# Patient Record
Sex: FEMALE | Race: WHITE | NOT HISPANIC OR LATINO | Employment: OTHER | URBAN - METROPOLITAN AREA
[De-identification: names, ages, dates, MRNs, and addresses within clinical notes are randomized per-mention and may not be internally consistent; named-entity substitution may affect disease eponyms.]

---

## 2018-03-13 ENCOUNTER — CONVERSION ENCOUNTER (OUTPATIENT)
Dept: FAMILY MEDICINE CLINIC | Facility: CLINIC | Age: 73
End: 2018-03-13

## 2018-03-13 ENCOUNTER — OFFICE VISIT CONVERTED (OUTPATIENT)
Dept: FAMILY MEDICINE CLINIC | Facility: CLINIC | Age: 73
End: 2018-03-13
Attending: NURSE PRACTITIONER

## 2018-09-20 ENCOUNTER — OFFICE VISIT CONVERTED (OUTPATIENT)
Dept: FAMILY MEDICINE CLINIC | Facility: CLINIC | Age: 73
End: 2018-09-20
Attending: NURSE PRACTITIONER

## 2018-10-02 ENCOUNTER — CONVERSION ENCOUNTER (OUTPATIENT)
Dept: FAMILY MEDICINE CLINIC | Facility: CLINIC | Age: 73
End: 2018-10-02

## 2018-10-02 ENCOUNTER — OFFICE VISIT CONVERTED (OUTPATIENT)
Dept: FAMILY MEDICINE CLINIC | Facility: CLINIC | Age: 73
End: 2018-10-02
Attending: FAMILY MEDICINE

## 2018-10-18 ENCOUNTER — CONVERSION ENCOUNTER (OUTPATIENT)
Dept: FAMILY MEDICINE CLINIC | Facility: CLINIC | Age: 73
End: 2018-10-18

## 2018-10-18 ENCOUNTER — OFFICE VISIT CONVERTED (OUTPATIENT)
Dept: FAMILY MEDICINE CLINIC | Facility: CLINIC | Age: 73
End: 2018-10-18
Attending: FAMILY MEDICINE

## 2018-10-25 ENCOUNTER — OFFICE VISIT CONVERTED (OUTPATIENT)
Dept: FAMILY MEDICINE CLINIC | Facility: CLINIC | Age: 73
End: 2018-10-25
Attending: FAMILY MEDICINE

## 2018-11-28 ENCOUNTER — OFFICE VISIT CONVERTED (OUTPATIENT)
Dept: FAMILY MEDICINE CLINIC | Facility: CLINIC | Age: 73
End: 2018-11-28
Attending: NURSE PRACTITIONER

## 2018-12-28 ENCOUNTER — CONVERSION ENCOUNTER (OUTPATIENT)
Dept: SURGERY | Facility: CLINIC | Age: 73
End: 2018-12-28

## 2018-12-28 ENCOUNTER — OFFICE VISIT CONVERTED (OUTPATIENT)
Dept: SURGERY | Facility: CLINIC | Age: 73
End: 2018-12-28
Attending: UROLOGY

## 2019-01-02 ENCOUNTER — HOSPITAL ENCOUNTER (OUTPATIENT)
Dept: PERIOP | Facility: HOSPITAL | Age: 74
Setting detail: HOSPITAL OUTPATIENT SURGERY
Discharge: HOME | End: 2019-01-02

## 2019-01-02 LAB
ANION GAP SERPL CALC-SCNC: 15 MMOL/L (ref 8–19)
BUN SERPL-MCNC: 16 MG/DL (ref 5–25)
BUN/CREAT SERPL: 15 {RATIO} (ref 6–20)
CALCIUM SERPL-MCNC: 9.5 MG/DL (ref 8.7–10.4)
CHLORIDE SERPL-SCNC: 101 MMOL/L (ref 99–111)
CONV CO2: 28 MMOL/L (ref 22–32)
CREAT UR-MCNC: 1.05 MG/DL (ref 0.5–0.9)
GFR SERPLBLD BASED ON 1.73 SQ M-ARVRAT: 53 ML/MIN/{1.73_M2}
GLUCOSE BLD-MCNC: 174 MG/DL (ref 65–99)
GLUCOSE SERPL-MCNC: 131 MG/DL (ref 65–99)
OSMOLALITY SERPL CALC.SUM OF ELEC: 291 MOSM/KG (ref 273–304)
POTASSIUM SERPL-SCNC: 5 MMOL/L (ref 3.5–5.3)
SODIUM SERPL-SCNC: 139 MMOL/L (ref 135–147)

## 2019-01-07 ENCOUNTER — HOSPITAL ENCOUNTER (OUTPATIENT)
Dept: SURGERY | Facility: CLINIC | Age: 74
Discharge: HOME | End: 2019-01-07

## 2019-01-09 ENCOUNTER — HOSPITAL ENCOUNTER (OUTPATIENT)
Dept: OTHER | Facility: HOSPITAL | Age: 74
Discharge: HOME | End: 2019-01-09
Attending: NURSE PRACTITIONER

## 2019-01-09 LAB
BACTERIA UR CULT: ABNORMAL
CEFEPIME SUSC ISLT: 2
CEFTAZIDIME SUSC ISLT: 4
CIPROFLOXACIN SUSC ISLT: <=0.25
GENTAMICIN SUSC ISLT: <=1
INR PPP: 2.05 (ref 2–3)
LEVOFLOXACIN SUSC ISLT: 1
PROTHROMBIN TIME: 19.4 S (ref 9.4–12)
TOBRAMYCIN SUSC ISLT: <=1

## 2019-01-14 ENCOUNTER — HOSPITAL ENCOUNTER (OUTPATIENT)
Dept: SURGERY | Facility: CLINIC | Age: 74
Discharge: HOME | End: 2019-01-14

## 2019-01-16 ENCOUNTER — OFFICE VISIT CONVERTED (OUTPATIENT)
Dept: FAMILY MEDICINE CLINIC | Facility: CLINIC | Age: 74
End: 2019-01-16
Attending: NURSE PRACTITIONER

## 2019-01-17 LAB
AMPICILLIN SUSC ISLT: <=2
BACTERIA UR CULT: ABNORMAL
CIPROFLOXACIN SUSC ISLT: >=8
CONV GENTAMICIN HIGH LEVEL SYNERGY: ABNORMAL
CONV STREPTOMYCIN HIGH LEVEL SYNERGY: ABNORMAL
DAPTOMYCIN SUSC ISLT: 4
ERYTHROMYCIN SUSC ISLT: >=8
LEVOFLOXACIN SUSC ISLT: >=8
LINEZOLID SUSC ISLT: 2
NITROFURANTOIN SUSC ISLT: <=16
TETRACYCLINE SUSC ISLT: >=16
TIGECYCLINE SUSC ISLT: <=0.12
VANCOMYCIN SUSC ISLT: 1

## 2019-01-28 ENCOUNTER — HOSPITAL ENCOUNTER (OUTPATIENT)
Dept: SURGERY | Facility: CLINIC | Age: 74
Discharge: HOME | End: 2019-01-28

## 2019-01-30 LAB — BACTERIA UR CULT: NORMAL

## 2019-01-31 ENCOUNTER — HOSPITAL ENCOUNTER (OUTPATIENT)
Dept: PERIOP | Facility: HOSPITAL | Age: 74
Setting detail: HOSPITAL OUTPATIENT SURGERY
Discharge: HOME | End: 2019-01-31

## 2019-01-31 LAB
GLUCOSE BLD-MCNC: 116 MG/DL (ref 65–99)
GLUCOSE BLD-MCNC: 130 MG/DL (ref 65–99)

## 2019-02-08 LAB
COLOR STONE: NORMAL
COMPN STONE: NORMAL
CONV CA OXALATE DIHYDRATE: 15 %
CONV CA OXALATE MONOHYDRATE: 65 %
CONV CALCIUM PHOSPHATE: 20 %
CONV CALCULI COMMENT: NORMAL
CONV CALCULI DISCLAIMER: NORMAL
CONV CALCULI NOTE: NORMAL
NIDUS STONE QL: NORMAL
SIZE STONE: NORMAL MM
WT STONE: 27.4 MG

## 2019-02-22 ENCOUNTER — OFFICE VISIT CONVERTED (OUTPATIENT)
Dept: SURGERY | Facility: CLINIC | Age: 74
End: 2019-02-22
Attending: UROLOGY

## 2019-02-27 ENCOUNTER — HOSPITAL ENCOUNTER (OUTPATIENT)
Dept: GENERAL RADIOLOGY | Facility: HOSPITAL | Age: 74
Discharge: HOME | End: 2019-02-27

## 2019-03-06 ENCOUNTER — OFFICE VISIT CONVERTED (OUTPATIENT)
Dept: GASTROENTEROLOGY | Facility: CLINIC | Age: 74
End: 2019-03-06
Attending: INTERNAL MEDICINE

## 2019-03-06 ENCOUNTER — CONVERSION ENCOUNTER (OUTPATIENT)
Dept: GASTROENTEROLOGY | Facility: CLINIC | Age: 74
End: 2019-03-06

## 2019-03-08 ENCOUNTER — OFFICE VISIT CONVERTED (OUTPATIENT)
Dept: SURGERY | Facility: CLINIC | Age: 74
End: 2019-03-08
Attending: UROLOGY

## 2019-04-03 ENCOUNTER — HOSPITAL ENCOUNTER (OUTPATIENT)
Dept: GENERAL RADIOLOGY | Facility: HOSPITAL | Age: 74
Discharge: HOME | End: 2019-04-03
Attending: INTERNAL MEDICINE

## 2019-04-03 LAB
CREAT BLD-MCNC: 1.1 MG/DL (ref 0.6–1.4)
GFR SERPLBLD BASED ON 1.73 SQ M-ARVRAT: 50 ML/MIN/{1.73_M2}

## 2019-04-09 ENCOUNTER — HOSPITAL ENCOUNTER (OUTPATIENT)
Dept: FAMILY MEDICINE CLINIC | Facility: CLINIC | Age: 74
Discharge: HOME | End: 2019-04-09
Attending: NURSE PRACTITIONER

## 2019-04-09 LAB
ALBUMIN SERPL-MCNC: 3.7 G/DL (ref 3.5–5)
ALBUMIN/GLOB SERPL: 1 {RATIO} (ref 1.4–2.6)
ALP SERPL-CCNC: 68 U/L (ref 43–160)
ALT SERPL-CCNC: 13 U/L (ref 10–40)
ANION GAP SERPL CALC-SCNC: 17 MMOL/L (ref 8–19)
APPEARANCE UR: CLEAR
AST SERPL-CCNC: 23 U/L (ref 15–50)
BASOPHILS # BLD AUTO: 0.05 10*3/UL (ref 0–0.2)
BASOPHILS NFR BLD AUTO: 0.8 % (ref 0–3)
BILIRUB SERPL-MCNC: 0.54 MG/DL (ref 0.2–1.3)
BILIRUB UR QL: NEGATIVE
BUN SERPL-MCNC: 13 MG/DL (ref 5–25)
BUN/CREAT SERPL: 12 {RATIO} (ref 6–20)
CALCIUM SERPL-MCNC: 9.6 MG/DL (ref 8.7–10.4)
CHLORIDE SERPL-SCNC: 99 MMOL/L (ref 99–111)
CHOLEST SERPL-MCNC: 192 MG/DL (ref 107–200)
CHOLEST/HDLC SERPL: 3.3 {RATIO} (ref 3–6)
COLOR UR: ABNORMAL
CONV ABS IMM GRAN: 0.01 10*3/UL (ref 0–0.2)
CONV BACTERIA: ABNORMAL
CONV CO2: 28 MMOL/L (ref 22–32)
CONV COLLECTION SOURCE (UA): ABNORMAL
CONV CREATININE URINE, RANDOM: 175.2 MG/DL (ref 10–300)
CONV IMMATURE GRAN: 0.2 % (ref 0–1.8)
CONV MICROALBUM.,U,RANDOM: 14.7 MG/L (ref 0–20)
CONV TOTAL PROTEIN: 7.4 G/DL (ref 6.3–8.2)
CONV UROBILINOGEN IN URINE BY AUTOMATED TEST STRIP: 0.2 {EHRLICHU}/DL (ref 0.1–1)
CREAT UR-MCNC: 1.12 MG/DL (ref 0.5–0.9)
DEPRECATED RDW RBC AUTO: 51.2 FL (ref 36.4–46.3)
EOSINOPHIL # BLD AUTO: 0.2 10*3/UL (ref 0–0.7)
EOSINOPHIL # BLD AUTO: 3.4 % (ref 0–7)
ERYTHROCYTE [DISTWIDTH] IN BLOOD BY AUTOMATED COUNT: 15.4 % (ref 11.7–14.4)
EST. AVERAGE GLUCOSE BLD GHB EST-MCNC: 120 MG/DL
GFR SERPLBLD BASED ON 1.73 SQ M-ARVRAT: 49 ML/MIN/{1.73_M2}
GLOBULIN UR ELPH-MCNC: 3.7 G/DL (ref 2–3.5)
GLUCOSE SERPL-MCNC: 99 MG/DL (ref 65–99)
GLUCOSE UR QL: NEGATIVE MG/DL
HBA1C MFR BLD: 13.1 G/DL (ref 12–16)
HBA1C MFR BLD: 5.8 % (ref 3.5–5.7)
HCT VFR BLD AUTO: 43.6 % (ref 37–47)
HDLC SERPL-MCNC: 59 MG/DL (ref 40–60)
HGB UR QL STRIP: ABNORMAL
KETONES UR QL STRIP: NEGATIVE MG/DL
LDLC SERPL CALC-MCNC: 109 MG/DL (ref 70–100)
LEUKOCYTE ESTERASE UR QL STRIP: ABNORMAL
LYMPHOCYTES # BLD AUTO: 1.7 10*3/UL (ref 1–5)
MCH RBC QN AUTO: 27.2 PG (ref 27–31)
MCHC RBC AUTO-ENTMCNC: 30 G/DL (ref 33–37)
MCV RBC AUTO: 90.6 FL (ref 81–99)
MICROALBUMIN/CREAT UR: 8.4 MG/G{CRE} (ref 0–35)
MONOCYTES # BLD AUTO: 0.58 10*3/UL (ref 0.2–1.2)
MONOCYTES NFR BLD AUTO: 9.8 % (ref 3–10)
NEUTROPHILS # BLD AUTO: 3.4 10*3/UL (ref 2–8)
NEUTROPHILS NFR BLD AUTO: 57.2 % (ref 30–85)
NITRITE UR QL STRIP: NEGATIVE
NRBC CBCN: 0 % (ref 0–0.7)
OSMOLALITY SERPL CALC.SUM OF ELEC: 290 MOSM/KG (ref 273–304)
PH UR STRIP.AUTO: 6.5 [PH] (ref 5–8)
PLATELET # BLD AUTO: 211 10*3/UL (ref 130–400)
PMV BLD AUTO: 10.8 FL (ref 9.4–12.3)
POTASSIUM SERPL-SCNC: 4.1 MMOL/L (ref 3.5–5.3)
PROT UR QL: NEGATIVE MG/DL
RBC # BLD AUTO: 4.81 10*6/UL (ref 4.2–5.4)
RBC #/AREA URNS HPF: ABNORMAL /[HPF]
SODIUM SERPL-SCNC: 140 MMOL/L (ref 135–147)
SP GR UR: 1.02 (ref 1–1.03)
SQUAMOUS SPT QL MICRO: ABNORMAL /[HPF]
TRIGL SERPL-MCNC: 122 MG/DL (ref 40–150)
VARIANT LYMPHS NFR BLD MANUAL: 28.6 % (ref 20–45)
VLDLC SERPL-MCNC: 24 MG/DL (ref 5–37)
WBC # BLD AUTO: 5.94 10*3/UL (ref 4.8–10.8)
WBC #/AREA URNS HPF: ABNORMAL /[HPF]

## 2019-04-12 LAB — BACTERIA UR CULT: NORMAL

## 2019-06-21 ENCOUNTER — HOSPITAL ENCOUNTER (OUTPATIENT)
Dept: OTHER | Facility: HOSPITAL | Age: 74
Discharge: HOME | End: 2019-06-21
Attending: NURSE PRACTITIONER

## 2019-06-21 LAB
INR PPP: 2.55 (ref 2–3)
PROTHROMBIN TIME: 23.8 S (ref 9.4–12)

## 2019-07-05 ENCOUNTER — OFFICE VISIT CONVERTED (OUTPATIENT)
Dept: FAMILY MEDICINE CLINIC | Facility: CLINIC | Age: 74
End: 2019-07-05
Attending: NURSE PRACTITIONER

## 2019-07-05 ENCOUNTER — HOSPITAL ENCOUNTER (OUTPATIENT)
Dept: FAMILY MEDICINE CLINIC | Facility: CLINIC | Age: 74
Discharge: HOME | End: 2019-07-05
Attending: NURSE PRACTITIONER

## 2019-07-05 ENCOUNTER — CONVERSION ENCOUNTER (OUTPATIENT)
Dept: FAMILY MEDICINE CLINIC | Facility: CLINIC | Age: 74
End: 2019-07-05

## 2019-07-25 ENCOUNTER — HOSPITAL ENCOUNTER (OUTPATIENT)
Dept: OTHER | Facility: HOSPITAL | Age: 74
Discharge: HOME | End: 2019-07-25
Attending: NURSE PRACTITIONER

## 2019-07-25 LAB
INR PPP: 2.34 (ref 2–3)
PROTHROMBIN TIME: 22 S (ref 9.4–12)

## 2019-07-30 ENCOUNTER — OFFICE VISIT CONVERTED (OUTPATIENT)
Dept: ORTHOPEDIC SURGERY | Facility: CLINIC | Age: 74
End: 2019-07-30
Attending: ORTHOPAEDIC SURGERY

## 2019-07-30 ENCOUNTER — CONVERSION ENCOUNTER (OUTPATIENT)
Dept: ORTHOPEDIC SURGERY | Facility: CLINIC | Age: 74
End: 2019-07-30

## 2019-09-03 ENCOUNTER — HOSPITAL ENCOUNTER (OUTPATIENT)
Dept: OTHER | Facility: HOSPITAL | Age: 74
Discharge: HOME | End: 2019-09-03
Attending: NURSE PRACTITIONER

## 2019-09-03 LAB
INR PPP: 2.1 (ref 2–3)
PROTHROMBIN TIME: 19.9 S (ref 9.4–12)

## 2019-10-14 ENCOUNTER — HOSPITAL ENCOUNTER (OUTPATIENT)
Dept: OTHER | Facility: HOSPITAL | Age: 74
Discharge: HOME | End: 2019-10-14
Attending: NURSE PRACTITIONER

## 2019-10-14 LAB
INR PPP: 2.62 (ref 2–3)
PROTHROMBIN TIME: 25.5 S (ref 9.4–12)

## 2019-11-01 ENCOUNTER — HOSPITAL ENCOUNTER (OUTPATIENT)
Dept: FAMILY MEDICINE CLINIC | Facility: CLINIC | Age: 74
Discharge: HOME | End: 2019-11-01
Attending: INTERNAL MEDICINE

## 2019-11-01 ENCOUNTER — OFFICE VISIT CONVERTED (OUTPATIENT)
Dept: FAMILY MEDICINE CLINIC | Facility: CLINIC | Age: 74
End: 2019-11-01
Attending: NURSE PRACTITIONER

## 2019-11-01 LAB
ALBUMIN SERPL-MCNC: 4 G/DL (ref 3.5–5)
ALBUMIN/GLOB SERPL: 1.1 {RATIO} (ref 1.4–2.6)
ALP SERPL-CCNC: 63 U/L (ref 43–160)
ALT SERPL-CCNC: 14 U/L (ref 10–40)
ANION GAP SERPL CALC-SCNC: 21 MMOL/L (ref 8–19)
AST SERPL-CCNC: 31 U/L (ref 15–50)
BASOPHILS # BLD AUTO: 0.06 10*3/UL (ref 0–0.2)
BASOPHILS NFR BLD AUTO: 1 % (ref 0–3)
BILIRUB SERPL-MCNC: 0.53 MG/DL (ref 0.2–1.3)
BUN SERPL-MCNC: 12 MG/DL (ref 5–25)
BUN/CREAT SERPL: 11 {RATIO} (ref 6–20)
CALCIUM SERPL-MCNC: 9.9 MG/DL (ref 8.7–10.4)
CHLORIDE SERPL-SCNC: 102 MMOL/L (ref 99–111)
CHOLEST SERPL-MCNC: 190 MG/DL (ref 107–200)
CHOLEST/HDLC SERPL: 3.3 {RATIO} (ref 3–6)
CONV ABS IMM GRAN: 0.01 10*3/UL (ref 0–0.2)
CONV CO2: 24 MMOL/L (ref 22–32)
CONV CREATININE URINE, RANDOM: 101.9 MG/DL (ref 10–300)
CONV CREATININE URINE, RANDOM: 107.1 MG/DL (ref 10–300)
CONV IMMATURE GRAN: 0.2 % (ref 0–1.8)
CONV MICROALBUM.,U,RANDOM: 94.6 MG/L (ref 0–20)
CONV PROTEIN TO CREATININE RATIO (RANDOM URINE): 0.33 {RATIO} (ref 0–0.1)
CONV TOTAL PROTEIN: 7.6 G/DL (ref 6.3–8.2)
CREAT UR-MCNC: 1.06 MG/DL (ref 0.5–0.9)
DEPRECATED RDW RBC AUTO: 51.7 FL (ref 36.4–46.3)
EOSINOPHIL # BLD AUTO: 0.19 10*3/UL (ref 0–0.7)
EOSINOPHIL # BLD AUTO: 3.2 % (ref 0–7)
ERYTHROCYTE [DISTWIDTH] IN BLOOD BY AUTOMATED COUNT: 15.3 % (ref 11.7–14.4)
EST. AVERAGE GLUCOSE BLD GHB EST-MCNC: 123 MG/DL
GFR SERPLBLD BASED ON 1.73 SQ M-ARVRAT: 52 ML/MIN/{1.73_M2}
GLOBULIN UR ELPH-MCNC: 3.6 G/DL (ref 2–3.5)
GLUCOSE SERPL-MCNC: 129 MG/DL (ref 65–99)
HBA1C MFR BLD: 5.9 % (ref 3.5–5.7)
HCT VFR BLD AUTO: 46.6 % (ref 37–47)
HDLC SERPL-MCNC: 58 MG/DL (ref 40–60)
HGB BLD-MCNC: 14.4 G/DL (ref 12–16)
LDLC SERPL CALC-MCNC: 109 MG/DL (ref 70–100)
LYMPHOCYTES # BLD AUTO: 1.36 10*3/UL (ref 1–5)
LYMPHOCYTES NFR BLD AUTO: 22.6 % (ref 20–45)
MCH RBC QN AUTO: 28.6 PG (ref 27–31)
MCHC RBC AUTO-ENTMCNC: 30.9 G/DL (ref 33–37)
MCV RBC AUTO: 92.5 FL (ref 81–99)
MICROALBUMIN/CREAT UR: 92.8 MG/G{CRE} (ref 0–35)
MONOCYTES # BLD AUTO: 0.65 10*3/UL (ref 0.2–1.2)
MONOCYTES NFR BLD AUTO: 10.8 % (ref 3–10)
NEUTROPHILS # BLD AUTO: 3.75 10*3/UL (ref 2–8)
NEUTROPHILS NFR BLD AUTO: 62.2 % (ref 30–85)
NRBC CBCN: 0 % (ref 0–0.7)
OSMOLALITY SERPL CALC.SUM OF ELEC: 295 MOSM/KG (ref 273–304)
PLATELET # BLD AUTO: 214 10*3/UL (ref 130–400)
PMV BLD AUTO: 10.5 FL (ref 9.4–12.3)
POTASSIUM SERPL-SCNC: 5.1 MMOL/L (ref 3.5–5.3)
PROT UR-MCNC: 35.8 MG/DL
RBC # BLD AUTO: 5.04 10*6/UL (ref 4.2–5.4)
SODIUM SERPL-SCNC: 142 MMOL/L (ref 135–147)
T4 FREE SERPL-MCNC: 1.1 NG/DL (ref 0.9–1.8)
TRIGL SERPL-MCNC: 114 MG/DL (ref 40–150)
TSH SERPL-ACNC: 2.82 M[IU]/L (ref 0.27–4.2)
VLDLC SERPL-MCNC: 23 MG/DL (ref 5–37)
WBC # BLD AUTO: 6.02 10*3/UL (ref 4.8–10.8)

## 2019-11-03 LAB
AMPICILLIN SUSC ISLT: 16
AMPICILLIN+SULBAC SUSC ISLT: 16
BACTERIA UR CULT: ABNORMAL
CEFAZOLIN SUSC ISLT: >=64
CEFEPIME SUSC ISLT: <=1
CEFTAZIDIME SUSC ISLT: <=1
CEFTRIAXONE SUSC ISLT: <=1
CEFUROXIME ORAL SUSC ISLT: <=1
CEFUROXIME PARENTER SUSC ISLT: <=1
CIPROFLOXACIN SUSC ISLT: <=0.25
ERTAPENEM SUSC ISLT: <=0.5
GENTAMICIN SUSC ISLT: 2
LEVOFLOXACIN SUSC ISLT: <=0.12
NITROFURANTOIN SUSC ISLT: 256
TETRACYCLINE SUSC ISLT: >=16
TMP SMX SUSC ISLT: <=20
TOBRAMYCIN SUSC ISLT: 2

## 2019-12-03 ENCOUNTER — HOSPITAL ENCOUNTER (OUTPATIENT)
Dept: OTHER | Facility: HOSPITAL | Age: 74
Discharge: HOME | End: 2019-12-03
Attending: NURSE PRACTITIONER

## 2019-12-03 LAB
INR PPP: 3.59 (ref 2–3)
PROTHROMBIN TIME: 34.8 S (ref 9.4–12)

## 2019-12-10 ENCOUNTER — HOSPITAL ENCOUNTER (OUTPATIENT)
Dept: OTHER | Facility: HOSPITAL | Age: 74
Discharge: HOME | End: 2019-12-10
Attending: NURSE PRACTITIONER

## 2019-12-10 LAB
APPEARANCE UR: ABNORMAL
BILIRUB UR QL: NEGATIVE
COLOR UR: YELLOW
CONV BACTERIA: ABNORMAL
CONV COLLECTION SOURCE (UA): ABNORMAL
CONV CRYSTALS: ABNORMAL /[HPF]
CONV HYALINE CASTS IN URINE MICRO: ABNORMAL /[LPF]
CONV UROBILINOGEN IN URINE BY AUTOMATED TEST STRIP: 1 {EHRLICHU}/DL (ref 0.1–1)
GLUCOSE UR QL: NEGATIVE MG/DL
HGB UR QL STRIP: ABNORMAL
INR PPP: 2.28 (ref 2–3)
KETONES UR QL STRIP: NEGATIVE MG/DL
LEUKOCYTE ESTERASE UR QL STRIP: ABNORMAL
NITRITE UR QL STRIP: NEGATIVE
PH UR STRIP.AUTO: 6.5 [PH] (ref 5–8)
PROT UR QL: 30 MG/DL
PROTHROMBIN TIME: 22.4 S (ref 9.4–12)
RBC #/AREA URNS HPF: ABNORMAL /[HPF]
SP GR UR: 1.02 (ref 1–1.03)
SQUAMOUS SPT QL MICRO: ABNORMAL /[HPF]
WBC #/AREA URNS HPF: ABNORMAL /[HPF]

## 2019-12-12 LAB
AMOXICILLIN+CLAV SUSC ISLT: <=2
AMPICILLIN SUSC ISLT: >=32
AMPICILLIN+SULBAC SUSC ISLT: <=2
BACTERIA UR CULT: ABNORMAL
CEFAZOLIN SUSC ISLT: <=4
CEFEPIME SUSC ISLT: <=1
CEFTAZIDIME SUSC ISLT: <=1
CEFTRIAXONE SUSC ISLT: <=1
CEFUROXIME ORAL SUSC ISLT: <=1
CEFUROXIME PARENTER SUSC ISLT: <=1
CIPROFLOXACIN SUSC ISLT: <=0.25
ERTAPENEM SUSC ISLT: <=0.5
GENTAMICIN SUSC ISLT: <=1
LEVOFLOXACIN SUSC ISLT: 0.25
NITROFURANTOIN SUSC ISLT: 128
TETRACYCLINE SUSC ISLT: >=16
TMP SMX SUSC ISLT: >=320
TOBRAMYCIN SUSC ISLT: <=1

## 2020-01-21 ENCOUNTER — HOSPITAL ENCOUNTER (OUTPATIENT)
Dept: OTHER | Facility: HOSPITAL | Age: 75
Discharge: HOME | End: 2020-01-21
Attending: NURSE PRACTITIONER

## 2020-01-21 LAB
INR PPP: 2.2 (ref 2–3)
PROTHROMBIN TIME: 21.6 S (ref 9.4–12)

## 2020-01-28 ENCOUNTER — OFFICE VISIT CONVERTED (OUTPATIENT)
Dept: FAMILY MEDICINE CLINIC | Facility: CLINIC | Age: 75
End: 2020-01-28
Attending: NURSE PRACTITIONER

## 2020-03-11 ENCOUNTER — HOSPITAL ENCOUNTER (OUTPATIENT)
Dept: OTHER | Facility: HOSPITAL | Age: 75
Discharge: HOME | End: 2020-03-11
Attending: NURSE PRACTITIONER

## 2020-03-11 LAB
INR PPP: 2.12 (ref 2–3)
PROTHROMBIN TIME: 20.9 S (ref 9.4–12)

## 2020-04-17 ENCOUNTER — HOSPITAL ENCOUNTER (OUTPATIENT)
Dept: FAMILY MEDICINE CLINIC | Facility: CLINIC | Age: 75
Discharge: HOME | End: 2020-04-17
Attending: NURSE PRACTITIONER

## 2020-04-17 LAB
INR PPP: 2.44 (ref 2–3)
PROTHROMBIN TIME: 23.9 S (ref 9.4–12)

## 2020-04-24 ENCOUNTER — TELEPHONE CONVERTED (OUTPATIENT)
Dept: FAMILY MEDICINE CLINIC | Facility: CLINIC | Age: 75
End: 2020-04-24
Attending: NURSE PRACTITIONER

## 2020-06-01 ENCOUNTER — HOSPITAL ENCOUNTER (OUTPATIENT)
Dept: FAMILY MEDICINE CLINIC | Facility: CLINIC | Age: 75
Discharge: HOME | End: 2020-06-01
Attending: NURSE PRACTITIONER

## 2020-06-01 LAB
INR PPP: 3.52 (ref 2–3)
PROTHROMBIN TIME: 34.1 S (ref 9.4–12)

## 2020-06-30 ENCOUNTER — HOSPITAL ENCOUNTER (OUTPATIENT)
Dept: FAMILY MEDICINE CLINIC | Facility: CLINIC | Age: 75
Discharge: HOME | End: 2020-06-30
Attending: NURSE PRACTITIONER

## 2020-06-30 ENCOUNTER — OFFICE VISIT CONVERTED (OUTPATIENT)
Dept: FAMILY MEDICINE CLINIC | Facility: CLINIC | Age: 75
End: 2020-06-30
Attending: NURSE PRACTITIONER

## 2020-06-30 LAB
ALBUMIN SERPL-MCNC: 4 G/DL (ref 3.5–5)
ALBUMIN/GLOB SERPL: 1.1 {RATIO} (ref 1.4–2.6)
ALP SERPL-CCNC: 63 U/L (ref 43–160)
ALT SERPL-CCNC: 10 U/L (ref 10–40)
ANION GAP SERPL CALC-SCNC: 22 MMOL/L (ref 8–19)
APPEARANCE UR: ABNORMAL
AST SERPL-CCNC: 24 U/L (ref 15–50)
BASOPHILS # BLD AUTO: 0.08 10*3/UL (ref 0–0.2)
BASOPHILS NFR BLD AUTO: 1.3 % (ref 0–3)
BILIRUB SERPL-MCNC: 0.76 MG/DL (ref 0.2–1.3)
BILIRUB UR QL: NEGATIVE
BUN SERPL-MCNC: 13 MG/DL (ref 5–25)
BUN/CREAT SERPL: 10 {RATIO} (ref 6–20)
CALCIUM SERPL-MCNC: 10.4 MG/DL (ref 8.7–10.4)
CHLORIDE SERPL-SCNC: 98 MMOL/L (ref 99–111)
CHOLEST SERPL-MCNC: 152 MG/DL (ref 107–200)
CHOLEST/HDLC SERPL: 2.8 {RATIO} (ref 3–6)
COLOR UR: YELLOW
CONV ABS IMM GRAN: 0.01 10*3/UL (ref 0–0.2)
CONV BACTERIA: ABNORMAL
CONV CO2: 26 MMOL/L (ref 22–32)
CONV COLLECTION SOURCE (UA): ABNORMAL
CONV HYALINE CASTS IN URINE MICRO: ABNORMAL /[LPF]
CONV IMMATURE GRAN: 0.2 % (ref 0–1.8)
CONV TOTAL PROTEIN: 7.7 G/DL (ref 6.3–8.2)
CONV UROBILINOGEN IN URINE BY AUTOMATED TEST STRIP: 0.2 {EHRLICHU}/DL (ref 0.1–1)
CREAT UR-MCNC: 1.26 MG/DL (ref 0.5–0.9)
DEPRECATED RDW RBC AUTO: 51.8 FL (ref 36.4–46.3)
EOSINOPHIL # BLD AUTO: 0.28 10*3/UL (ref 0–0.7)
EOSINOPHIL # BLD AUTO: 4.7 % (ref 0–7)
ERYTHROCYTE [DISTWIDTH] IN BLOOD BY AUTOMATED COUNT: 15.4 % (ref 11.7–14.4)
GFR SERPLBLD BASED ON 1.73 SQ M-ARVRAT: 42 ML/MIN/{1.73_M2}
GLOBULIN UR ELPH-MCNC: 3.7 G/DL (ref 2–3.5)
GLUCOSE SERPL-MCNC: 101 MG/DL (ref 65–99)
GLUCOSE UR QL: NEGATIVE MG/DL
HCT VFR BLD AUTO: 45.1 % (ref 37–47)
HDLC SERPL-MCNC: 54 MG/DL (ref 40–60)
HGB BLD-MCNC: 13.9 G/DL (ref 12–16)
HGB UR QL STRIP: ABNORMAL
KETONES UR QL STRIP: NEGATIVE MG/DL
LDLC SERPL CALC-MCNC: 76 MG/DL (ref 70–100)
LEUKOCYTE ESTERASE UR QL STRIP: ABNORMAL
LYMPHOCYTES # BLD AUTO: 1.57 10*3/UL (ref 1–5)
LYMPHOCYTES NFR BLD AUTO: 26.2 % (ref 20–45)
MCH RBC QN AUTO: 28.6 PG (ref 27–31)
MCHC RBC AUTO-ENTMCNC: 30.8 G/DL (ref 33–37)
MCV RBC AUTO: 92.8 FL (ref 81–99)
MONOCYTES # BLD AUTO: 0.65 10*3/UL (ref 0.2–1.2)
MONOCYTES NFR BLD AUTO: 10.8 % (ref 3–10)
NEUTROPHILS # BLD AUTO: 3.41 10*3/UL (ref 2–8)
NEUTROPHILS NFR BLD AUTO: 56.8 % (ref 30–85)
NITRITE UR QL STRIP: NEGATIVE
NRBC CBCN: 0 % (ref 0–0.7)
OSMOLALITY SERPL CALC.SUM OF ELEC: 292 MOSM/KG (ref 273–304)
PH UR STRIP.AUTO: 7.5 [PH] (ref 5–8)
PLATELET # BLD AUTO: 188 10*3/UL (ref 130–400)
PMV BLD AUTO: 10.8 FL (ref 9.4–12.3)
POTASSIUM SERPL-SCNC: 4.7 MMOL/L (ref 3.5–5.3)
PROT UR QL: ABNORMAL MG/DL
RBC # BLD AUTO: 4.86 10*6/UL (ref 4.2–5.4)
RBC #/AREA URNS HPF: ABNORMAL /[HPF]
SODIUM SERPL-SCNC: 141 MMOL/L (ref 135–147)
SP GR UR: 1.01 (ref 1–1.03)
TRIGL SERPL-MCNC: 108 MG/DL (ref 40–150)
VLDLC SERPL-MCNC: 22 MG/DL (ref 5–37)
WBC # BLD AUTO: 6 10*3/UL (ref 4.8–10.8)
WBC #/AREA URNS HPF: ABNORMAL /[HPF]

## 2020-07-01 LAB
EST. AVERAGE GLUCOSE BLD GHB EST-MCNC: 117 MG/DL
HBA1C MFR BLD: 5.7 % (ref 3.5–5.7)

## 2020-07-02 LAB
AMPHETAMINES UR QL SCN: NEGATIVE
BARBITURATES UR QL SCN: NEGATIVE
BENZODIAZ UR QL SCN: NEGATIVE
CONV COCAINE, UR: NEGATIVE
CONV CREATININE URINE, RANDOM: 87.5 MG/DL (ref 10–300)
CONV MICROALBUM.,U,RANDOM: 56.1 MG/L (ref 0–20)
METHADONE UR QL SCN: NEGATIVE
MICROALBUMIN/CREAT UR: 64.1 MG/G{CRE} (ref 0–35)
OPIATES TESTED UR SCN: NEGATIVE
OXYCODONE UR QL SCN: NEGATIVE
PCP UR QL: NEGATIVE
THC SERPLBLD CFM-MCNC: NEGATIVE NG/ML

## 2020-07-24 ENCOUNTER — HOSPITAL ENCOUNTER (OUTPATIENT)
Dept: FAMILY MEDICINE CLINIC | Facility: CLINIC | Age: 75
Discharge: HOME | End: 2020-07-24
Attending: NURSE PRACTITIONER

## 2020-07-26 LAB
AMOXICILLIN+CLAV SUSC ISLT: <=2
AMPICILLIN SUSC ISLT: <=2
AMPICILLIN+SULBAC SUSC ISLT: <=2
BACTERIA UR CULT: ABNORMAL
CEFAZOLIN SUSC ISLT: <=4
CEFEPIME SUSC ISLT: <=1
CEFTAZIDIME SUSC ISLT: <=1
CEFTRIAXONE SUSC ISLT: <=1
CEFUROXIME ORAL SUSC ISLT: 4
CEFUROXIME PARENTER SUSC ISLT: 4
CIPROFLOXACIN SUSC ISLT: <=0.25
ERTAPENEM SUSC ISLT: <=0.5
GENTAMICIN SUSC ISLT: <=1
LEVOFLOXACIN SUSC ISLT: <=0.12
NITROFURANTOIN SUSC ISLT: <=16
TETRACYCLINE SUSC ISLT: <=1
TMP SMX SUSC ISLT: <=20
TOBRAMYCIN SUSC ISLT: <=1

## 2020-08-06 ENCOUNTER — HOSPITAL ENCOUNTER (OUTPATIENT)
Dept: FAMILY MEDICINE CLINIC | Facility: CLINIC | Age: 75
Discharge: HOME | End: 2020-08-06
Attending: NURSE PRACTITIONER

## 2020-08-08 LAB
AMOXICILLIN+CLAV SUSC ISLT: <=2
AMPICILLIN SUSC ISLT: <=2
AMPICILLIN+SULBAC SUSC ISLT: <=2
BACTERIA UR CULT: ABNORMAL
CEFAZOLIN SUSC ISLT: <=4
CEFEPIME SUSC ISLT: <=1
CEFTAZIDIME SUSC ISLT: <=1
CEFTRIAXONE SUSC ISLT: <=1
CEFUROXIME ORAL SUSC ISLT: 4
CEFUROXIME PARENTER SUSC ISLT: 4
CIPROFLOXACIN SUSC ISLT: <=0.25
ERTAPENEM SUSC ISLT: <=0.5
GENTAMICIN SUSC ISLT: <=1
LEVOFLOXACIN SUSC ISLT: <=0.12
NITROFURANTOIN SUSC ISLT: 64
TETRACYCLINE SUSC ISLT: <=1
TMP SMX SUSC ISLT: <=20
TOBRAMYCIN SUSC ISLT: <=1

## 2020-09-22 ENCOUNTER — OFFICE VISIT CONVERTED (OUTPATIENT)
Dept: FAMILY MEDICINE CLINIC | Facility: CLINIC | Age: 75
End: 2020-09-22
Attending: NURSE PRACTITIONER

## 2020-09-22 ENCOUNTER — HOSPITAL ENCOUNTER (OUTPATIENT)
Dept: FAMILY MEDICINE CLINIC | Facility: CLINIC | Age: 75
Discharge: HOME | End: 2020-09-22
Attending: NURSE PRACTITIONER

## 2020-09-22 LAB
ALBUMIN SERPL-MCNC: 3 G/DL (ref 3.5–5)
ALBUMIN/GLOB SERPL: 0.8 {RATIO} (ref 1.4–2.6)
ALP SERPL-CCNC: 78 U/L (ref 43–160)
ALT SERPL-CCNC: 13 U/L (ref 10–40)
ANION GAP SERPL CALC-SCNC: 17 MMOL/L (ref 8–19)
AST SERPL-CCNC: 36 U/L (ref 15–50)
BASOPHILS # BLD AUTO: 0.05 10*3/UL (ref 0–0.2)
BASOPHILS NFR BLD AUTO: 0.7 % (ref 0–3)
BILIRUB SERPL-MCNC: 0.72 MG/DL (ref 0.2–1.3)
BUN SERPL-MCNC: 21 MG/DL (ref 5–25)
BUN/CREAT SERPL: 13 {RATIO} (ref 6–20)
CALCIUM SERPL-MCNC: 10.6 MG/DL (ref 8.7–10.4)
CHLORIDE SERPL-SCNC: 91 MMOL/L (ref 99–111)
CONV ABS IMM GRAN: 0.03 10*3/UL (ref 0–0.2)
CONV CO2: 29 MMOL/L (ref 22–32)
CONV IMMATURE GRAN: 0.4 % (ref 0–1.8)
CONV TOTAL PROTEIN: 7 G/DL (ref 6.3–8.2)
CREAT UR-MCNC: 1.58 MG/DL (ref 0.5–0.9)
DEPRECATED RDW RBC AUTO: 53.7 FL (ref 36.4–46.3)
EOSINOPHIL # BLD AUTO: 0.05 10*3/UL (ref 0–0.7)
EOSINOPHIL # BLD AUTO: 0.7 % (ref 0–7)
ERYTHROCYTE [DISTWIDTH] IN BLOOD BY AUTOMATED COUNT: 15.6 % (ref 11.7–14.4)
GFR SERPLBLD BASED ON 1.73 SQ M-ARVRAT: 32 ML/MIN/{1.73_M2}
GLOBULIN UR ELPH-MCNC: 4 G/DL (ref 2–3.5)
GLUCOSE SERPL-MCNC: 157 MG/DL (ref 65–99)
HCT VFR BLD AUTO: 49.5 % (ref 37–47)
HGB BLD-MCNC: 15.6 G/DL (ref 12–16)
LYMPHOCYTES # BLD AUTO: 1.15 10*3/UL (ref 1–5)
LYMPHOCYTES NFR BLD AUTO: 16.8 % (ref 20–45)
MCH RBC QN AUTO: 29.5 PG (ref 27–31)
MCHC RBC AUTO-ENTMCNC: 31.5 G/DL (ref 33–37)
MCV RBC AUTO: 93.6 FL (ref 81–99)
MONOCYTES # BLD AUTO: 0.65 10*3/UL (ref 0.2–1.2)
MONOCYTES NFR BLD AUTO: 9.5 % (ref 3–10)
NEUTROPHILS # BLD AUTO: 4.93 10*3/UL (ref 2–8)
NEUTROPHILS NFR BLD AUTO: 71.9 % (ref 30–85)
NRBC CBCN: 0 % (ref 0–0.7)
OSMOLALITY SERPL CALC.SUM OF ELEC: 284 MOSM/KG (ref 273–304)
PLATELET # BLD AUTO: 218 10*3/UL (ref 130–400)
PMV BLD AUTO: 11 FL (ref 9.4–12.3)
POTASSIUM SERPL-SCNC: 3.4 MMOL/L (ref 3.5–5.3)
RBC # BLD AUTO: 5.29 10*6/UL (ref 4.2–5.4)
SODIUM SERPL-SCNC: 134 MMOL/L (ref 135–147)
WBC # BLD AUTO: 6.86 10*3/UL (ref 4.8–10.8)

## 2020-09-26 LAB
AMPICILLIN SUSC ISLT: >=32
AMPICILLIN+SULBAC SUSC ISLT: >=32
BACTERIA UR CULT: ABNORMAL
CEFAZOLIN SUSC ISLT: >=64
CEFEPIME SUSC ISLT: >=32
CEFTAZIDIME SUSC ISLT: 4
CEFTRIAXONE SUSC ISLT: >=64
CIPROFLOXACIN SUSC ISLT: 2
CONV ERTAPENEM SUSCEPTIBILITY BY DISK DIFFUSION (KB): 31
CONV MEROPENEM (BP): 0.05
GENTAMICIN SUSC ISLT: <=1
LEVOFLOXACIN SUSC ISLT: 4
NITROFURANTOIN SUSC ISLT: <=16
PIP+TAZO SUSC ISLT: >=128
TMP SMX SUSC ISLT: >=320
TOBRAMYCIN SUSC ISLT: <=1

## 2020-09-30 ENCOUNTER — PATIENT OUTREACH - CONVERTED (OUTPATIENT)
Dept: FAMILY MEDICINE CLINIC | Facility: CLINIC | Age: 75
End: 2020-09-30
Attending: NURSE PRACTITIONER

## 2020-10-02 ENCOUNTER — CONVERSION ENCOUNTER (OUTPATIENT)
Dept: SURGERY | Facility: CLINIC | Age: 75
End: 2020-10-02

## 2020-10-02 ENCOUNTER — OFFICE VISIT CONVERTED (OUTPATIENT)
Dept: UROLOGY | Facility: CLINIC | Age: 75
End: 2020-10-02
Attending: NURSE PRACTITIONER

## 2020-12-30 ENCOUNTER — PATIENT OUTREACH - CONVERTED (OUTPATIENT)
Dept: FAMILY MEDICINE CLINIC | Facility: CLINIC | Age: 75
End: 2020-12-30
Attending: NURSE PRACTITIONER

## 2021-01-07 ENCOUNTER — CONVERSION ENCOUNTER (OUTPATIENT)
Dept: FAMILY MEDICINE CLINIC | Facility: CLINIC | Age: 76
End: 2021-01-07

## 2021-02-08 ENCOUNTER — HOSPITAL ENCOUNTER (OUTPATIENT)
Dept: OTHER | Facility: HOSPITAL | Age: 76
Discharge: HOME | End: 2021-02-08
Attending: NURSE PRACTITIONER

## 2021-02-08 LAB
INR PPP: 2.6 (ref 2–3)
PROTHROMBIN TIME: 26.4 S (ref 9.4–12)

## 2021-02-23 ENCOUNTER — TELEMEDICINE CONVERTED (OUTPATIENT)
Dept: FAMILY MEDICINE CLINIC | Facility: CLINIC | Age: 76
End: 2021-02-23
Attending: NURSE PRACTITIONER

## 2021-02-26 ENCOUNTER — PATIENT OUTREACH - CONVERTED (OUTPATIENT)
Dept: FAMILY MEDICINE CLINIC | Facility: CLINIC | Age: 76
End: 2021-02-26
Attending: NURSE PRACTITIONER

## 2021-03-09 ENCOUNTER — HOSPITAL ENCOUNTER (OUTPATIENT)
Dept: FAMILY MEDICINE CLINIC | Facility: CLINIC | Age: 76
Discharge: HOME | End: 2021-03-09
Attending: NURSE PRACTITIONER

## 2021-03-09 ENCOUNTER — CONVERSION ENCOUNTER (OUTPATIENT)
Dept: FAMILY MEDICINE CLINIC | Facility: CLINIC | Age: 76
End: 2021-03-09

## 2021-03-09 ENCOUNTER — OFFICE VISIT CONVERTED (OUTPATIENT)
Dept: FAMILY MEDICINE CLINIC | Facility: CLINIC | Age: 76
End: 2021-03-09
Attending: NURSE PRACTITIONER

## 2021-03-09 LAB
ANION GAP SERPL CALC-SCNC: 15 MMOL/L (ref 8–19)
BUN SERPL-MCNC: 5 MG/DL (ref 5–25)
BUN/CREAT SERPL: 5 {RATIO} (ref 6–20)
CALCIUM SERPL-MCNC: 10.3 MG/DL (ref 8.7–10.4)
CHLORIDE SERPL-SCNC: 105 MMOL/L (ref 99–111)
CONV CO2: 26 MMOL/L (ref 22–32)
CREAT UR-MCNC: 1.07 MG/DL (ref 0.5–0.9)
GFR SERPLBLD BASED ON 1.73 SQ M-ARVRAT: 51 ML/MIN/{1.73_M2}
GLUCOSE SERPL-MCNC: 119 MG/DL (ref 65–99)
MAGNESIUM SERPL-MCNC: 1.53 MG/DL (ref 1.6–2.3)
OSMOLALITY SERPL CALC.SUM OF ELEC: 294 MOSM/KG (ref 273–304)
POTASSIUM SERPL-SCNC: 3.4 MMOL/L (ref 3.5–5.3)
SODIUM SERPL-SCNC: 143 MMOL/L (ref 135–147)

## 2021-03-10 ENCOUNTER — HOSPITAL ENCOUNTER (OUTPATIENT)
Dept: OTHER | Facility: HOSPITAL | Age: 76
Discharge: HOME | End: 2021-03-10
Attending: NURSE PRACTITIONER

## 2021-03-10 LAB
APPEARANCE UR: ABNORMAL
BILIRUB UR QL: NEGATIVE
COLOR UR: YELLOW
CONV BACTERIA: ABNORMAL
CONV COLLECTION SOURCE (UA): ABNORMAL
CONV HYALINE CASTS IN URINE MICRO: ABNORMAL /[LPF]
CONV UROBILINOGEN IN URINE BY AUTOMATED TEST STRIP: 0.2 {EHRLICHU}/DL (ref 0.1–1)
GLUCOSE UR QL: NEGATIVE MG/DL
HGB UR QL STRIP: ABNORMAL
KETONES UR QL STRIP: NEGATIVE MG/DL
LEUKOCYTE ESTERASE UR QL STRIP: ABNORMAL
NITRITE UR QL STRIP: POSITIVE
PH UR STRIP.AUTO: 6.5 [PH] (ref 5–8)
PROT UR QL: 30 MG/DL
RBC #/AREA URNS HPF: ABNORMAL /[HPF]
SP GR UR: 1.01 (ref 1–1.03)
SQUAMOUS SPT QL MICRO: ABNORMAL /[HPF]
WBC #/AREA URNS HPF: ABNORMAL /[HPF]

## 2021-03-13 LAB
AMPICILLIN SUSC ISLT: >=32
AMPICILLIN+SULBAC SUSC ISLT: >=32
BACTERIA UR CULT: ABNORMAL
CEFAZOLIN SUSC ISLT: >=64
CEFEPIME SUSC ISLT: >=32
CEFTAZIDIME SUSC ISLT: 4
CEFTRIAXONE SUSC ISLT: >=64
CIPROFLOXACIN SUSC ISLT: 2
CONV ERTAPENEM SUSCEPTIBILITY BY DISK DIFFUSION (KB): 20
CONV MEROPENEM (BP): 2
ERTAPENEM SUSC ISLT: 1
GENTAMICIN SUSC ISLT: <=1
IMIPENEM SUSC ISLT: <=0.25
LEVOFLOXACIN SUSC ISLT: 4
Lab: 25
NITROFURANTOIN SUSC ISLT: 64
PIP+TAZO SUSC ISLT: >=128
TMP SMX SUSC ISLT: >=320
TOBRAMYCIN SUSC ISLT: <=1

## 2021-03-29 ENCOUNTER — PATIENT OUTREACH - CONVERTED (OUTPATIENT)
Dept: FAMILY MEDICINE CLINIC | Facility: CLINIC | Age: 76
End: 2021-03-29
Attending: NURSE PRACTITIONER

## 2021-04-07 ENCOUNTER — HOSPITAL ENCOUNTER (OUTPATIENT)
Dept: OTHER | Facility: HOSPITAL | Age: 76
Discharge: HOME | End: 2021-04-07
Attending: UROLOGY

## 2021-04-07 LAB
APPEARANCE UR: ABNORMAL
BILIRUB UR QL: NEGATIVE
COLOR UR: YELLOW
CONV BACTERIA: NEGATIVE
CONV COLLECTION SOURCE (UA): ABNORMAL
CONV UROBILINOGEN IN URINE BY AUTOMATED TEST STRIP: 0.2 {EHRLICHU}/DL (ref 0.1–1)
CONV YEAST, UA: PRESENT
GLUCOSE UR QL: NEGATIVE MG/DL
HGB UR QL STRIP: ABNORMAL
KETONES UR QL STRIP: NEGATIVE MG/DL
LEUKOCYTE ESTERASE UR QL STRIP: ABNORMAL
NITRITE UR QL STRIP: NEGATIVE
PH UR STRIP.AUTO: 6.5 [PH] (ref 5–8)
PROT UR QL: 100 MG/DL
RBC #/AREA URNS HPF: ABNORMAL /[HPF]
SP GR UR: 1.01 (ref 1–1.03)
WBC #/AREA URNS HPF: ABNORMAL /[HPF]

## 2021-04-09 ENCOUNTER — HOSPITAL ENCOUNTER (OUTPATIENT)
Dept: PREADMISSION TESTING | Facility: HOSPITAL | Age: 76
Discharge: HOME | End: 2021-04-09
Attending: UROLOGY

## 2021-04-10 LAB
BACTERIA UR CULT: ABNORMAL
CEFEPIME SUSC ISLT: 2
CEFTAZIDIME SUSC ISLT: 4
CIPROFLOXACIN SUSC ISLT: <=0.25
GENTAMICIN SUSC ISLT: <=1
LEVOFLOXACIN SUSC ISLT: 0.5
PIP+TAZO SUSC ISLT: 8
SARS-COV-2 RNA SPEC QL NAA+PROBE: NOT DETECTED
TOBRAMYCIN SUSC ISLT: <=1

## 2021-04-15 ENCOUNTER — HOSPITAL ENCOUNTER (OUTPATIENT)
Dept: PERIOP | Facility: HOSPITAL | Age: 76
Setting detail: HOSPITAL OUTPATIENT SURGERY
Discharge: HOME | End: 2021-04-15
Attending: UROLOGY

## 2021-04-15 LAB
ALBUMIN SERPL-MCNC: 2.8 G/DL (ref 3.5–5)
ALBUMIN/GLOB SERPL: 0.7 {RATIO} (ref 1.4–2.6)
ALP SERPL-CCNC: 105 U/L (ref 43–160)
ALT SERPL-CCNC: 10 U/L (ref 10–40)
ANION GAP SERPL CALC-SCNC: 14 MMOL/L (ref 8–19)
AST SERPL-CCNC: 23 U/L (ref 15–50)
BASOPHILS # BLD AUTO: 0.11 10*3/UL (ref 0–0.2)
BASOPHILS NFR BLD AUTO: 1.9 % (ref 0–3)
BILIRUB SERPL-MCNC: 0.57 MG/DL (ref 0.2–1.3)
BUN SERPL-MCNC: 12 MG/DL (ref 5–25)
BUN/CREAT SERPL: 10 {RATIO} (ref 6–20)
CALCIUM SERPL-MCNC: 10.1 MG/DL (ref 8.7–10.4)
CHLORIDE SERPL-SCNC: 102 MMOL/L (ref 99–111)
CONV ABS IMM GRAN: 0.06 10*3/UL (ref 0–0.2)
CONV CO2: 27 MMOL/L (ref 22–32)
CONV IMMATURE GRAN: 1 % (ref 0–1.8)
CONV TOTAL PROTEIN: 7 G/DL (ref 6.3–8.2)
CREAT UR-MCNC: 1.22 MG/DL (ref 0.5–0.9)
DEPRECATED RDW RBC AUTO: 50.5 FL (ref 36.4–46.3)
EOSINOPHIL # BLD AUTO: 0.35 10*3/UL (ref 0–0.7)
EOSINOPHIL # BLD AUTO: 6.1 % (ref 0–7)
ERYTHROCYTE [DISTWIDTH] IN BLOOD BY AUTOMATED COUNT: 15.2 % (ref 11.7–14.4)
GFR SERPLBLD BASED ON 1.73 SQ M-ARVRAT: 43 ML/MIN/{1.73_M2}
GLOBULIN UR ELPH-MCNC: 4.2 G/DL (ref 2–3.5)
GLUCOSE SERPL-MCNC: 112 MG/DL (ref 65–99)
HCT VFR BLD AUTO: 41.9 % (ref 37–47)
HGB BLD-MCNC: 13.7 G/DL (ref 12–16)
INR PPP: 1.35 (ref 2–3)
LYMPHOCYTES # BLD AUTO: 1.66 10*3/UL (ref 1–5)
LYMPHOCYTES NFR BLD AUTO: 29 % (ref 20–45)
MCH RBC QN AUTO: 29.8 PG (ref 27–31)
MCHC RBC AUTO-ENTMCNC: 32.7 G/DL (ref 33–37)
MCV RBC AUTO: 91.1 FL (ref 81–99)
MONOCYTES # BLD AUTO: 0.61 10*3/UL (ref 0.2–1.2)
MONOCYTES NFR BLD AUTO: 10.6 % (ref 3–10)
NEUTROPHILS # BLD AUTO: 2.94 10*3/UL (ref 2–8)
NEUTROPHILS NFR BLD AUTO: 51.4 % (ref 30–85)
NRBC CBCN: 0 % (ref 0–0.7)
OSMOLALITY SERPL CALC.SUM OF ELEC: 287 MOSM/KG (ref 273–304)
PLATELET # BLD AUTO: 216 10*3/UL (ref 130–400)
PMV BLD AUTO: 9.7 FL (ref 9.4–12.3)
POTASSIUM SERPL-SCNC: 4.7 MMOL/L (ref 3.5–5.3)
PROTHROMBIN TIME: 14.2 S (ref 9.4–12)
PTH-INTACT SERPL-MCNC: 47.8 PG/ML (ref 11.1–79.5)
RBC # BLD AUTO: 4.6 10*6/UL (ref 4.2–5.4)
SODIUM SERPL-SCNC: 138 MMOL/L (ref 135–147)
URATE SERPL-MCNC: 7.9 MG/DL (ref 2.5–7.5)
WBC # BLD AUTO: 5.73 10*3/UL (ref 4.8–10.8)

## 2021-04-26 LAB
COLOR STONE: NORMAL
COMPN STONE: NORMAL
CONV CA OXALATE DIHYDRATE: 40 %
CONV CA OXALATE MONOHYDRATE: 55 %
CONV CALCULI COMMENT: NORMAL
CONV CALCULI DISCLAIMER: NORMAL
CONV CALCULI NOTE: NORMAL
CONV PHOTO (CALCULI): NORMAL
HYDROXYAPATITE: 5 %
SIZE STONE: NORMAL MM
WT STONE: 32 MG

## 2021-04-28 ENCOUNTER — PATIENT OUTREACH - CONVERTED (OUTPATIENT)
Dept: FAMILY MEDICINE CLINIC | Facility: CLINIC | Age: 76
End: 2021-04-28
Attending: NURSE PRACTITIONER

## 2021-05-07 NOTE — PROGRESS NOTES
Progress Note      Patient Name: Isabell Ribera   Patient ID: [id number]   Sex: Female   YOB: 1945        Visit Date: September 20, 2018    Provider: CAROL Mejia   Location: [facility]   Location Address: [address] [place] [address]  [id number]   Location Phone: [phone]          Chief Complaint     refills       History Of Present Illness  Isabell Ribera is a 72 year old /White female who presents for evaluation and treatment of:      chronic health conditions and medication refills.     She takes Coreg for HTN and atrial fibrillation--denies any persistent chest pain, palpitations, dizziness, headaches, syncope--she does have chronic LE edema--this is stable with PRN furosemide. She does not need that today, but she does need her potassium refilled.     She takes ranitidine for GERD--BID--works well--no breakthrough reflux, nausea, vomiting, or abdominal pain. No dysphagia.     She is also requesting her refill of gabapentin for neuropathy--she takes 400mg, 2 po at bedtime. This works well for her and she denies any adverse side effects--she exhibits no s/s of dependency/addiction.       Past Medical History  Disease Name Date Onset Notes   Anxiety --  --    Arthritis --  --    Atrial Fibrillation --  --    Depression --  --    Diabetes --  --    Hypertension --  --    Long term (current) use of anticoagulants 12/08/2017 --    Warfarin anticoagulation 12/08/2017 --          Past Surgical History  Procedure Name Date Notes   *Denies any surgical procedures --  --          Medication List  Name Date Started Instructions   aspirin 81 mg oral tablet,chewable  chew 1 tablet (81 mg) by oral route once daily   carvedilol 6.25 mg oral tablet 08/31/2018 TAKE ONE TABLET BY MOUTH TWICE A DAY   FreeStyle Lite Strips miscellaneous strip  use as directed   furosemide 40 mg oral tablet  Take 1 tablet every morning and take 1/2 tablet in evening    gabapentin 400 mg oral capsule  take 2 capsules at bedtime   Klor-Con M10 10 mEq oral tablet,ER particles/crystals 2018 TAKE ONE TABLET BY MOUTH DAILY   ranitidine HCl 150 mg oral tablet 2018 TAKE ONE TABLET BY MOUTH TWICE A DAY AS DIRECTED   warfarin 3 mg oral tablet 2018 TAKE ONE TABLET BY MOUTH DAILY ON , , AND  AS DIRECTED. TAKE 4 MG ON OTHER DAYS   warfarin 4 mg oral tablet 01/10/2018 Take 1 tablet on all days except Wednesday and Thursday (take 3mg on Wednesday and Thursday)         Allergy List  Allergen Name Date Reaction Notes   NO KNOWN DRUG ALLERGIES --  --  --          Family Medical History  Disease Name Relative/Age Notes   *No Known Family History / --          Reproductive History  Menstrual   Pregnancy Summary   Total Pregnancies: 5 Full Term: 0 Premature: 0   Ab Induced: 0 Ab Spontaneous: 0 Ectopics: 0   Multiples: 0 Livin         Social History  Finding Status Start/Stop Quantity Notes   Alcohol Never --/-- --  --    Disabled --  --/-- --  --    Homemaker --  --/-- --  --    lives with spouse --  --/-- --  --     --  --/-- --  --    Tobacco Former --/-- --  quit in          Review of Systems  · Constitutional  o Admits  o : good general health lately  o Denies  o : fatigue, fever, chills  · Eyes  o Denies  o : changes in vision  · HENT  o Denies  o : headaches, vertigo, lightheadedness  · Cardiovascular  o Admits  o : lower extremity edema  o Denies  o : chest pain, irregular heart beats, syncope, dyspnea on exertion, palpitations  · Respiratory  o Denies  o : shortness of breath, cough  · Gastrointestinal  o Denies  o : nausea, vomiting, diarrhea, heartburn, reflux, abdominal pain, blood in stools, hematochezia  · Genitourinary  o Denies  o : dysuria, urinary retention  · Neurologic  o Denies  o : tingling or numbness--stable with gabapentin  · Musculoskeletal  o Denies  o : joint pain, joint swelling  · Endocrine  o Denies  o :  polyuria, polydipsia, cold intolerance, heat intolerance  · Heme-Lymph  o Admits  o : easy bruising  o Denies  o : easy bleeding      Vitals  Date Time BP Position Site L\R Cuff Size HR RR TEMP(F) WT  HT  BMI kg/m2 BSA m2 O2 Sat HC       09/20/2018 02:52 /80 Sitting    78 - R  97     96 %           Physical Examination  · Constitutional  o Appearance  o : well developed, well-nourished, in no acute distress  · Eyes  o Conjunctivae  o : conjunctivae normal, no exudates present  · Neck  o Inspection/Palpation  o : normal appearance, no masses or tenderness, trachea midline  o Thyroid  o : no thyromegaly  · Respiratory  o Respiratory Effort  o : breathing unlabored  o Auscultation of Lungs  o : clear to ascultation  · Cardiovascular  o Heart  o :   § Auscultation of Heart  § : regular rate, normal rhythm, no murmurs present  o Peripheral Vascular System  o :   § Carotid Arteries  § : normal pulses bilaterally, no bruits present  § Pedal Pulses  § : bilateral pulse strength 1+   § Extremities  § : mild lower extremity edema present, no cyanosis, generalized distal extremity hair loss present, normal capillary refill  · Gastrointestinal  o Abdominal Examination  o :   § Abdomen  § : soft, non-tender, non-distended, bowel sounds +  · Lymphatic  o Neck  o : no lymphadenopathy present  · Musculoskeletal  o General  o :   § General Musculoskeletal  § : Muscle tone, strength, and development grossly normal.  · Skin and Subcutaneous Tissue  o General Inspection  o : no lesions present, no areas of discoloration, skin turgor normal, texture normal  · Neurologic  o Gait and Station  o :   § Gait Screening  § : resting in a wheelchair, but station steady with walker  · Psychiatric  o Mood and Affect  o : mood normal, affect appropriate              Assessment  · Need for influenza vaccination     V04.81/Z23  · Long term (current) use of anticoagulants     V58.61/Z79.01  · Warfarin  anticoagulation     V58.61/Z79.01  · Hypertension     401.9/I10  · Arthritis     V13.4/V13.4  · Atrial Fibrillation     427.31/I48.91  · Lower extremity edema     782.3/R60.0  · Peripheral neuropathy     356.9/G62.9  · History of diabetes mellitus     V12.29/Z86.39      Plan  · Orders  o Collection Fee for Venipuncture (57514) - - 09/20/2018  o CMP [facility] (33616) - 401.9/I10, V12.29/Z86.39 - 09/20/2018  o Lipid Panel [facility] (39527) - 401.9/I10, V12.29/Z86.39 - 09/20/2018  o Urinalysis with Reflex Microscopy if abnormal ([facility]) (23115) - 401.9/I10, V12.29/Z86.39 - 09/20/2018  o Urine microalbumin (65974) - 401.9/I10, V12.29/Z86.39 - 09/20/2018  o Immunization Admin Fee (Single) ([facility]) (93098) - V04.81/Z23 - 09/20/2018  o ACO-14: Influenza immunization administered or previously received () - V04.81/Z23 - 09/20/2018  o ACO-27: Most recent 2017 HgbA1c less than 7 [facility] (3044F) - - 09/20/2018  o ACO-39: Current medications updated and reviewed () - - 09/20/2018  o Fluzone High Dose Flu Vaccine (85504) - V04.81/Z23 - 09/20/2018   Vaccine - Influenza; Dose: 0.5; Site: Left Arm; Route: Intramuscular; Date: 09/20/2018 15:07:00; Exp: 03/30/2019; Lot: [id number]; Mfg: sanofi pasteur; TradeName: Fluzone High-Dose; Administered By: Eunice Chinchilla MA; Comment: [id number]  · Medications  o carvedilol 6.25 mg oral tablet   SIG: take 1 tablet (6.25 mg) by oral route 2 times per day with food   DISP: (60) Tablet with 5 refills  Adjusted on 09/20/2018     o gabapentin 400 mg oral capsule   SIG: take 2 capsules by oral route once a day (at bedtime) for 30 days   DISP: (60) capsules with 2 refills  Adjusted on 09/20/2018     o Klor-Con M10 10 mEq oral tablet,ER particles/crystals   SIG: take 1 tablet (10 meq) by oral route once daily   DISP: (30) Tablet with 5 refills  Adjusted on 09/20/2018     o ranitidine HCl 150 mg oral tablet   SIG: take 1 tablet (150 mg) by oral route 2 times per day   DISP: (60) Tablet with 5 refills  Adjusted  on 09/20/2018     o warfarin 4 mg oral tablet   SIG: Take 1 tablet on all days except Wednesday and Thursday (take 3mg on Wednesday and Thursday)   DISP: (30) tablet with 0 refills  Discontinued on 09/20/2018     · Instructions  o Obtained a written consent for STEPHANIE query. Discussed the risk and benefits of the use of controlled substances with the patient, including the risk of tolerance and drug dependence. The patient has been counseled on the need to have an exit strategy, including potentially discontinuing the use of controlled substances. STEPHANIE has or will be reviewed as soon as it becomes avaliable.  o Take all medications as prescribed/directed.  o Patient was educated/instructed on their diagnosis, treatment and medications prior to discharge from the clinic today.  o Chronic conditions reviewed and taken into consideration for today's treatment plan.  · Disposition  o Call or Return if symptoms worsen or persist.            Electronically Signed by: CAROL Mejia -Author on September 20, 2018 03:40:30 PM

## 2021-05-07 NOTE — PROGRESS NOTES
Quick Note      Patient Name: Isabell Ribera   Patient ID: [id number]   Sex: Female   YOB: 1945    Primary Care Provider: Ivanna MEDINA   Referring Provider: Ivanna MEDINA    Visit Date: April 24, 2020    Provider: CAROL Mejia   Location: [facility]   Location Address: [address] [place] [address] [place]  [id number]   Location Phone: [phone]          History Of Present Illness  TELEHEALTH TELEPHONE VISIT  Chief Complaint: refill gabapentin   Isabell Ribera is a 74 year old /White female who is presenting for evaluation via telehealth telephone visit, completed remotely from my residence. Verbal consent obtained before beginning visit.   Provider spent 5 minutes with the patient during telehealth visit.   The following staff were present during this visit: CAROL Mejia   Past Medical History/Overview of Patient Symptoms     She is currently taking gabapentin, 400mg - 2 PO QHS for peripheral neuropathy secondary to DM - which is now diet-controlled - in the remote past she did take metformin for her DM. Last A1C was 5.9%. She has tried weaning off of the medication in the past as well -when she did, it did not go well, thus she ended up right back on it. She describes the sensation of her feet going numb - sometimes she can't feel them at all - and the sensation of electricity shooting through her legs.     Her last fill of gabapentin was on 3/30 - 30 day supply. She has transportation issues and limited income/resources - she is asking that I call in her gabapentin today so that it will be ready for  early next week when she is able to get a ride to [place]. Her  will have some medication that will be ready for  at the same time. This will limit their exposure in getting out of the house during the pandemic.           Assessment  · Diabetes mellitus, controlled     250.00/E11.9  · Peripheral  "neuropathy     356.9/G62.9  · High risk medication use     V58.69/Z79.899      Plan  · Orders  o STEPHANIE Report (KAS[facility]) - - 04/24/2020   on chart  · Medications  o gabapentin 400 mg oral capsule   SIG: take 2 capsules by oral route once a day (at bedtime) for 30 days   DISP: (60) capsules with 2 refills  Refilled on 04/24/2020     o Medications have been Reconciled  o Transition of Care or Provider Policy  · Instructions  o Plan Of Care: Gabapentin 400mg, 2 PO QHS, #60 with 2 RF called to Davi, pharmacist, at [facility]. Discussed discrepancy on STEPHANIE - shows \"300\" days of gabapentin dispensed - advised that he would have to call their tech support to have it corrected.   o Patient is taking medications as prescribed and doing well.   o Call the office with any concerns or questions.            Electronically Signed by: CAROL Mejia -Author on April 24, 2020 12:57:34 PM  "

## 2021-05-07 NOTE — PROGRESS NOTES
Progress Note      Patient Name: Isabell Ribera   Patient ID: [id number]   Sex: Female   YOB: 1945        Visit Date: November 28, 2018    Provider: CAROL Mejia   Location: [facility]   Location Address: [address] [place] [address]  [id number]   Location Phone: [phone]          Chief Complaint     follow up from [facility],  she was inpatient for 8 days for a UTI and cellulitis of lower leg   she doesn't need a refill of her gabapentin just yet, she has a refill left but while she is here she was wanting to sign the paperwork, etc       History Of Present Illness  Isabell Ribera is a 72 year old /White female who presents for evaluation and treatment of:      follow up from the hospital.     She was admitted to [facility] on 11/16 and discharged on 11/21--was admitted for AMS r/t UTI and cat bite, with the brent bite resulting in cellulitis of the LLE. She was treated with Zosyn in the hospital and discharged home with 3 days of Doxycycline 100mg BID.     She currently denies any c/o. She has edema of the BLE, but notes that this is not any different than baseline. She has mild erythema of the LLE, but again, she states this is not any different than baseline. She denies any pain in the LLE. There is no areas of skin breakdown, but skin dryness is present.       Past Medical History  Disease Name Date Onset Notes   Anxiety --  --    Arthritis --  --    Atrial Fibrillation --  --    Depression --  --    Diabetes --  --    Hypertension --  --    Long term (current) use of anticoagulants 12/08/2017 --    Warfarin anticoagulation 12/08/2017 --          Past Surgical History  Procedure Name Date Notes   *Denies any surgical procedures --  --          Medication List  Name Date Started Instructions   aspirin 81 mg oral tablet,chewable  chew 1 tablet (81 mg) by oral route once daily   carvedilol 6.25 mg oral tablet 09/20/2018 take 1 tablet (6.25 mg) by oral route  2 times per day with food   FreeStyle Lite Strips miscellaneous strip  use as directed   furosemide 40 mg oral tablet  Take 1 tablet every morning and take 1/2 tablet in evening   gabapentin 400 mg oral capsule 2018 take 2 capsules by oral route once a day (at bedtime) for 30 days   Klor-Con M10 10 mEq oral tablet,ER particles/crystals 2018 take 1 tablet (10 meq) by oral route once daily   ranitidine HCl 150 mg oral tablet 2018 take 1 tablet (150 mg) by oral route 2 times per day   warfarin 1 mg oral tablet 10/02/2018 take 1 tablet (1 mg) by oral route once daily for 30 days   warfarin 3 mg oral tablet 10/25/2018 take 1 tablet (3 mg) by oral route once daily for 30 days         Allergy List  Allergen Name Date Reaction Notes   NO KNOWN DRUG ALLERGIES --  --  --          Family Medical History  Disease Name Relative/Age Notes   *No Known Family History / --          Reproductive History  Menstrual   Pregnancy Summary   Total Pregnancies: 5 Full Term: 0 Premature: 0   Ab Induced: 0 Ab Spontaneous: 0 Ectopics: 0   Multiples: 0 Livin         Social History  Finding Status Start/Stop Quantity Notes   Alcohol Never --/-- --  --    Disabled --  --/-- --  --    Homemaker --  --/-- --  --    lives with spouse --  --/-- --  --     --  --/-- --  --    Tobacco Former --/-- --  quit in          Immunizations  NameDate Admin Mfg Trade Name Lot Number Route Inj VIS Given VIS Publication   Mfcunrgil25/20/2018 [facility] Fluzone High-Dose [id number] IM LA 2018   Comments: [id number]         Review of Systems  · Constitutional  o Admits  o : good general health lately  o Denies  o : fatigue, fever, chills  · HENT  o Denies  o : headaches, vertigo, lightheadedness  · Cardiovascular  o Admits  o : lower extremity edema  o Denies  o : chest pain, irregular heart beats, rapid heart rate, dyspnea on exertion, palpitations  · Respiratory  o Denies  o : shortness of breath, wheezing,  cough  · Gastrointestinal  o Denies  o : nausea, vomiting, diarrhea, abdominal pain  · Genitourinary  o Denies  o : urgency, frequency, dysuria, urinary retention  · Integument  o Admits  o : skin dryness  o Denies  o : new skin lesions, changes to existing skin lesions or moles  · Neurologic  o Denies  o : altered mental status  · Musculoskeletal  o Denies  o : joint pain, joint swelling  · Endocrine  o Denies  o : cold intolerance, heat intolerance      Vitals  Date Time BP Position Site L\R Cuff Size HR RR TEMP(F) WT  HT  BMI kg/m2 BSA m2 O2 Sat HC       11/28/2018 09:32 /80 Sitting    85 - R  96.4     98 %           Physical Examination  · Constitutional  o Appearance  o : well developed, well-nourished, in no acute distress  · Eyes  o Conjunctivae  o : conjunctivae normal, no exudates present  · Respiratory  o Respiratory Effort  o : breathing unlabored  o Auscultation of Lungs  o : clear to ascultation  · Cardiovascular  o Heart  o :   § Auscultation of Heart  § : regular rate and rhythm  o Peripheral Vascular System  o :   § Pedal Pulses  § : bilateral pulse strength 1+   § Extremities  § : 1+ edema present, no cyanosis  · Gastrointestinal  o Abdominal Examination  o :   § Abdomen  § : soft, non-tender, non-distended, bowel sounds +  · Lymphatic  o Neck  o : no lymphadenopathy present  · Musculoskeletal  o General  o :   § General Musculoskeletal  § : Muscle tone, strength, and development grossly normal.  · Skin and Subcutaneous Tissue  o General Inspection  o : the LLE has mild erythema anteriorly with dryness, but there is no skin breakdown present--there are tiny punctate scabs present where the cat bit her--no drainage or exudates  · Neurologic  o Gait and Station  o :   § Gait Screening  § : normal gait  · Psychiatric  o Mood and Affect  o : mood normal, affect appropriate              Assessment  · Hospital discharge follow-up     V67.59/Z09  · Resolved Cat bite of lower leg, left, subsequent  encounter       Open bite, left lower leg, subsequent encounter     V58.89/S81.852D  Bitten by cat, subsequent encounter     V58.89/W55.01XD  · Resolved Cellulitis of left lower extremity     682.6/L03.116      Plan  · Orders  o ACO-39: Current medications updated and reviewed () - - 11/28/2018  · Instructions  o Patient was educated/instructed on their diagnosis, treatment and medications prior to discharge from the clinic today. She is doing well s/p hospitalization. She has home health coming. Advised to follow up in 1 month for repeat INR and gabapentin refill. Call with any concerns.   · Disposition  o Call or Return if symptoms worsen or persist.            Electronically Signed by: CAROL Mejia -Author on November 28, 2018 10:14:16 AM

## 2021-05-07 NOTE — PROGRESS NOTES
Progress Note      Patient Name: Isabell Ribera   Patient ID: [id number]   Sex: Female   YOB: 1945        Visit Date: October 25, 2018    Provider: Tacho Cleveland MD   Location: [facility]   Location Address: [address] [place] [address]  [id number]   Location Phone: [phone]          Chief Complaint     protime       History Of Present Illness  Isabell Ribera is a 72 year old /White female who presents for evaluation and treatment of:      DM II- controlled  discussed labs  renal insufficiency  hyperlipidemia- stable  HTN- controlled       Past Medical History  Disease Name Date Onset Notes   Anxiety --  --    Arthritis --  --    Atrial Fibrillation --  --    Depression --  --    Diabetes --  --    Hypertension --  --    Long term (current) use of anticoagulants 12/08/2017 --    Warfarin anticoagulation 12/08/2017 --          Past Surgical History  Procedure Name Date Notes   *Denies any surgical procedures --  --          Medication List  Name Date Started Instructions   aspirin 81 mg oral tablet,chewable  chew 1 tablet (81 mg) by oral route once daily   carvedilol 6.25 mg oral tablet 09/20/2018 take 1 tablet (6.25 mg) by oral route 2 times per day with food   FreeStyle Lite Strips miscellaneous strip  use as directed   furosemide 40 mg oral tablet  Take 1 tablet every morning and take 1/2 tablet in evening   gabapentin 400 mg oral capsule 09/20/2018 take 2 capsules by oral route once a day (at bedtime) for 30 days   Klor-Con M10 10 mEq oral tablet,ER particles/crystals 09/20/2018 take 1 tablet (10 meq) by oral route once daily   ranitidine HCl 150 mg oral tablet 09/20/2018 take 1 tablet (150 mg) by oral route 2 times per day   warfarin 1 mg oral tablet 10/02/2018 take 1 tablet (1 mg) by oral route once daily for 30 days   warfarin 3 mg oral tablet 08/28/2018 TAKE ONE TABLET BY MOUTH DAILY ON TUESDAYS, THURSDAYS, AND SATURDAYS AS DIRECTED. TAKE 4 MG  ON OTHER DAYS         Allergy List  Allergen Name Date Reaction Notes   NO KNOWN DRUG ALLERGIES --  --  --          Family Medical History  Disease Name Relative/Age Notes   *No Known Family History / --          Reproductive History  Menstrual   Pregnancy Summary   Total Pregnancies: 5 Full Term: 0 Premature: 0   Ab Induced: 0 Ab Spontaneous: 0 Ectopics: 0   Multiples: 0 Livin         Social History  Finding Status Start/Stop Quantity Notes   Alcohol Never --/-- --  --    Disabled --  --/-- --  --    Homemaker --  --/-- --  --    lives with spouse --  --/-- --  --     --  --/-- --  --    Tobacco Former --/-- --  quit in          Immunizations  NameDate Admin Mfg Trade Name Lot Number Route Inj VIS Given VIS Publication   Kdhvvygpb08/20/2018 [facility] Fluzone High-Dose [id number] IM LA 2018   Comments: [id number]         Review of Systems  · Constitutional  o Denies  o : fatigue, fever  · Cardiovascular  o Denies  o : chest pain, palpitations  · Respiratory  o Denies  o : shortness of breath, cough  · Gastrointestinal  o Denies  o : nausea, vomiting, diarrhea      Vitals  Date Time BP Position Site L\R Cuff Size HR RR TEMP(F) WT  HT  BMI kg/m2 BSA m2 O2 Sat HC       10/25/2018 11:43 /84 Sitting    70 - R  96.6     97 %           Physical Examination  · Constitutional  o Appearance  o : well developed, well-nourished, in no acute distress  · Respiratory  o Respiratory Effort  o : breathing unlabored  o Auscultation of Lungs  o : clear to ascultation  · Cardiovascular  o Heart  o :   § Auscultation of Heart  § : regular rate and rhythm  o Peripheral Vascular System  o :   § Extremities  § : no edema  · Gastrointestinal  o Abdomen  o : soft, non-tender, non-distended, + bowel sounds, no hepatosplenomegaly, no masses palpated  · Musculoskeletal  o General  o :   § General Musculoskeletal  § : No joint swelling or deformity., Muscle tone, strength, and development grossly  normal.  · Neurologic  o Gait and Station  o :   § Gait Screening  § : normal gait  · Psychiatric  o Mood and Affect  o : mood normal, affect appropriate          Assessment  · Diabetes mellitus, type 2     250.00/E11.9  · Essential hypertension     401.9/I10  · Hyperlipidemia     272.4/E78.5  · Long term (current) use of anticoagulants     V58.61/Z79.01  · Renal insufficiency     593.9/N28.9      Plan  · Orders  o IOP - INR (29171) - - 10/25/2018   2.6/31.2  o ACO-14: Influenza immunization administered or previously received () - - 10/25/2018  o ACO-15: Pneumococcal Vaccine Administered or Previously Received (4040F) - - 10/25/2018  o ACO-39: Current medications updated and reviewed () - - 10/25/2018  o NEPHROLOGY CONSULTATION (NEPHR) - 593.9/N28.9 - 10/25/2018  · Medications  o warfarin 3 mg oral tablet   SIG: take 1 tablet (3 mg) by oral route once daily for 30 days   DISP: (30) Tablet with 5 refills  Adjusted on 10/25/2018     · Instructions  o Advised that cheeses and other sources of dairy fats, animal fats, fast food, and the extras (candy, pasteries, pies, doughnuts and cookies) all contain LDL raising nutrients. Advised to increase fruits, vegetables, whole grains, and to monitor portion sizes.   o Patient was educated/instructed on their diagnosis, treatment and medications prior to discharge from the clinic today.            Electronically Signed by: Tacho Cleveland MD -Author on October 25, 2018 12:22:45 PM

## 2021-05-07 NOTE — PROGRESS NOTES
"   Progress Note      Patient Name: Isabell Ribera   Patient ID: [id number]   Sex: Female   YOB: 1945        Visit Date: March 13, 2018    Provider: CAROL Mejia   Location: [facility]   Location Address: [address] [place] [address]  [id number]   Location Phone: [phone]          Chief Complaint     refills and protime  INR 3.7       History Of Present Illness  Isabell Ribera is a 72 year old /White female who presents for evaluation and treatment of:      INR check and refill gabapentin.     Her INR was 3.9 last week--this week it is 3.7--she takes this for atrial fibrillation. She has not had any recent changes in medication or diet--she denies taking any antibiotics. She can tell she is thin--she can hit her hands so easily and instantly bruise. She denies any blood in her urine or stool. No chest pain, palpitations, abdominal pain, nausea, dizziness, or shortness of breath.     She takes the gabapentin for neuropathy and pain in her legs. She has taken it for years. She is currently taking 400mg, 2 pills once daily at bedtime. She does not split the dose in 2. As far as she can tell it is working well. She is no longer having the sharp pains or \"electric shock\" going through her toes.     She has been well. She isn't sure that she needs refills on her other medications at this time, but she would like to go ahead and get her routine lab work next week when she comes in for her INR.     She has a remote hx of diabetes--she hasn't been on medication in a year--she used to take Metformin. She was hospitalized at [facility] about a year ago--maybe 2, and she hasn't taken the meds since then d/t blood sugar dropping.       Past Medical History  Disease Name Date Onset Notes   Arthritis --  --    Atrial Fibrillation --  --    Diabetes --  --    Hypertension --  --    Long term (current) use of anticoagulants 12/08/2017 --    Warfarin anticoagulation " 2017 --          Past Surgical History  Procedure Name Date Notes   *Denies any surgical procedures --  --          Medication List  Name Date Started Instructions   aspirin 81 mg oral tablet,chewable  chew 1 tablet (81 mg) by oral route once daily   carvedilol 6.25 mg oral tablet 2017 TAKE ONE TABLET BY MOUTH TWICE A DAY   FreeStyle Lite Strips miscellaneous strip  use as directed   furosemide 40 mg oral tablet  Take 1 tablet every morning and take 1/2 tablet in evening   gabapentin 400 mg oral capsule  take 2 capsules at bedtime   Klor-Con M10 10 mEq oral tablet,ER particles/crystals 2018 TAKE ONE TABLET BY MOUTH DAILY   ranitidine HCl 150 mg oral tablet 2018 TAKE ONE TABLET BY MOUTH TWICE A DAY AS DIRECTED   Tessalon Perles 100 mg oral capsule 2017 take 1 capsule (100 mg) by oral route 3 times per day for 21 days   warfarin 3 mg oral tablet  take 1 tablet as directed Tues, Thurs, Sat. all other days take 4mg   warfarin 4 mg oral tablet 01/10/2018 Take 1 tablet on all days except Wednesday and Thursday (take 3mg on Wednesday and Thursday)         Allergy List  Allergen Name Date Reaction Notes   NO KNOWN DRUG ALLERGIES --  --  --          Family Medical History  Disease Name Relative/Age Notes   *No Known Family History / --          Reproductive History  Menstrual   Pregnancy Summary   Total Pregnancies: 5 Full Term: 0 Premature: 0   Ab Induced: 0 Ab Spontaneous: 0 Ectopics: 0   Multiples: 0 Livin         Social History  Finding Status Start/Stop Quantity Notes   Alcohol Never --/-- --  --    Disabled --  --/-- --  --    Homemaker --  --/-- --  --    lives with spouse --  --/-- --  --     --  --/-- --  --    Tobacco Former --/-- --  quit in          Review of Systems  · Constitutional  o Admits  o : good general health lately  o Denies  o : fatigue, fever, chills  · Eyes  o Denies  o : double vision, blurred vision  · HENT  o Denies  o : headaches, vertigo,  lightheadedness, nose bleeding, gingival bleeding  · Cardiovascular  o Admits  o : lower extremity edema  o Denies  o : chest pain, irregular heart beats, syncope, dyspnea on exertion, shortness of breath, palpitations  · Respiratory  o Denies  o : shortness of breath, wheezing, cough  · Gastrointestinal  o Denies  o : nausea, diarrhea, constipation, abdominal pain, blood in stools, hematochezia, melena  · Genitourinary  o Admits  o : urinary retention  o Denies  o : urgency, dysuria, hematuria  · Integument  o Denies  o : rash, new skin lesions  · Neurologic  o Admits  o : tingling or numbness  · Musculoskeletal  o Admits  o : knee pain--stable, from knee surgery  · Psychiatric  o Admits  o : anxiety, depression--she gets aggravated because she can't hardly do anything. She is a worrier. Always has been. She has been treated for it in the past by Dr. Brody and she didn't tolerate the medication  o Denies  o : difficulty sleeping, suicidal ideation, homicidal ideation      Vitals  Date Time BP Position Site L\R Cuff Size HR RR TEMP(F) WT  HT  BMI kg/m2 BSA m2 O2 Sat        03/13/2018 11:04 /80 Sitting    78 - R  97.3     98 %           Physical Examination  · Constitutional  o Appearance  o : well developed, well-nourished, in no acute distress  · Eyes  o Conjunctivae  o : conjunctivae normal, no exudates present  o Pupils and Irises  o : pupils equal and round, pupils reactive to light bilaterally  · Neck  o Inspection/Palpation  o : normal appearance, no masses or tenderness, trachea midline  o Thyroid  o : no thyromegaly  · Respiratory  o Respiratory Effort  o : breathing unlabored  o Auscultation of Lungs  o : clear to ascultation  · Cardiovascular  o Heart  o :   § Auscultation of Heart  § : regular rate and rhythm  o Peripheral Vascular System  o :   § Carotid Arteries  § : normal pulses bilaterally, no bruits present  § Extremities  § : marked lower extremity edema present, no cyanosis, generalized  distal extremity hair loss present, normal capillary refill  · Gastrointestinal  o Abdominal Examination  o :   § Abdomen  § : pendulous abdomen, soft, non-tender, non-distended, bowel sounds +  · Lymphatic  o Neck  o : no lymphadenopathy present  · Musculoskeletal  o General  o :   § General Musculoskeletal  § : No joint swelling or deformity. Muscle tone, strength, and development grossly normal.  · Skin and Subcutaneous Tissue  o General Inspection  o : no lesions present, no areas of discoloration, skin turgor normal, texture normal  · Neurologic  o Gait and Station  o :   § Gait Screening  § : normal gait  · Psychiatric  o Mood and Affect  o : mood normal, affect appropriate              Assessment  · Anticoagulant long-term use     V58.61/Z79.01  · Long term (current) use of anticoagulants     V58.61/Z79.01  · Warfarin anticoagulation     V58.61/Z79.01  · Hypertension     401.9/I10  · Arthritis     V13.4/V13.4  · Atrial Fibrillation     427.31/I48.91  · High risk medication use     V58.69/Z79.899  · Neuropathy     355.9/G62.9  · History of diabetes mellitus     V12.29/Z86.39  · Lower extremity edema     782.3/R60.0      Plan  · Orders  o Collection Fee for Venipuncture (87722) - - 03/13/2018  o CBC with Auto Diff [facility] (85730) - 427.31/I48.91, 401.9/I10, V12.29/Z86.39, 782.3/R60.0 - 03/20/2018  o CMP [facility] (54056) - 355.9/G62.9, 401.9/I10, V12.29/Z86.39, 782.3/R60.0 - 03/20/2018  o Hgb A1c [facility] (92305) - 355.9/G62.9, 401.9/I10, 782.3/R60.0 - 03/20/2018  o IOP - INR (02766) - V58.61/Z79.01, 427.31/I48.91 - 03/13/2018   inr 3.7  o Lipid Panel [facility] (83034) - 401.9/I10, V12.29/Z86.39 - 03/20/2018  o Urinalysis with Reflex Microscopy if abnormal ([facility]) (61469) - 427.31/I48.91, 401.9/I10, V12.29/Z86.39, 782.3/R60.0 - 03/20/2018  o Urine Drug Screen ([facility]) (18365) - V58.69/Z79.899, 355.9/G62.9 - 03/13/2018  o Urine microalbumin (25763) - 427.31/I48.91, 401.9/I10, V12.29/Z86.39, 782.3/R60.0 - 03/20/2018  o ACO-39: Current  medications updated and reviewed () - - 03/13/2018  o ACO-18: Screening for clinical depression not completed due to current diagnosis of depression or bipolar disorder () - - 03/13/2018  o ACO-15: Pneumococcal Vaccine Administered or Previously Received (4040F) - - 03/13/2018   Already received in the past  o Influenza immunization was not ordered or administered for reasons documented by clinician () - - 03/13/2018   already received this year  o IOP - INR (85546) - V58.61/Z79.01, 427.31/I48.91 - 03/20/2018  · Medications  o warfarin 3 mg oral tablet   SIG: take 1 tablet by oral route daily   DISP: (30) tablet with 1 refills  Adjusted on 03/13/2018     · Instructions  o Obtained a written consent for STEPHANIE query. Discussed the risk and benefits of the use of controlled substances with the patient, including the risk of tolerance and drug dependence. The patient has been counseled on the need to have an exit strategy, including potentially discontinuing the use of controlled substances. STEPHANIE has or will be reviewed as soon as it becomes avaliable.  o Patient was educated/instructed on their diagnosis, treatment and medications prior to discharge from the clinic today. With her warfarin--she is going to hold today, and then take 3mg daily thereafter, and recheck in 1 week, at which time she will get fasting labs.   o Chronic conditions reviewed and taken into consideration for today's treatment plan.            Electronically Signed by: CAROL Mejia -Author on March 13, 2018 11:39:45 AM

## 2021-05-07 NOTE — PROGRESS NOTES
Progress Note      Patient Name: Isabell Ribera   Patient ID: [id number]   Sex: Female   YOB: 1945    Primary Care Provider: Ivanna MEDINA   Referring Provider: Ivanna MEDINA    Visit Date: March 9, 2021    Provider: CAROL Mejia   [place] [facility]   [place] Address: [address] [place] [address] [place]  [address]   Location Phone: [phone]          Chief Complaint  · Follow up office visit within 14 calender days of discharge from inpatient status (moderate complexity).     f/u UTI       History Of Present Illness  FOLLOW UP OFFICE VISIT WITHIN 14 CALENDER DAYS OF INPATIENT STATUS (MODERATE COMPLEXITY)  Isabell Ribera presents to office for follow up post discharge from inpatient status within 14 calender days. Patient was contacted within 2 business days via phone conversation. Documentation of that phone contact is present in the patient's electronic chart. Patient was admitted to inpatient facility on 02/22/21 and discharged 03/01/2021 due to: ESBL Klebsiella UTI, acute metabolic encephalopathy, hypomagnesemia, Hypokalemia, CHF, Stage III CKD, Afib, GERD, chronic venous stasis.   Admitting MD: Maira Zavala MD   Her discharge summary has been reviewed and placed in the patient's electronic chart.   Patient's problem list is: Arthritis, Atrial Fibrillation, Hypertension, Long term (current) use of anticoagulants, and Warfarin anticoagulation   Patient's medication list has been updated/reconcilled from discharge summary. Medication list is: aspirin 81 mg oral tablet,chewable, carvedilol 6.25 mg oral tablet, FreeStyle Lite Strips miscellaneous strip, furosemide 20 mg oral tablet, gabapentin 400 mg oral capsule, Miralax 17 gram/dose oral powder, nitrofurantoin macrocrystal 50 mg oral capsule, potassium chloride 10 mEq oral tablet extended release, warfarin 1 mg oral tablet, warfarin 2 mg oral tablet, and warfarin 3 mg oral tablet      She  was sent home on Macrobid and finished yesterday or the day before. Her INR is 4.7 today - she was normal at the time of discharge and home health hasn't been out since prior to her hospitalization d/t she told them not to come for one month.     The family is concerned that she isn't taking her Lasix. She won't take the Lasix d/t it makes her have to pee more and then it is just too much trouble to have to get up and go to the bathroom. She has a walker and a cane, but neither seem to help with the LLE weakness/sensation that her knee will give way. Her family is wondering if a brace would help. She was having PT services through home health but she fired them d/t one of the nurses that came out was hateful about her needing to wear a mask in her own home.       Past Medical History  Disease Name Date Onset Notes   Anxiety --  --    Arthritis --  --    Atrial Fibrillation --  --    Depression --  --    Diabetes --  --    Hypertension --  --    Long term (current) use of anticoagulants 12/08/2017 --    Warfarin anticoagulation 12/08/2017 --          Past Surgical History  Procedure Name Date Notes   *Denies any surgical procedures --  --          Medication List  Name Date Started Instructions   aspirin 81 mg oral tablet,chewable  chew 1 tablet (81 mg) by oral route once daily   carvedilol 6.25 mg oral tablet 02/23/2021 take 1 tablet (6.25 mg) by oral route 2 times per day with food   FreeStyle Lite Strips miscellaneous strip  use as directed   furosemide 20 mg oral tablet  take 1 tablet (20 mg) by oral route once daily for 30 days   gabapentin 400 mg oral capsule 02/23/2021 take 2 capsules by oral route once a day (at bedtime)   Miralax 17 gram/dose oral powder 02/23/2021 take 17 gram mixed with 8 oz. water, juice, soda, coffee or tea by oral route once daily   nitrofurantoin macrocrystal 50 mg oral capsule  take 2 capsules (100 mg) by oral route once daily at bedtime with food   potassium chloride 10 mEq oral  tablet extended release 2021 take 1 tablet by oral route three times per week on Monday, Wednesday, and Friday   warfarin 1 mg oral tablet 2020 TAKE ONE TABLET BY MOUTH ONCE DAILY   warfarin 2 mg oral tablet 2021 take 1 tablet (2 mg) by oral route once daily   warfarin 3 mg oral tablet 2021 TAKE ONE TABLET BY MOUTH DAILY         Allergy List  Allergen Name Date Reaction Notes   NO KNOWN DRUG ALLERGIES --  --  --          Family Medical History  Disease Name Relative/Age Notes   *No Known Family History  --          Reproductive History  Menstrual   Pregnancy Summary   Total Pregnancies: 5 Full Term: 0 Premature: 0   Ab Induced: 0 Ab Spontaneous: 0 Ectopics: 0   Multiples: 0 Livin         Social History  Finding Status Start/Stop Quantity Notes   Alcohol Never --/-- --  --    Disabled --  --/-- --  --    Homemaker --  --/-- --  --    lives with spouse --  --/-- --  --     --  --/-- --  --    Tobacco Former --/-- --  quit in          Immunizations  NameDate Admin Mfg Trade Name Lot Number Route Inj VIS Given VIS Publication   Luchpfwmn06/22/2020 PMC FLUZONE-HIGH DOSE [id number] IM LA 2020    Comments: [id number]         Review of Systems  · Constitutional  o Denies  o : fever, weight gain, weight loss, fatigue  · HENT  o Denies  o : headaches, vertigo, recent head injury  · Cardiovascular  o Admits  o : lower extremity edema, chronic- currently doing well  o Denies  o : palpitation, chest pain, claudication  · Respiratory  o Denies  o : shortness of breath, wheezing, cough, productive cough  · Gastrointestinal  o Denies  o : nausea, vomiting, diarrhea, constipation, abdominal pain  · Genitourinary  o Denies  o : urgency, frequency, dysuria, urinary retention  · Integument  o Denies  o : rash, pigmentation changes  · Neurologic  o Admits  o : unsteady gait, weakness  o Denies  o : dizziness, H/A  · Musculoskeletal  o Admits  o : joint pain, knee pain      Vitals  Date Time BP  Position Site L\R Cuff Size HR RR TEMP (F) WT  HT  BMI kg/m2 BSA m2 O2 Sat FR L/min FiO2 HC       03/09/2021 03:04 /88 Sitting    61 - R  97.6     94 %            Physical Examination  · Constitutional  o Appearance  o : morbidly obese, well developed, alert, in no acute distress, well-tended appearance, no obvious deformities present  · Head and Face  o HEENT  o : Unremarkable  · Respiratory  o Respiratory Effort  o : breathing unlabored  o Auscultation of Lungs  o : clear to auscultation  · Cardiovascular  o Heart  o :   § Auscultation of Heart  § : regular rate, normal rhythm, no murmurs; only trace LE edema on exam today - her legs actually look very good for her  · Gastrointestinal  o Abdomen  o : soft, non-tender, non-distended, + bowel sounds  · Musculoskeletal  o General  o :   § General Musculoskeletal  § : Muscle tone, strength, and development grossly normal. Sitting in the passenger seat of her vehicle  · Skin and Subcutaneous Tissue  o General Inspection  o : normal tone  · Neurologic  o Mental Status Examination  o :   § Orientation  § : grossly oriented to person, place and time  · Psychiatric  o General  o : normal affect and calm          Assessment  · Urinary tract infection     599.0/N39.0  · Hospital discharge follow-up     V67.59/Z09  · Long term (current) use of anticoagulants     V58.61/Z79.01  · Warfarin anticoagulation     V58.61/Z79.01  · Atrial Fibrillation     427.31/I48.91  · Hypomagnesemia     275.2/E83.42  · Hypokalemia     276.8/E87.6  · Stage III chronic kidney disease     585.3/N18.30  · Chronic venous stasis     459.81/I87.8  · Weakness     780.79/R53.1  · Knee pain, left     719.46/M25.562      Plan  · Orders  o Discharge medications reconciled with the current medication list (1111F) - - 03/09/2021  o IOP - INR (92640) - V58.61/Z79.01 - 03/09/2021   INR 4.7, PRORTIME 56.2  o BMP Non-fasting [facility] (91442) - 276.8/E87.6, 585.3/N18.30 - 03/09/2021  o Magnesium ser (18500) -  275.2/E83.42 - 03/09/2021  o Urinalysis with Reflex Microscopy if abnormal ([facility]) (18360) - 599.0/N39.0 - 03/09/2021   to be obtained by home health  o PHYSICAL THERAPY CONSULTATION ([facility]) - 780.79/R53.1, 719.46/M25.562 - 03/09/2021   needs in-home PT - [facility]??  · Medications  o nitrofurantoin macrocrystal 50 mg oral capsule   SIG: take 2 capsules (100 mg) by oral route once daily at bedtime with food   DISP: (30) Capsule with 0 refills  Discontinued on 03/09/2021     o Medications have been Reconciled  o Transition of Care or Provider Policy  · Instructions  o Patient discharge summary has been reviewed and placed in patient's electronic medical record.  o Patient received a phone call from my office within 2 business days of discharge from inpatient status, and documented within the patient's chart.  o Also patient was seen (face to face) for follow up evaluation within 14 calender days of discharge from inpatient status for a severe complexity issue.  o Patient was educated on their diagnosis, treatment and any medication changes while being evaluated today.  o Hold warfarin - will have home health check INR daily until less than 2, then plan to restart warfarin at that time. Likely out of range d/t recent abx use.   o Will re-consult home health PT for weakness and decreased mobility with left knee pain - explained to patient that she would need to take her diuretic as prescribed to help with LE edema and to prevent acute exacerbation of CHF - if she was not willing to participate in PT and could not ambulate to the bathroom then she would need a permanent catheter or go to the nursing home or assisted living d/t her family could not physically take care of her that way. She voiced understanding.             Electronically Signed by: CAROL Mejia -Author on March 9, 2021 03:34:45 PM

## 2021-05-07 NOTE — OUTREACH NOTE
Quick Note      Patient Name: Isabell Ribera   Patient ID: [id number]   Sex: Female   YOB: 1945    Primary Care Provider: Ivanna MEDINA   Referring Provider: Ivanna MEDINA    Visit Date: December 30, 2020    Provider: CAROL Mejia   Location: [facility]   Location Address: [address] [place] [address] [place]  [address]   Location Phone: [phone]          History Of Present Illness  CCM Comprehensive Care Plan    This Chronic Medical Management Care Plan for Isabell Ribera, 75 year old /White female, has been monitored and managed, reviewed, and revised for the month of December. A cumulative time of 40 minutes was spent on this patient record, including phone call with patient, phone call with other providers, and chart review.   Regarding the patient's diagnoses Arthritis, Atrial Fibrillation, Hypertension, Long term (current) use of anticoagulants, and Warfarin anticoagulation, the following items were adressed: medical records and medications and any changes can be found within the plan section of the note. A detailed listing of time spent for chronic care management has been scanned into the patient's electronic record. Current medications include: amoxicillin-pot clavulanate 500-125 mg oral tablet, aspirin 81 mg oral tablet,chewable, carvedilol 6.25 mg oral tablet, cefdinir 300 mg oral capsule, famotidine 10 mg oral tablet, FreeStyle Lite Strips miscellaneous strip, furosemide 40 mg oral tablet, gabapentin 400 mg oral capsule, Miralax 17 gram/dose oral powder, potassium chloride 10 mEq oral tablet extended release, POTASSIUM CHLORIDE ER 10MEQ TABLET, Tylenol-Codeine #3 300-30 mg oral tablet, warfarin 1 mg oral tablet, and warfarin 3 mg oral tablet and the patient is reported to be compliant with medication protocol. Medications are reported to be effective. Regarding these diagnoses, referrals were made to the following provider/s N/A.    The patient was monitored remotely for blood pressure and INR level for a period of 30 minutes.   This patient's physical needs include: health care coverage and DME supplies. Patient is in the care of Home Health also requires a walker and assistance to ambulate..   Patient's mental support needs include: continued support. Patient has no diagnosis of depression or anxiety but still requires support to help her through her continued care..   The patient's cognitive support needs include: continued support, needs assistance with ADLs, personal care, and household care. Per chart the patient requires assistance with all ADLs and continued support..   The patient's psychosocial support needs are N/A,   This patient's functional needs include: DME supplies, needs assistance for ADLs, and physical healthcare. patient requires walker , ADL assistance and constant supervision.   This patient's environmental needs are N/A.           Assessment  · Chronic Care Management (CCM)     V68.89/Z02.89  · Long term (current) use of anticoagulants     V58.61/Z79.01  · Warfarin anticoagulation     V58.61/Z79.01  · Hypertension     401.9/I10  · Arthritis     V13.4/V13.4  · Atrial Fibrillation     427.31/I48.91      Plan  · Instructions  o Patient's Health Care Goals: obtain and maintain therapeutic level on her INR  o Provider's Health Care Goals: Continued health and wellness / control of INR levels  o Patient was provided an electronic copy of care plan  o CCM services were explained and offered and patient has accepted these services.  o Patient has given their written consent to recieve CCM services and understands that this includes the authorization of electronic communication of medical information with other treating providers.  o Patient understands that they may stop CCM services at any time and these changes will be effective at the end of the calendar month and will effectively revocate the agreement of CCM  services.  o Patient understands that only one practioner can furnish and be paid for CCM services during one calendar month. Patient also understands that there may be co-payment or deductible fees in association with CCM services.  o Patient will continue with at least monthly follow-up calls with the Nurse Navigator.  · Associate Tasks  o Task ID [id number] CCM: CCM Note December 2020            Electronically Signed by: Micaela Ramirez RN -Author on December 30, 2020 10:09:30 AM

## 2021-05-07 NOTE — OUTREACH NOTE
Quick Note      Patient Name: Isabell Ribera   Patient ID: [id number]   Sex: Female   YOB: 1945    Primary Care Provider: Ivanna MEDINA   Referring Provider: Iavnna MEDINA    Visit Date: March 29, 2021    Provider: CAROL Mejia   Location: [facility]   Location Address: [address] [place] [address] [place]  [address]   Location Phone: [phone]          History Of Present Illness  CCM Comprehensive Care Plan    This Chronic Medical Management Care Plan for Isabell Ribera, 75 year old /White female, has been monitored and managed, reviewed, revised, and and a new plan of care implemented for the month of March. A cumulative time of 60 minutes was spent on this patient record, including phone call with patient, electronic communication with primary care provider, and chart review.   Regarding the patient's diagnoses Arthritis, Atrial Fibrillation, Hypertension, Long term (current) use of anticoagulants, and Warfarin anticoagulation, the following items were adressed: medical records, medications, changes to medical care, transitions to medical care, and referrals to community service providers and any changes can be found within the plan section of the note. A detailed listing of time spent for chronic care management has been scanned into the patient's electronic record. Current medications include: aspirin 81 mg oral tablet,chewable, Augmentin 875-125 mg oral tablet, carvedilol 6.25 mg oral tablet, FreeStyle Lite Strips miscellaneous strip, furosemide 20 mg oral tablet, gabapentin 400 mg oral capsule, magnesium oxide 400 mg (241.3 mg magnesium) oral tablet, Miralax 17 gram/dose oral powder, potassium chloride 10 mEq oral tablet extended release, warfarin 1 mg oral tablet, warfarin 2 mg oral tablet, and warfarin 3 mg oral tablet and the patient is reported to be compliant with medication protocol. Medications are reported to be effective.   The  patient was monitored remotely for activity level for a period of 15 minutes.   This patient's physical needs include: help taking medications as prescribed, medication education, needs assistance with ADLs, physical healthcare, and resources for disability needs. Patient has struggled with strength and with getting around without assistance since her last hospitalization. Patient has had a recent decline again, with trouble clearing UTI. Patient has been less active, has been wheelchair bound, and has required a lot of help from her , daughters, and homehealth services. Patient reports not being able to walk much, as she has been very weak. She has been utilizing the wheelchair as much as possible, but feels herself getting weaker instead of stronger.   Patient's mental support needs include: increased support, contact information, and coordination of community providers. Patient recently down mentally, because of her decline in health, and her inability to take care of herself or drive. I have encouraged patient to call with any needs.   The patient's cognitive support needs include: medication, increased support, coordination of community providers, requires supervision, needs assistance with ADLs, and supervision. Patient has required a lot of help from family and from homehealth staff in order to complete ADL's. Family has been more involved lately, as they feel like their mother is not as active as before, and she has had some mental status changes and confusion.   The patient's psychosocial support needs include:, continued support, coordination of community providers, and medication monitoring and or assistance. patient has been declining over the past few month, and has required more helped that usual. Her daughters have had to provide care, outside of what home health is able to provide. With patient's increasing weakness, patient reports that she spends most of her time in her wheelchair, as she is  not able to get up and care for herself.   This patient's functional needs include: DME supplies, needs assistance for ADLs, physical healthcare, and resources for disability needs. Patient is needing help with mobility, and even with therapy, patient is having to utilize a wheelchair at this time. Her continued weakness has made it impossible for her care for herself.   This patient's environmental needs include: resources for disability needs, an unsafe living environment, and no involvement in outside activities or no access to other activities. With patients extreme weakness, she is going to be prone to readmission to the hospital. I have advised  or patient to call me with any needs, but to also take patient to the er with any increased weakness, high fever, or mental status changes.           Assessment  · Chronic Care Management (CCM)     V68.89/Z02.89  · Long term (current) use of anticoagulants     V58.61/Z79.01  · Warfarin anticoagulation     V58.61/Z79.01  · Hypertension     401.9/I10  · Arthritis     V13.4/V13.4  · Atrial Fibrillation     427.31/I48.91      Plan  · Orders  o Chronic care management services with the following required chin (85846) - V58.61/Z79.01, 401.9/I10, 427.31/I48.91 - 03/26/2021  o Chronic care management services with the following required chin (77276) - V58.61/Z79.01, 401.9/I10, 427.31/I48.91 - 03/26/2021  · Instructions  o Patient's Health Care Goals: Get stronger  o Provider's Health Care Goals: Compliance with home health care; no falls  o Patient was provided an electronic copy of care plan  o CCM services were explained and offered and patient has accepted these services.  o Patient has given their written consent to recieve CCM services and understands that this includes the authorization of electronic communication of medical information with other treating providers.  o Patient understands that they may stop CCM services at any time and these changes will be  effective at the end of the calendar month and will effectively revocate the agreement of CCM services.  o Patient understands that only one practioner can furnish and be paid for CCM services during one calendar month. Patient also understands that there may be co-payment or deductible fees in association with CCM services.  o Patient will continue with at least monthly follow-up calls with the Nurse Navigator.  · Associate Tasks  o Task ID [id number] CCM: CCM - March 2021            Electronically Signed by: Micaela Ramirez RN -Author on March 29, 2021 03:33:50 PM

## 2021-05-07 NOTE — PROGRESS NOTES
Progress Note      Patient Name: Isabell Ribera   Patient ID: [id number]   Sex: Female   YOB: 1945    Primary Care Provider: Ivanna MEDINA   Referring Provider: Ivanna MEDINA    Visit Date: January 28, 2020    Provider: CAROL Mejia   Location: [facility]   Location Address: [address] [place] [address]   [place]  [address]   Location Phone: [phone]          Chief Complaint     Refill Gabapentin.       History Of Present Illness  Isabell Ribera is a 74 year old /White female who presents for evaluation and treatment of:      she needs a refill on her gabapentin - she has 9 pills left and brought her RX with her today - last RX was written on 11/2 - she has only taken 1 a few nights d/t she was afraid she would run out before her check comes in - she gets her check on the 3rd of the month. She has been taking gabapentin for some time - she takes 800mg at bedtime for peripheral neuropathy secondary to DM, which is currently diet-controlled. No adverse s/e. No s/s of abuse or addiction.     She declines any screening tests today - mammogram, colonoscopy, and bone density - she does not feel that they are necessary as she does not have any symptoms to suggest that there is anything wrong. She denies any falls in the past 12 months.       Past Medical History  Disease Name Date Onset Notes   Anxiety --  --    Arthritis --  --    Atrial Fibrillation --  --    Depression --  --    Diabetes --  --    Hypertension --  --    Long term (current) use of anticoagulants 12/08/2017 --    Warfarin anticoagulation 12/08/2017 --          Past Surgical History  Procedure Name Date Notes   *Denies any surgical procedures --  --          Medication List  Name Date Started Instructions   aspirin 81 mg oral tablet,chewable  chew 1 tablet (81 mg) by oral route once daily   carvedilol 6.25 mg oral tablet 11/01/2019 TAKE ONE TABLET BY MOUTH TWICE A DAY   famotidine  10 mg oral tablet  take 1 tablet (10 mg) by oral route 2 times per day as needed   FreeStyle Lite Strips miscellaneous strip  use as directed   furosemide 40 mg oral tablet 2019 take 1/2 to 1 tablet daily as needed for swelling   gabapentin 400 mg oral capsule 2019 take 2 capsules by oral route once a day (at bedtime) for 30 days   Klor-Con M10 10 mEq oral tablet,ER particles/crystals 2019 TAKE ONE TABLET BY MOUTH DAILY   Tylenol-Codeine #3 300-30 mg oral tablet 2019 take 1/2 to 1 tablets by oral route every 6 hours as needed for severe pain   warfarin 1 mg oral tablet 2019 take 1 tablet (1 mg) by oral route once daily for 30 days   warfarin 3 mg oral tablet 2019 take 1 tablet (3 mg) by oral route once daily for 30 days         Allergy List  Allergen Name Date Reaction Notes   NO KNOWN DRUG ALLERGIES --  --  --          Family Medical History  Disease Name Relative/Age Notes   *No Known Family History  --          Reproductive History  Menstrual   Pregnancy Summary   Total Pregnancies: 5 Full Term: 0 Premature: 0   Ab Induced: 0 Ab Spontaneous: 0 Ectopics: 0   Multiples: 0 Livin         Social History  Finding Status Start/Stop Quantity Notes   Alcohol Never --/-- --  --    Disabled --  --/-- --  --    Homemaker --  --/-- --  --    lives with spouse --  --/-- --  --     --  --/-- --  --    Tobacco Former --/-- --  quit in          Immunizations  NameDate Admin Mfg Trade Name Lot Number Route Inj VIS Given VIS Publication   Qycshndhz35/01/2019 [facility] FLUZONE-HIGH DOSE [id number]  LA 2019    Comments: 79684-804-82   Ikbjlsxzu28/20/2018 [facility] FLUZONE-HIGH DOSE [id number] IM LA 2018   Comments: 67243-686-28         Review of Systems  · Constitutional  o Admits  o : good general health lately  o Denies  o : fatigue, fever, chills  · Breasts  o Denies  o : lumps, tenderness, swelling, nipple discharge  · Cardiovascular  o Admits  o : lower extremity  edema  o Denies  o : chest pain, syncope, dyspnea on exertion, palpitations  · Respiratory  o Denies  o : shortness of breath, cough  · Gastrointestinal  o Denies  o : nausea, vomiting, diarrhea, constipation, abdominal pain, blood in stools, hematochezia  · Neurologic  o Admits  o : tingling or numbness  o Denies  o : dizziness  · Musculoskeletal  o Admits  o : knee pain, BLE pain  · Endocrine  o Denies  o : polyuria, polydipsia, cold intolerance, heat intolerance, weight loss  · Psychiatric  o Denies  o : anxiety, depression, difficulty sleeping, suicidal ideation, homicidal ideation      Vitals  Date Time BP Position Site L\R Cuff Size HR RR TEMP (F) WT  HT  BMI kg/m2 BSA m2 O2 Sat HC       01/28/2020 11:09 /95 Sitting    55 - R  97.2     98 %    01/28/2020 11:42 /94 Sitting                     Physical Examination  · Constitutional  o Appearance  o : well developed, well-nourished, in no acute distress  · Eyes  o Conjunctivae  o : conjunctivae normal, no exudates present  · Neck  o Inspection/Palpation  o : normal appearance, no masses or tenderness, trachea midline  o Thyroid  o : no thyromegaly  · Respiratory  o Respiratory Effort  o : breathing unlabored  o Auscultation of Lungs  o : clear to ascultation  · Cardiovascular  o Heart  o :   § Auscultation of Heart  § : regular rate and rhythm  o Peripheral Vascular System  o :   § Pedal Pulses  § : bilateral pulse strength 2+  § Extremities  § : trace BLE   · Lymphatic  o Neck  o : no lymphadenopathy present  · Musculoskeletal  o General  o :   § General Musculoskeletal  § : Muscle tone, strength, and development grossly normal.  · Skin and Subcutaneous Tissue  o General Inspection  o : no lesions present, no areas of discoloration, skin turgor normal, texture normal  · Neurologic  o Gait and Station  o :   § Gait Screening  § : normal gait  · Psychiatric  o Mood and Affect  o : mood normal, affect appropriate          Assessment  · Diabetes  mellitus, controlled     250.00/E11.9  · Peripheral neuropathy     356.9/G62.9  · High risk medication use     V58.69/Z79.899  · Colon cancer screening declined     V64.2/Z53.20  · Mammogram declined     V64.2/Z53.20  · Osteoporosis screening declined     V64.2/Z53.20    Problems Reconciled  Plan  · Orders  o ACO-13: Fall Risk Screening with no falls in past year or only one fall without injury in the past year (1101F) - - 01/28/2020  o ACO-18: Positive screen for clinical depression using a standardized tool and a follow-up plan documented () - - 01/28/2020  o ACO-19: Colorectal cancer screening results documented and reviewed (3017F) - V64.2/Z53.20 - 01/28/2020  o ACO-20: Screening Mammography documented and reviewed (, 3014F) - V64.2/Z53.20 - 01/28/2020  o ACO-39: Current medications updated and reviewed () - - 01/28/2020  · Medications  o gabapentin 400 mg oral capsule   SIG: take 2 capsules by oral route once a day (at bedtime) for 30 days   DISP: (60) capsules with 2 refills  Refilled on 01/28/2020     o Medications have been Reconciled  o Transition of Care or Provider Policy  · Instructions  o Obtained a written consent for STEPHANIE query. Discussed the risk and benefits of the use of controlled substances with the patient, including the risk of tolerance and drug dependence. The patient has been counseled on the need to have an exit strategy, including potentially discontinuing the use of controlled substances. STEPHANIE has or will be reviewed as soon as it becomes avaliable.  o Take all medications as prescribed/directed.  o Patient was educated/instructed on their diagnosis, treatment and medications prior to discharge from the clinic today.  o Chronic conditions reviewed and taken into consideration for today's treatment plan.  o Electronically Identified Patient Education Materials Provided Electronically  · Disposition  o Call or Return if symptoms worsen or  persist.            Electronically Signed by: CAROL Mejia -Author on January 28, 2020 11:47:37 AM

## 2021-05-07 NOTE — PROGRESS NOTES
Progress Note      Patient Name: Isabell Ribera   Patient ID: [id number]   Sex: Female   YOB: 1945    Referring Provider: Tacho [place] MD    Visit Date: October 2, 2018    Provider: Tacho [place] MD   Location: [facility]   Location Address: [address] [place] [address]  [id number]   Location Phone: [phone]          Chief Complaint  · Follow up office visit within 7 calender days of discharge from inpatient status (high complexity).      History Of Present Illness  FOLLOW UP OFFICE VISIT WITHIN 7 CALENDER DAYS OF INPATIENT STATUS (SEVERE COMPLEXITY)  Isabell Ribera presents to office for follow up post discharge from inpatient status within 7 calender days. Patient was contacted within 2 business days via phone conversation. Documentation of that phone contact is present in the patient's electronic chart. Patient was admitted to an inpatient faciliity on 09/27/2018 and discharged on 09/29/2018 due to: uti. Patient did not tolerate Levaquin. Urine culture and wound cultures were sensitive to Bactrim also. Pt said that Levaquin caused weakness and dizziness. Patient feels better since stopping Levaquin 2 days ago. Will need to start Bactrim. Right big toe wound looking better- home health caring for wound. No redness, swelling, or warmth. Pt feeling much better. Rechecked INR and changed Coumadin dose- please see Coumadin flow sheet.   Admitting MD: Dr. Morris   Her discharge summary has been reviewed and placed in the patient's electronic chart.   Patient's problem list is: Arthritis, Atrial Fibrillation, Hypertension, and Long term (current) use of anticoagulants   Patient's medication list has been updated/reconcilled from discharge summary. Medication list is: aspirin 81 mg oral tablet,chewable, carvedilol 6.25 mg oral tablet, FreeStyle Lite Strips miscellaneous strip, furosemide 40 mg oral tablet, gabapentin 400 mg oral capsule, Klor-Con M10 10 mEq  oral tablet,ER particles/crystals, ranitidine HCl 150 mg oral tablet, and warfarin 3 mg oral tablet       Past Medical History  Disease Name Date Onset Notes   Anxiety --  --    Arthritis --  --    Atrial Fibrillation --  --    Depression --  --    Diabetes --  --    Hypertension --  --    Long term (current) use of anticoagulants 2017 --    Warfarin anticoagulation 2017 --          Past Surgical History  Procedure Name Date Notes   *Denies any surgical procedures --  --          Medication List  Name Date Started Instructions   aspirin 81 mg oral tablet,chewable  chew 1 tablet (81 mg) by oral route once daily   carvedilol 6.25 mg oral tablet 2018 take 1 tablet (6.25 mg) by oral route 2 times per day with food   FreeStyle Lite Strips miscellaneous strip  use as directed   furosemide 40 mg oral tablet  Take 1 tablet every morning and take 1/2 tablet in evening   gabapentin 400 mg oral capsule 2018 take 2 capsules by oral route once a day (at bedtime) for 30 days   Klor-Con M10 10 mEq oral tablet,ER particles/crystals 2018 take 1 tablet (10 meq) by oral route once daily   ranitidine HCl 150 mg oral tablet 2018 take 1 tablet (150 mg) by oral route 2 times per day   warfarin 3 mg oral tablet 2018 TAKE ONE TABLET BY MOUTH DAILY ON , , AND  AS DIRECTED. TAKE 4 MG ON OTHER DAYS         Allergy List  Allergen Name Date Reaction Notes   NO KNOWN DRUG ALLERGIES --  --  --          Family Medical History  Disease Name Relative/Age Notes   *No Known Family History / --          Reproductive History  Menstrual   Pregnancy Summary   Total Pregnancies: 5 Full Term: 0 Premature: 0   Ab Induced: 0 Ab Spontaneous: 0 Ectopics: 0   Multiples: 0 Livin         Social History  Finding Status Start/Stop Quantity Notes   Alcohol Never --/-- --  --    Disabled --  --/-- --  --    Homemaker --  --/-- --  --    lives with spouse --  --/-- --  --     --  --/-- --  --     Tobacco Former --/-- --  quit in 1973         Immunizations  NameDate Admin Mfg Trade Name Lot Number Route Inj VIS Given VIS Publication   Ksbicsfqm78/20/2018 [facility] Fluzone High-Dose [id number] IM LA 09/20/2018 08/07/2015   Comments: 00333-209-30         Review of Systems  · Constitutional  o Denies  o : fever, weight gain, weight loss, malaise/fatigue  · Cardiovascular  o Denies  o : palpitation, chest pain, claudication, lower extremity edema  · Respiratory  o Denies  o : shortness of breath, hemoptysis, wheezing, productive cough  · Gastrointestinal  o Denies  o : nausea, vomiting, diarrhea, constipation, abdominal pain  · Neurologic  o Denies  o : unsteady gait, weakness, dizziness, H/A      Vitals  Date Time BP Position Site L\R Cuff Size HR RR TEMP(F) WT  HT  BMI kg/m2 BSA m2 O2 Sat HC       10/02/2018 02:25 /76 Sitting    82 - R  97.2     98 %           Physical Examination  · Constitutional  o Appearance  o : well developed, well-nourished, in no acute distress  · Respiratory  o Respiratory Effort  o : breathing unlabored  o Auscultation of Lungs  o : clear to ascultation  · Cardiovascular  o Heart  o :   § Auscultation of Heart  § : regular rate and rhythm  o Peripheral Vascular System  o :   § Extremities  § : no edema  · Gastrointestinal  o Abdomen  o : soft, non-tender, non-distended, + bowel sounds, no hepatosplenomegaly, no masses palpated, no CVA tenderness  · Musculoskeletal  o General  o :   § General Musculoskeletal  § : No joint swelling or deformity., Muscle tone, strength, and development grossly normal.  · Skin and Subcutaneous Tissue  o General Inspection  o : right big toe wound wrapped- no redness, warmth, or swelling.  · Neurologic  o Gait and Station  o :   § Gait Screening  § : normal gait  · Psychiatric  o Mood and Affect  o : mood normal, affect appropriate          Assessment  · Long term (current) use of anticoagulants     V58.61/Z79.01  · Hypertension     401.9/I10  · Atrial  Fibrillation     427.31/I48.91  · UTI (urinary tract infection)     599.0/N39.0  · Foot ulcer     707.15/L97.509      Plan  · Orders  o Discharge medications reconciled with the current medication list (1111F) - - 10/02/2018  o CBC with Auto Diff [facility] (76378) - 401.9/I10 - 10/02/2018  o CMP [facility] (21150) - 401.9/I10 - 10/02/2018  o IOP - INR (35084) - V58.61/Z79.01 - 10/02/2018   1.9  o Urinalysis with Reflex Microscopy if abnormal ([facility]) (26847) - 599.0/N39.0 - 10/09/2018  o INR (3555F, 22294) - V58.61/Z79.01, 427.31/I48.91 - 10/09/2018  · Medications  o Bactrim -160 mg oral tablet   SIG: take 1 tablet by oral route every 12 hours for 7 days   DISP: (14) tablets with 0 refills  Prescribed on 10/02/2018     · Instructions  o Patient discharge summary has been reviewed and placed in patient's electronic medical record.  o Patient received a phone call from my office within 2 business days of discharge from inpatient status, and documented within the patient's chart.  o Also patient was seen (face to face) for follow up evaluation within 7 calender days of discharge from inpatient status for a high complexity issue.  o Patient was educated on their diagnosis, treatment and any medication changes while being evaluated today.            Electronically Signed by: Tacho [place] MD -Author on October 8, 2018 03:23:24 PM

## 2021-05-07 NOTE — OUTREACH NOTE
Quick Note      Patient Name: Isabell Ribera   Patient ID: [id number]   Sex: Female   YOB: 1945    Primary Care Provider: Ivanna MEDINA   Referring Provider: Ivanna MEDINA    Visit Date: September 30, 2020    Provider: CAROL Mejia   Location: [facility]   Location Address: [address] [place] [address]   [place]  [address]   Location Phone: [phone]          History Of Present Illness  CCM Comprehensive Care Plan    This Chronic Medical Management Care Plan for Isabell Ribera, 74 year old /White female, has been monitored and managed, reviewed, revised, and a new plan of care implemented, and established for the month of September. A cumulative time of 45 minutes was spent on this patient record, including face to face visit with provider and patient, phone call with other providers, and chart review.   Regarding the patient's diagnoses Arthritis, Atrial Fibrillation, Hypertension, Long term (current) use of anticoagulants, and Warfarin anticoagulation, the following items were adressed: medical records, medications, and transitions to medical care and any changes can be found within the plan section of the note. A detailed listing of time spent for chronic care management has been scanned into the patient's electronic record. Current medications include: amoxicillin-pot clavulanate 500-125 mg oral tablet, aspirin 81 mg oral tablet,chewable, Augmentin 875-125 mg oral tablet, carvedilol 6.25 mg oral tablet, famotidine 10 mg oral tablet, FreeStyle Lite Strips miscellaneous strip, furosemide 40 mg oral tablet, gabapentin 400 mg oral capsule, Miralax 17 gram/dose oral powder, potassium chloride 10 mEq oral tablet extended release, Tylenol-Codeine #3 300-30 mg oral tablet, warfarin 1 mg oral tablet, and warfarin 3 mg oral tablet and the patient is reported to be compliant with medication protocol. Medications are reported to be effective.   The patient was  monitored remotely for activity level for a period of 15 minutes.   This patient's physical needs include: help taking medications as prescribed, health care coverage, needs assistance with ADLs, physical healthcare, and resources for disability needs. For the past few months, patient has had extreme weakness and fatigue associated with her UTI's. Patient is needing more care that family can provide, so homehealth has been ordered, and the  has been consulted to connect the patient with more resources. Currently trying to connect patient with services for during the day care, while daughters are at work.  has been able to help some, but with patient weight, and her weakness, he is unable to lift her and she is an extreme fall risk.   Patient's mental support needs include: increased support and coordination of community providers. Support provided to patient and family during this time of down health. All are needing more help than usual right now, with patient recent UTIs and weakness.   The patient's cognitive support needs include: continued support, coordination of community providers, needs assistance with ADLs, personal care, household care, emotional care, and supervision. Patient has severe fatigue and weakness secondary to recurrent UTIs recently. Patient has been unable to ambulate safe on her, and she is currently using a wheelchair to get around her home. Patient also has been having periods of confusion and forgetfulness.   The patient's psychosocial support needs include:, need for increased support and coordination of community providers. Helping patient and family to be connected to more healthcare with patient now being at home. Homehealth is working to see about getting a sitter/aid to help with ADL's and hygiene. Patient is having trouble with incontinence of bladder and bowels and requires a lot care right now. Family splitting the day with patient and , and it is  stressful for all involved.   This patient's functional needs include: DME supplies, needs assistance for ADLs, physical healthcare, and resources for disability needs. Patient is still too week to ambulate, so she has been having to use a wheelchair, but home is older in age, therefore the door frames are not as wide as the wheelchair, so that leaves patient from being able to enter certain rooms, mainly the bathroom.   This patient's environmental needs include: resources for disability needs. Patient is needing more help with getting around her home, and also care with hygiene related to the set up of their home.           Assessment  · Chronic Care Management (CCM)     V68.89/Z02.89  · Long term (current) use of anticoagulants     V58.61/Z79.01  · Recurrent UTI (urinary tract infection)     599.0/N39.0  · Weakness     780.79/R53.1      Plan  · Orders  o CCM:Each Additional 20 mins () - V68.89/Z02.89, V58.61/Z79.01, 599.0/N39.0, 780.79/R53.1 - 09/24/2020  · Instructions  o Patient's Health Care Goals: Help with care at home  o Provider's Health Care Goals: Resolution of UTI/ safety/ no falls  o CCM services were explained and offered and patient has accepted these services.  o Patient has given their written consent to recieve CCM services and understands that this includes the authorization of electronic communication of medical information with other treating providers.  o Patient understands that they may stop CCM services at any time and these changes will be effective at the end of the calendar month and will effectively revocate the agreement of CCM services.  o Patient understands that only one practioner can furnish and be paid for CCM services during one calendar month. Patient also understands that there may be co-payment or deductible fees in association with CCM services.  o Patient will continue with at least monthly follow-up calls with the Nurse Navigator.  · Associate Tasks  o Task ID [id number]  CCM: CCM - September 2020            Electronically Signed by: Micaela Ramirez RN -Author on September 30, 2020 09:11:38 AM

## 2021-05-07 NOTE — PROGRESS NOTES
Progress Note      Patient Name: Isabell Ribera   Patient ID: [id number]   Sex: Female   YOB: 1945    Primary Care Provider: Ivanna MEDINA   Referring Provider: Ivanna MEDINA    Visit Date: February 23, 2021    Provider: CAROL Mejia   Location: [facility]   Location Address: [address] [place] [address] [place]  [address]   Location Phone: [phone]          Chief Complaint     refills       History Of Present Illness  Video Conferencing Visit  Isabell Ribera is a 75 year old /White female who is presenting for evaluation via video conferencing via [facility] . Verbal consent obtained before beginning visit.   The following staff were present during this visit: CAROL Mejia   Chief Complaint: Medication refills/follow up   Past Medical History/Overview of Patient Symptoms     follow up on chronic health conditions and medication refills     She has recently been put back in the hospital for recurrent UTI - expected to be out of the hospital on Friday.     Diet -controlled DM with severe peripheral neuropathy - she takes gabapentin 400mg, 2 at bedtime daily. It helps with the numbness/tingling and sensation of 'electric shock'. She can hardly feel her feet anymore - some days are better than others. They check her feet routinely to make sure she doesn't have any breakdown. She can hardly feel the gas pedal if she drives, so she doesn't drive much at all. When she does, she has to be very careful. She has tried to come off of gabapentin in the past but has been unsuccessful due to recurrence of shocking sensation. She is mainly wheelchair dependent - if she walks with a walker then she can lose her balance easily. Her daughter can hardly get her out of the house any longer - her  was a big help previously, but he hasn't been able to help as much anymore d/t his back. She has become to much for him to lift.     HTN/HLD and a-fib -  she has been compliant with her warfarin - home health has been coming weekly to check her INR for her - she is currently taking 2mg MWF and 3mg all other days. She takes furosemide PRN for LE edema -she doesn't take it routinely d/t she doesn't want to run to the bathroom every 5 minutes. She is taking Coreg BID as well - no c/o chest pain, palpitations, headaches, dizziness, or shortness of breath. The kidney doctor cut her potassium back to three times per week.     When she starts getting confused and weak she knows she has another UTI coming on.       Past Medical History  Disease Name Date Onset Notes   Anxiety --  --    Arthritis --  --    Atrial Fibrillation --  --    Depression --  --    Diabetes --  --    Hypertension --  --    Long term (current) use of anticoagulants 12/08/2017 --    Warfarin anticoagulation 12/08/2017 --          Past Surgical History  Procedure Name Date Notes   *Denies any surgical procedures --  --          Medication List  Name Date Started Instructions   aspirin 81 mg oral tablet,chewable  chew 1 tablet (81 mg) by oral route once daily   carvedilol 6.25 mg oral tablet 02/08/2021 TAKE ONE TABLET BY MOUTH TWICE A DAY WITH FOOD   famotidine 10 mg oral tablet  take 1 tablet (10 mg) by oral route 2 times per day as needed   FreeStyle Lite Strips miscellaneous strip  use as directed   furosemide 40 mg oral tablet 12/28/2020 TAKE ONE-HALF TO ONE TABLET BY MOUTH DAILY AS NEEDED FOR SWELLING   gabapentin 400 mg oral capsule 02/12/2021 take 2 capsules by oral route once a day (at bedtime) for 7 days   Miralax 17 gram/dose oral powder 09/22/2020 take 17 gram mixed with 8 oz. water, juice, soda, coffee or tea by oral route once daily   POTASSIUM CHLORIDE ER 10MEQ TABLET 10/27/2020 TAKE ONE TABLET BY MOUTH DAILY   warfarin 1 mg oral tablet 11/23/2020 TAKE ONE TABLET BY MOUTH ONCE DAILY   warfarin 2 mg oral tablet 12/04/2020 take 1 tablet (2 mg) by oral route once daily   warfarin 3 mg oral  tablet 12/10/2020 TAKE ONE TABLET BY MOUTH DAILY         Allergy List  Allergen Name Date Reaction Notes   NO KNOWN DRUG ALLERGIES --  --  --          Family Medical History  Disease Name Relative/Age Notes   *No Known Family History  --          Reproductive History  Menstrual   Pregnancy Summary   Total Pregnancies: 5 Full Term: 0 Premature: 0   Ab Induced: 0 Ab Spontaneous: 0 Ectopics: 0   Multiples: 0 Livin         Social History  Finding Status Start/Stop Quantity Notes   Alcohol Never --/-- --  --    Disabled --  --/-- --  --    Homemaker --  --/-- --  --    lives with spouse --  --/-- --  --     --  --/-- --  --    Tobacco Former --/-- --  quit in          Immunizations  NameDate Admin Mfg Trade Name Lot Number Route Inj VIS Given VIS Publication   Sbrfjwelv71/22/2020 [facility] FLUZONE-HIGH DOSE [id number] IM LA 2020    Comments: [id number]         Review of Systems  · Constitutional  o Admits  o : fatigue  o Denies  o : fever, chills, body aches  · Eyes  o Denies  o : double vision, blurred vision  · HENT  o Denies  o : headaches, chronic sinus problem  · Cardiovascular  o Admits  o : lower extremity edema  o Denies  o : chest pain, syncope, dyspnea on exertion, lightheadedness, palpitations  · Respiratory  o Denies  o : shortness of breath, cough  · Gastrointestinal  o Denies  o : nausea, vomiting, diarrhea, abdominal pain  · Genitourinary  o Admits  o : recurrent UTI  o Denies  o : dysuria, urinary retention  · Integument  o Denies  o : rash, pigmentation changes  · Neurologic  o Admits  o : altered mental status - with UTI but improved  o Denies  o : dizziness  · Musculoskeletal  o Admits  o : muscular weakness  o Denies  o : joint pain, joint swelling  · Endocrine  o Denies  o : polyuria, polydipsia, cold intolerance, heat intolerance      Physical Examination  · Constitutional  o Appearance  o : morbidly obese, well developed, alert, in no acute distress, well-tended appearance, no obvious  deformities present - patient is in the hospital in the bed - daughter at her bedside   · Head and Face  o HEENT  o : Unremarkable  · Respiratory  o Respiratory Effort  o : breathing unlabored, no accessory muscle use  · Musculoskeletal  o General  o :   § General Musculoskeletal  § : Muscle tone, strength, and development grossly normal.  · Skin and Subcutaneous Tissue  o General Inspection  o : no lesions present, no areas of discoloration, skin turgor normal, texture normal  · Neurologic  o Gait and Station  o :   § Gait Screening  § : laying in bed  · Psychiatric  o Mood and Affect  o : mood normal, affect appropriate          Assessment  · Diabetes mellitus, type 2     250.00/E11.9  · Essential hypertension     401.9/I10  · Hyperlipidemia     272.4/E78.5  · Long term (current) use of anticoagulants     V58.61/Z79.01  · Warfarin anticoagulation     V58.61/Z79.01  · Atrial Fibrillation     427.31/I48.91  · High risk medication use     V58.69/Z79.899  · Peripheral neuropathy     356.9/G62.9      Plan  · Orders  o ACO-39: Current medications updated and reviewed (1159F, ) - - 02/23/2021  · Medications  o carvedilol 6.25 mg oral tablet   SIG: take 1 tablet (6.25 mg) by oral route 2 times per day with food   DISP: (60) Tablet with 2 refills  Adjusted on 02/23/2021     o furosemide 40 mg oral tablet   SIG: take 0.5 tablet by oral route QD PRN swelling   DISP: (15) Tablet with 2 refills  Adjusted on 02/23/2021     o gabapentin 400 mg oral capsule   SIG: take 2 capsules by oral route once a day (at bedtime)   DISP: (60) Capsule with 2 refills  Adjusted on 02/23/2021     o potassium chloride 10 mEq oral tablet extended release   SIG: take 1 tablet by oral route three times per week on Monday, Wednesday, and Friday   DISP: (30) Tablet with 0 refills  Adjusted on 02/23/2021     o Miralax 17 gram/dose oral powder   SIG: take 17 gram mixed with 8 oz. water, juice, soda, coffee or tea by oral route once daily   DISP:  (510) Gram with 0 refills  Refilled on 02/23/2021     o warfarin 2 mg oral tablet   SIG: take 1 tablet (2 mg) by oral route once daily   DISP: (90) Tablet with 0 refills  Refilled on 02/23/2021     o warfarin 3 mg oral tablet   SIG: TAKE ONE TABLET BY MOUTH DAILY   DISP: (90) Tablet with 0 refills  Refilled on 02/23/2021     o Medications have been Reconciled  o Transition of Care or Provider Policy  · Instructions  o Obtained a written consent for STEPHANIE query. Discussed the risk and benefits of the use of controlled substances with the patient, including the risk of tolerance and drug dependence. The patient has been counseled on the need to have an exit strategy, including potentially discontinuing the use of controlled substances. STEPHANIE has or will be reviewed as soon as it becomes avaliable.  o Take all medications as prescribed/directed.  o Patient was educated/instructed on their diagnosis, treatment and medications prior to discharge from the clinic today. Refills to the pharmacy today - will request in-patient records - patient to follow up in office after discharge from the hospital.   o Chronic conditions reviewed and taken into consideration for today's treatment plan.            Electronically Signed by: CAROL Mejia -Author on May 17, 2021 02:09:28 PM

## 2021-05-07 NOTE — PROGRESS NOTES
Progress Note      Patient Name: Isabell Ribera   Patient ID: [id number]   Sex: Female   YOB: 1945        Visit Date: January 16, 2019    Provider: CAROL Mejia   Location: [facility] [place]   Location Address: [address] [place] [address]  [id number]   Location Phone: [phone]          Chief Complaint     refills       History Of Present Illness  Isabell Ribera is a 73 year old /White female who presents for evaluation and treatment of:      she needs a refill on her gabapentin, and she is requesting a new prescription for Pepcid.     She is supposed to have surgery for a kidney stone--they are supposed to call her when they are going to do it (Dr. Boss). She was going to have it done earlier this month but she ended up having another UTI and they couldn't. She just came off another round of Cipro. She wants her INR checked to make sure her warfarin is still good. It was 2.05 on her last check on 1/9/18.     She needs her gabapentin refilled for her neuropathy. She does okay so long as she takes it. She takes 2-400mg tabs at bedtime. No s/s of dependence or addiction. She has been taking this dose for several years.     When she was in the hospital for her kidney stone/UTI they gave her Pepcid instead of ranitidine. She states it works just as well and it is cheaper for her. She would like to get a RX because she has been buying OTC. No nausea, vomiting, abdominal pain, or dysphagia.            Past Medical History  Disease Name Date Onset Notes   Anxiety --  --    Arthritis --  --    Atrial Fibrillation --  --    Depression --  --    Diabetes --  --    Hypertension --  --    Long term (current) use of anticoagulants 12/08/2017 --    Warfarin anticoagulation 12/08/2017 --          Past Surgical History  Procedure Name Date Notes   *Denies any surgical procedures --  --          Medication List  Name Date Started Instructions   aspirin 81 mg oral  tablet,chewable  chew 1 tablet (81 mg) by oral route once daily   carvedilol 6.25 mg oral tablet 2018 take 1 tablet (6.25 mg) by oral route 2 times per day with food   FreeStyle Lite Strips miscellaneous strip  use as directed   furosemide 40 mg oral tablet  Take 1 tablet every morning and take 1/2 tablet in evening   gabapentin 400 mg oral capsule 2018 take 2 capsules by oral route once a day (at bedtime) for 30 days   Klor-Con M10 10 mEq oral tablet,ER particles/crystals 2018 take 1 tablet (10 meq) by oral route once daily   warfarin 1 mg oral tablet 10/02/2018 take 1 tablet (1 mg) by oral route once daily for 30 days   warfarin 3 mg oral tablet 10/25/2018 take 1 tablet (3 mg) by oral route once daily for 30 days         Allergy List  Allergen Name Date Reaction Notes   NO KNOWN DRUG ALLERGIES --  --  --          Family Medical History  Disease Name Relative/Age Notes   *No Known Family History / --          Reproductive History  Menstrual   Pregnancy Summary   Total Pregnancies: 5 Full Term: 0 Premature: 0   Ab Induced: 0 Ab Spontaneous: 0 Ectopics: 0   Multiples: 0 Livin         Social History  Finding Status Start/Stop Quantity Notes   Alcohol Never --/-- --  --    Disabled --  --/-- --  --    Homemaker --  --/-- --  --    lives with spouse --  --/-- --  --     --  --/-- --  --    Tobacco Former --/-- --  quit in          Immunizations  NameDate Admin Mfg Trade Name Lot Number Route Inj VIS Given VIS Publication   Acuycadab84/20/2018 [facility] Fluzone High-Dose [id number] IM LA 2018   Comments: [id number]         Review of Systems  · Constitutional  o Admits  o : good general health lately  o Denies  o : fatigue, fever, chills  · HENT  o Denies  o : headaches, vertigo, nose bleeding, gingival bleeding  · Cardiovascular  o Admits  o : lower extremity edema  o Denies  o : chest pain, palpitations  · Respiratory  o Denies  o : shortness of breath,  cough  · Gastrointestinal  o Denies  o : nausea, vomiting, diarrhea, heartburn, reflux, excessive belching, abdominal pain  · Genitourinary  o Admits  o : kidney stones  o Denies  o : urgency, frequency, dysuria, hematuria  · Integument  o Denies  o : pigmentation changes  · Neurologic  o Denies  o : dizziness  · Musculoskeletal  o Denies  o : joint pain      Vitals  Date Time BP Position Site L\R Cuff Size HR RR TEMP(F) WT  HT  BMI kg/m2 BSA m2 O2 Sat        01/16/2019 12:48 /80 Sitting    61 - R  97     96 %           Physical Examination  · Constitutional  o Appearance  o : obese, well developed, alert, in no acute distress, no obvious deformities present  · Eyes  o Conjunctivae  o : conjunctivae normal, no exudates present  · Neck  o Inspection/Palpation  o : normal appearance, no masses or tenderness, trachea midline  o Thyroid  o : no thyromegaly  · Respiratory  o Respiratory Effort  o : breathing unlabored  o Auscultation of Lungs  o : clear to ascultation  · Cardiovascular  o Heart  o :   § Auscultation of Heart  § : regular rate, normal rhythm, no murmurs present  o Peripheral Vascular System  o :   § Carotid Arteries  § : normal pulses bilaterally, no bruits present  § Extremities  § : BLE edema--1+  · Gastrointestinal  o Abdominal Examination  o :   § Abdomen  § : soft, non-tender, non-distended, bowel sounds +  · Lymphatic  o Neck  o : no lymphadenopathy present  · Musculoskeletal  o General  o :   § General Musculoskeletal  § : Muscle tone, strength, and development grossly normal.  · Skin and Subcutaneous Tissue  o General Inspection  o : no lesions present, no areas of discoloration, skin turgor normal, texture normal  · Neurologic  o Gait and Station  o :   § Gait Screening  § : wheelchair-dependent   · Psychiatric  o Mood and Affect  o : mood normal, affect appropriate              Assessment  · GERD (gastroesophageal reflux disease)     530.81/K21.9  · Long term (current) use of  anticoagulants     V58.61/Z79.01  · Warfarin anticoagulation     V58.61/Z79.01  · Hypertension     401.9/I10  · Arthritis     V13.4/V13.4  · Atrial Fibrillation     427.31/I48.91  · Neuropathy     355.9/G62.9  · Lower extremity edema     782.3/R60.0      Plan  · Orders  o IOP - INR (82927) - V58.61/Z79.01, 427.31/I48.91 - 01/16/2019   2.3  o ACO-39: Current medications updated and reviewed () - - 01/16/2019  · Medications  o famotidine 10 mg oral tablet   SIG: take 1 tablet (10 mg) by oral route 2 times per day as needed   DISP: (180) tablets with 1 refills  Prescribed on 01/17/2019     o gabapentin 400 mg oral capsule   SIG: take 2 capsules by oral route once a day (at bedtime) for 30 days   DISP: (60) capsules with 2 refills  Refilled on 01/16/2019     · Instructions  o Maintain a healthy weight. Avoid tight fitting clothes. Avoid fried, fatty foods, tomato sauce, chocolate, mint, garlic, onion, alcohol. caffeine. Eat smaller meals, dont lie down after a meal, dont smoke. Elevate the head of your bed 6-9 inches.  o Take all medications as prescribed/directed.  o Patient was educated/instructed on their diagnosis, treatment and medications prior to discharge from the clinic today.  o Chronic conditions reviewed and taken into consideration for today's treatment plan.  · Disposition  o Call or Return if symptoms worsen or persist.            Electronically Signed by: CAROL Mejia -Author on January 17, 2019 09:34:27 AM

## 2021-05-07 NOTE — OUTREACH NOTE
Quick Note      Patient Name: Isabell Ribera   Patient ID: [id number]   Sex: Female   YOB: 1945    Primary Care Provider: Ivanna MEDINA   Referring Provider: Ivanna MEDINA    Visit Date: September 30, 2020    Provider: CAROL Mejia   [facility]   Location Address: [facility] [place]  [address]   Location Phone: [phone]          History Of Present Illness     [facility]     TaskID: [id number]    Task Subject: [facility] - September 2020    Task Comments:    Date: Sep 24 2020 10:21AM     Creator: [email or web address]  PT/INR results were called to me yesterday afternoon. INR was 4.1. Provider has reached out and advised that her coumadin be held at this time, and recheck levels on friday. Homehealth RN to call family and advised medication to be held. (5 minutes)    Date: Sep 24 2020 10:19AM     Creator: [email or web address]   Gavin, is at home with her during the day, but he is unable to lift her with her weight. He does try to help in other ways, but lifting and hygiene has not been a strong suit. Daughters to look into medicaid for both patient and  Gavin. I have encouraged them to reach out to the DCBS or social security office to apply, as their combined income is low. Daughters have asked that all communication come through them, as their mom is oftentimes confused and can refuse treatment at times. Family would like a caregiver who would be willing to cover the mid-day hours and to help with hygiene. Will see if any private sitters are available (40 minutes)    Date: Sep 24 2020 10:15AM     Creator: [email or web address]  I have been working with this patient for quite sometime, since her discharge from the nursing home recently. Spoke with provider, patient and family  on 09/22/2020 at patient appt. patient and family in need of more assistance at home. Both daughters are working around the clock to help care for their mom. Since she is unable to ambulate at this time,  they are having a hard time to get her into the shower safely and help with daily hygiene tasks. Family is requesting help at home, that is preferrably covered by her insurance. I have reached out to [facility] to see what resources are avilable. They do not have a nursing assistant at this time, but will hopefully in the future. [facility] is another option brought to our attention, but the care has to be paid out of pocket. [facility] to reach out to daughters to see if that would be doable, even once or twice per week. Patient is incontinent of bladder and bowel at times, therefore she has been battling recurrent UTI's with weakness, and at times confusion.                   Electronically Signed by: Micaela Ramirez RN -Author on September 30, 2020 09:12:27 AM

## 2021-05-07 NOTE — PROGRESS NOTES
Progress Note      Patient Name: Isabell Ribera   Patient ID: [id number]   Sex: Female   YOB: 1945    Referring Provider: Tacho Cleveland MD    Visit Date: October 18, 2018    Provider: Tacho Cleveland MD   Location: [facility]   Location Address: [address] [place] [address]  [id number]   Location Phone: [phone]          Chief Complaint     follow up on lab work       History Of Present Illness  Isabell Ribera is a 72 year old /White female who presents for evaluation and treatment of:      needs INR- on Coumadin- 3mg on T-ejas-X-F-Sat-Sun, 4mg Thurs  DM II- unknown control  renal insufficiency- needs lab rechecked  hyperlipidemia- unknown control- needs rechecked  HTN- controlled       Past Medical History  Disease Name Date Onset Notes   Anxiety --  --    Arthritis --  --    Atrial Fibrillation --  --    Depression --  --    Diabetes --  --    Hypertension --  --    Long term (current) use of anticoagulants 12/08/2017 --    Warfarin anticoagulation 12/08/2017 --          Past Surgical History  Procedure Name Date Notes   *Denies any surgical procedures --  --          Medication List  Name Date Started Instructions   aspirin 81 mg oral tablet,chewable  chew 1 tablet (81 mg) by oral route once daily   carvedilol 6.25 mg oral tablet 09/20/2018 take 1 tablet (6.25 mg) by oral route 2 times per day with food   FreeStyle Lite Strips miscellaneous strip  use as directed   furosemide 40 mg oral tablet  Take 1 tablet every morning and take 1/2 tablet in evening   gabapentin 400 mg oral capsule 09/20/2018 take 2 capsules by oral route once a day (at bedtime) for 30 days   Klor-Con M10 10 mEq oral tablet,ER particles/crystals 09/20/2018 take 1 tablet (10 meq) by oral route once daily   ranitidine HCl 150 mg oral tablet 09/20/2018 take 1 tablet (150 mg) by oral route 2 times per day   warfarin 1 mg oral tablet 10/02/2018 take 1 tablet (1 mg) by oral route once  daily for 30 days   warfarin 3 mg oral tablet 2018 TAKE ONE TABLET BY MOUTH DAILY ON , , AND  AS DIRECTED. TAKE 4 MG ON OTHER DAYS         Allergy List  Allergen Name Date Reaction Notes   NO KNOWN DRUG ALLERGIES --  --  --          Family Medical History  Disease Name Relative/Age Notes   *No Known Family History / --          Reproductive History  Menstrual   Pregnancy Summary   Total Pregnancies: 5 Full Term: 0 Premature: 0   Ab Induced: 0 Ab Spontaneous: 0 Ectopics: 0   Multiples: 0 Livin         Social History  Finding Status Start/Stop Quantity Notes   Alcohol Never --/-- --  --    Disabled --  --/-- --  --    Homemaker --  --/-- --  --    lives with spouse --  --/-- --  --     --  --/-- --  --    Tobacco Former --/-- --  quit in          Immunizations  NameDate Admin Mfg Trade Name Lot Number Route Inj VIS Given VIS Publication   Jwzligwbd07/20/2018 [facility] Fluzone High-Dose [id number] IM LA 2018   Comments: 96231-812-70         Review of Systems  · Constitutional  o Denies  o : fatigue, fever  · Cardiovascular  o Denies  o : chest pain, palpitations  · Respiratory  o Denies  o : shortness of breath, cough  · Gastrointestinal  o Denies  o : nausea, vomiting, diarrhea  · Psychiatric  o Denies  o : anxiety, depression      Physical Examination  · Constitutional  o Appearance  o : well developed, well-nourished, in no acute distress  · Respiratory  o Respiratory Effort  o : breathing unlabored  o Auscultation of Lungs  o : clear to ascultation  · Cardiovascular  o Heart  o :   § Auscultation of Heart  § : regular rate and rhythm  · Gastrointestinal  o Abdomen  o : soft, non-tender, non-distended, + bowel sounds, no hepatosplenomegaly, no masses palpated  · Musculoskeletal  o General  o :   § General Musculoskeletal  § : No joint swelling or deformity., Muscle tone, strength, and development grossly normal.  · Neurologic  o Mental Status Examination  o :    § Orientation  § : grossly oriented to person, place and time  o Gait and Station  o :   § Gait Screening  § : normal gait          Assessment  · Diabetes mellitus, type 2     250.00/E11.9  · Essential hypertension     401.9/I10  · Hyperlipidemia     272.4/E78.5  · Atrial fibrillation     427.31/I48.91  · Renal insufficiency     593.9/N28.9      Plan  · Orders  o CBC with Auto Diff [facility] (55110) - 250.00/E11.9 - 10/18/2018  o CMP [facility] (84951) - 250.00/E11.9 - 10/18/2018  o Hgb A1c [facility] (91800) - 250.00/E11.9 - 10/18/2018  o IOP - INR (78846) - - 10/18/2018   2.5  o Lipid Panel [facility] (96908) - 250.00/E11.9 - 10/18/2018  o Urine microalbumin (50439) - 250.00/E11.9 - 10/18/2018  o ACO-14: Influenza immunization administered or previously received () - - 10/18/2018  o ACO-39: Current medications updated and reviewed () - - 10/18/2018  · Instructions  o Advised that cheeses and other sources of dairy fats, animal fats, fast food, and the extras (candy, pasteries, pies, doughnuts and cookies) all contain LDL raising nutrients. Advised to increase fruits, vegetables, whole grains, and to monitor portion sizes.   o Patient was educated/instructed on their diagnosis, treatment and medications prior to discharge from the clinic today.            Electronically Signed by: Tacho Cleveland MD -Author on October 25, 2018 11:58:40 AM

## 2021-05-07 NOTE — OUTREACH NOTE
Quick Note      Patient Name: Isabell Ribera   Patient ID: [id number]   Sex: Female   YOB: 1945    Primary Care Provider: Ivanna MEDINA   Referring Provider: Ivanna MEDINA    Visit Date: February 26, 2021    Provider: CAROL Mejia   Location: [facility]   Location Address: [address] [place] [address] [place]  [address]   Location Phone: [phone]          History Of Present Illness  CCM Comprehensive Care Plan    This Chronic Medical Management Care Plan for Isabell Ribera, 75 year old /White female, has been monitored and managed, reviewed, and revised for the month of February. A cumulative time of 68 minutes was spent on this patient record, including phone call with patient and chart review.   Regarding the patient's diagnoses Hypertension, Long term (current) use of anticoagulants, and Warfarin anticoagulation, the following items were adressed: medical records, medications, and transitions to medical care and any changes can be found within the plan section of the note. A detailed listing of time spent for chronic care management has been scanned into the patient's electronic record. Current medications include: aspirin 81 mg oral tablet,chewable, carvedilol 6.25 mg oral tablet, famotidine 10 mg oral tablet, FreeStyle Lite Strips miscellaneous strip, furosemide 40 mg oral tablet, gabapentin 400 mg oral capsule, Miralax 17 gram/dose oral powder, potassium chloride 10 mEq oral tablet extended release, POTASSIUM CHLORIDE ER 10MEQ TABLET, warfarin 1 mg oral tablet, warfarin 2 mg oral tablet, and warfarin 3 mg oral tablet and the patient is reported to be compliant with medication protocol. Medications are reported to be effective. Regarding these diagnoses, referrals were made to the following provider/s N/A.   The patient was monitored remotely for blood pressure and INR level for a period of 20 minutes.   This patient's physical needs  include: health care coverage, needs assistance with ADLs, physical healthcare, physician referral, and DME supplies. May need further assistance with Medicaid. Due to immobility may need assistance with ADL's with continued mobility issues. Will require sleeves for her knees until surgery is able to scheduled, but patient has been advised to follow up with ortho to see about possible surgery dates. with frequency of UTI's and AMS changes, may require continued hospitalization and closer watch of s/s moving forward with homehealth. Hospital notes indicate that with patient condition, there is talk of her not being discharged home until her strength is improved.   Patient's mental support needs include: continued support and coordination of community providers. Continued support to patient as she has had some changes in mental status due to UTI's. Patient is currently hospitalized with mental status changes related to her UTI. Patient and family concerned for patients health moving forward.   The patient's cognitive support needs include: medication, increased support, needs assistance with ADLs, personal care, health care, and caregiver. May continue to need assistance in all areas post Discharge from Hospital. With current conversations about care, if patient is discharged home, she will require more services and needs in order to maintain health.   The patient's psychosocial support needs include:, need for increased support, coordination of community providers, and medication monitoring and or assistance. Patient needing to be followed closely, therefore homehealth has been consulted since the patient was last in the hospital. Patient has never regained full strength or level of functioning, especially with driving, therefore patient has had to rely on homehealth for services like home INR monitoring. Patient is afraid she will never return to previous level of functioning, and that is a stressor to patient and  family. Will continue to follow her case closely to ensure her and family needs are met.   This patient's functional needs include: needs assistance for ADLs and physical healthcare. As stated prior, but patient has had a significant decrease in her level of functioning and strength, requiring more help from her spouse and daughters. With spouse chronic back issues, he is unable to lift patient to help with all care needs due to his own health concerns. Daughters are concerned, as well as patient, that her level of care required may be too much to stay at home.   This patient's environmental needs include: no involvement in outside activities or no access to other activities. With patients decline, there is some issues with her having outside access to not only activities but also to health care. Patient now reliant on daughters help to get her to appts, and with them all working, it has been a struggle at times to see provider. With help, patient has utilized Telehealth visits to keep in contact with pcp.           Assessment  · Chronic Care Management (CCM)     V68.89/Z02.89  · Long term (current) use of anticoagulants     V58.61/Z79.01  · Warfarin anticoagulation     V58.61/Z79.01  · Hypertension     401.9/I10  · Atrial Fibrillation     427.31/I48.91      Plan  · Orders  o Chronic care management services with the following required chin (38527) - V68.89/Z02.89, V58.61/Z79.01, 401.9/I10, 427.31/I48.91 - 02/23/2021  o Chronic care management services with the following required chin (91426) - V68.89/Z02.89, V58.61/Z79.01, 401.9/I10, 427.31/I48.91 - 02/23/2021  · Instructions  o Patient's Health Care Goals: Continue recovery and discharge from hospital  o Provider's Health Care Goals: DC from [facility]/ Continued health and wellness  o Patient was provided an electronic copy of care plan  o CCM services were explained and offered and patient has accepted these services.  o Patient has given their written consent to recieve  CCM services and understands that this includes the authorization of electronic communication of medical information with other treating providers.  o Patient understands that they may stop CCM services at any time and these changes will be effective at the end of the calendar month and will effectively revocate the agreement of CCM services.  o Patient understands that only one practioner can furnish and be paid for CCM services during one calendar month. Patient also understands that there may be co-payment or deductible fees in association with CCM services.  o Patient will continue with at least monthly follow-up calls with the Nurse Navigator.  · Associate Tasks  o Task ID [id number] CCM: CCM - February 2021            Electronically Signed by: Micaela Ramirez RN -Author on February 26, 2021 10:22:59 AM

## 2021-05-07 NOTE — PROGRESS NOTES
Progress Note      Patient Name: Isabell Ribera   Patient ID: [id number]   Sex: Female   YOB: 1945    Primary Care Provider: Ivanna MEDINA   Referring Provider: Ivanna MEDINA    Visit Date: June 30, 2020    Provider: CAROL Mejia   [place] [facility] [place]   Location Address: [address] [place], [place] [address]   [place]  [address]   Location Phone: [phone]          Chief Complaint     refills       History Of Present Illness  Isabell Ribera is a 74 year old /White female who presents for evaluation and treatment of:      follow up on chronic health conditions and medication refills.     Diet-controlled DM with severe peripheral neuropathy - she takes gabapentin 400mg, 2 at bedtime daily. It helps with the numbness/tingling and sensation of electric shock. She can hardly feel her feet otherwise. She can cut her foot and not know. They check her feet routinely. She can hardly feel the gas pedal if she drives, so she doesn't drive much at all. When she does, she has to be very careful. She has tried to come off of gabapentin in the past but has been unsuccessful due to recurrence of shocking sensation. She is mainly wheelchair dependent - if she walks with a walker then she can lose her balance easily.     HTN/HLD and a-fib - she has been compliant with her warfarin - gets INR monthly and has been stable. She will go up or down with antibiotic use. She takes furosemide PRN for LE edema -she doesn't take it routinely d/t she doesn't want to run to the bathroom every 5 minutes. She is taking Coreg BID as well - no c/o chest pain, palpitations, headaches, dizziness, or shortness of breath. The kidney doctor cut her potassium back to three times per week.            Past Medical History  Disease Name Date Onset Notes   Anxiety --  --    Arthritis --  --    Atrial Fibrillation --  --    Depression --  --    Diabetes --  --    Hypertension --  --    Long term  (current) use of anticoagulants 2017 --    Warfarin anticoagulation 2017 --          Past Surgical History  Procedure Name Date Notes   *Denies any surgical procedures --  --          Medication List  Name Date Started Instructions   aspirin 81 mg oral tablet,chewable  chew 1 tablet (81 mg) by oral route once daily   carvedilol 6.25 mg oral tablet 2021 TAKE ONE TABLET BY MOUTH TWICE A DAY WITH FOOD   famotidine 10 mg oral tablet  take 1 tablet (10 mg) by oral route 2 times per day as needed   FreeStyle Lite Strips miscellaneous strip  use as directed   furosemide 40 mg oral tablet 2020 TAKE ONE-HALF TO ONE TABLET BY MOUTH DAILY AS NEEDED FOR SWELLING   gabapentin 400 mg oral capsule 2021 take 2 capsules by oral route once a day (at bedtime) for 7 days   Miralax 17 gram/dose oral powder 2020 take 17 gram mixed with 8 oz. water, juice, soda, coffee or tea by oral route once daily   POTASSIUM CHLORIDE ER 10MEQ TABLET 10/27/2020 TAKE ONE TABLET BY MOUTH DAILY   warfarin 1 mg oral tablet 2020 TAKE ONE TABLET BY MOUTH ONCE DAILY   warfarin 2 mg oral tablet 2020 take 1 tablet (2 mg) by oral route once daily   warfarin 3 mg oral tablet 12/10/2020 TAKE ONE TABLET BY MOUTH DAILY         Allergy List  Allergen Name Date Reaction Notes   NO KNOWN DRUG ALLERGIES --  --  --        Allergies Reconciled  Family Medical History  Disease Name Relative/Age Notes   *No Known Family History  --          Reproductive History  Menstrual   Pregnancy Summary   Total Pregnancies: 5 Full Term: 0 Premature: 0   Ab Induced: 0 Ab Spontaneous: 0 Ectopics: 0   Multiples: 0 Livin         Social History  Finding Status Start/Stop Quantity Notes   Alcohol Never --/-- --  --    Disabled --  --/-- --  --    Homemaker --  --/-- --  --    lives with spouse --  --/-- --  --     --  --/-- --  --    Tobacco Former --/-- --  quit in          Immunizations  NameDate Admin Mfg Trade Name Lot Number  Route Inj VIS Given VIS Publication   Uuwrqnnsh10/22/2020 [facility] FLUZONE-HIGH DOSE [id number] IM LA 09/22/2020    Comments: [id number]         Review of Systems  · Constitutional  o Admits  o : good general health lately  o Denies  o : fatigue, fever, chills  · Eyes  o Denies  o : blurred vision, changes in vision  · HENT  o Denies  o : headaches, vertigo, lightheadedness  · Cardiovascular  o Admits  o : lower extremity edema  o Denies  o : chest pain, dyspnea on exertion, palpitations  · Respiratory  o Denies  o : shortness of breath, cough  · Gastrointestinal  o Denies  o : nausea, vomiting, diarrhea, abdominal pain  · Genitourinary  o Denies  o : dysuria, urinary retention  · Integument  o Denies  o : pigmentation changes  · Neurologic  o Admits  o : tingling or numbness  · Endocrine  o Denies  o : polyuria, polydipsia, cold intolerance, heat intolerance      Vitals  Date Time BP Position Site L\R Cuff Size HR RR TEMP (F) WT  HT  BMI kg/m2 BSA m2 O2 Sat FR L/min FiO2        06/30/2020 10:59 /84 Sitting    65 - R  97.8     97 %            Physical Examination  · Constitutional  o Appearance  o : well developed, well-nourished, in no acute distress  · Neck  o Inspection/Palpation  o : normal appearance, no masses or tenderness, trachea midline  o Thyroid  o : no thyromegaly  · Respiratory  o Respiratory Effort  o : breathing unlabored  o Auscultation of Lungs  o : clear to auscultation  · Cardiovascular  o Heart  o :   § Auscultation of Heart  § : regular rate, normal rhythm, no murmurs present  o Peripheral Vascular System  o :   § Carotid Arteries  § : normal pulses bilaterally, no bruits present  § Pedal Pulses  § : bilateral pulse strength 1+   § Extremities  § : LE edema - pitting - bilateral   · Lymphatic  o Neck  o : no lymphadenopathy present  · Musculoskeletal  o General  o :   § General Musculoskeletal  § : Muscle tone, strength, and development grossly normal.  · Skin and Subcutaneous  Tissue  o General Inspection  o : no lesions present, no areas of discoloration, skin turgor normal, texture normal  · Neurologic  o Gait and Station  o :   § Gait Screening  § : wheelchair-dependent   · Psychiatric  o Mood and Affect  o : mood normal, affect appropriate  · Left DM Foot Exam  o Sensation  o : no perceived sensation on exam   o Visual Inspection  o : visual inspection is normal with no signs of breakdown, ulcerations or deformities unless otherwise noted.   o Vascular  o : palpable dorsalis pedis and posterir tibialis pulses present, normal capillary refill  · Right DM Foot Exam  o Sensation  o : no perceived sensation on exam   o Visual Inspection  o : visual inspection is normal with no signs of breakdown, ulcerations or deformities unless otherwise noted.   o Vascular  o : palpable dorsalis pedis and posterir tibialis pulses present, normal capillary refill          Assessment  · Diabetes mellitus, type 2     250.00/E11.9  · Essential hypertension     401.9/I10  · Hyperlipidemia     272.4/E78.5  · Long term (current) use of anticoagulants     V58.61/Z79.01  · Warfarin anticoagulation     V58.61/Z79.01  · Atrial Fibrillation     427.31/I48.91  · Lower extremity edema     782.3/R60.0  · Peripheral neuropathy     356.9/G62.9  · High risk medication use     V58.69/Z79.899    Problems Reconciled  Plan  · Orders  o Diabetic Foot (Motor and Sensory) Exam Completed [facility] (, , 2028F) - 250.00/E11.9 - 06/30/2020  o CBC with Auto Diff [facility] (72698) - 250.00/E11.9, 272.4/E78.5, 401.9/I10 - 06/30/2020  o CMP [facility] (28120) - 250.00/E11.9, 272.4/E78.5, 401.9/I10 - 06/30/2020  o Hgb A1c [facility] (29192) - 250.00/E11.9 - 06/30/2020  o Lipid Panel [facility] (43180) - 250.00/E11.9, 272.4/E78.5, 401.9/I10 - 06/30/2020  o Urinalysis with Reflex Microscopy if abnormal ([facility]) (21330) - 250.00/E11.9, 401.9/I10 - 06/30/2020  o Urine Drug Screen ([facility]) (73839) - V58.69/Z79.899 - 06/30/2020  o Urine microalbumin (36820) -  250.00/E11.9, 401.9/I10 - 06/30/2020  o ACO-39: Current medications updated and reviewed () - - 06/30/2020  · Medications  o carvedilol 6.25 mg oral tablet   SIG: take 1 tablet (6.25 mg) by oral route 2 times per day with food   DISP: (60) Tablet with 5 refills  Adjusted on 06/30/2020     o warfarin 1 mg oral tablet   SIG: take 1 tablet (1 mg) by oral route once daily   DISP: (30) Tablet with 2 refills  Adjusted on 06/30/2020     o warfarin 3 mg oral tablet   SIG: take 1 tablet (3 mg) by oral route once daily   DISP: (30) Tablet with 2 refills  Adjusted on 06/30/2020     o Medications have been Reconciled  o Transition of Care or Provider Policy  · Instructions  o Daily foot care. Avoid walking barefoot. Annual Dilated Eye Exam.  o Obtained a written consent for STEPHANIE query. Discussed the risk and benefits of the use of controlled substances with the patient, including the risk of tolerance and drug dependence. The patient has been counseled on the need to have an exit strategy, including potentially discontinuing the use of controlled substances. STEPHANIE has or will be reviewed as soon as it becomes avaliable. SHE HAS ONE RX REFILL REMAINING ON HER RX WRITTEN IN APRIL, WHICH SHE IS GETTING FILLED TODAY. CALL IN 1 MONTH AND I WILL SEND IN A NEW RX FOR HER.   o Take all medications as prescribed/directed.  o Patient was educated/instructed on their diagnosis, treatment and medications prior to discharge from the clinic today.  o Call the office with any concerns or questions.  o Chronic conditions reviewed and taken into consideration for today's treatment plan.  · Disposition  o Call or Return if symptoms worsen or persist.            Electronically Signed by: CAROL Mejia -Author on February 23, 2021 01:07:21 PM

## 2021-05-07 NOTE — PROGRESS NOTES
"   Progress Note      Patient Name: Isabell Ribera   Patient ID: [id number]   Sex: Female   YOB: 1945    Primary Care Provider: Ivanna MEDINA   Referring Provider: Ivanna MEDINA    Visit Date: September 22, 2020    Provider: CAROL Mejia   Location: [facility]   Location Address: [address] [place], [place] [address] [place]  [address]   Location Phone: [phone]          Chief Complaint     refills       History Of Present Illness  Isabell Ribera is a 74 year old /White female who presents for evaluation and treatment of:      follow up on chronic health conditions - was recently in the hospital several times for UTI - daughter is requesting repeat UA - recently had dark urine and discharge.     (Her daughter, Say, pulled me aside prior to the visit - states that the night before last she was talking out of her head, confused - EMS was called and she refused to go to the hospital - she has been being stubborn, and they think she has another infection)    She has been having recurrent UTIs - both as an outpatient and in-patient - she currently denies any burning or urgency - urine odor is strong - daughter states that she has been incontinent and she cannot control her urine. She has not been to see urology d/t unable to schedule at [facility] d/t outstanding balance - No c/o fever, chills or body aches - daughter states that she is cold all of the time and wearing a heating blanket - she does feel weak - her legs are really weak - PT with home health came yesterday and will be back Thursday or Friday - she has a walker at home, but she cannot support herself. If she goes to take a step she goes down. Her  is having to pick her up and put her in a chair.     She denies feeling confused or disoriented - but her  states that the other night he called EMS even though she told him not to - she states \"I don't want to go back to that hospital and I ain't " "going to\"     Her daughter wants home health to come out - Gavin, her , states that the  did come out and made sure that they had equipment that they needed - wheelchair, port-a-port, and walker.            Past Medical History  Disease Name Date Onset Notes   Anxiety --  --    Arthritis --  --    Atrial Fibrillation --  --    Depression --  --    Diabetes --  --    Hypertension --  --    Long term (current) use of anticoagulants 12/08/2017 --    Warfarin anticoagulation 12/08/2017 --          Past Surgical History  Procedure Name Date Notes   *Denies any surgical procedures --  --          Medication List  Name Date Started Instructions   amoxicillin-pot clavulanate 500-125 mg oral tablet 08/16/2020 take 1 tablet by oral route every 12 hours for 10 days   aspirin 81 mg oral tablet,chewable  chew 1 tablet (81 mg) by oral route once daily   carvedilol 6.25 mg oral tablet 06/30/2020 take 1 tablet (6.25 mg) by oral route 2 times per day with food   famotidine 10 mg oral tablet  take 1 tablet (10 mg) by oral route 2 times per day as needed   FreeStyle Lite Strips miscellaneous strip  use as directed   furosemide 40 mg oral tablet 11/01/2019 take 1/2 to 1 tablet daily as needed for swelling   gabapentin 400 mg oral capsule 07/29/2020 take 2 capsules by oral route once a day (at bedtime) for 30 days   potassium chloride 10 mEq oral tablet extended release  take 1 tablet (10 meq) by oral route on Mondays, Wednesdays, and Fridays   Tylenol-Codeine #3 300-30 mg oral tablet 07/05/2019 take 1/2 to 1 tablets by oral route every 6 hours as needed for severe pain   warfarin 1 mg oral tablet 06/30/2020 take 1 tablet (1 mg) by oral route once daily   warfarin 3 mg oral tablet 06/30/2020 take 1 tablet (3 mg) by oral route once daily         Allergy List  Allergen Name Date Reaction Notes   NO KNOWN DRUG ALLERGIES --  --  --          Family Medical History  Disease Name Relative/Age Notes   *No Known Family " History  --          Reproductive History  Menstrual   Pregnancy Summary   Total Pregnancies: 5 Full Term: 0 Premature: 0   Ab Induced: 0 Ab Spontaneous: 0 Ectopics: 0   Multiples: 0 Livin         Social History  Finding Status Start/Stop Quantity Notes   Alcohol Never --/-- --  --    Disabled --  --/-- --  --    Homemaker --  --/-- --  --    lives with spouse --  --/-- --  --     --  --/-- --  --    Tobacco Former --/-- --  quit in          Immunizations  NameDate Admin Mfg Trade Name Lot Number Route Inj VIS Given VIS Publication   Xrelzszxu54/01/2019 [facility] FLUZONE-HIGH DOSE [id number] IM LA 2019    Comments: [id number]   Mqnpkfvee40/20/2018 [facility] FLUZONE-HIGH DOSE [id number]  LA 2018   Comments: [id number]         Review of Systems  · Constitutional  o Admits  o : fatigue  o Denies  o : fever, chills, body aches  · Eyes  o Denies  o : double vision, blurred vision  · HENT  o Denies  o : headaches  · Cardiovascular  o Denies  o : chest pain, dyspnea on exertion, lower extremity edema, palpitations  · Respiratory  o Admits  o : cough  o Denies  o : shortness of breath  · Gastrointestinal  o Admits  o : constipation - has been having impactions and daughters are removing, loss of appetite  o Denies  o : vomiting, diarrhea, abdominal pain  · Genitourinary  o Admits  o : incontinence  o Denies  o : urgency, frequency, dysuria, urinary retention  · Neurologic  o Admits  o : tingling or numbness in the BLE (chronic/neuropathy), loss of balance/unsteady gait  · Musculoskeletal  o Admits  o : muscular weakness      Vitals  Date Time BP Position Site L\R Cuff Size HR RR TEMP (F) WT  HT  BMI kg/m2 BSA m2 O2 Sat HC       2020 10:52 /70 Sitting    66 - R  97.2     95 %          Physical Examination  · Constitutional  o Appearance  o : well developed, well-nourished, in no acute distress - she does appear to have lost weight but her gait is too unstable to put her on the  scale  · Neck  o Inspection/Palpation  o : normal appearance, no masses or tenderness, trachea midline  o Thyroid  o : no thyromegaly  · Respiratory  o Respiratory Effort  o : breathing unlabored  o Auscultation of Lungs  o : clear to auscultation  · Cardiovascular  o Heart  o :   § Auscultation of Heart  § : regular rate, normal rhythm, no murmurs present  o Peripheral Vascular System  o :   § Pedal Pulses  § : bilateral pulse strength 2+  § Extremities  § : minimal/trace edema bilaterally  · Lymphatic  o Neck  o : no lymphadenopathy present  · Musculoskeletal  o General  o :   § General Musculoskeletal  § : Muscle tone, strength, and development grossly normal.  · Skin and Subcutaneous Tissue  o General Inspection  o : no lesions present, no areas of discoloration, skin turgor normal, texture normal  · Neurologic  o Gait and Station  o :   § Gait Screening  § : wheelchair-dependent   · Psychiatric  o Mood and Affect  o : mood normal, affect appropriate          Assessment  · Urinary tract infection     599.0/N39.0  · Weakness     780.79/R53.1  · Unsteady gait     781.2/R26.81  · Recent urinary tract infection     V13.02/Z87.440  · Confusion     298.9/R41.0  · Urinary incontinence     788.30/R32  · Constipation     564.00/K59.00    Problems Reconciled  Plan  · Orders  o CBC with Auto Diff [facility] (63149) - 780.79/R53.1, 781.2/R26.81, V13.02/Z87.440, 298.9/R41.0 - 09/22/2020  o CMP [facility] (31966) - 780.79/R53.1, 781.2/R26.81, V13.02/Z87.440, 298.9/R41.0 - 09/22/2020  o IOP - Urinalysis without Microscopy (Clinitek) [facility] (33608) - 780.79/R53.1, V13.02/Z87.440, 298.9/R41.0 - 09/22/2020   GLU NEG, LORENZO NEG, KET NEG, SG 1.015, BLO LARGE, PH 7.0, PRO 100MG/DL, URO 0.2, NIT POSITIVE, GLORIA LARGE   o Urine culture (02958, 40016) - 599.0/N39.0 - 09/22/2020  o ACO-39: Current medications updated and reviewed () - - 09/22/2020  o Home Health Consultation (HOMHE) - 780.79/R53.1, 781.2/R26.81, V13.02/Z87.440, 788.30/R32 -  09/22/2020   Micaela is assisting with this. BV  o UROLOGY CONSULTATION (UROLO) - V13.02/Z87.440, 788.30/R32 - 09/22/2020   [facility] preferred, if not them, then [facility] [place]. BV  o IM - Injection Fee (14041) - 599.0/N39.0 - 09/22/2020  o 4.00 - Rocephin 1 gram mixed with Lidocaine (-2) - 599.0/N39.0 - 09/22/2020   Injection - Ceftriaxone 1 gram; Dose: 1 gram; Site: Right Gluteus; Route: intramuscular; Date: 09/22/2020 14:17:21; Exp: 02/01/2022; Lot: [id number]; Mfg: [employer]; TradeName: Rocephin; Location: [facility]; Administered By: Eunice Chinchilla MA; Comment: [id number]  · Medications  o Miralax 17 gram/dose oral powder   SIG: take 17 gram mixed with 8 oz. water, juice, soda, coffee or tea by oral route once daily   DISP: (1) 510 gm jar with 0 refills  Prescribed on 09/22/2020     o Augmentin 875-125 mg oral tablet   SIG: take 1 tablet by oral route every 12 hours for 7 days   DISP: (14) tablets with 0 refills  Prescribed on 09/22/2020     o Medications have been Reconciled  o Transition of Care or Provider Policy  · Instructions  o Increase fluid intake. If you develop fever, chills, N/V, flank pain, or worsening of your symptoms return to the office or go to the ER.  o Take all medications as prescribed/directed.  o Patient was educated/instructed on their diagnosis, treatment and medications prior to discharge from the clinic today.  o Chronic conditions reviewed and taken into consideration for today's treatment plan.  · Disposition  o Call or Return if symptoms worsen or persist.            Electronically Signed by: CAROL Mejia -Author on September 22, 2020 02:24:20 PM

## 2021-05-07 NOTE — PROGRESS NOTES
Progress Note      Patient Name: Isabell Ribera   Patient ID: [id number]   Sex: Female   YOB: 1945        Visit Date: July 5, 2019    Provider: CAROL Mejia   Location: [facility]   Location Address: [address] [place] [address]   [place]  [address]   Location Phone: [phone]          Chief Complaint     LT knee pain for about a month       History Of Present Illness  Isabell Ribera is a 73 year old /White female who presents for evaluation and treatment of:      left knee pain -- she has always had pain in it, but worse as of the last 1 month -- last week it was hurting so bad she was crying. She has a history of right total knee -- she went to [facility] for that in 2005. She uses a walker at home -- she couldn't walk without it -- she is in a wheelchair in the office today -- in the past she has been told it was bone on bone -- her  says that you can hear it grinding when she walks.     She is currently taking Tylenol with fair control of pain -- occasionally the pain is very bad. She isn't one to take a lot of medication. She cannot take NSAIDs due to warfarin anticoagulation.     She takes Gabapentin 400mg, 2 at bedtime for peripheral neuropathy -- has been taking this for years. She has tried to come off before and didn't tolerate symptoms well at all. Her last RX was written on 4/9. Patient exhibits no s/s of abuse or addiction. No suspicion for misuse.       Past Medical History  Disease Name Date Onset Notes   Anxiety --  --    Arthritis --  --    Atrial Fibrillation --  --    Depression --  --    Diabetes --  --    Hypertension --  --    Long term (current) use of anticoagulants 12/08/2017 --    Warfarin anticoagulation 12/08/2017 --          Past Surgical History  Procedure Name Date Notes   *Denies any surgical procedures --  --          Medication List  Name Date Started Instructions   aspirin 81 mg oral tablet,chewable  chew 1 tablet (81  mg) by oral route once daily   atorvastatin 10 mg oral tablet 2019 take 1 tablet (10 mg) by oral route once daily at bedtime   carvedilol 6.25 mg oral tablet 2019 TAKE ONE TABLET BY MOUTH TWICE A DAY   famotidine 10 mg oral tablet 2019 take 1 tablet (10 mg) by oral route 2 times per day as needed   FreeStyle Lite Strips miscellaneous strip  use as directed   furosemide 40 mg oral tablet  Take 1 tablet every morning and take 1/2 tablet in evening   gabapentin 400 mg oral capsule 2019 take 2 capsules by oral route once a day (at bedtime) for 30 days   Klor-Con M10 10 mEq oral tablet,ER particles/crystals 04/10/2019 TAKE ONE TABLET BY MOUTH DAILY   warfarin 1 mg oral tablet 2019 take 1 tablet (1 mg) by oral route once daily for 30 days   warfarin 3 mg oral tablet 2019 take 1 tablet (3 mg) by oral route once daily for 30 days         Allergy List  Allergen Name Date Reaction Notes   NO KNOWN DRUG ALLERGIES --  --  --          Family Medical History  Disease Name Relative/Age Notes   *No Known Family History  --          Reproductive History  Menstrual   Pregnancy Summary   Total Pregnancies: 5 Full Term: 0 Premature: 0   Ab Induced: 0 Ab Spontaneous: 0 Ectopics: 0   Multiples: 0 Livin         Social History  Finding Status Start/Stop Quantity Notes   Alcohol Never --/-- --  --    Disabled --  --/-- --  --    Homemaker --  --/-- --  --    lives with spouse --  --/-- --  --     --  --/-- --  --    Tobacco Former --/-- --  quit in          Immunizations  NameDate Admin Mfg Trade Name Lot Number Route Inj VIS Given VIS Publication   [id number]/20/2018 [facility] FLUZONE-HIGH DOSE [id number] IM LA 2018   Comments: 42751-861-92         Review of Systems  · Constitutional  o Admits  o : good general health lately  · Cardiovascular  o Admits  o : lower extremity edema -- chronic  o Denies  o : chest pain, palpitations  · Respiratory  o Denies  o : shortness of  breath  · Integument  o Denies  o : pigmentation changes  · Neurologic  o Admits  o : tingling or numbness  · Musculoskeletal  o Admits  o : joint pain, joint swelling, limitation of motion, knee pain  · Heme-Lymph  o Admits  o : easy bleeding      Vitals  Date Time BP Position Site L\R Cuff Size HR RR TEMP (F) WT  HT  BMI kg/m2 BSA m2 O2 Sat        07/05/2019 11:10 /80 Sitting    72 - R  96.3     97 %          Physical Examination  · Constitutional  o Appearance  o : well developed, well-nourished, in no acute distress  · Eyes  o Conjunctivae  o : conjunctivae normal, no exudates present  · Respiratory  o Respiratory Effort  o : breathing unlabored  o Auscultation of Lungs  o : clear to ascultation  · Cardiovascular  o Heart  o :   § Auscultation of Heart  § : regular rate and rhythm  o Peripheral Vascular System  o :   § Extremities  § : BLE trace edema present -- no purpura or ecchymosis  · Lymphatic  o Neck  o : no lymphadenopathy present  · Musculoskeletal  o General  o :   § General Musculoskeletal  § : Muscle tone, strength, and development grossly normal.  · Skin and Subcutaneous Tissue  o General Inspection  o : no lesions present, no areas of discoloration, skin turgor normal, texture normal  · Neurologic  o Gait and Station  o :   § Gait Screening  § : wheelchair-dependent   · Psychiatric  o Mood and Affect  o : mood normal, affect appropriate  · Left Knee  o Inspection  o : no redness, swelling present, no bruising  o Palpation  o : lateral joint line tenderness absent, medial joint line tenderness absent, crepitus present --anterior knee TTP   o Range of Motion  o : limited PROM -- patient unable to stand out of wheelchair without pain              Assessment  · Knee pain, left     719.46/M25.562  · Warfarin anticoagulation     V58.61/Z79.01  · Atrial Fibrillation     427.31/I48.91  · Diabetes mellitus, controlled     250.00/E11.9  · Peripheral  neuropathy     356.9/G62.9      Plan  · Orders  o ACO-39: Current medications updated and reviewed () - - 07/05/2019  o Knee (Left) 3 views X-Ray [facility] Preferred View ([id number]) - 719.46/M25.562 - 07/05/2019   1. No displaced acute fracture. 2. 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 3. Image quality degraded due to soft tissue attenuation and technical factors.  o ORTHOPEDIC CONSULTATION (ORTHO) - 719.46/M25.562 - 07/05/2019   [place]  · Medications  o Tylenol-Codeine #3 300-30 mg oral tablet   SIG: take 1/2 to 1 tablets by oral route every 6 hours as needed for severe pain   DISP: (12) tablets with 0 refills  Prescribed on 07/05/2019     o gabapentin 400 mg oral capsule   SIG: take 2 capsules by oral route once a day (at bedtime) for 30 days   DISP: (60) capsules with 2 refills  Refilled on 07/05/2019     · Instructions  o Obtained a written consent for STEPHANIE query. Discussed the risk and benefits of the use of controlled substances with the patient, including the risk of tolerance and drug dependence. The patient has been counseled on the need to have an exit strategy, including potentially discontinuing the use of controlled substances. STEPHANIE has or will be reviewed as soon as it becomes avaliable.  o Take all medications as prescribed/directed.  o Patient was educated/instructed on their diagnosis, treatment and medications prior to discharge from the clinic today. Refill her gabapentin today -- Stephanie reviewed and as expected. Will give her Tylenol #3, 12 pills, to use PRN for breakthrough pain. Do not exceed more than 2000mg of Tylenol per 24 hour period. Will refer to ortho d/t sever OA of knee.   o Chronic conditions reviewed and taken into consideration for today's treatment plan.  · Disposition  o Call or Return if symptoms worsen or persist.            Electronically Signed by: CAROL Mejia -Author on July 5, 2019 12:06:23 PM

## 2021-05-07 NOTE — OUTREACH NOTE
Quick Note      Patient Name: Isabell Ribera   Patient ID: [id number]   Sex: Female   YOB: 1945    Primary Care Provider: Ivanna MEDINA   Referring Provider: Ivanna MEDINA    Visit Date: April 28, 2021    Provider: CRAOL Mejia   Location: [facility]   Location Address: [address] [place] [address] [place]  [address]   Location Phone: [phone]          History Of Present Illness  CCM Comprehensive Care Plan    This Chronic Medical Management Care Plan for Isabell Ribera, 75 year old /White female, has been monitored and managed, reviewed, revised, and and a new plan of care implemented for the month of April. A cumulative time of 85 minutes was spent on this patient record, including face to face with provider, phone call with patient, phone call with pharmacist, electronic communication with primary care provider, and chart review.   Regarding the patient's diagnoses Arthritis, Atrial Fibrillation, Clostridium difficile diarrhea, Depression, Diabetes, Hypertension, Long term (current) use of anticoagulants, and Warfarin anticoagulation, the following items were adressed: medical records, medications, changes to medical care, transitions to medical care, and referrals to community service providers and any changes can be found within the plan section of the note. A detailed listing of time spent for chronic care management has been scanned into the patient's electronic record. Current medications include: aspirin 81 mg oral tablet,chewable, Augmentin 875-125 mg oral tablet, carvedilol 6.25 mg oral tablet, FreeStyle Lite Strips miscellaneous strip, furosemide 20 mg oral tablet, gabapentin 400 mg oral capsule, magnesium oxide 400 mg (241.3 mg magnesium) oral tablet, Miralax 17 gram/dose oral powder, potassium chloride 10 mEq oral tablet extended release, warfarin 1 mg oral tablet, warfarin 2 mg oral tablet, and warfarin 3 mg oral tablet and the  patient is reported to be compliant with medication protocol. Medications are reported to be effective.   The patient was monitored remotely for activity level and C-diff diarrhea for a period of 35 minutes.   This patient's physical needs include: help taking medications as prescribed, health care coverage, medication education, physical healthcare, and resources for disability needs. Patient has currently been discharged from a short stay in a LTC facility. Family was unhappy with facility and the care her mother received, so she has pushed for discharge home with homehealth services. Facility obliged, and patient is now home. Patient was advised to continue c-diff medications for 10 days beyond her IVAB treatments for her UTI to prevent recurrence. Providers at nursing home sent in Rx for liquid vancomycin, which was going to cost over $4000 dollars. DaughterGissell has called to see what their options were. Pharmacy did apply a coupon code and was able to get the price down to $900; still not acceptable. I have spoken personally with patients pharmacist and provider, and because infectious disease was consulted in the hospital, patient needs to continue the Vancomycin, but PO would be better from a cost perspective for this patient. Task sent to provider to send over new Rx for this medication as advised by Infectious Disease provider who rounded on patient while she was inpatient.   Patient's mental support needs include: increased support, contact information, and coordination of community providers. increased support provided to patient during this time of transition from the nursing home to home. Daughter is working closely with her mom to try and anticipate any needs to try and prevent a re-hospitalization.   The patient's cognitive support needs include: requires supervision, needs assistance with ADLs, personal care, health care, and supervision. Patient does require supervision at this time. With her  "overall weakness, patients daughter has been stopping by her home daily to check on her. Daughter has also been the one ensuring that patient has all needs met at home. Will communicate with daughter to ensure that home health services has been established.   The patient's psychosocial support needs include:, need for increased support, coordination of community providers, and medication monitoring and or assistance. Helping patient and family manage care at this time, as patient has just been discharge from the hospital and Long term care facility. Patient appears stronger to family, and has been up in her chair with little assist from daughter or spouse. Will continue to work closely with family to address any ongoing needs.   This patient's functional needs include: medication education, needs assistance for ADLs, and physical healthcare. Patient has been recently hospitalized and spend close to 2 weeks in a LTC facility. Family was not happy with the care received, as they did not want patient getting up and being physically active while there. Family did not feel like she received any \"rehab\" while there, so they pushed to have her discharged to home with homehealth services.   This patient's environmental needs are N/A.           Assessment  · Chronic Care Management (CCM)     V68.89/Z02.89  · Long term (current) use of anticoagulants     V58.61/Z79.01  · Atrial Fibrillation     427.31/I48.91  · Clostridium difficile diarrhea     008.45/A04.72      Plan  · Orders  o Chronic care management services with the following required chin (66407) - V68.89/Z02.89, V58.61/Z79.01, 427.31/I48.91, 008.45/A04.72 - 04/05/2021  o Chronic care management services with the following required chin (61582) - V68.89/Z02.89, V58.61/Z79.01, 427.31/I48.91, 008.45/A04.72 - 04/05/2021  · Instructions  o Patient's Health Care Goals: Getting stronger; getting over C-diff  o Provider's Health Care Goals: Treatment for C-diff and prevention " of rehospitalization  o Patient was provided an electronic copy of care plan  o CCM services were explained and offered and patient has accepted these services.  o Patient has given their written consent to recieve CCM services and understands that this includes the authorization of electronic communication of medical information with other treating providers.  o Patient understands that they may stop CCM services at any time and these changes will be effective at the end of the calendar month and will effectively revocate the agreement of CCM services.  o Patient understands that only one practioner can furnish and be paid for CCM services during one calendar month. Patient also understands that there may be co-payment or deductible fees in association with CCM services.  o Patient will continue with at least monthly follow-up calls with the Nurse Navigator.  · Associate Tasks  o Task ID [id number] CCM: CCM - April 2021  o Task ID 15457 CCM: Vancomycin Rx            Electronically Signed by: Micaela Ramirez RN -Author on April 28, 2021 03:13:19 PM

## 2021-05-07 NOTE — PROGRESS NOTES
Progress Note      Patient Name: Isabell Ribera   Patient ID: [id number]   Sex: Female   YOB: 1945    Primary Care Provider: vIanna MEDINA   Referring Provider: Ivanna MEDINA    Visit Date: November 1, 2019    Provider: CAROL Mejia   Location: [facility]   Location Address: [address] [place] [address] [place]  [id number]   Location Phone: [phone]          Chief Complaint     refills       History Of Present Illness  Isabell Ribera is a 73 year old /White female who presents for evaluation and treatment of:      follow up/med refills.     HTN/HLD/Chronic LE edema - she is taking carvedilol, atorvastatin, and furosemide with potassium supplement -- she takes either 1/2 to 1 tablet of furosemide daily, unless she has an errand or an appointment, then she won't take it d/t having to urinate so much. Her LE swelling is controlled when she takes it on a regular basis. No c/o chest pain or palpitations. No shortness of breath, headaches or dizziness. No history of JACK -- she does not snore or stop breathing in her sleep per her , Gavin.     Atrial fibrillation - takes warfarin - goal 2-3 - today she is 2.0. She is taking 3mg daily except on Thursday, she takes 4mg on Thursdays.    DM (diet controlled) with Peripheral neuropathy - she is taking 400mg, 2 pills, of gabapentin at bedtime for the neuropathy -- she has tried to come off before and was unsuccessful. Even on the medication she does occasionally have the sensation of electricity shooting through her feet. 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    GERD -- she is taking Pepcid ([employer] brand) OTC -- she has been taking TUMS as of late -- at least 1/2 of one 2-3 times per day. Salt or spearmint gum can make her reflux bad -- she doesn't eat spicy foods. She eats mainly starches -- potato chips and potatoes -- she  can't hardly eat any meat d/t she thinks about the poor little animal it came from.     She is having urinary frequency -- she feels like she has to pee after she has already peed -- she has urgency as well.    She doesn't get mammograms -- she states the last time she got one she had such a hard time standing up to the machine and it took forever. She states she doesn't have any problems with her boobs.     She does not want a colonoscopy.    She is wanting to see if she can get in home PT services again -- she has limited mobility -- unsteady gait. When she is out of her house she uses a transport wheelchair. Prior to going into the hospital for an UTI she was on a cane, but ever since she came out of the hospital she has been on a walker at home. She has trouble with her left knee -- she was recommended to have a knee replacement but she doesn't want it. She is wearing an ACE wrap and when she does that it helps her a lot.       Past Medical History  Disease Name Date Onset Notes   Anxiety --  --    Arthritis --  --    Atrial Fibrillation --  --    Depression --  --    Diabetes --  --    Hypertension --  --    Long term (current) use of anticoagulants 12/08/2017 --    Warfarin anticoagulation 12/08/2017 --          Past Surgical History  Procedure Name Date Notes   *Denies any surgical procedures --  --          Medication List  Name Date Started Instructions   aspirin 81 mg oral tablet,chewable  chew 1 tablet (81 mg) by oral route once daily   atorvastatin 10 mg oral tablet 11/01/2019 take 1 tablet (10 mg) by oral route once daily at bedtime   carvedilol 6.25 mg oral tablet 11/01/2019 TAKE ONE TABLET BY MOUTH TWICE A DAY   famotidine 10 mg oral tablet  take 1 tablet (10 mg) by oral route 2 times per day as needed   FreeStyle Lite Strips miscellaneous strip  use as directed   furosemide 40 mg oral tablet 11/01/2019 take 1/2 to 1 tablet daily as needed for swelling   gabapentin 400 mg oral capsule 11/01/2019 take  2 capsules by oral route once a day (at bedtime) for 30 days   Klor-Con M10 10 mEq oral tablet,ER particles/crystals 2019 TAKE ONE TABLET BY MOUTH DAILY   Tylenol-Codeine #3 300-30 mg oral tablet 2019 take 1/2 to 1 tablets by oral route every 6 hours as needed for severe pain   warfarin 1 mg oral tablet 2019 take 1 tablet (1 mg) by oral route once daily for 30 days   warfarin 3 mg oral tablet 2019 take 1 tablet (3 mg) by oral route once daily for 30 days         Allergy List  Allergen Name Date Reaction Notes   NO KNOWN DRUG ALLERGIES --  --  --          Family Medical History  Disease Name Relative/Age Notes   *No Known Family History  --          Reproductive History  Menstrual   Pregnancy Summary   Total Pregnancies: 5 Full Term: 0 Premature: 0   Ab Induced: 0 Ab Spontaneous: 0 Ectopics: 0   Multiples: 0 Livin         Social History  Finding Status Start/Stop Quantity Notes   Alcohol Never --/-- --  --    Disabled --  --/-- --  --    Homemaker --  --/-- --  --    lives with spouse --  --/-- --  --     --  --/-- --  --    Tobacco Former --/-- --  quit in          Immunizations  NameDate Admin Mfg Trade Name Lot Number Route Inj VIS Given VIS Publication   [id number]/01/2019 [facility] FLUZONE-HIGH DOSE [id number] Replaced by [facility] 2019    Comments: [id number]   Yitditydw66/20/2018 [facility] FLUZONE-HIGH DOSE [id number] Replaced by [facility] 2018   Comments: [id number]         Review of Systems  · Constitutional  o Admits  o : good general health lately  o Denies  o : fatigue, fever, chills  · Eyes  o Denies  o : changes in vision  · HENT  o Denies  o : headaches, vertigo, lightheadedness  · Cardiovascular  o Admits  o : lower extremity edema  o Denies  o : chest pain, dyspnea on exertion, palpitations  · Respiratory  o Denies  o : shortness of breath, cough  · Gastrointestinal  o Admits  o : heartburn, reflux  o Denies  o : nausea, vomiting, diarrhea, abdominal  pain  · Genitourinary  o Admits  o : urgency, frequency  o Denies  o : dysuria, nocturia, urinary retention  · Integument  o Denies  o : pigmentation changes  · Neurologic  o Admits  o : tingling or numbness  · Musculoskeletal  o Admits  o : knee pain  · Endocrine  o Denies  o : polyuria, polydipsia, cold intolerance, heat intolerance      Vitals  Date Time BP Position Site L\R Cuff Size HR RR TEMP (F) WT  HT  BMI kg/m2 BSA m2 O2 Sat HC       11/01/2019 11:20 /80 Sitting    70 - R  97.2     100 %          Physical Examination  · Constitutional  o Appearance  o : morbidly obese, well developed, no obvious deformities present  · Eyes  o Conjunctivae  o : conjunctivae normal, no exudates present  · Neck  o Inspection/Palpation  o : normal appearance, no masses or tenderness, trachea midline  o Thyroid  o : no thyromegaly  · Respiratory  o Respiratory Effort  o : breathing unlabored  o Auscultation of Lungs  o : clear to ascultation  · Cardiovascular  o Heart  o :   § Auscultation of Heart  § : regular rate, normal rhythm, no murmurs present  o Peripheral Vascular System  o :   § Carotid Arteries  § : normal pulses bilaterally, no bruits present  § Pedal Pulses  § : bilateral pulse strength 1+   § Extremities  § : mild lower extremity edema present, no cyanosis, generalized distal extremity hair loss present, normal capillary refill, no varicosities present  · Lymphatic  o Neck  o : no lymphadenopathy present  · Musculoskeletal  o General  o :   § General Musculoskeletal  § : Muscle tone, strength, and development grossly normal.  · Skin and Subcutaneous Tissue  o General Inspection  o : no lesions present, no areas of discoloration, skin turgor normal, texture normal  · Neurologic  o Gait and Station  o :   § Gait Screening  § : wheelchair-dependent   · Psychiatric  o Mood and Affect  o : mood normal, affect appropriate              Assessment  · Need for influenza vaccination     V04.81/Z23  · Diabetes mellitus,  type 2     250.00/E11.9  · Essential hypertension     401.9/I10  · Hyperlipidemia     272.4/E78.5  · Urinary tract infection     599.0/N39.0  · Long term (current) use of anticoagulants     V58.61/Z79.01  · Warfarin anticoagulation     V58.61/Z79.01  · Atrial Fibrillation     427.31/I48.91  · Peripheral neuropathy     356.9/G62.9  · Lower extremity edema     782.3/R60.0  · Left knee pain, unspecified chronicity     719.46/M25.562  · Unsteady gait     781.2/R26.81  · Mammogram declined     V64.2/Z53.20  · Colon cancer screening declined     V64.2/Z53.20    Problems Reconciled  Plan  · Orders  o CBC with Auto Diff [facility] (99326) - 250.00/E11.9, 401.9/I10 - 11/01/2019  o CMP [facility] (60214) - 250.00/E11.9, 401.9/I10 - 11/01/2019  o Hgb A1c [facility] (69439) - 250.00/E11.9 - 11/01/2019  o IOP - INR (39797) - V58.61/Z79.01, 427.31/I48.91 - 11/01/2019   2.0  o Lipid Panel [facility] (78213) - 250.00/E11.9, 401.9/I10, 272.4/E78.5 - 11/01/2019  o Thyroid Profile (THYII) - 250.00/E11.9, 401.9/I10 - 11/01/2019  o IOP - Urinalysis without Microscopy (Clinitek) [facility] (05861) - 250.00/E11.9, 401.9/I10 - 11/01/2019   Glu negative, Luis negative, Ket negative, SG 1.015, Blood trace -intact, pH 8.5, Pro 30, URO 0.2, Nit positive, Leuk Moderate  o Urine culture (15747, 25746) - 599.0/N39.0 - 11/01/2019  o Urine microalbumin (87972) - 250.00/E11.9, 401.9/I10 - 11/01/2019  o Immunization Admin Fee (Single) ([facility]) (07561) - V04.81/Z23 - 11/01/2019  o Fluzone High Dose Flu Vaccine (44530) - V04.81/Z23 - 11/01/2019   Vaccine - Influenza; Dose: 0.5; Site: Left Arm; Route: Intramuscular; Date: 11/01/2019 14:06:00; Exp: 05/26/2020; Lot: [id number]; Mfg: [employer] pasteur; TradeName: FLUZONE-HIGH DOSE; Administered By: Eunice Chinchilla MA; Comment: [id number]  o ACO-14: Influenza immunization administered or previously received () - - 11/01/2019  o ACO-39: Current medications updated and reviewed () - - 11/01/2019  o Home Health Consultation ([facility]) -  719.46/M25.562, 781.2/R26.81 - 11/01/2019   PT services  · Medications  o Augmentin 875-125 mg oral tablet   SIG: take 1 tablet by oral route every 12 hours for 7 days   DISP: (14) tablets with 0 refills  Prescribed on 11/01/2019     o atorvastatin 10 mg oral tablet   SIG: take 1 tablet (10 mg) by oral route once daily at bedtime   DISP: (30) tablets with 5 refills  Refilled on 11/01/2019     o carvedilol 6.25 mg oral tablet   SIG: TAKE ONE TABLET BY MOUTH TWICE A DAY   DISP: (60) Tablet with 5 refills  Refilled on 11/01/2019     o furosemide 40 mg oral tablet   SIG: take 1/2 to 1 tablet daily as needed for swelling   DISP: (30) tablet with 5 refills  Refilled on 11/01/2019     o gabapentin 400 mg oral capsule   SIG: take 2 capsules by oral route once a day (at bedtime) for 30 days   DISP: (60) capsules with 2 refills  Refilled on 11/01/2019     o Klor-Con M10 10 mEq oral tablet,ER particles/crystals   SIG: TAKE ONE TABLET BY MOUTH DAILY   DISP: (30) Tablet with 5 refills  Refilled on 11/01/2019     o warfarin 1 mg oral tablet   SIG: take 1 tablet (1 mg) by oral route once daily for 30 days   DISP: (30) tablets with 5 refills  Refilled on 11/01/2019     o warfarin 3 mg oral tablet   SIG: take 1 tablet (3 mg) by oral route once daily for 30 days   DISP: (30) Tablet with 5 refills  Refilled on 11/01/2019     o famotidine 10 mg oral tablet   SIG: take 1 tablet (10 mg) by oral route 2 times per day as needed   DISP: (60) tablets with 5 refills  Discontinued on 11/01/2019     o Klor-Con 10 10 mEq oral tablet extended release   SIG: TAKE ONE TABLET BY MOUTH DAILY   DISP: (90) Unspecified with 0 refills  Discontinued on 11/01/2019     o Medications have been Reconciled  o Transition of Care or Provider Policy  · Instructions  o Daily foot care. Avoid walking barefoot. Annual Dilated Eye Exam.  o Discussed with patient blood pressure monitoring, hemoglobin A1C levels need to be below 7.0, and LDL (Lipid) goals below  70.  o Patient advised to monitor blood pressure (B/P) at home and journal readings. Patient informed that a B/P reading at home of more than 135/85 is considered hypertension. For readings greater keke313/90 or higher patient is advised to follow up in the office with readings for management. Patient advised to limit sodium intake.  o Obtained a written consent for STEPHANIE query. Discussed the risk and benefits of the use of controlled substances with the patient, including the risk of tolerance and drug dependence. The patient has been counseled on the need to have an exit strategy, including potentially discontinuing the use of controlled substances. STEPHANIE has or will be reviewed as soon as it becomes avaliable.  o Take all medications as prescribed/directed.  o Patient was educated/instructed on their diagnosis, treatment and medications prior to discharge from the clinic today.  o Chronic conditions reviewed and taken into consideration for today's treatment plan.  o Electronically Identified Patient Education Materials Provided Electronically  · Disposition  o Call or Return if symptoms worsen or persist.            Electronically Signed by: CAROL Mejia -Author on November 1, 2019 02:33:24 PM

## 2021-05-09 VITALS
DIASTOLIC BLOOD PRESSURE: 80 MMHG | HEART RATE: 61 BPM | TEMPERATURE: 97 F | OXYGEN SATURATION: 96 % | SYSTOLIC BLOOD PRESSURE: 120 MMHG

## 2021-05-09 VITALS
TEMPERATURE: 97.2 F | HEART RATE: 66 BPM | SYSTOLIC BLOOD PRESSURE: 110 MMHG | OXYGEN SATURATION: 95 % | DIASTOLIC BLOOD PRESSURE: 70 MMHG

## 2021-05-09 VITALS
SYSTOLIC BLOOD PRESSURE: 120 MMHG | HEART RATE: 72 BPM | DIASTOLIC BLOOD PRESSURE: 80 MMHG | OXYGEN SATURATION: 97 % | TEMPERATURE: 96.3 F

## 2021-05-09 VITALS
SYSTOLIC BLOOD PRESSURE: 120 MMHG | OXYGEN SATURATION: 98 % | TEMPERATURE: 97.3 F | HEART RATE: 78 BPM | DIASTOLIC BLOOD PRESSURE: 80 MMHG

## 2021-05-09 VITALS
OXYGEN SATURATION: 94 % | HEART RATE: 61 BPM | TEMPERATURE: 97.6 F | SYSTOLIC BLOOD PRESSURE: 132 MMHG | DIASTOLIC BLOOD PRESSURE: 88 MMHG

## 2021-05-09 VITALS
TEMPERATURE: 97.2 F | SYSTOLIC BLOOD PRESSURE: 120 MMHG | OXYGEN SATURATION: 100 % | HEART RATE: 70 BPM | DIASTOLIC BLOOD PRESSURE: 80 MMHG

## 2021-05-09 VITALS
TEMPERATURE: 97.8 F | DIASTOLIC BLOOD PRESSURE: 84 MMHG | SYSTOLIC BLOOD PRESSURE: 124 MMHG | OXYGEN SATURATION: 97 % | HEART RATE: 65 BPM

## 2021-05-09 VITALS
HEART RATE: 70 BPM | SYSTOLIC BLOOD PRESSURE: 118 MMHG | OXYGEN SATURATION: 97 % | DIASTOLIC BLOOD PRESSURE: 84 MMHG | TEMPERATURE: 96.6 F

## 2021-05-09 VITALS
SYSTOLIC BLOOD PRESSURE: 120 MMHG | OXYGEN SATURATION: 96 % | DIASTOLIC BLOOD PRESSURE: 80 MMHG | HEART RATE: 78 BPM | TEMPERATURE: 97 F

## 2021-05-09 VITALS
SYSTOLIC BLOOD PRESSURE: 120 MMHG | OXYGEN SATURATION: 99 % | DIASTOLIC BLOOD PRESSURE: 70 MMHG | TEMPERATURE: 97 F | HEART RATE: 61 BPM

## 2021-05-09 VITALS
HEART RATE: 55 BPM | SYSTOLIC BLOOD PRESSURE: 150 MMHG | DIASTOLIC BLOOD PRESSURE: 95 MMHG | TEMPERATURE: 97.2 F | OXYGEN SATURATION: 98 %

## 2021-05-09 VITALS
TEMPERATURE: 96.4 F | OXYGEN SATURATION: 98 % | SYSTOLIC BLOOD PRESSURE: 120 MMHG | DIASTOLIC BLOOD PRESSURE: 80 MMHG | HEART RATE: 85 BPM

## 2021-05-09 VITALS
HEART RATE: 82 BPM | TEMPERATURE: 97.2 F | OXYGEN SATURATION: 98 % | SYSTOLIC BLOOD PRESSURE: 118 MMHG | DIASTOLIC BLOOD PRESSURE: 76 MMHG

## 2021-05-11 ENCOUNTER — CONVERSION ENCOUNTER (OUTPATIENT)
Dept: UROLOGY | Facility: CLINIC | Age: 76
End: 2021-05-11

## 2021-05-11 ENCOUNTER — HOSPITAL ENCOUNTER (OUTPATIENT)
Dept: UROLOGY | Facility: CLINIC | Age: 76
Discharge: HOME | End: 2021-05-11
Attending: UROLOGY

## 2021-05-11 ENCOUNTER — OFFICE VISIT CONVERTED (OUTPATIENT)
Dept: UROLOGY | Facility: CLINIC | Age: 76
End: 2021-05-11
Attending: UROLOGY

## 2021-05-11 LAB
BILIRUB UR QL STRIP: NEGATIVE
COLOR UR: NORMAL
CONV BACTERIA IN URINE MICRO: NORMAL
CONV CALCIUM OXALATE CRYSTALS /HPF IN URINE SEDIMENT BY MICROSCOPY: 0
CONV CLARITY OF URINE: NORMAL
CONV PROTEIN IN URINE BY AUTOMATED TEST STRIP: NEGATIVE
CONV UROBILINOGEN IN URINE BY AUTOMATED TEST STRIP: NORMAL
GLUCOSE UR QL: NEGATIVE
HGB UR QL STRIP: NORMAL
KETONES UR QL STRIP: NEGATIVE
LEUKOCYTE ESTERASE UR QL STRIP: NORMAL
NITRITE UR QL STRIP: NEGATIVE
PH UR STRIP.AUTO: 7 [PH]
RBC #/AREA URNS HPF: 0 /[HPF]
RENAL EPI CELLS #/AREA URNS HPF: NORMAL /[HPF]
SP GR UR: 1.02
SQUAMOUS SPT QL MICRO: 0
WBC #/AREA URNS HPF: NORMAL /[HPF]

## 2021-05-13 NOTE — PROGRESS NOTES
"   Progress Note      Patient Name: Isabell Ribera   Patient ID: [id number]   Sex: Female   YOB: 1945    Primary Care Provider: Ivanna MEDINA   Referring Provider: Ivanna MEDINA    Visit Date: October 2, 2020    Provider: CAROL Woodson   Location: [facility]   Location Address: [address] [place] [address]  [id number]   Location Phone: [phone]          Chief Complaint  · \"Having another bladder infection\"      History Of Present Illness  The patient is a 74 year old /White female, who is a consultation from Ivanna MEDINA, for a recurrent UTI.   There are no symptoms. She is no longer bothered by the symptoms. There are no additional symptoms. There are no aggravating factors. The patient is not constipated.   She has had prior treatment. Temporary relief has been obtained with antibiotics. The most recent antibiotic was taken now ago. Prior evaluation has not been done.   She has a prior history of recurrent UTIs and kidney stones. The patient describes continuous UTIs over the past 10 months. She received treatment for these infections. Treatment has included Several different antibiotics. Urine cultures revealed E. coli, Klebsiella, Proteus, and Providencia stuartii.      The patient states that she does not know what her symptoms are because she has no feeling in her groin related to neuropathy.    The patient has been incontinent of urine significantly for about the last 6 months.    The patient had surgery on her knee and has been unable to ambulate since that point in time so the patient does not get up to void at all and is sitting in urine for extended periods of time throughout the day her adult daughters can come to change her incontinence pads.  The patient is supposed to have home health however they only come in twice a week to draw her blood related to the current COVID pandemic.    The patient does physical therapy " twice a week in her home and her family reports that she has not had any improvement.    Her recurrent urinary tract infections began approximately 11 months ago.    The patient's daughters state that she was admitted to the hospital in August of this year related to a urinary tract infection and the patient was confused however I review of the records does not appear as if the patient had a urinary tract infection while she was in the hospital rather had significant weakness that concerned her adult daughters and the patient was admitted to the hospital related to weakness..    The patient was discharged from the hospital to a rehabilitation facility and the daughters were concerned that she was not receiving appropriate care at that point in time and pulled her from the rehabilitation facility.       Past Medical History  Disease Name Date Onset Notes   Aftercare;following joint replacement 06/01/2015 --    Arthritis --  --    Arthritis: Knee 06/01/2015 --    Atrial Fibrillation --  --    Diabetes Mellitus, Type II --  --    Essential hypertension --  --    Gout --  --    Hypertension --  --    Knee pain --  left knee pain   Lymphedema --  --          Past Surgical History  Procedure Name Date Notes   Cataract surgery --  --    Joint Surgery --  --    Kidney Stone Surgery, Unspecified --  --    Knee replacement, right 2007 --    Tubal ligation --  --          Medication List  Name Date Started Instructions   aspirin 81 mg oral tablet,delayed release (DR/EC)  take 1 tablet (81 mg) by oral route once daily   Coreg 6.25 mg oral tablet  take 1 tablet (6.25 mg) by oral route 2 times per day with food   gabapentin 800 mg oral tablet  take 1 tablet (800 mg) by oral route 3 times per day   nitrofurantoin 25 mg/5 mL oral suspension  --    Pepcid 20 mg oral tablet  take 1 tablet (20 mg) by oral route once daily at bedtime   potassium chloride 10 mEq oral tablet extended release  take 1 tablet (10 meq) by oral route once  daily with food   warfarin 3 mg oral tablet  take 1 tablet (3 mg) by oral route once daily         Allergy List  Allergen Name Date Reaction Notes   NO KNOWN DRUG ALLERGIES --  --  --          Family Medical History  Disease Name Relative/Age Notes   Congestive Heart Failure Mother/64   --    - No Family History of Colorectal Cancer  --    Family history of Arthritis Mother/  Sister/   Mother; Sister         Social History  Finding Status Start/Stop Quantity Notes   Alcohol Never --/-- --  does not drink   Alcohol Use Never --/-- --  does not drink   Claustophobic Unknown --/-- --  yes   lives with spouse --  --/-- --  --     --  --/-- --  --    Tobacco Former --/-- --  former smoker   Unemployed --  --/-- --  --          Review of Systems  · Constitutional  o Denies  o : fever, chills  · Cardiovascular  o Denies  o : chest pain at rest, chest pain with exercise, irregular heart beats, palpitations, leg cramps with exercise  · Respiratory  o Denies  o : shortness of breath, wheezing, sleep apnea  · Gastrointestinal  o Denies  o : heartburn or indigestion, nausea or vomiting, change in abdominal girth, diarrhea, constipation, blood in stools  · Genitourinary  o Admits  o : additional symptoms as noted in HPI  · Integument  o Denies  o : rash, new skin lesions  · Neurologic  o Denies  o : memory difficulties, headache, mini-strokes, seizures  · Endocrine  o Denies  o : hot flashes, thyroid disorders  · Heme-Lymph  o Denies  o : easy bleeding, easy bruising, sickle cell disease or trait, lymph node enlargement or tenderness  · Allergic-Immunologic  o Denies  o : immune deficiency, HIV, Hepatitis C      Vitals  Date Time BP Position Site L\R Cuff Size HR RR TEMP (F) WT  HT  BMI kg/m2 BSA m2 O2 Sat FR L/min FiO2 HC       10/02/2020 01:03 PM      63 - R 14      95 %            Physical Examination  · Constitutional  o Appearance  o : Well nourished, well developed patient in no acute distress. Ambulating without  difficulty.  · Head and Face  o Head  o :   § Inspection  § : atraumatic, normocephalic  o Face  o :   § Inspection  § : no facial lesions  · Eyes  o Sclerae  o : sclerae white  · Ears, Nose, Mouth and Throat  o Ears  o :   § External Ears  § : appearance within normal limits, no lesions present  o Nose  o :   § External Nose  § : appearance normal  · Neck  o Inspection/Palpation  o : normal appearance, trachea midline  o Thyroid  o : Normal size without tenderness, nodules or masses  · Respiratory  o Respiratory Effort  o : Breathing is unlabored without accessory muscle use  o Inspection of Chest  o : normal appearance, no retractions  · Skin and Subcutaneous Tissue  o General Inspection  o : No rashes, lesions or areas of discoloration present. Skin turgor is normal.  · Neurologic  o Mental Status Examination  o :   § Orientation  § : grossly oriented to person, place and time  § Speech/Language  § : communication ability within normal limits  o Gait and Station  o : normal gait, able to stand without difficulty  · Psychiatric  o Judgement and Insight  o : judgment and insight intact, judgement for everyday activities and social situations within normal limits, insight intact  o Mood and Affect  o : mood normal, affect appropriate              Assessment  · UTI     599.0  · Atrophic vaginitis     627.3/N95.2      Plan  · Medications  o Estrace 0.01 % (0.1 mg/gram) vaginal cream   SIG: insert 1 gram by vaginal route 3 times per week for 90 days   DISP: (45) Gram with 2 refills  Prescribed on 10/02/2020     o Medications have been Reconciled  o Transition of Care or Provider Policy  · Instructions  o DISCUSSION:  o The patient continues to have recurrent UTI's with different pathogens each time.Recurrent urinary tract infection unable to procure specimen today.Discussed with patient that chronic recurrent UTI is a common condition that is multifactorial in nature, difficult to treat, unlikely to completely  eradicate, and the specific cause for recurrent infections is often unknown.Encouraged behavioral modifications including fluid management, hydration, not delaying urgency when she needs to void.Recommend cranberry supplementation daily to aid with prevention of urinary tract infection.Consider trial of localized estrogen cream per PCPDiscussed with patient the importance of obtaining urine culture prior to treatment with antibiotics for urinary tract infection. Discussed that routine screening UA for infection is not recommended as in postmenopausal women asymptomatic bacteruria is common and does not warrant treatment Discussed that obtaining urine culture after treatment for UTI is not recommended unless a patient has persistent symptoms of UTI given prevalence of asymptomatic UTI. All questions addressed.  o PLAN: Start Estrace cream and follow-up in 4 weeks with patient and family to discuss the use of pure wick urinary diversion system for urinary incontinence  o Electronically Identified Patient Education Materials Provided Electronically            Electronically Signed by: CAROL Woodson -Author on October 2, 2020 02:18:22 PM

## 2021-05-14 VITALS — OXYGEN SATURATION: 95 % | RESPIRATION RATE: 14 BRPM | HEART RATE: 63 BPM

## 2021-05-14 LAB
AMPICILLIN SUSC ISLT: >=32
AMPICILLIN+SULBAC SUSC ISLT: 4
BACTERIA UR CULT: ABNORMAL
CEFAZOLIN SUSC ISLT: 16
CEFEPIME SUSC ISLT: <=0.12
CEFTAZIDIME SUSC ISLT: <=1
CEFTRIAXONE SUSC ISLT: <=0.25
CIPROFLOXACIN SUSC ISLT: <=0.25
ERTAPENEM SUSC ISLT: <=0.12
GENTAMICIN SUSC ISLT: <=1
LEVOFLOXACIN SUSC ISLT: <=0.12
NITROFURANTOIN SUSC ISLT: 64
PIP+TAZO SUSC ISLT: <=4
TMP SMX SUSC ISLT: <=20
TOBRAMYCIN SUSC ISLT: <=1

## 2021-05-15 VITALS
OXYGEN SATURATION: 100 % | DIASTOLIC BLOOD PRESSURE: 55 MMHG | RESPIRATION RATE: 12 BRPM | SYSTOLIC BLOOD PRESSURE: 136 MMHG | HEART RATE: 66 BPM | WEIGHT: 289.56 LBS | HEIGHT: 67 IN | BODY MASS INDEX: 45.45 KG/M2

## 2021-05-15 VITALS — HEIGHT: 67 IN | BODY MASS INDEX: 41.12 KG/M2 | RESPIRATION RATE: 16 BRPM | WEIGHT: 262 LBS

## 2021-05-15 VITALS — HEIGHT: 67 IN | RESPIRATION RATE: 12 BRPM | BODY MASS INDEX: 45.36 KG/M2 | WEIGHT: 289 LBS

## 2021-05-15 VITALS — WEIGHT: 262.06 LBS | BODY MASS INDEX: 41.13 KG/M2 | RESPIRATION RATE: 12 BRPM | HEIGHT: 67 IN

## 2021-05-15 VITALS — HEIGHT: 67 IN | WEIGHT: 272 LBS | BODY MASS INDEX: 42.69 KG/M2 | HEART RATE: 85 BPM | OXYGEN SATURATION: 100 %

## 2021-05-17 ENCOUNTER — CONVERSION ENCOUNTER (OUTPATIENT)
Dept: FAMILY MEDICINE CLINIC | Facility: CLINIC | Age: 76
End: 2021-05-17

## 2021-05-17 ENCOUNTER — HOSPITAL ENCOUNTER (OUTPATIENT)
Dept: FAMILY MEDICINE CLINIC | Facility: CLINIC | Age: 76
Discharge: HOME | End: 2021-05-17
Attending: NURSE PRACTITIONER

## 2021-05-17 ENCOUNTER — OFFICE VISIT CONVERTED (OUTPATIENT)
Dept: FAMILY MEDICINE CLINIC | Facility: CLINIC | Age: 76
End: 2021-05-17
Attending: NURSE PRACTITIONER

## 2021-05-17 LAB
ALBUMIN SERPL-MCNC: 3.2 G/DL (ref 3.5–5)
ALBUMIN/GLOB SERPL: 0.8 {RATIO} (ref 1.4–2.6)
ALP SERPL-CCNC: 81 U/L (ref 43–160)
ALT SERPL-CCNC: 7 U/L (ref 10–40)
ANION GAP SERPL CALC-SCNC: 15 MMOL/L (ref 8–19)
AST SERPL-CCNC: 21 U/L (ref 15–50)
BASOPHILS # BLD AUTO: 0.04 10*3/UL (ref 0–0.2)
BASOPHILS NFR BLD AUTO: 0.7 % (ref 0–3)
BILIRUB SERPL-MCNC: 0.49 MG/DL (ref 0.2–1.3)
BNP SERPL-MCNC: 2467 PG/ML (ref 0–1800)
BUN SERPL-MCNC: 13 MG/DL (ref 5–25)
BUN/CREAT SERPL: 11 {RATIO} (ref 6–20)
CALCIUM SERPL-MCNC: 9.5 MG/DL (ref 8.7–10.4)
CHLORIDE SERPL-SCNC: 101 MMOL/L (ref 99–111)
CHOLEST SERPL-MCNC: 176 MG/DL (ref 107–200)
CHOLEST/HDLC SERPL: 3.4 {RATIO} (ref 3–6)
CONV ABS IMM GRAN: 0.01 10*3/UL (ref 0–0.2)
CONV CO2: 26 MMOL/L (ref 22–32)
CONV IMMATURE GRAN: 0.2 % (ref 0–1.8)
CONV TOTAL PROTEIN: 7.2 G/DL (ref 6.3–8.2)
CREAT UR-MCNC: 1.14 MG/DL (ref 0.5–0.9)
DEPRECATED RDW RBC AUTO: 52 FL (ref 36.4–46.3)
EOSINOPHIL # BLD AUTO: 0.11 10*3/UL (ref 0–0.7)
EOSINOPHIL # BLD AUTO: 2 % (ref 0–7)
ERYTHROCYTE [DISTWIDTH] IN BLOOD BY AUTOMATED COUNT: 15.3 % (ref 11.7–14.4)
EST. AVERAGE GLUCOSE BLD GHB EST-MCNC: 103 MG/DL
FERRITIN SERPL-MCNC: 85 NG/ML (ref 10–200)
FOLATE SERPL-MCNC: 2.2 NG/ML (ref 4.8–20)
GFR SERPLBLD BASED ON 1.73 SQ M-ARVRAT: 47 ML/MIN/{1.73_M2}
GLOBULIN UR ELPH-MCNC: 4 G/DL (ref 2–3.5)
GLUCOSE SERPL-MCNC: 153 MG/DL (ref 65–99)
HBA1C MFR BLD: 5.2 % (ref 3.5–5.7)
HCT VFR BLD AUTO: 42.8 % (ref 37–47)
HDLC SERPL-MCNC: 52 MG/DL (ref 40–60)
HGB BLD-MCNC: 13.4 G/DL (ref 12–16)
IRON SATN MFR SERPL: 28 % (ref 20–55)
IRON SERPL-MCNC: 91 UG/DL (ref 60–170)
LDLC SERPL CALC-MCNC: 99 MG/DL (ref 70–100)
LYMPHOCYTES # BLD AUTO: 1.66 10*3/UL (ref 1–5)
LYMPHOCYTES NFR BLD AUTO: 29.7 % (ref 20–45)
MCH RBC QN AUTO: 28.9 PG (ref 27–31)
MCHC RBC AUTO-ENTMCNC: 31.3 G/DL (ref 33–37)
MCV RBC AUTO: 92.4 FL (ref 81–99)
MONOCYTES # BLD AUTO: 0.5 10*3/UL (ref 0.2–1.2)
MONOCYTES NFR BLD AUTO: 9 % (ref 3–10)
NEUTROPHILS # BLD AUTO: 3.26 10*3/UL (ref 2–8)
NEUTROPHILS NFR BLD AUTO: 58.4 % (ref 30–85)
NRBC CBCN: 0 % (ref 0–0.7)
OSMOLALITY SERPL CALC.SUM OF ELEC: 287 MOSM/KG (ref 273–304)
PLATELET # BLD AUTO: 201 10*3/UL (ref 130–400)
PMV BLD AUTO: 10.3 FL (ref 9.4–12.3)
POTASSIUM SERPL-SCNC: 4.6 MMOL/L (ref 3.5–5.3)
RBC # BLD AUTO: 4.63 10*6/UL (ref 4.2–5.4)
SODIUM SERPL-SCNC: 137 MMOL/L (ref 135–147)
T4 FREE SERPL-MCNC: 0.8 NG/DL (ref 0.9–1.8)
TIBC SERPL-MCNC: 323 UG/DL (ref 245–450)
TRANSFERRIN SERPL-MCNC: 226 MG/DL (ref 250–380)
TRIGL SERPL-MCNC: 124 MG/DL (ref 40–150)
TSH SERPL-ACNC: 2.73 M[IU]/L (ref 0.27–4.2)
VIT B12 SERPL-MCNC: 294 PG/ML (ref 211–911)
VLDLC SERPL-MCNC: 25 MG/DL (ref 5–37)
WBC # BLD AUTO: 5.58 10*3/UL (ref 4.8–10.8)

## 2021-05-18 LAB — HCV AB S/CO SERPL IA: 0.1 S/CO RATIO (ref 0–0.9)

## 2021-05-31 LAB — INTERNATIONAL NORMALIZED RATIO (INR) RANGE: NORMAL

## 2021-06-02 ENCOUNTER — TRANSCRIBE ORDERS (OUTPATIENT)
Dept: FAMILY MEDICINE CLINIC | Facility: CLINIC | Age: 76
End: 2021-06-02

## 2021-06-02 DIAGNOSIS — D89.2 HYPERGAMMAGLOBULINEMIA, UNSPECIFIED: Primary | ICD-10-CM

## 2021-06-05 NOTE — PROCEDURES
Procedure Note      Patient Name: Isabell Ribera   Patient ID: [id number]   Sex: Female   YOB: 1945    Primary Care Provider: Ivanna MEDINA   Referring Provider: Ivanna MEDINA    Visit Date: May 11, 2021    Provider: Jayda Levi MD   Location: [facility]   Location Address: [address] [place] [address], [address]  [id number]   Location Phone: [phone]          Bladder Scan  The patient voided and was placed in the supine position.   The ultrasound machine was used at 3.5 mHz and the bladder was scanned. The bladder volume was estimated at 144 milliliters.   The patient tolerated the procedure well.           Assessment  · Incomplete bladder emptying     788.21/R33.9      Plan  · Orders  o Bladder Scan, post void (95022) - 788.21/R33.9 - 05/11/2021  · Medications  o Medications have been Reconciled  o Transition of Care or Provider Policy            Electronically Signed by: JILL Oviedo -Author on May 11, 2021 12:10:24 PM  Electronically Co-signed by: Jayda Levi MD -Reviewer on May 11, 2021 01:56:46 PM

## 2021-06-05 NOTE — PROGRESS NOTES
Progress Note      Patient Name: Isabell Ribera   Patient ID: [id number]   Sex: Female   YOB: 1945    Primary Care Provider: Ivanna MEDINA   Referring Provider: Ivanna MEDINA    Visit Date: May 17, 2021    Provider: CAROL Mejia   Location: [facility] [place]   Location Address: [address] [place] [address]   [place]  [id number]   Location Phone: [phone]          Chief Complaint     refills       History Of Present Illness  Isabell Ribera is a 75 year old /White female who presents for evaluation and treatment of:      follow up on chronic health conditions and medication refills.     Dr. Levi placed a stent for the kidney stones and it came out about 2 days after it was placed - she continues to have recurrent UTIs - they are wanting her to get up and sit on the bedside commode to allow her bladder to completely empty - she wasn't doing that. She was wearing disposable briefs and changing them after soiling. She cannot make it to her regular toilet, but she does have a bedside commode next to her. She is getting ready to  another abx to take for UTI - she last saw him last week     Diet -controlled DM with severe peripheral neuropathy - she is taking gabapentin 400mg, one pill at bedtime daily - it was reduced from two after most recent hospitalization, but she isn't sure why. She states that with the reduction in dose it is still helping with the numbness/tingling and sensation of 'electric shock'. She still can hardly feel her feet anymore - some days are better than others. She will lose her footing if she gets up to walk; like her legs will come out from underneath her. They check her feet routinely to make sure she doesn't have any breakdown. She has tried to come off of gabapentin in the past but has been unsuccessful due to recurrence of shocking sensation. She is mainly wheelchair dependent - if she walks with a walker then she can  lose her balance easily.      She had her INR checked today by home health and it is 3.4 - she was taking 4mg MWF, and 3mg all other days - was on an antibiotic last week - she was actually on it for 24 days and finished it a week or two ago.     HTN/HLD and a-fib - she has been compliant with her warfarin - home health has been coming weekly to check her INR for her - She takes furosemide PRN for LE edema - she hasn't taken it in a while; states it has been a long time. She doesn't want to run to the bathroom every 5 minutes. She is taking Coreg BID as well.    PHQ-9 score is 15 - admits to depression and sleeping a lot, but she does not want medication for depression. States she knows why she is depressed. She denies any HI/SI. She is agreeable to let me check labs for fatigue, but does not want medication at this time and will let me know if that were to change.       Past Medical History  Disease Name Date Onset Notes   Anxiety --  --    Arthritis --  --    Atrial Fibrillation --  --    Clostridium difficile diarrhea --  --    Depression --  --    Diabetes --  --    Hypertension --  --    Long term (current) use of anticoagulants 12/08/2017 --    Warfarin anticoagulation 12/08/2017 --          Past Surgical History  Procedure Name Date Notes   *Denies any surgical procedures --  --          Medication List  Name Date Started Instructions   aspirin 81 mg oral tablet,chewable  chew 1 tablet (81 mg) by oral route once daily   carvedilol 6.25 mg oral tablet 05/17/2021 take 1 tablet (6.25 mg) by oral route 2 times per day with food   FreeStyle Lite Strips miscellaneous strip  use as directed   furosemide 20 mg oral tablet  take 1 tablet (20 mg) by oral route once daily for 30 days   gabapentin 400 mg oral capsule 02/23/2021 take 2 capsules by oral route once a day (at bedtime)   magnesium oxide 400 mg (241.3 mg magnesium) oral tablet 05/17/2021 take 1 tablet by oral route 2 times a day   Miralax 17 gram/dose oral  powder 05/10/2021 take 17 gram mixed with 8 oz. water, juice, soda, coffee or tea by oral route once daily   potassium chloride 10 mEq oral tablet extended release 2021 take 1 tablet (10 meq) by oral route once daily with food   warfarin 1 mg oral tablet 2021 TAKE ONE TABLET BY MOUTH ONCE DAILY   warfarin 2 mg oral tablet 2021 take 1 tablet (2 mg) by oral route once daily   warfarin 3 mg oral tablet 2021 TAKE ONE TABLET BY MOUTH DAILY         Allergy List  Allergen Name Date Reaction Notes   NO KNOWN DRUG ALLERGIES --  --  --        Allergies Reconciled  Family Medical History  Disease Name Relative/Age Notes   *No Known Family History  --          Reproductive History  Menstrual   Pregnancy Summary   Total Pregnancies: 5 Full Term: 0 Premature: 0   Ab Induced: 0 Ab Spontaneous: 0 Ectopics: 0   Multiples: 0 Livin         Social History  Finding Status Start/Stop Quantity Notes   Alcohol Never --/-- --  --    Disabled --  --/-- --  --    Homemaker --  --/-- --  --    lives with spouse --  --/-- --  --     --  --/-- --  --    Tobacco Former --/-- --  quit in          Immunizations  NameDate Admin Mfg Trade Name Lot Number Route Inj VIS Given VIS Publication   [id number]/22/2020 [facility] FLUZONE-HIGH DOSE [id number] IM LA 2020    Comments: [id number]         Review of Systems  · Constitutional  o Admits  o : fatigue  o Denies  o : fever, chills, body aches  · Eyes  o Denies  o : double vision, blurred vision  · HENT  o Denies  o : headaches, chronic sinus problem  · Cardiovascular  o Admits  o : lower extremity edema  o Denies  o : chest pain, syncope, dyspnea on exertion, lightheadedness, palpitations  · Respiratory  o Denies  o : shortness of breath, cough  · Gastrointestinal  o Denies  o : nausea, vomiting, diarrhea, constipation, abdominal pain  · Genitourinary  o Admits  o : incontinence  o Denies  o : urgency, frequency, dysuria, hematuria, urinary  retention  · Integument  o Denies  o : rash, pigmentation changes  · Neurologic  o Admits  o : tingling or numbness, loss of balance  · Musculoskeletal  o Admits  o : muscular weakness  · Endocrine  o Denies  o : polyuria, polydipsia, cold intolerance, heat intolerance  · Psychiatric  o Admits  o : depression  o Denies  o : anxiety, difficulty sleeping, suicidal ideation, homicidal ideation      Vitals  Date Time BP Position Site L\R Cuff Size HR RR TEMP (F) WT  HT  BMI kg/m2 BSA m2 O2 Sat FR L/min FiO2        05/17/2021 02:00 /70 Sitting    63 - R  96.4     99 %            Physical Examination  · Constitutional  o Appearance  o : morbidly obese, well developed, alert, in no acute distress, well-tended appearance, no obvious deformities present  · Head and Face  o HEENT  o : Unremarkable - wearing a mask  · Eyes  o Conjunctivae  o : conjunctivae normal, no exudates present  · Neck  o Inspection/Palpation  o : normal appearance, no masses or tenderness, trachea midline  o Thyroid  o : no thyromegaly  · Respiratory  o Respiratory Effort  o : breathing unlabored  o Auscultation of Lungs  o : clear to auscultation  · Cardiovascular  o Heart  o :   § Auscultation of Heart  § : regular rate, normal rhythm, no murmurs   o Peripheral Vascular System  o :   § Carotid Arteries  § : normal pulses bilaterally, no bruits present  § Pedal Pulses  § : bilateral pulse strength 2+  § Extremities  § : trace LE edema present, non-pitting  · Lymphatic  o Neck  o : no lymphadenopathy present  · Musculoskeletal  o General  o :   § General Musculoskeletal  § : Muscle tone, strength, and development grossly normal.  · Skin and Subcutaneous Tissue  o General Inspection  o : no lesions present, no areas of discoloration, skin turgor normal, texture normal  · Neurologic  o Gait and Station  o :   § Gait Screening  § : wheelchair-dependent   · Psychiatric  o Mood and Affect  o : depressed, affect  appropriate          Assessment  · Essential hypertension     401.9/I10  · Fatigue     780.79/R53.83  · Long term (current) use of anticoagulants     V58.61/Z79.01  · Warfarin anticoagulation     V58.61/Z79.01  · Atrial Fibrillation     427.31/I48.91  · Depression     311/F32.9  · Diabetes     250.00/E11.9  · Lower extremity edema     782.3/R60.0  · Need for hepatitis C screening test     V73.89/Z11.59      Plan  · Orders  o CBC with Auto Diff [facility] ([id number]) - 250.00/E11.9, 782.3/R60.0, 401.9/I10 - 05/17/2021  o CMP [facility] ([id number]) - 250.00/E11.9, 782.3/R60.0, 401.9/I10 - 05/17/2021  o Hgb A1c [facility] ([id number]) - 250.00/E11.9 - 05/17/2021  o Lipid Panel [facility] ([id number]) - 250.00/E11.9, 401.9/I10 - 05/17/2021  o Thyroid Profile (THYII) - 780.79/R53.83 - 05/17/2021  o ACO-13: Fall Risk Screening with no falls in past year or only one fall without injury in the past year (1101F) - - 05/17/2021  o ACO-18: Positive screen for clinical depression using a standardized tool and a follow-up plan documented () - - 05/17/2021  o ACO-39: Current medications updated and reviewed (1159F, ) - - 05/17/2021  o BNP (brain natriuretic peptide measurement) (80124) - 782.3/R60.0, 401.9/I10 - 05/17/2021  o Hepatitis C antibody MEDICARE screening [facility] () - V73.89/Z11.59 - 05/17/2021  o Iron Profile (Iron 06260 TIBC 54125 and Transferrin 41261) (IRONP) - 780.79/R53.83 - 05/17/2021  o Ferritin ser/plas (91384) - 780.79/R53.83 - 05/17/2021  o B12 Folate levels (B12FO) - 780.79/R53.83 - 05/17/2021  · Medications  o carvedilol 6.25 mg oral tablet   SIG: take 1 tablet (6.25 mg) by oral route 2 times per day with food   DISP: (60) Tablet with 2 refills  Refilled on 05/17/2021     o magnesium oxide 400 mg (241.3 mg magnesium) oral tablet   SIG: take 1 tablet by oral route 2 times a day   DISP: (60) Tablet with 2 refills  Refilled on 05/17/2021     o potassium chloride 10 mEq oral tablet extended release   SIG: take 1 tablet (10 meq) by oral route  once daily with food   DISP: (30) Tablet with 2 refills  Refilled on 05/17/2021     o warfarin 1 mg oral tablet   SIG: TAKE ONE TABLET BY MOUTH ONCE DAILY   DISP: (30) Tablet with 2 refills  Refilled on 05/17/2021     o warfarin 2 mg oral tablet   SIG: take 1 tablet (2 mg) by oral route once daily   DISP: (30) Tablet with 2 refills  Refilled on 05/17/2021     o warfarin 3 mg oral tablet   SIG: TAKE ONE TABLET BY MOUTH DAILY   DISP: (30) Tablet with 2 refills  Refilled on 05/17/2021     o Medications have been Reconciled  o Transition of Care or Provider Policy  · Instructions  o Obtained a written consent for STEPHANIE query. Discussed the risk and benefits of the use of controlled substances with the patient, including the risk of tolerance and drug dependence. The patient has been counseled on the need to have an exit strategy, including potentially discontinuing the use of controlled substances. STEPHANIE has or will be reviewed as soon as it becomes avaliable.  o Take all medications as prescribed/directed.  o Patient was educated/instructed on their diagnosis, treatment and medications prior to discharge from the clinic today. INR 3.4 with home health today - patient instructed to hold warfarin today and will repeat tomorrow - just finished abx and will start another soon d/t recurrent UTI managed by Dr. Levi.   o Chronic conditions reviewed and taken into consideration for today's treatment plan.  · Disposition  o Call or Return if symptoms worsen or persist.            Electronically Signed by: CAROL Mejia -Author on May 17, 2021 03:22:22 PM

## 2021-06-05 NOTE — PROGRESS NOTES
Progress Note      Patient Name: Isabell Ribera   Patient ID: [id number]   Sex: Female   YOB: 1945    Primary Care Provider: Ivanna MEDINA   Referring Provider: Ivanna MEDINA    Visit Date: May 11, 2021    Provider: Jayda Levi MD   Location: [facility]   Location Address: [address] [place] [address], [address] [place] [address]  [id number]   Location Phone: [phone]          Chief Complaint  · Renal stones  · UTI  · follow up after stent came out      History Of Present Illness  The patient returns for a routine follow-up after stone removal surgery. Patient have a lot of frequency and no dysuria. Patient is very weak in the lower extremity particularly on the right side and does not walk. She is not not go to tolerate and just urinates in bed       Past Medical History  Aftercare;following joint replacement; Arthritis; Arthritis: Knee; Atrial Fibrillation; Diabetes Mellitus, Type II; Essential hypertension; Gout; Hypertension; Knee pain; Lymphedema         Past Surgical History  Cataract surgery; Joint Surgery; Kidney Stone Surgery, Unspecified; Knee replacement, right; Tubal ligation         Medication List  aspirin 81 mg oral tablet,delayed release (DR/EC); Coreg 6.25 mg oral tablet; Estrace 0.01 % (0.1 mg/gram) vaginal cream; furosemide 40 mg oral tablet; gabapentin 800 mg oral tablet; Pepcid 20 mg oral tablet; potassium chloride 10 mEq oral tablet extended release; warfarin 3 mg oral tablet         Allergy List  NO KNOWN DRUG ALLERGIES       Allergies Reconciled  Family Medical History  Congestive Heart Failure; - No Family History of Colorectal Cancer; Family history of Arthritis         Social History  Alcohol (Never); Alcohol Use (Never); Claustophobic (Unknown); lives with spouse; ; Tobacco (Former); Unemployed         Review of Systems  · Constitutional  o Denies  o : fever, headache, chills  · Eyes  o Denies  o : eye pain, double vision, blurred  "vision  · HENT  o Denies  o : sinus problems, sore throat, ear infection  · Cardiovascular  o Denies  o : chest pain, high blood pressure, varicosities  · Respiratory  o Denies  o : shortness of breath, wheezing, frequent cough  · Gastrointestinal  o Denies  o : nausea, vomiting, heartburn, indigestion, abdominal pain  · Genitourinary  o Admits  o : incontinence  o Denies  o : urgency, frequency, dysuria, hematuria, urinary retention, painful urination  · Integument  o Denies  o : rash, itching, boils  · Neurologic  o Denies  o : tingling or numbness, tremors, dizzy spells  · Musculoskeletal  o Denies  o : joint pain, neck pain, back pain  · Endocrine  o Denies  o : cold intolerance, heat intolerance, tired, excessive thirst, sluggish  · Psychiatric  o Admits  o : feels satisfied with life  o Denies  o : severe depression, concerns with hurting themselves  · Heme-Lymph  o Denies  o : swollen glands, blood clotting problems  · Allergic-Immunologic  o Denies  o : sinus allergy symptoms, hay fever      Vitals  Date Time BP Position Site L\R Cuff Size HR RR TEMP (F) WT  HT  BMI kg/m2 BSA m2 O2 Sat FR L/min FiO2 HC       05/11/2021 11:41 /55 Sitting    56 - R  97.5 281lbs 0oz 5'  7.5\" 43.36 2.46             Physical Examination  · Constitutional  o Appearance  o : 75-year-old white female who is on the wheelchair and looks weak  · Gastrointestinal  o Abdominal Examination  o : Scaphoid abdomen which is non-tender to palpation with normal tone and without rigidity or guarding. Normal bowel sounds. No masses present.  o Liver and spleen  o : no abnormalities  o Hernias  o : absent   · Skin and Subcutaneous Tissue  o General Inspection  o : no lesions present, no areas of discoloration, skin turgor normal, texture normal  · Neurologic  o Mental Status Examination  o : grossly oriented to person, place and time  o Gait and Station  o : normal gait, able to stand without difficulty  · Psychiatric  o Mood and Affect  o : " mood normal, affect appropriate      Figure 1.0: Pain Rating Scale-[place]         Results  · In-Office Procedures  o Lab procedure  § Automated dipstick urinalysis with microscopy (29173)   § Color Ur: Lt. Yellow   § Clarity Ur: Cloudy   § Glucose Ur Ql Strip: Negative   § Bilirub Ur Ql Strip: Negative   § Ketones Ur Ql Strip: Negative   § Sp Gr Ur Qn: 1.020   § Hgb Ur Ql Strip: Small   § pH Ur-LsCnc: 7.0   § Prot Ur Ql Strip: Negative   § Urobilinogen Ur Strip-mCnc: 0.2 E.U./dL   § Nitrite Ur Ql Strip: Negative   § WBC Est Ur Ql Strip: Small   § RBC UrnS Qn HPF: 0   § WBC UrnS Qn HPF: 7-8   § Bacteria UrnS Qn HPF: 4+   § Crystals UrnS Qn HPF: 0   § Epithelial Cells (non renal): 0 /HPF  § Epithelial Cells (renal): o       Assessment  · Urinary Retention     788.20/R33.9  · UTI (urinary tract infection)     599.0/N39.0  · Recurrent urinary tract infection     599.0/N39.0      Plan  · Orders  o Urine Culture (Clean Catch) [facility] ([id number]) - 599.0/N39.0 - 05/11/2021  · Medications  o Medications have been Reconciled  o Transition of Care or Provider Policy  · Instructions  o Start the patient on Urecholine 25 mg 3 times a day. We will call in antibiotics on Thursday or Friday depends upon the urine culture and wrist check her in 1 month's time  o Electronically Identified Patient Education Materials Provided Electronically            Electronically Signed by: Jayda Levi MD -Author on May 11, 2021 12:15:51 PM

## 2021-06-09 ENCOUNTER — ANTICOAGULATION VISIT (OUTPATIENT)
Dept: FAMILY MEDICINE CLINIC | Facility: CLINIC | Age: 76
End: 2021-06-09

## 2021-06-09 LAB — INR PPP: 2.9 (ref 2–3)

## 2021-06-15 ENCOUNTER — OFFICE VISIT (OUTPATIENT)
Dept: UROLOGY | Facility: CLINIC | Age: 76
End: 2021-06-15

## 2021-06-15 VITALS
BODY MASS INDEX: 29.35 KG/M2 | WEIGHT: 187 LBS | DIASTOLIC BLOOD PRESSURE: 81 MMHG | SYSTOLIC BLOOD PRESSURE: 153 MMHG | HEIGHT: 67 IN | HEART RATE: 92 BPM

## 2021-06-15 DIAGNOSIS — N39.0 URINARY TRACT INFECTION WITHOUT HEMATURIA, SITE UNSPECIFIED: Primary | ICD-10-CM

## 2021-06-15 DIAGNOSIS — N39.0 RECURRENT URINARY TRACT INFECTION: ICD-10-CM

## 2021-06-15 LAB
BACTERIA UR QL AUTO: ABNORMAL /HPF
BILIRUB BLD-MCNC: NEGATIVE MG/DL
CLARITY, POC: ABNORMAL
COLOR UR: YELLOW
EPI CELLS #/AREA URNS HPF: 0 /[HPF]
GLUCOSE UR STRIP-MCNC: NEGATIVE MG/DL
KETONES UR QL: NEGATIVE
LEUKOCYTE EST, POC: ABNORMAL
NITRITE UR-MCNC: POSITIVE MG/ML
PH UR: 6.5 [PH] (ref 5–8)
PROT UR STRIP-MCNC: NEGATIVE MG/DL
RBC # UR STRIP: ABNORMAL /HPF
RBC # UR STRIP: ABNORMAL /UL
RENAL EPITHELIAL, POC: 0
SP GR UR: 1.02 (ref 1–1.03)
UNIDENT CRYS URNS QL MICRO: ABNORMAL /HPF
UROBILINOGEN UR QL: NORMAL
WBC # UR STRIP: ABNORMAL /HPF

## 2021-06-15 PROCEDURE — 81003 URINALYSIS AUTO W/O SCOPE: CPT | Performed by: UROLOGY

## 2021-06-15 PROCEDURE — 99212 OFFICE O/P EST SF 10 MIN: CPT | Performed by: UROLOGY

## 2021-06-15 RX ORDER — POTASSIUM CHLORIDE 750 MG/1
10 TABLET, FILM COATED, EXTENDED RELEASE ORAL 3 TIMES WEEKLY
COMMUNITY
End: 2022-12-27

## 2021-06-15 RX ORDER — FUROSEMIDE 20 MG/1
20 TABLET ORAL DAILY
COMMUNITY
End: 2022-12-27

## 2021-06-15 RX ORDER — WARFARIN SODIUM 2 MG/1
TABLET ORAL
COMMUNITY
End: 2021-06-15 | Stop reason: SDUPTHER

## 2021-06-15 RX ORDER — GABAPENTIN 400 MG/1
CAPSULE ORAL
COMMUNITY
End: 2021-10-01

## 2021-06-15 RX ORDER — FOLIC ACID 1 MG/1
1 TABLET ORAL DAILY
COMMUNITY
Start: 2021-05-18 | End: 2022-12-27

## 2021-06-15 RX ORDER — WARFARIN SODIUM 3 MG/1
TABLET ORAL
COMMUNITY
End: 2021-09-27 | Stop reason: SDUPTHER

## 2021-06-15 RX ORDER — CARVEDILOL 6.25 MG/1
TABLET ORAL
COMMUNITY
End: 2021-09-20

## 2021-06-15 NOTE — PROGRESS NOTES
"Chief Complaint  Follow-up (UTI)    Subjective          Isabell Ribera presents to [facility]  History of Present Illness    Patient is doing much better than before.  Her control is better than before.  Urine does not smell and does not look bad to the patient.  No dysuria or gross hematuria    Objective   Vital Signs:   /81   Pulse 92   Ht 170.2 cm (67\")   Wt 84.8 kg (187 lb)   BMI 29.29 kg/m²       Review of Systems   Constitutional: Positive for activity change. Negative for appetite change, chills, diaphoresis, fatigue, fever and unexpected weight change.        Patient is weaker than it used to be   HENT: Negative for congestion.    Eyes: Negative.    Respiratory: Negative for apnea, cough, choking, chest tightness, shortness of breath, wheezing and stridor.    Cardiovascular: Positive for leg swelling. Negative for chest pain and palpitations.   Gastrointestinal: Negative for abdominal distention, abdominal pain, anal bleeding and nausea.   Endocrine: Negative for cold intolerance, heat intolerance, polydipsia and polyphagia.   Genitourinary: Negative for decreased urine volume, difficulty urinating, dysuria, enuresis, flank pain, frequency, genital sores, hematuria and urgency.   Musculoskeletal: Positive for gait problem, joint swelling and myalgias. Negative for arthralgias and back pain.        Patient is wheelchair dependent   Skin: Negative.    Allergic/Immunologic: Negative.    Neurological: Positive for weakness.   Hematological: Negative.    Psychiatric/Behavioral: Negative.         Physical Exam  Vitals and nursing note reviewed.   Constitutional:       Appearance: Normal appearance. She is well-developed and normal weight.   Cardiovascular:      Rate and Rhythm: Normal rate and regular rhythm.   Pulmonary:      Effort: Pulmonary effort is normal.      Breath sounds: Normal air entry.   Abdominal:      General: Bowel sounds are normal.      Palpations: Abdomen is " soft.   Musculoskeletal:         General: Swelling present. Normal range of motion.      Comments: Knees and lower extremities   Skin:     General: Skin is warm and dry.   Neurological:      Mental Status: She is alert and oriented to person, place, and time.      Motor: Weakness present.      Gait: Gait abnormal.   Psychiatric:         Mood and Affect: Mood normal.        Result Review :                 Assessment and Plan    Diagnoses and all orders for this visit:    1. Recurrent urinary tract infection (Primary)    2. Urinary tract infection without hematuria, site unspecified  -     POC Urinalysis Dipstick, Automated    Patient is doing better than before.  She has a lot of difficulty with transportation because of her mobility.  I will see her on as needed basis    Follow Up   No follow-ups on file.  Patient was given instructions and counseling regarding her condition or for health maintenance advice. Please see specific information pulled into the AVS if appropriate.     Jayda Levi MD

## 2021-06-16 ENCOUNTER — ANTICOAGULATION VISIT (OUTPATIENT)
Dept: FAMILY MEDICINE CLINIC | Facility: CLINIC | Age: 76
End: 2021-06-16

## 2021-06-16 ENCOUNTER — TRANSCRIBE ORDERS (OUTPATIENT)
Dept: FAMILY MEDICINE CLINIC | Facility: CLINIC | Age: 76
End: 2021-06-16

## 2021-06-16 LAB
INR PPP: 3
INR PPP: 3 (ref 2–3)

## 2021-06-22 LAB — INR PPP: 3 (ref 2–3)

## 2021-06-23 ENCOUNTER — PATIENT OUTREACH (OUTPATIENT)
Dept: CASE MANAGEMENT | Facility: OTHER | Age: 76
End: 2021-06-23

## 2021-06-23 ENCOUNTER — ANTICOAGULATION VISIT (OUTPATIENT)
Dept: FAMILY MEDICINE CLINIC | Facility: CLINIC | Age: 76
End: 2021-06-23

## 2021-06-23 DIAGNOSIS — Z59.6 LOW INCOME: Primary | ICD-10-CM

## 2021-06-23 SDOH — ECONOMIC STABILITY - INCOME SECURITY: LOW INCOME: Z59.6

## 2021-06-23 NOTE — OUTREACH NOTE
Ambulatory Case Management Note    CCM Interim Update    Talked at length with patient's daughter, Gissell, about ways to help her parents. Most concerning is their low income. Patient and her  do not have/draw a lot of income from Social security benefits, therefore often go with out medications and necessities in order to conserve financial resources.     Patients have been encouraged to apply for medicaid, as their monthly combined income is below a certain threshold, which would likely make them eligible for more assistance.     I have been in contact with [facility] , who has advised that I call and see if there is a anyway that the patient/spouse and daughter be able to apply in person for benefits, or perhaps over the phone with help. I have passed the message along to Daughter, Gissell, in order to call [phone] to help parents apply by phone for medicaid benefits.     Care Plan: Community Resources   Updates made since 6/23/2021 12:00 AM      Problem: LACK OF TRANSPORTATION       Goal: Establish Reliable Transportation       Task: Discuss current transportation plan with patient Completed 6/23/2021   Responsible User: Micaela Ramirez RN   Note:    Patient and  currently relying on daughter to come by once per week to take then to the grocery or run errands. Patient has been scheduling all appts for Mondays when her daughter is off.      Task: Assign community resources for transportation assistance    Responsible User: Micaela Ramriez RN      Task: Provide community resources for financial assistance    Responsible User: Micaela Ramirez RN      Problem: LIMITED FINANCIAL RESOURCES       Goal: Establish options for financial assistance       Task: Provide community resources for financial assistance Completed 6/23/2021   Responsible User: Micaela Ramirez RN   Note:    Helping patient and family navigate how to apply for medicaid + services for low income individuals. Working in  conjunction with daughter, patient and  to connect family to resources.      Task: Discuss financial planning with patient    Responsible User: Micaela Ramirez RN      Task: Refer patient to social work    Responsible User: Micaela Ramirez RN      Care Plan: Tobacco Use   Updates made since 6/23/2021 12:00 AM      Problem: USE OF TOBACCO PRODUCTS       Goal: Tobacco cessation       Task: Assess tobacco use    Responsible User: Micaela Ramirez RN      Task: ASSESS WILLINGNESS TO MAKE A QUIT ATTEMPT    Responsible User: Micaela Ramirez RN      Task: ADVISE TO QUIT    Responsible User: Micaela Ramirez RN      Task: Provide education on avoiding smoking and breathable irritants    Responsible User: Micaela Ramirez RN      Task: Utilize the five R’s to motivate patient to quit smoking    Responsible User: Micaela Ramirez RN      Task: Refer to value-based pharmacist for pharmacotherapy consideration    Responsible User: Micaela Ramirez RN      Task: Refer to support group for ongoing support, virtual or in person cessation courses, provide 1-800-QUIT-NOW #    Responsible User: Micaela Ramirez RN Tabitha Davis, RN  Ambulatory Case Management    6/23/2021, 15:46 EDT

## 2021-06-25 PROCEDURE — G0511 CCM/BHI BY RHC/FQHC 20MIN MO: HCPCS | Performed by: NURSE PRACTITIONER

## 2021-06-28 ENCOUNTER — TELEPHONE (OUTPATIENT)
Dept: FAMILY MEDICINE CLINIC | Facility: CLINIC | Age: 76
End: 2021-06-28

## 2021-06-28 ENCOUNTER — PATIENT OUTREACH (OUTPATIENT)
Dept: CASE MANAGEMENT | Facility: OTHER | Age: 76
End: 2021-06-28

## 2021-06-28 DIAGNOSIS — Z59.6 LOW INCOME: ICD-10-CM

## 2021-06-28 DIAGNOSIS — R53.1 WEAKNESS: Primary | ICD-10-CM

## 2021-06-28 DIAGNOSIS — M19.90 ARTHRITIS: ICD-10-CM

## 2021-06-28 DIAGNOSIS — I10 HYPERTENSION, UNSPECIFIED TYPE: ICD-10-CM

## 2021-06-28 SDOH — ECONOMIC STABILITY - INCOME SECURITY: LOW INCOME: Z59.6

## 2021-06-28 NOTE — OUTREACH NOTE
Ambulatory Case Management Note      [facility] End of Month Documentation    This Chronic Medical Management Care Plan for Isabell Ribera, 75 y.o. female, has been monitored and managed;reviewed and a new plan of care implemented for the month of June.  A cumulative time of 25  minutes was spent on this patient record this month, including phone call with relative;electronic communication with primary care provider;chart review.    Regarding the patient's problems: has Recurrent urinary tract infection on their problem list., the following items were addressed: medications;transitions to medical care and any changes can be found within the plan section of the note.  A detailed listing of time spent for chronic care management is tracked within each outreach encounter.  Current medications include:  has a current medication list which includes the following prescription(s): carvedilol, folic acid, furosemide, gabapentin, potassium chloride, and warfarin. and the patient is reported to be patient is compliant with medication protocol,  Medications are reported to be effective.  Regarding these diagnoses, referrals were made to the following provider(s):  N/A.  All notes on chart for PCP to review.    The patient was monitored remotely for activity level;blood glucose;mood & behavior;pain.    The patient's physical needs include:  help taking medications as prescribed;needs assistance with ADLs;physical healthcare;medication education.     The patient's mental support needs include:  increased support    The patient's cognitive support needs include:  continued support;requires supervision;needs assistance with ADLs;health care    The patient's psychosocial support needs include:  continued support    The patient's functional needs include: needs assistance for ADLs;physical healthcare;health care coverage;medication education, Patient has homehealth services that come to the house for therapy and nursing services for INR  checks.     The patient's environmental needs include:  No data recorded    Refer to previous outreach notes for more information on the areas listed above.    Monthly Billing Diagnoses  (R53.1) Weakness    (Z59.6) Low income    (I10) Hypertension, unspecified type    (M19.90) Arthritis    Medications   · Medications have been reconciled    Care Plan progress this month:      Recently Modified Care Plans Updates made since 5/28/2021 12:00 AM     Community Resources         Problem Priority Last Modified     LACK OF TRANSPORTATION --  6/23/2021  3:21 PM by Micaela Ramirez RN              Goal Recent Progress Last Modified     Establish Reliable Transportation --  6/23/2021  3:21 PM by Micaela Ramirez RN              Task Due Date Last Modified     Discuss current transportation plan with patient --  6/23/2021  3:23 PM by Micaela Ramirez RN     Patient and  currently relying on daughter to come by once per week to take then to the grocery or run errands. Patient has been scheduling all appts for Mondays when her daughter is off.        Assign community resources for transportation assistance --  6/23/2021  3:21 PM by Micaela Ramirez RN        Provide community resources for financial assistance --  6/23/2021  3:21 PM by Micaela Ramirez RN              Problem Priority Last Modified     LIMITED FINANCIAL RESOURCES --  6/23/2021  3:21 PM by Micaela Ramirez RN              Goal Recent Progress Last Modified     Establish options for financial assistance --  6/23/2021  3:21 PM by Micaela Ramirez RN              Task Due Date Last Modified     Provide community resources for financial assistance --  6/23/2021  3:37 PM by Micaela Ramirez, RN     Helping patient and family navigate how to apply for medicaid + services for low income individuals. Working in conjunction with daughter, patient and  to connect family to resources.        Discuss financial planning with patient --  6/23/2021  3:21 PM by James  DEVAN Hinojosa        Refer patient to social work --  6/23/2021  3:21 PM by Micaela Ramirez RN                 Tobacco Use         Problem Priority Last Modified     USE OF TOBACCO PRODUCTS --  6/23/2021  3:21 PM by Micaela Ramirez RN              Goal Recent Progress Last Modified     Tobacco cessation --  6/23/2021  3:21 PM by Micaela Ramirez RN              Task Due Date Last Modified     Assess tobacco use --  6/23/2021  3:21 PM by Micaela Ramirez RN        ASSESS WILLINGNESS TO MAKE A QUIT ATTEMPT --  6/23/2021  3:21 PM by Micaela Ramirez RN        ADVISE TO QUIT --  6/23/2021  3:21 PM by Micaela Ramirez RN        Provide education on avoiding smoking and breathable irritants --  6/23/2021  3:21 PM by Micaela Ramirez RN        Utilize the five R’s to motivate patient to quit smoking --  6/23/2021  3:21 PM by Micaela Ramirez RN        Refer to value-based pharmacist for pharmacotherapy consideration --  6/23/2021  3:21 PM by Micaela Ramirez RN        Refer to support group for ongoing support, virtual or in person cessation courses, provide 1-800-QUIT-NOW # --  6/23/2021  3:21 PM by Micaela Ramirez RN                      · Current Specialty Plan of Care Status finalized    Instructions   · Patient was provided an electronic copy of care plan  · CCM services were explained and offered and patient has accepted these services.  · Patient has given their written consent to receive CCM services and understands that this includes the authorization of electronic communication of medical information with the other treating providers.  · Patient understands that they may stop CCM services at any time and these changes will be effective at the end of the calendar month and will effectively revocate the agreement of CCM services.  · Patient understands that only one practitioner can furnish and be paid for CCM services during one calendar month.  Patient also understands that there may be co-payment or deductible fees in  association with CCM services.  · Patient will continue with at least monthly follow-up calls with the Nurse Navigator.    Micaela Ramirez RN  Ambulatory Case Management    6/28/2021, 09:51 EDT

## 2021-06-28 NOTE — TELEPHONE ENCOUNTER
Caller: FELIBERTO-VAIBHAV [facility]    Relationship: OTHER    Best call back number: [phone]    What is the best time to reach you: ANYTIME    Who are you requesting to speak with (clinical staff, provider,  specific staff member): FINA CHANEL        What was the call regarding: FELIBERTO FROM [facility] CALLED IN TO REPORT THIS PATIENT INR. 2.7 INR   AND PT32    Do you require a callback: NO

## 2021-06-29 ENCOUNTER — ANTICOAGULATION VISIT (OUTPATIENT)
Dept: FAMILY MEDICINE CLINIC | Facility: CLINIC | Age: 76
End: 2021-06-29

## 2021-06-29 LAB — INR PPP: 2.7

## 2021-07-06 LAB — INR PPP: 2.7 (ref 0.9–1.1)

## 2021-07-07 ENCOUNTER — ANTICOAGULATION VISIT (OUTPATIENT)
Dept: FAMILY MEDICINE CLINIC | Facility: CLINIC | Age: 76
End: 2021-07-07

## 2021-07-07 ENCOUNTER — PATIENT OUTREACH (OUTPATIENT)
Dept: CASE MANAGEMENT | Facility: OTHER | Age: 76
End: 2021-07-07

## 2021-07-07 DIAGNOSIS — Z79.01 WARFARIN ANTICOAGULATION: Primary | ICD-10-CM

## 2021-07-07 DIAGNOSIS — R53.1 WEAKNESS: Primary | ICD-10-CM

## 2021-07-07 PROCEDURE — 36416 COLLJ CAPILLARY BLOOD SPEC: CPT | Performed by: NURSE PRACTITIONER

## 2021-07-07 PROCEDURE — 85610 PROTHROMBIN TIME: CPT | Performed by: NURSE PRACTITIONER

## 2021-07-07 NOTE — OUTREACH NOTE
Ambulatory Case Management Note    CCM Interim Update    Called and spoke with patient and daughter today. Patient reports that she is doing okay. Patient has completed home physical therapy and occupational therapy, but still has weekly/biweekly visits from nursing for INR monitoring.     Patient and daughter has reported that patient seems stronger, and her legs are stronger than before. I have encouraged patient to ambulate through her home as her strength allows. Patient does spend a considerable amount of time in her wheelchair, but I have encouraged her to be as active as safely possible.    Patient does report being scared to fall, and this has inhibited her from moving around too much.     Daughter to keep in touch with mom and myself and will report any concerns as they arise.     Care Plan: Community Resources   Updates made since 7/7/2021 12:00 AM      Problem: LACK OF TRANSPORTATION       Goal: Establish Reliable Transportation       Task: Provide community resources for financial assistance Completed 7/7/2021   Responsible User: Micaela Ramirez, RN   Note:    Patient is currently still reliant on daughter for transportation. Since last contact, patient has been taken to the grocery, and has been out of her home. I have encouraged patient to continue with outings, but do so safely. I have advised her to still wear her mask and to ensure she is performing good hand hygiene when in public.          Micaela Ramirez RN  Ambulatory Case Management    7/7/2021, 16:10 EDT

## 2021-07-12 ENCOUNTER — ANTICOAGULATION VISIT (OUTPATIENT)
Dept: FAMILY MEDICINE CLINIC | Facility: CLINIC | Age: 76
End: 2021-07-12

## 2021-07-12 LAB — INR PPP: 3.4

## 2021-07-13 ENCOUNTER — ANTICOAGULATION VISIT (OUTPATIENT)
Dept: FAMILY MEDICINE CLINIC | Facility: CLINIC | Age: 76
End: 2021-07-13

## 2021-07-13 LAB — INR PPP: 2.8

## 2021-07-14 ENCOUNTER — TELEPHONE (OUTPATIENT)
Dept: FAMILY MEDICINE CLINIC | Facility: CLINIC | Age: 76
End: 2021-07-14

## 2021-07-14 NOTE — TELEPHONE ENCOUNTER
Caller: Isabell Ribera    Relationship: Self    Best call back number: [phone]    What is the best time to reach you: ANYTIME     Who are you requesting to speak with (clinical staff, provider,  specific staff member): CLINICAL     What was the call regarding: PATIENT IS WANTING TO KNOW WHEN SHE CAN START TAKING HER WARFARIN AGAIN       Do you require a callback: YES

## 2021-07-15 VITALS
TEMPERATURE: 97.5 F | HEART RATE: 56 BPM | DIASTOLIC BLOOD PRESSURE: 55 MMHG | BODY MASS INDEX: 44.1 KG/M2 | WEIGHT: 281 LBS | HEIGHT: 67 IN | SYSTOLIC BLOOD PRESSURE: 117 MMHG

## 2021-07-15 VITALS
HEART RATE: 63 BPM | TEMPERATURE: 96.4 F | DIASTOLIC BLOOD PRESSURE: 70 MMHG | SYSTOLIC BLOOD PRESSURE: 110 MMHG | OXYGEN SATURATION: 99 %

## 2021-07-16 ENCOUNTER — TELEPHONE (OUTPATIENT)
Dept: FAMILY MEDICINE CLINIC | Facility: CLINIC | Age: 76
End: 2021-07-16

## 2021-07-16 ENCOUNTER — ANTICOAGULATION VISIT (OUTPATIENT)
Dept: FAMILY MEDICINE CLINIC | Facility: CLINIC | Age: 76
End: 2021-07-16

## 2021-07-16 DIAGNOSIS — I48.91 ATRIAL FIBRILLATION, UNSPECIFIED TYPE (HCC): Primary | ICD-10-CM

## 2021-07-16 LAB — INR PPP: 1.7

## 2021-07-16 NOTE — TELEPHONE ENCOUNTER
Blanca with home health called, Isabell's INR is 1.7 today and has been alternating 3mg and 4mg as on her INR calendar in chart. She asked that we call her and patient back both with instructions. Blanca's call back is [phone] --FAVIO

## 2021-07-19 ENCOUNTER — TELEPHONE (OUTPATIENT)
Dept: FAMILY MEDICINE CLINIC | Facility: CLINIC | Age: 76
End: 2021-07-19

## 2021-07-19 ENCOUNTER — ANTICOAGULATION VISIT (OUTPATIENT)
Dept: FAMILY MEDICINE CLINIC | Facility: CLINIC | Age: 76
End: 2021-07-19

## 2021-07-19 LAB — INR PPP: 1.8

## 2021-07-19 NOTE — TELEPHONE ENCOUNTER
Blanca with [facility] called with INR resutls.  INR 1.8.  Spoke with Ivanna.  Ms. ribera is to take 4 mg all days but Tuesday and Thursday she is to do 3.  And she is to recheck in one wk.  I gave this information to Blanca and Ms. Ribera.  Both voiced understanding.

## 2021-07-26 ENCOUNTER — PATIENT OUTREACH (OUTPATIENT)
Dept: CASE MANAGEMENT | Facility: OTHER | Age: 76
End: 2021-07-26

## 2021-07-26 DIAGNOSIS — Z59.6 LOW INCOME: ICD-10-CM

## 2021-07-26 DIAGNOSIS — I10 HYPERTENSION, UNSPECIFIED TYPE: Primary | ICD-10-CM

## 2021-07-26 DIAGNOSIS — R53.1 WEAKNESS: ICD-10-CM

## 2021-07-26 SDOH — ECONOMIC STABILITY - INCOME SECURITY: LOW INCOME: Z59.6

## 2021-07-26 NOTE — OUTREACH NOTE
Ambulatory Case Management Note    [facility] End of Month Documentation    This Chronic Medical Management Care Plan for Isabell Ribera, 75 y.o. female, has been monitored and managed;reviewed and a new plan of care implemented for the month of July.  A cumulative time of 30 minutes was spent on this patient record this month, including phone call with relative;electronic communication with primary care provider;chart review.    Regarding the patient's problems: has Recurrent urinary tract infection on their problem list., the following items were addressed: medications;transitions to medical care and any changes can be found within the plan section of the note.  A detailed listing of time spent for chronic care management is tracked within each outreach encounter.  Current medications include:  has a current medication list which includes the following prescription(s): carvedilol, folic acid, furosemide, gabapentin, potassium chloride, and warfarin. and the patient is reported to be patient is compliant with medication protocol,  Medications are reported to be effective.  Regarding these diagnoses, referrals were made to the following provider(s):  N/A.  All notes on chart for PCP to review.    The patient was monitored remotely for activity level;blood glucose;mood & behavior;pain.    The patient's physical needs include:  help taking medications as prescribed;needs assistance with ADLs;physical healthcare;medication education.     The patient's mental support needs include:  increased support    The patient's cognitive support needs include:  continued support;requires supervision;needs assistance with ADLs;health care    The patient's psychosocial support needs include:  continued support    The patient's functional needs include: Patient has homehealth services that come to the house for therapy and nursing services for INR checks.     The patient's environmental needs include:  resources for disability needs;no  access to transportation, Limited access to transportation at this time.     Care Plan overall comments:  Working with patients daughter to help get patient and  signed up for medicaid, as they are severely low income.     Refer to previous outreach notes for more information on the areas listed above.    Monthly Billing Diagnoses  (I10) Hypertension, unspecified type    (R53.1) Weakness    (Z59.6) Low income    Medications   · Medications have been reconciled    Care Plan progress this month:      Recently Modified Care Plans Updates made since 6/25/2021 12:00 AM     Community Resources         Problem Priority Last Modified     LACK OF TRANSPORTATION --  6/23/2021  3:21 PM by Micaela Ramirez, RN              Goal Recent Progress Last Modified     Establish Reliable Transportation --  6/23/2021  3:21 PM by Micaela Ramirez, DEVAN              Task Due Date Last Modified     Provide community resources for financial assistance --  7/7/2021  4:05 PM by Micaela Ramirez, RN     Patient is currently still reliant on daughter for transportation. Since last contact, patient has been taken to the grocery, and has been out of her home. I have encouraged patient to continue with outings, but do so safely. I have advised her to still wear her mask and to ensure she is performing good hand hygiene when in public.                      · Current Specialty Plan of Care Status finalized    Instructions   · Patient was provided an electronic copy of care plan  · CCM services were explained and offered and patient has accepted these services.  · Patient has given their written consent to receive CCM services and understands that this includes the authorization of electronic communication of medical information with the other treating providers.  · Patient understands that they may stop CCM services at any time and these changes will be effective at the end of the calendar month and will effectively revocate the agreement of CCM  services.  · Patient understands that only one practitioner can furnish and be paid for CCM services during one calendar month.  Patient also understands that there may be co-payment or deductible fees in association with CCM services.  · Patient will continue with at least monthly follow-up calls with the Nurse Navigator.    Micaela Ramirez RN  Ambulatory Case Management    7/26/2021, 13:28 EDT

## 2021-07-27 ENCOUNTER — ANTICOAGULATION VISIT (OUTPATIENT)
Dept: FAMILY MEDICINE CLINIC | Facility: CLINIC | Age: 76
End: 2021-07-27

## 2021-07-27 LAB — INR PPP: 1.8

## 2021-08-03 ENCOUNTER — ANTICOAGULATION VISIT (OUTPATIENT)
Dept: FAMILY MEDICINE CLINIC | Facility: CLINIC | Age: 76
End: 2021-08-03

## 2021-08-03 LAB — INR PPP: 2.6 (ref 2–3)

## 2021-08-10 ENCOUNTER — ANTICOAGULATION VISIT (OUTPATIENT)
Dept: FAMILY MEDICINE CLINIC | Facility: CLINIC | Age: 76
End: 2021-08-10

## 2021-08-10 LAB — INR PPP: 2.7 (ref 2–3)

## 2021-08-16 ENCOUNTER — PATIENT OUTREACH (OUTPATIENT)
Dept: CASE MANAGEMENT | Facility: OTHER | Age: 76
End: 2021-08-16

## 2021-08-16 DIAGNOSIS — Z59.6 LOW INCOME: Primary | ICD-10-CM

## 2021-08-16 DIAGNOSIS — I48.20 CHRONIC ATRIAL FIBRILLATION (HCC): ICD-10-CM

## 2021-08-16 DIAGNOSIS — Z92.29 HISTORY OF LONG TERM ANTICOAGULANT USE: ICD-10-CM

## 2021-08-16 SDOH — ECONOMIC STABILITY - INCOME SECURITY: LOW INCOME: Z59.6

## 2021-08-16 NOTE — OUTREACH NOTE
"Ambulatory Case Management Note    CCM Interim Update    Called patient's daughter today, and she reports that her mom is being \"ornery and grumpy\". She has been reluctant to call about medicaid services and has been reportedly not taking her medications as prescribed. Daughter reports that her mom has had a rash to her bilateral lower arms for the past few weeks. Due to her not taking her water pill, she reports mild swelling to forearms as well.     Patient did report \"bruising\" on her arms, and told me that she was putting cream on it everyday. I did ask patient if she had had any falls recently and she denied any recent falls or injury.     Care Gaps:  Patient has refused to get the Covid-19 vaccines.   She is overdue for her Dexa scan and her colonoscopy per chart. I did not find any records of this in [facility] or [facility].     Will continue to reach out to patient to assess for any on-going needs.     Homehealth still checking on patient, and doing weekly INR checks.     Care Plan: Community Resources   Updates made since 8/16/2021 12:00 AM      Problem: LACK OF TRANSPORTATION       Goal: Establish Reliable Transportation       Task: Assign community resources for transportation assistance Completed 8/16/2021   Responsible User: Micaela Ramirez, RN   Note:    Patient is not interested in any transportation assistance at this time. I have explained that give her low household income, she would likely be eligible for Medicaid. Patient is reluctant, even with daughters urging, as she does not want anyone to know their income and financial status.          Micaela Ramirez RN  Ambulatory Case Management    8/16/2021, 15:02 EDT    "

## 2021-08-17 ENCOUNTER — ANTICOAGULATION VISIT (OUTPATIENT)
Dept: FAMILY MEDICINE CLINIC | Facility: CLINIC | Age: 76
End: 2021-08-17

## 2021-08-17 LAB — INR PPP: 2.9 (ref 2–3)

## 2021-08-24 ENCOUNTER — OFFICE VISIT (OUTPATIENT)
Dept: FAMILY MEDICINE CLINIC | Facility: CLINIC | Age: 76
End: 2021-08-24

## 2021-08-24 VITALS
OXYGEN SATURATION: 96 % | HEART RATE: 79 BPM | DIASTOLIC BLOOD PRESSURE: 94 MMHG | TEMPERATURE: 97.7 F | SYSTOLIC BLOOD PRESSURE: 144 MMHG

## 2021-08-24 DIAGNOSIS — S51.801A OPEN WOUND OF RIGHT FOREARM, INITIAL ENCOUNTER: ICD-10-CM

## 2021-08-24 DIAGNOSIS — L03.90: Primary | ICD-10-CM

## 2021-08-24 DIAGNOSIS — M79.89 SWELLING OF LEFT UPPER EXTREMITY: ICD-10-CM

## 2021-08-24 DIAGNOSIS — M79.89 SWELLING OF RIGHT UPPER EXTREMITY: ICD-10-CM

## 2021-08-24 PROCEDURE — 87186 SC STD MICRODIL/AGAR DIL: CPT | Performed by: NURSE PRACTITIONER

## 2021-08-24 PROCEDURE — 87070 CULTURE OTHR SPECIMN AEROBIC: CPT | Performed by: NURSE PRACTITIONER

## 2021-08-24 PROCEDURE — 87077 CULTURE AEROBIC IDENTIFY: CPT | Performed by: NURSE PRACTITIONER

## 2021-08-24 PROCEDURE — 87205 SMEAR GRAM STAIN: CPT | Performed by: NURSE PRACTITIONER

## 2021-08-24 PROCEDURE — 99214 OFFICE O/P EST MOD 30 MIN: CPT | Performed by: NURSE PRACTITIONER

## 2021-08-24 RX ORDER — METHYLPREDNISOLONE 4 MG/1
TABLET ORAL
Qty: 1 EACH | Refills: 0 | Status: SHIPPED | OUTPATIENT
Start: 2021-08-24 | End: 2022-02-07

## 2021-08-24 RX ORDER — CLINDAMYCIN HYDROCHLORIDE 300 MG/1
300 CAPSULE ORAL 4 TIMES DAILY
Qty: 56 CAPSULE | Refills: 0 | Status: SHIPPED | OUTPATIENT
Start: 2021-08-24 | End: 2022-02-07

## 2021-08-24 RX ORDER — HYDROXYZINE HYDROCHLORIDE 10 MG/1
10 TABLET, FILM COATED ORAL 3 TIMES DAILY PRN
Qty: 42 TABLET | Refills: 0 | Status: SHIPPED | OUTPATIENT
Start: 2021-08-24 | End: 2021-09-11

## 2021-08-24 NOTE — PROGRESS NOTES
Chief Complaint  swelling on arms    Alfredo Ribera presents to [facility]  History of Present Illness     This was an audio and video enabled telemedicine encounter. The patient, her daughter, myself, and medical assistant Eunice Chinchilla, were present for the encounter. The patient was present in the office at the time of visit, and I was located at home.     She reports a two to three week hx of progressive swelling of the BUEs - she notes that some areas are oozing/weeping, and there is one in particular on the right forearm. She notes that she has been itching a lot - arms, legs, and back. There are now sores on her legs and on her back.     She denies any change in soaps, lotions, or detergents. No new clothing or bed linens. No new pets. She denies any issues with bugs in the home.     She denies any fever, chills, or body aches. Denies any pain in the BUEs.       PHQ-2 Total Score: 1      Past Medical History:   Diagnosis Date   • Afib (CMS/[facility])    • Aftercare following ankle joint replacement surgery 06/01/2015   • Arthritis    • Arthritis of knee 06/01/2015   • Essential hypertension    • Gout    • HTN (hypertension)    • Left knee pain    • Lymphedema    • Recurrent UTI 05/11/2021   • Urinary retention 05/11/2021       No Known Allergies     Past Surgical History:   Procedure Laterality Date   • CATARACT EXTRACTION     • KIDNEY STONE SURGERY     • KNEE SURGERY Left 2007    REPLACEMENT   • OTHER SURGICAL HISTORY      JOINT SURGERY, UNSPECIFIED   • TUBAL ABDOMINAL LIGATION          Social History     Tobacco Use   • Smoking status: Former Smoker   • Smokeless tobacco: Never Used   Vaping Use   • Vaping Use: Never used   Substance Use Topics   • Alcohol use: Never   • Drug use: Not on file       Family History   Problem Relation Age of Onset   • Heart failure Mother 64        CONGESTIVE   • Arthritis Mother    • Arthritis Sister         Health  Maintenance Due   Topic Date Due   • DXA SCAN  Never done   • COLORECTAL CANCER SCREENING  Never done   • Pneumococcal Vaccine 65+ (1 of 2 - PPSV23) Never done   • COVID-19 Vaccine (1) Never done   • TDAP/TD VACCINES (1 - Tdap) Never done   • ZOSTER VACCINE (1 of 2) Never done   • ANNUAL WELLNESS VISIT  06/02/2021   • DIABETIC EYE EXAM  06/02/2021   • DIABETIC FOOT EXAM  06/30/2021   • URINE MICROALBUMIN  06/30/2021        Current Outpatient Medications on File Prior to Visit   Medication Sig   • carvedilol (Coreg) 6.25 MG tablet Coreg 6.25 mg oral tablet take 1 tablet (6.25 mg) by oral route 2 times per day with food   Active   • gabapentin (NEURONTIN) 400 MG capsule gabapentin 400 mg oral capsule take 2 capsules by oral route once a day (at bedtime)   Suspended   • potassium chloride 10 MEQ CR tablet potassium chloride 10 mEq oral tablet extended release take 1 tablet (10 meq) by oral route on Mondays, Wednesdays, and Fridays   Suspended   • warfarin (COUMADIN) 3 MG tablet warfarin 3 mg oral tablet take 1 tablet (3 mg) by oral route once daily   Active   • folic acid (FOLVITE) 1 MG tablet folic acid 1 mg oral tablet take 1 tablet (1 mg) by oral route once daily for 30 days 5/18/2021  Active   • furosemide (LASIX) 20 MG tablet furosemide 20 mg oral tablet take 1 tablet (20 mg) by oral route once daily for 30 days   Active     No current facility-administered medications on file prior to visit.       Immunization History   Administered Date(s) Administered   • Flu Vaccine Quad PF >36MO 09/20/2018, 11/01/2019, 09/22/2020       Review of Systems     Objective     /94   Pulse 79   Temp 97.7 °F (36.5 °C)   SpO2 96%       Physical Exam  Constitutional:       General: She is not in acute distress.     Appearance: She is obese.      Comments: Sitting comfortably in a wheelchair.   Eyes:      General: No scleral icterus.  Pulmonary:      Effort: Pulmonary effort is normal. No respiratory distress.   Skin:      Comments: Evaluation via video demonstrates erythematous patches on the back, worse over the left lower back and right upper back, over the right shoulder. There is erythema, excoriation, and scabbing present.     On the BUEs there is diffuse edema, pitting and weeping - right forearm, posterior aspect, reveals an area of broken skin with serosanguineous drainage present.     BLEs with erythema over the knees with excoriation and scabbing present as well.     Chronic LE edema present.    Neurological:      General: No focal deficit present.      Mental Status: She is alert and oriented to person, place, and time. Mental status is at baseline.   Psychiatric:         Mood and Affect: Mood normal.         Behavior: Behavior normal.         Thought Content: Thought content normal.         Judgment: Judgment normal.         Result Review :     The following data was reviewed by: CAROL Ibarra on 08/24/2021:    CMP    CMP 3/23/21 4/15/21 5/17/21   Glucose 89 112 (A) 153 (A)   BUN 24 12 13   Creatinine 0.88 1.22 (A) 1.14 (A)   Sodium 137 138 137   Potassium 3.8 4.7 4.6   Chloride 105 102 101   Calcium 8.0 (A) 10.1 9.5   Albumin  2.8 (A) 3.2 (A)   Total Bilirubin  0.57 0.49   Alkaline Phosphatase  105 81   AST (SGOT)  23 21   ALT (SGPT)  10 7 (A)   (A) Abnormal value       Comments are available for some flowsheets but are not being displayed.           CBC    CBC 3/23/21 4/15/21 5/17/21   WBC 5.27 5.73 5.58   RBC 4.08 (A) 4.60 4.63   Hemoglobin 12.4 13.7 13.4   Hematocrit 37.3 41.9 42.8   MCV 91.4 91.1 92.4   MCH 30.4 29.8 28.9   MCHC 33.2 32.7 (A) 31.3 (A)   RDW 14.6 (A) 15.2 (A) 15.3 (A)   Platelets 111 (A) 216 201   (A) Abnormal value                            Assessment and Plan      Diagnoses and all orders for this visit:    1. Diffuse cellulitis (Primary)  -     methylPREDNISolone (MEDROL) 4 MG dose pack; Take as directed on package instructions.  Dispense: 1 each; Refill: 0  -     clindamycin (Cleocin)  300 MG capsule; Take 1 capsule by mouth 4 (Four) Times a Day.  Dispense: 56 capsule; Refill: 0  -     mupirocin (Bactroban) 2 % ointment; Apply  topically to the appropriate area as directed 2 (Two) Times a Day.  Dispense: 30 g; Refill: 0  -     hydrOXYzine (ATARAX) 10 MG tablet; Take 1 tablet by mouth 3 (Three) Times a Day As Needed for Itching.  Dispense: 42 tablet; Refill: 0    2. Swelling of left upper extremity    3. Swelling of right upper extremity    4. Open wound of right forearm, initial encounter  -     Wound Culture - Wound, Arm, Right            Follow Up     Return in about 9 days (around 9/2/2021) for Recheck - request an in-person visit.     Advised patient to apply mupirocin to any area of open wound or skin breakdown. Where weeping, wrap with gauze bandage and then ace wrap. Elevated UEs to help with swelling. Medications per above.     I want to see her back at the end of next week, but advised ER with any acutely worsening symptoms, and definitely with any systemic symptoms such as fever, chills, nausea, vomiting, or body aches.     Patient was given instructions and counseling regarding her condition or for health maintenance advice. Please see specific information pulled into the AVS if appropriate.

## 2021-08-25 ENCOUNTER — HOSPITAL ENCOUNTER (EMERGENCY)
Facility: HOSPITAL | Age: 76
Discharge: HOME | End: 2021-08-25
Attending: EMERGENCY MEDICINE | Admitting: EMERGENCY MEDICINE

## 2021-08-25 VITALS
SYSTOLIC BLOOD PRESSURE: 140 MMHG | RESPIRATION RATE: 15 BRPM | TEMPERATURE: 98 F | WEIGHT: 157.41 LBS | DIASTOLIC BLOOD PRESSURE: 63 MMHG | HEIGHT: 67 IN | HEART RATE: 66 BPM | OXYGEN SATURATION: 100 % | BODY MASS INDEX: 24.71 KG/M2

## 2021-08-25 DIAGNOSIS — T45.511A COUMADIN TOXICITY, ACCIDENTAL OR UNINTENTIONAL, INITIAL ENCOUNTER: Primary | ICD-10-CM

## 2021-08-25 LAB
ALBUMIN SERPL-MCNC: 3.2 G/DL (ref 3.5–5.2)
ALBUMIN/GLOB SERPL: 1.1 G/DL
ALP SERPL-CCNC: 59 U/L (ref 39–117)
ALT SERPL W P-5'-P-CCNC: 5 U/L (ref 1–33)
ANION GAP SERPL CALCULATED.3IONS-SCNC: 12.2 MMOL/L (ref 5–15)
AST SERPL-CCNC: 16 U/L (ref 1–32)
BASOPHILS # BLD AUTO: 0.05 10*3/MM3 (ref 0–0.2)
BASOPHILS NFR BLD AUTO: 0.5 % (ref 0–1.5)
BILIRUB SERPL-MCNC: 0.8 MG/DL (ref 0–1.2)
BUN SERPL-MCNC: 12 MG/DL (ref 8–23)
BUN/CREAT SERPL: 8.2 (ref 7–25)
CALCIUM SPEC-SCNC: 8.8 MG/DL (ref 8.6–10.5)
CHLORIDE SERPL-SCNC: 102 MMOL/L (ref 98–107)
CO2 SERPL-SCNC: 23.8 MMOL/L (ref 22–29)
CREAT SERPL-MCNC: 1.47 MG/DL (ref 0.57–1)
DEPRECATED RDW RBC AUTO: 48.6 FL (ref 37–54)
EOSINOPHIL # BLD AUTO: 1.15 10*3/MM3 (ref 0–0.4)
EOSINOPHIL NFR BLD AUTO: 10.6 % (ref 0.3–6.2)
ERYTHROCYTE [DISTWIDTH] IN BLOOD BY AUTOMATED COUNT: 15.2 % (ref 12.3–15.4)
GFR SERPL CREATININE-BSD FRML MDRD: 35 ML/MIN/1.73
GLOBULIN UR ELPH-MCNC: 2.8 GM/DL
GLUCOSE SERPL-MCNC: 108 MG/DL (ref 65–99)
HCT VFR BLD AUTO: 45.1 % (ref 34–46.6)
HGB BLD-MCNC: 14.8 G/DL (ref 12–15.9)
HOLD SPECIMEN: NORMAL
HOLD SPECIMEN: NORMAL
IMM GRANULOCYTES # BLD AUTO: 0.05 10*3/MM3 (ref 0–0.05)
IMM GRANULOCYTES NFR BLD AUTO: 0.5 % (ref 0–0.5)
INR PPP: 8.53 (ref 2–3)
LYMPHOCYTES # BLD AUTO: 1.43 10*3/MM3 (ref 0.7–3.1)
LYMPHOCYTES NFR BLD AUTO: 13.2 % (ref 19.6–45.3)
MCH RBC QN AUTO: 29.2 PG (ref 26.6–33)
MCHC RBC AUTO-ENTMCNC: 32.8 G/DL (ref 31.5–35.7)
MCV RBC AUTO: 89.1 FL (ref 79–97)
MONOCYTES # BLD AUTO: 0.93 10*3/MM3 (ref 0.1–0.9)
MONOCYTES NFR BLD AUTO: 8.6 % (ref 5–12)
NEUTROPHILS NFR BLD AUTO: 66.6 % (ref 42.7–76)
NEUTROPHILS NFR BLD AUTO: 7.19 10*3/MM3 (ref 1.7–7)
NRBC BLD AUTO-RTO: 0 /100 WBC (ref 0–0.2)
PLATELET # BLD AUTO: 195 10*3/MM3 (ref 140–450)
PMV BLD AUTO: 9.8 FL (ref 6–12)
POTASSIUM SERPL-SCNC: 4 MMOL/L (ref 3.5–5.2)
PROT SERPL-MCNC: 6 G/DL (ref 6–8.5)
PROTHROMBIN TIME: 89.2 SECONDS (ref 9.4–12)
RBC # BLD AUTO: 5.06 10*6/MM3 (ref 3.77–5.28)
SODIUM SERPL-SCNC: 138 MMOL/L (ref 136–145)
WBC # BLD AUTO: 10.8 10*3/MM3 (ref 3.4–10.8)
WHOLE BLOOD HOLD SPECIMEN: NORMAL
WHOLE BLOOD HOLD SPECIMEN: NORMAL

## 2021-08-25 PROCEDURE — 85610 PROTHROMBIN TIME: CPT | Performed by: EMERGENCY MEDICINE

## 2021-08-25 PROCEDURE — 85025 COMPLETE CBC W/AUTO DIFF WBC: CPT | Performed by: EMERGENCY MEDICINE

## 2021-08-25 PROCEDURE — 99283 EMERGENCY DEPT VISIT LOW MDM: CPT

## 2021-08-25 PROCEDURE — 80053 COMPREHEN METABOLIC PANEL: CPT | Performed by: EMERGENCY MEDICINE

## 2021-08-25 NOTE — ED PROVIDER NOTES
Time: 11:10 AM EDT  Arrived by: EMS  Chief Complaint:   Chief Complaint   Patient presents with   • Abnormal Lab     History provided by: Patient  History is limited by: N/A     History of Present Illness:    Isabell Ribera is a 75 y.o. female who presents to the emergency department today with complaints of abnormal labs. The patient reports that her Coumadin levels were found to be too high today. She denies any prior history of this occurrence. The patient was referred to the ED today by her PCP.    The patient denies any bleeding from her gums or visible blood in her stool. She does have chronic swelling and some erythema to the bilateral upper and lower extremities. The patient has a history of atrial fibrillation, lymphedema, and hypertension. She is a former smoker. She notes that she was placed on antibiotics yesterday. She also states that she has been placed on Lasix but that she is not taking it as prescribed. There are no other acute complaints at this time.      History provided by:  Patient   used: No    Abnormal Lab  Location:  N/A  Quality:  High Coumadin levels.  Severity:  Moderate  Onset quality:  Sudden  Duration:  1 day  Timing:  Constant  Progression:  Unchanged  Chronicity:  New  Context:  Patient's Coumadin levels were found to be too high today.   Relieved by:  Nothing  Worsened by:  Nothing  Ineffective treatments:  N/A  Associated symptoms: no chest pain, no cough, no diarrhea, no fever, no shortness of breath and no vomiting    Risk factors:  Patient is a former smoker. She started antibiotics recently.      Similar Symptoms Previously: No.  Recently seen: Patient was seen by her PCP yesterday.      Patient Care Team  Primary Care Provider: Ivanna Frederick APRN    Past Medical History:     No Known Allergies  Past Medical History:   Diagnosis Date   • Afib (CMS/HCC)    • Aftercare following ankle joint replacement surgery 06/01/2015   • Arthritis    • Arthritis  of knee 06/01/2015   • Essential hypertension    • Gout    • HTN (hypertension)    • Left knee pain    • Lymphedema    • Recurrent UTI 05/11/2021   • Urinary retention 05/11/2021     Past Surgical History:   Procedure Laterality Date   • CATARACT EXTRACTION     • KIDNEY STONE SURGERY     • KNEE SURGERY Left 2007    REPLACEMENT   • OTHER SURGICAL HISTORY      JOINT SURGERY, UNSPECIFIED   • TUBAL ABDOMINAL LIGATION       Family History   Problem Relation Age of Onset   • Heart failure Mother 64        CONGESTIVE   • Arthritis Mother    • Arthritis Sister        Home Medications:  Prior to Admission medications    Medication Sig Start Date End Date Taking? Authorizing Provider   carvedilol (Coreg) 6.25 MG tablet Coreg 6.25 mg oral tablet take 1 tablet (6.25 mg) by oral route 2 times per day with food   Active    ProviderMaricarmen MD   clindamycin (Cleocin) 300 MG capsule Take 1 capsule by mouth 4 (Four) Times a Day. 8/24/21   Ivanna Frederick APRN   folic acid (FOLVITE) 1 MG tablet folic acid 1 mg oral tablet take 1 tablet (1 mg) by oral route once daily for 30 days 5/18/2021  Active 5/18/21   ProviderMaricarmen MD   furosemide (LASIX) 20 MG tablet furosemide 20 mg oral tablet take 1 tablet (20 mg) by oral route once daily for 30 days   Active    Provider, MD Maricarmen   gabapentin (NEURONTIN) 400 MG capsule gabapentin 400 mg oral capsule take 2 capsules by oral route once a day (at bedtime)   Suspended    ProviderMaricarmen MD   hydrOXYzine (ATARAX) 10 MG tablet Take 1 tablet by mouth 3 (Three) Times a Day As Needed for Itching. 8/24/21   Ivanna Frederick APRN   methylPREDNISolone (MEDROL) 4 MG dose pack Take as directed on package instructions. 8/24/21   Ivanna Frederick APRN   mupirocin (Bactroban) 2 % ointment Apply  topically to the appropriate area as directed 2 (Two) Times a Day. 8/24/21   Ivanna Frederick APRN   potassium chloride 10 MEQ CR tablet potassium chloride 10 mEq oral  "tablet extended release take 1 tablet (10 meq) by oral route on Mondays, Wednesdays, and Fridays   Suspended    ProviderMaricarmen MD   warfarin (COUMADIN) 3 MG tablet warfarin 3 mg oral tablet take 1 tablet (3 mg) by oral route once daily   Active    Provider, MD Maricarmen        Social History:   Social History     Tobacco Use   • Smoking status: Former Smoker   • Smokeless tobacco: Never Used   Vaping Use   • Vaping Use: Never used   Substance Use Topics   • Alcohol use: Never   • Drug use: Not on file     Recent travel: not applicable     Review of Systems:  Review of Systems   Constitutional: Negative for chills and fever.   HENT: Negative for dental problem and nosebleeds.    Eyes: Negative for redness.   Respiratory: Negative for cough and shortness of breath.    Cardiovascular: Positive for leg swelling (swelling/erythema to arms and legs bilaterally). Negative for chest pain.   Gastrointestinal: Negative for blood in stool, diarrhea and vomiting.   Genitourinary: Negative for dysuria and frequency.   Musculoskeletal: Negative for back pain and neck pain.   Skin: Negative for pallor.   Neurological: Negative for seizures and syncope.        Physical Exam:  /63   Pulse 66   Temp 98 °F (36.7 °C) (Oral)   Resp 15   Ht 170.2 cm (67\")   Wt 71.4 kg (157 lb 6.5 oz)   SpO2 100%   BMI 24.65 kg/m²     Physical Exam  Vitals and nursing note reviewed.   Constitutional:       General: She is not in acute distress.  HENT:      Head: Normocephalic and atraumatic.      Nose: Nose normal.      Mouth/Throat:      Mouth: Mucous membranes are moist.   Eyes:      General: No scleral icterus.  Cardiovascular:      Rate and Rhythm: Normal rate and regular rhythm.      Heart sounds: Normal heart sounds. No murmur heard.     Pulmonary:      Effort: No respiratory distress.      Breath sounds: Normal breath sounds.   Abdominal:      Palpations: Abdomen is soft.      Tenderness: There is no abdominal tenderness. "   Musculoskeletal:         General: Normal range of motion.      Cervical back: Normal range of motion and neck supple. No tenderness.      Right lower leg: Edema present.      Left lower leg: Edema present.      Comments: She has edema in the arms, hands, and legs bilaterally with erythema on the arms and legs. It appears chronic.   Skin:     General: Skin is warm and dry.   Neurological:      General: No focal deficit present.      Mental Status: She is alert.      Sensory: No sensory deficit.      Motor: No weakness.   Psychiatric:         Behavior: Behavior normal.                Medications in the Emergency Department:  Medications - No data to display     Labs  Lab Results (last 24 hours)     Procedure Component Value Units Date/Time    Protime-INR [[id number]]  (Abnormal) Collected: 08/27/21 1143    Specimen: Blood from Arm, Right Updated: 08/27/21 1227     Protime 85.5 Seconds      INR 8.19    Narrative:      Suggested Therapeutic Ranges For Oral Anticoagulant Therapy:  Level of Therapy                      INR Target Range  Standard Dose                            2.0-3.0  High Dose                                2.5-3.5  Patients not receiving anticoagulant  Therapy Normal Range                     0.6-1.2    CBC & Differential [[id number]]  (Abnormal) Collected: 08/27/21 1143    Specimen: Blood from Arm, Right Updated: 08/27/21 1223    Narrative:      The following orders were created for panel order CBC & Differential.  Procedure                               Abnormality         Status                     ---------                               -----------         ------                     CBC Auto Differential[[id number]]        Abnormal            Final result                 Please view results for these tests on the individual orders.    Comprehensive Metabolic Panel [[id number]]  (Abnormal) Collected: 08/27/21 1143    Specimen: Blood from Arm, Right Updated: 08/27/21 1213     Glucose 105 mg/dL      BUN  14 mg/dL      Creatinine 1.30 mg/dL      Sodium 138 mmol/L      Potassium 3.5 mmol/L      Chloride 103 mmol/L      CO2 23.4 mmol/L      Calcium 8.7 mg/dL      Total Protein 5.9 g/dL      Albumin 3.10 g/dL      ALT (SGPT) 6 U/L      AST (SGOT) 22 U/L      Alkaline Phosphatase 57 U/L      Total Bilirubin 0.7 mg/dL      eGFR Non African Amer 40 mL/min/1.73      Globulin 2.8 gm/dL      A/G Ratio 1.1 g/dL      BUN/Creatinine Ratio 10.8     Anion Gap 11.6 mmol/L     Narrative:      GFR Normal >60  Chronic Kidney Disease <60  Kidney Failure <15      CBC Auto Differential [[id number]]  (Abnormal) Collected: 08/27/21 1143    Specimen: Blood from Arm, Right Updated: 08/27/21 1223     WBC 8.86 10*3/mm3      RBC 4.97 10*6/mm3      Hemoglobin 14.7 g/dL      Hematocrit 44.2 %      MCV 88.9 fL      MCH 29.6 pg      MCHC 33.3 g/dL      RDW 15.2 %      RDW-SD 49.4 fl      MPV 10.5 fL      Platelets 192 10*3/mm3      Neutrophil % 58.6 %      Lymphocyte % 13.8 %      Monocyte % 9.7 %      Eosinophil % 16.8 %      Basophil % 0.6 %      Immature Grans % 0.5 %      Neutrophils, Absolute 5.20 10*3/mm3      Lymphocytes, Absolute 1.22 10*3/mm3      Monocytes, Absolute 0.86 10*3/mm3      Eosinophils, Absolute 1.49 10*3/mm3      Basophils, Absolute 0.05 10*3/mm3      Immature Grans, Absolute 0.04 10*3/mm3      nRBC 0.0 /100 WBC     Urinalysis With Microscopic If Indicated (No Culture) - Urine, Catheter [[id number]] Collected: 08/27/21 1349    Specimen: Urine, Catheter Updated: 08/27/21 1352           Imaging:  No Radiology Exams Resulted Within Past 24 Hours    Procedures:  Procedures    Progress  ED Course as of Aug 27 1354   Wed Aug 25, 2021   1435 At bedside reevaluating the patient and updating on lab and imaging results. The patient is agreeable with the plan for discharge and will hold her Coumadin until recheck on Friday.    [RF]      ED Course User Index  [RF] Leidy Woo                            Medical Decision Making:  MDM    75  year old patient was sent to the emergency department for an elevated INR. Patient has no evidence of bleeding.  Patient's INR is eight. Current recommendation is to withhold her Coumadin for two days and to have it rechecked and reassess whether she can restart her Coumadin at what dose by her PCP. Patient stable for discharge.      Final diagnoses:   Coumadin toxicity, accidental or unintentional, initial encounter        Disposition:  ED Disposition     ED Disposition Condition Comment    Discharge Stable           Documentation assistance provided by Leidy Woo acting as scribe for Dr. Diego Shanks. Information recorded by the scribe was done at my direction and has been verified and validated by me.        Leidy Woo  08/25/21 1145       Leidy Woo  08/25/21 1147       Leidy Woo  08/25/21 1151       Leidy Woo  08/25/21 1236       Diego Shanks DO  08/27/21 [id number]

## 2021-08-25 NOTE — DISCHARGE INSTRUCTIONS
Hold your Coumadin dosing today and tomorrow, repeat a Coumadin level on Friday and discuss restarting Coumadin with your health provider

## 2021-08-27 ENCOUNTER — HOSPITAL ENCOUNTER (EMERGENCY)
Facility: HOSPITAL | Age: 76
Discharge: HOME | End: 2021-08-27
Attending: EMERGENCY MEDICINE | Admitting: EMERGENCY MEDICINE

## 2021-08-27 ENCOUNTER — TELEPHONE (OUTPATIENT)
Dept: FAMILY MEDICINE CLINIC | Facility: CLINIC | Age: 76
End: 2021-08-27

## 2021-08-27 VITALS
WEIGHT: 200.4 LBS | BODY MASS INDEX: 31.45 KG/M2 | DIASTOLIC BLOOD PRESSURE: 54 MMHG | HEIGHT: 67 IN | HEART RATE: 71 BPM | TEMPERATURE: 98.1 F | SYSTOLIC BLOOD PRESSURE: 120 MMHG | OXYGEN SATURATION: 92 % | RESPIRATION RATE: 20 BRPM

## 2021-08-27 DIAGNOSIS — N39.0 UTI (URINARY TRACT INFECTION) WITH PYURIA: ICD-10-CM

## 2021-08-27 DIAGNOSIS — T45.511A POISONING BY WARFARIN SODIUM, ACCIDENTAL OR UNINTENTIONAL, INITIAL ENCOUNTER: Primary | ICD-10-CM

## 2021-08-27 LAB
ALBUMIN SERPL-MCNC: 3.1 G/DL (ref 3.5–5.2)
ALBUMIN/GLOB SERPL: 1.1 G/DL
ALP SERPL-CCNC: 57 U/L (ref 39–117)
ALT SERPL W P-5'-P-CCNC: 6 U/L (ref 1–33)
ANION GAP SERPL CALCULATED.3IONS-SCNC: 11.6 MMOL/L (ref 5–15)
AST SERPL-CCNC: 22 U/L (ref 1–32)
BACTERIA UR QL AUTO: ABNORMAL /HPF
BASOPHILS # BLD AUTO: 0.05 10*3/MM3 (ref 0–0.2)
BASOPHILS NFR BLD AUTO: 0.6 % (ref 0–1.5)
BILIRUB SERPL-MCNC: 0.7 MG/DL (ref 0–1.2)
BILIRUB UR QL STRIP: NEGATIVE
BUN SERPL-MCNC: 14 MG/DL (ref 8–23)
BUN/CREAT SERPL: 10.8 (ref 7–25)
CALCIUM SPEC-SCNC: 8.7 MG/DL (ref 8.6–10.5)
CHLORIDE SERPL-SCNC: 103 MMOL/L (ref 98–107)
CLARITY UR: ABNORMAL
CO2 SERPL-SCNC: 23.4 MMOL/L (ref 22–29)
COLOR UR: ABNORMAL
CREAT SERPL-MCNC: 1.3 MG/DL (ref 0.57–1)
DEPRECATED RDW RBC AUTO: 49.4 FL (ref 37–54)
EOSINOPHIL # BLD AUTO: 1.49 10*3/MM3 (ref 0–0.4)
EOSINOPHIL NFR BLD AUTO: 16.8 % (ref 0.3–6.2)
ERYTHROCYTE [DISTWIDTH] IN BLOOD BY AUTOMATED COUNT: 15.2 % (ref 12.3–15.4)
GFR SERPL CREATININE-BSD FRML MDRD: 40 ML/MIN/1.73
GLOBULIN UR ELPH-MCNC: 2.8 GM/DL
GLUCOSE SERPL-MCNC: 105 MG/DL (ref 65–99)
GLUCOSE UR STRIP-MCNC: NEGATIVE MG/DL
HCT VFR BLD AUTO: 44.2 % (ref 34–46.6)
HGB BLD-MCNC: 14.7 G/DL (ref 12–15.9)
HGB UR QL STRIP.AUTO: ABNORMAL
HOLD SPECIMEN: NORMAL
HOLD SPECIMEN: NORMAL
HYALINE CASTS UR QL AUTO: ABNORMAL /LPF
IMM GRANULOCYTES # BLD AUTO: 0.04 10*3/MM3 (ref 0–0.05)
IMM GRANULOCYTES NFR BLD AUTO: 0.5 % (ref 0–0.5)
INR PPP: 8.19 (ref 2–3)
KETONES UR QL STRIP: ABNORMAL
LEUKOCYTE ESTERASE UR QL STRIP.AUTO: ABNORMAL
LYMPHOCYTES # BLD AUTO: 1.22 10*3/MM3 (ref 0.7–3.1)
LYMPHOCYTES NFR BLD AUTO: 13.8 % (ref 19.6–45.3)
MCH RBC QN AUTO: 29.6 PG (ref 26.6–33)
MCHC RBC AUTO-ENTMCNC: 33.3 G/DL (ref 31.5–35.7)
MCV RBC AUTO: 88.9 FL (ref 79–97)
MONOCYTES # BLD AUTO: 0.86 10*3/MM3 (ref 0.1–0.9)
MONOCYTES NFR BLD AUTO: 9.7 % (ref 5–12)
NEUTROPHILS NFR BLD AUTO: 5.2 10*3/MM3 (ref 1.7–7)
NEUTROPHILS NFR BLD AUTO: 58.6 % (ref 42.7–76)
NITRITE UR QL STRIP: POSITIVE
NRBC BLD AUTO-RTO: 0 /100 WBC (ref 0–0.2)
PH UR STRIP.AUTO: 6 [PH] (ref 5–8)
PLATELET # BLD AUTO: 192 10*3/MM3 (ref 140–450)
PMV BLD AUTO: 10.5 FL (ref 6–12)
POTASSIUM SERPL-SCNC: 3.5 MMOL/L (ref 3.5–5.2)
PROT SERPL-MCNC: 5.9 G/DL (ref 6–8.5)
PROT UR QL STRIP: ABNORMAL
PROTHROMBIN TIME: 85.5 SECONDS (ref 9.4–12)
RBC # BLD AUTO: 4.97 10*6/MM3 (ref 3.77–5.28)
RBC # UR: ABNORMAL /HPF
REF LAB TEST METHOD: ABNORMAL
SODIUM SERPL-SCNC: 138 MMOL/L (ref 136–145)
SP GR UR STRIP: 1.02 (ref 1–1.03)
SQUAMOUS #/AREA URNS HPF: ABNORMAL /HPF
UROBILINOGEN UR QL STRIP: ABNORMAL
WBC # BLD AUTO: 8.86 10*3/MM3 (ref 3.4–10.8)
WBC UR QL AUTO: ABNORMAL /HPF
WHOLE BLOOD HOLD SPECIMEN: NORMAL

## 2021-08-27 PROCEDURE — 99283 EMERGENCY DEPT VISIT LOW MDM: CPT

## 2021-08-27 PROCEDURE — 85610 PROTHROMBIN TIME: CPT | Performed by: EMERGENCY MEDICINE

## 2021-08-27 PROCEDURE — 36415 COLL VENOUS BLD VENIPUNCTURE: CPT

## 2021-08-27 PROCEDURE — 99284 EMERGENCY DEPT VISIT MOD MDM: CPT

## 2021-08-27 PROCEDURE — 85025 COMPLETE CBC W/AUTO DIFF WBC: CPT | Performed by: EMERGENCY MEDICINE

## 2021-08-27 PROCEDURE — 81001 URINALYSIS AUTO W/SCOPE: CPT | Performed by: EMERGENCY MEDICINE

## 2021-08-27 PROCEDURE — 80053 COMPREHEN METABOLIC PANEL: CPT | Performed by: EMERGENCY MEDICINE

## 2021-08-27 RX ORDER — GRANULES FOR ORAL 3 G/1
3 POWDER ORAL ONCE
Status: COMPLETED | OUTPATIENT
Start: 2021-08-27 | End: 2021-08-27

## 2021-08-27 RX ORDER — SODIUM CHLORIDE 0.9 % (FLUSH) 0.9 %
10 SYRINGE (ML) INJECTION AS NEEDED
Status: DISCONTINUED | OUTPATIENT
Start: 2021-08-27 | End: 2021-08-27 | Stop reason: HOSPADM

## 2021-08-27 RX ORDER — PHYTONADIONE 2 MG/ML
2.5 INJECTION, EMULSION INTRAMUSCULAR; INTRAVENOUS; SUBCUTANEOUS ONCE
Status: DISCONTINUED | OUTPATIENT
Start: 2021-08-27 | End: 2021-08-27

## 2021-08-27 RX ORDER — PHYTONADIONE 5 MG/1
2.5 TABLET ORAL ONCE
Status: COMPLETED | OUTPATIENT
Start: 2021-08-27 | End: 2021-08-27

## 2021-08-27 RX ADMIN — PHYTONADIONE 2.5 MG: 5 TABLET ORAL at 14:52

## 2021-08-27 RX ADMIN — GRANULES FOR ORAL SOLUTION 3 G: 3 POWDER ORAL at 14:53

## 2021-08-27 NOTE — TELEPHONE ENCOUNTER
INR still above 8. The home machine will not read anything above 8.   She has been holding for 2 days.  She did go to ER Wednesday. They told her to hold and sent her home. Did not give her any Vitamin K. Note is in chart.

## 2021-08-27 NOTE — DISCHARGE INSTRUCTIONS
Follow-up with your primary care provider Monday and have your Coumadin levels rechecked at that time before resuming Coumadin.

## 2021-08-27 NOTE — ED PROVIDER NOTES
Time: 11:39 AM EDT  Arrived by: ambulance  Chief Complaint: ABNORMAL LAB  History provided by: pt  History is limited by: N/A     History of Present Illness:  Patient is a 75 y.o. year old female that presents to the emergency department with ABNORMAL LAB (elevated INR). This started today and is still present. It is moderate in severity. No modifying factors reported.     Pt states that she had lab work done by home health two days ago and reports being informed today that her INR was elevated. She was referred to the ED. Pt denies any pain or bleeding. No HA, CP, or fever.     Pt is not vaccinated for COVID-19.       History provided by:  Patient      Recently seen: Pt was seen in this ED on 8/25/21 for abnormal lab.       Patient Care Team  Primary Care Provider: Ivanna Frederick APRN    Past Medical History:     No Known Allergies  Past Medical History:   Diagnosis Date   • Afib (CMS/[facility])    • Aftercare following ankle joint replacement surgery 06/01/2015   • Arthritis    • Arthritis of knee 06/01/2015   • Essential hypertension    • Gout    • HTN (hypertension)    • Left knee pain    • Lymphedema    • Recurrent UTI 05/11/2021   • Urinary retention 05/11/2021     Past Surgical History:   Procedure Laterality Date   • CATARACT EXTRACTION     • KIDNEY STONE SURGERY     • KNEE SURGERY Left 2007    REPLACEMENT   • OTHER SURGICAL HISTORY      JOINT SURGERY, UNSPECIFIED   • TUBAL ABDOMINAL LIGATION       Family History   Problem Relation Age of Onset   • Heart failure Mother 64        CONGESTIVE   • Arthritis Mother    • Arthritis Sister        Home Medications:  Prior to Admission medications    Medication Sig Start Date End Date Taking? Authorizing Provider   carvedilol (Coreg) 6.25 MG tablet Coreg 6.25 mg oral tablet take 1 tablet (6.25 mg) by oral route 2 times per day with food   Active    Provider, MD Maricarmen   clindamycin (Cleocin) 300 MG capsule Take 1 capsule by mouth 4 (Four) Times a Day. 8/24/21    Ivanna Frederick APRN   folic acid (FOLVITE) 1 MG tablet folic acid 1 mg oral tablet take 1 tablet (1 mg) by oral route once daily for 30 days 5/18/2021  Active 5/18/21   Maricarmen Cat MD   furosemide (LASIX) 20 MG tablet furosemide 20 mg oral tablet take 1 tablet (20 mg) by oral route once daily for 30 days   Active    Maricarmen Cat MD   gabapentin (NEURONTIN) 400 MG capsule gabapentin 400 mg oral capsule take 2 capsules by oral route once a day (at bedtime)   Suspended    Maricarmen aCt MD   hydrOXYzine (ATARAX) 10 MG tablet Take 1 tablet by mouth 3 (Three) Times a Day As Needed for Itching. 8/24/21   Ivanna Frederick APRN   methylPREDNISolone (MEDROL) 4 MG dose pack Take as directed on package instructions. 8/24/21   Ivanna Frederick APRN   mupirocin (Bactroban) 2 % ointment Apply  topically to the appropriate area as directed 2 (Two) Times a Day. 8/24/21   Ivanna Frederick APRN   potassium chloride 10 MEQ CR tablet potassium chloride 10 mEq oral tablet extended release take 1 tablet (10 meq) by oral route on Mondays, Wednesdays, and Fridays   Suspended    Maricarmen Cat MD   warfarin (COUMADIN) 3 MG tablet warfarin 3 mg oral tablet take 1 tablet (3 mg) by oral route once daily   Active    Maricarmen Cat MD        Social History:   Social History     Tobacco Use   • Smoking status: Former Smoker   • Smokeless tobacco: Never Used   Vaping Use   • Vaping Use: Never used   Substance Use Topics   • Alcohol use: Never   • Drug use: Not on file     Recent travel: no     Review of Systems:  Review of Systems   Constitutional: Negative for chills, diaphoresis and fever.   HENT: Negative for ear discharge and nosebleeds.    Eyes: Negative for photophobia.   Respiratory: Negative for shortness of breath.    Cardiovascular: Negative for chest pain.   Gastrointestinal: Negative for diarrhea, nausea and vomiting.   Genitourinary: Negative for dysuria.   Musculoskeletal:  "Negative for back pain and neck pain.   Skin: Negative for rash.   Neurological: Negative for headaches.   All other systems reviewed and are negative.       Physical Exam:  /54   Pulse 71   Temp 98.1 °F (36.7 °C) (Oral)   Resp 20   Ht 170.2 cm (67\")   Wt 90.9 kg (200 lb 6.4 oz)   SpO2 92%   BMI 31.39 kg/m²     Physical Exam  Vitals and nursing note reviewed.   Constitutional:       General: She is not in acute distress.     Appearance: Normal appearance.   HENT:      Head: Normocephalic and atraumatic.      Nose: Nose normal.   Eyes:      General: No scleral icterus.  Cardiovascular:      Rate and Rhythm: Normal rate and regular rhythm.      Heart sounds: Normal heart sounds.   Pulmonary:      Effort: Pulmonary effort is normal. No respiratory distress.      Breath sounds: Normal breath sounds.   Abdominal:      Palpations: Abdomen is soft.      Tenderness: There is no abdominal tenderness.   Musculoskeletal:         General: Normal range of motion.      Cervical back: Neck supple.      Comments: Diffuse BUE and BLE edema.    Skin:     General: Skin is warm and dry.      Comments: Scattered areas of erythema and ecchymosis to bother arms and BLE. Scattered erythema to the back.    Neurological:      General: No focal deficit present.      Mental Status: She is alert and oriented to person, place, and time.      Sensory: No sensory deficit.      Motor: No weakness.                Medications in the Emergency Department:  Medications   fosfomycin (MONUROL) packet 3 g (3 g Oral Given 8/27/21 1453)   phytonadione (MEPHYTON, VITAMIN K) tablet 2.5 mg (2.5 mg Oral Given 8/27/21 1452)        Labs  Lab Results (last 24 hours)     Procedure Component Value Units Date/Time    Protime-INR [[id number]]  (Abnormal) Collected: 08/27/21 1143    Specimen: Blood from Arm, Right Updated: 08/27/21 1227     Protime 85.5 Seconds      INR 8.19    Narrative:      Suggested Therapeutic Ranges For Oral Anticoagulant " Therapy:  Level of Therapy                      INR Target Range  Standard Dose                            2.0-3.0  High Dose                                2.5-3.5  Patients not receiving anticoagulant  Therapy Normal Range                     0.6-1.2    CBC & Differential [[id number]]  (Abnormal) Collected: 08/27/21 1143    Specimen: Blood from Arm, Right Updated: 08/27/21 1223    Narrative:      The following orders were created for panel order CBC & Differential.  Procedure                               Abnormality         Status                     ---------                               -----------         ------                     CBC Auto Differential[[id number]]        Abnormal            Final result                 Please view results for these tests on the individual orders.    Comprehensive Metabolic Panel [[id number]]  (Abnormal) Collected: 08/27/21 1143    Specimen: Blood from Arm, Right Updated: 08/27/21 1213     Glucose 105 mg/dL      BUN 14 mg/dL      Creatinine 1.30 mg/dL      Sodium 138 mmol/L      Potassium 3.5 mmol/L      Chloride 103 mmol/L      CO2 23.4 mmol/L      Calcium 8.7 mg/dL      Total Protein 5.9 g/dL      Albumin 3.10 g/dL      ALT (SGPT) 6 U/L      AST (SGOT) 22 U/L      Alkaline Phosphatase 57 U/L      Total Bilirubin 0.7 mg/dL      eGFR Non African Amer 40 mL/min/1.73      Globulin 2.8 gm/dL      A/G Ratio 1.1 g/dL      BUN/Creatinine Ratio 10.8     Anion Gap 11.6 mmol/L     Narrative:      GFR Normal >60  Chronic Kidney Disease <60  Kidney Failure <15      CBC Auto Differential [[id number]]  (Abnormal) Collected: 08/27/21 1143    Specimen: Blood from Arm, Right Updated: 08/27/21 1223     WBC 8.86 10*3/mm3      RBC 4.97 10*6/mm3      Hemoglobin 14.7 g/dL      Hematocrit 44.2 %      MCV 88.9 fL      MCH 29.6 pg      MCHC 33.3 g/dL      RDW 15.2 %      RDW-SD 49.4 fl      MPV 10.5 fL      Platelets 192 10*3/mm3      Neutrophil % 58.6 %      Lymphocyte % 13.8 %      Monocyte % 9.7 %       Eosinophil % 16.8 %      Basophil % 0.6 %      Immature Grans % 0.5 %      Neutrophils, Absolute 5.20 10*3/mm3      Lymphocytes, Absolute 1.22 10*3/mm3      Monocytes, Absolute 0.86 10*3/mm3      Eosinophils, Absolute 1.49 10*3/mm3      Basophils, Absolute 0.05 10*3/mm3      Immature Grans, Absolute 0.04 10*3/mm3      nRBC 0.0 /100 WBC     Urinalysis With Microscopic If Indicated (No Culture) - Urine, Catheter [[id number]]  (Abnormal) Collected: 08/27/21 1349    Specimen: Urine, Catheter Updated: 08/27/21 1416     Color, UA Dark Yellow     Appearance, UA Cloudy     pH, UA 6.0     Specific Gravity, UA 1.018     Glucose, UA Negative     Ketones, UA 15 mg/dL (1+)     Bilirubin, UA Negative     Blood, UA Moderate (2+)     Protein,  mg/dL (2+)     Leuk Esterase, UA Moderate (2+)     Nitrite, UA Positive     Urobilinogen, UA 1.0 E.U./dL    Urinalysis, Microscopic Only - Urine, Catheter [[id number]]  (Abnormal) Collected: 08/27/21 1349    Specimen: Urine, Catheter Updated: 08/27/21 1416     RBC, UA 0-2 /HPF      WBC, UA Too Numerous to Count /HPF      Bacteria, UA 3+ /HPF      Squamous Epithelial Cells, UA 3-6 /HPF      Hyaline Casts, UA 0-2 /LPF      Methodology Manual Light Microscopy           Imaging:  No Radiology Exams Resulted Within Past 24 Hours    Procedures:  Procedures    Progress                            Medical Decision Making:  MDM  Number of Diagnoses or Management Options  Poisoning by warfarin sodium, accidental or unintentional, initial encounter  UTI (urinary tract infection) with pyuria  Diagnosis management comments: Patient presents with elevated INR.  She denies headache bloody stools or hematuria or other signs of active bleeding at this time.  She has noted have some ecchymosis to both arms.  INR returned at 8.2.  Urinalysis is remarkable for bacteria suggestive of associated UTI.  Chemistries demonstrate creatinine 1.3.  CBC shows normal hemoglobin hematocrit normal platelets.  ED  course patient's INR was discussed with pharmacist and vitamin K is administered.  Patient is discharged stable advised with Holter Coumadin and have her levels rechecked this Monday.       Amount and/or Complexity of Data Reviewed  Clinical lab tests: reviewed  Tests in the medicine section of CPT®: reviewed    Risk of Complications, Morbidity, and/or Mortality  Presenting problems: high  Management options: high         Final diagnoses:   Poisoning by warfarin sodium, accidental or unintentional, initial encounter   UTI (urinary tract infection) with pyuria        Disposition:  ED Disposition     ED Disposition Condition Comment    Discharge Stable           Documentation assistance provided by Cassandra Marie acting as scribe for Rodger Woody MD. Information recorded by the scribe was done at my direction and has been verified and validated by me.         Cassandra Marie  08/27/21 1146       Rodger Woody MD  08/27/21 1926

## 2021-08-31 ENCOUNTER — TELEPHONE (OUTPATIENT)
Dept: FAMILY MEDICINE CLINIC | Facility: CLINIC | Age: 76
End: 2021-08-31

## 2021-08-31 ENCOUNTER — ANTICOAGULATION VISIT (OUTPATIENT)
Dept: FAMILY MEDICINE CLINIC | Facility: CLINIC | Age: 76
End: 2021-08-31

## 2021-08-31 LAB — INR PPP: 2.6 (ref 2–3)

## 2021-08-31 NOTE — TELEPHONE ENCOUNTER
She got Vitamin K 8/27/21, and re-start coumadin Sunday 8/29/21 and took 3mg both Sunday and Monday.

## 2021-08-31 NOTE — TELEPHONE ENCOUNTER
Isabell's INR is 2.6 today on 3mg daily. Also, wanting to know a recommendation for extremely dry skin post hospital stay. Call back- Blanca with Home Health. --FAVIO

## 2021-09-02 ENCOUNTER — TELEPHONE (OUTPATIENT)
Dept: FAMILY MEDICINE CLINIC | Facility: CLINIC | Age: 76
End: 2021-09-02

## 2021-09-02 ENCOUNTER — ANTICOAGULATION VISIT (OUTPATIENT)
Dept: FAMILY MEDICINE CLINIC | Facility: CLINIC | Age: 76
End: 2021-09-02

## 2021-09-02 LAB — INR PPP: 4.4

## 2021-09-02 NOTE — TELEPHONE ENCOUNTER
Blanca with [facility] called with INR results.  INR 4.4.    Spoke with Ivanna.  Ivanna wants her to hold daily until pt goes to 2.0.  And she wants it checked daily.  Blanca is aware and she was still with Isabell and was going to let her know as well.

## 2021-09-03 ENCOUNTER — ANTICOAGULATION VISIT (OUTPATIENT)
Dept: FAMILY MEDICINE CLINIC | Facility: CLINIC | Age: 76
End: 2021-09-03

## 2021-09-03 ENCOUNTER — OFFICE VISIT (OUTPATIENT)
Dept: FAMILY MEDICINE CLINIC | Facility: CLINIC | Age: 76
End: 2021-09-03

## 2021-09-03 VITALS
HEART RATE: 57 BPM | SYSTOLIC BLOOD PRESSURE: 120 MMHG | TEMPERATURE: 97 F | OXYGEN SATURATION: 100 % | DIASTOLIC BLOOD PRESSURE: 84 MMHG

## 2021-09-03 DIAGNOSIS — M79.89 SWELLING OF RIGHT UPPER EXTREMITY: ICD-10-CM

## 2021-09-03 DIAGNOSIS — M79.89 SWELLING OF LEFT UPPER EXTREMITY: ICD-10-CM

## 2021-09-03 DIAGNOSIS — Z79.01 WARFARIN ANTICOAGULATION: ICD-10-CM

## 2021-09-03 DIAGNOSIS — L03.90: Primary | ICD-10-CM

## 2021-09-03 DIAGNOSIS — R23.8 BULLAE: ICD-10-CM

## 2021-09-03 DIAGNOSIS — I48.91 ATRIAL FIBRILLATION, UNSPECIFIED TYPE (HCC): ICD-10-CM

## 2021-09-03 LAB
BACTERIA SPEC AEROBE CULT: ABNORMAL
GRAM STN SPEC: ABNORMAL
INR PPP: 2.6 (ref 0.9–1.1)

## 2021-09-03 PROCEDURE — 85610 PROTHROMBIN TIME: CPT | Performed by: NURSE PRACTITIONER

## 2021-09-03 PROCEDURE — 36416 COLLJ CAPILLARY BLOOD SPEC: CPT | Performed by: NURSE PRACTITIONER

## 2021-09-03 PROCEDURE — 99214 OFFICE O/P EST MOD 30 MIN: CPT | Performed by: NURSE PRACTITIONER

## 2021-09-03 NOTE — PROGRESS NOTES
Chief Complaint  Cellulitis (follow up on arms ) and Anticoagulation    Subjective            Isabell Ribera presents to [facility]  History of Present Illness     She is here today to follow-up from last week's visit for diffuse cellulitis.  She reports that she has taken all of the steroid pack, but she has not been taking the antibiotic as prescribed.  She is only taking it once to twice daily, if she remembers.  It is prescribed 4 times per day.  She is using some of the hydroxyzine for the itching.  Itching is still present, but improved.  She notes significant improvement in the edema of the bilateral upper extremities.  She has had new development of some blisters.  1 on the right hand.  There were other blisters on the left foot, but they have since then popped.  She is applying mupirocin to any open areas.    Her INR has been out of range the past several times it has been checked.  She had to go to the emergency room twice due to INR over 8.  She is currently holding her warfarin.  I have instructed her to hold warfarin until she is less than 2.  Home health has been checking her daily, but not today since she had the appointment here.  She denies any signs or symptoms of overt blood loss.  Not easily bleeding or bruising at present time.    She denies any fever, chills, or body aches.  No shortness of breath or wheezing.  Lower extremity edema is currently well controlled.  She denies any chest pain or palpitations.    Past Medical History:   Diagnosis Date   • Afib (CMS/HCC)    • Aftercare following ankle joint replacement surgery 06/01/2015   • Arthritis    • Arthritis of knee 06/01/2015   • Essential hypertension    • Gout    • HTN (hypertension)    • Left knee pain    • Lymphedema    • Recurrent UTI 05/11/2021   • Urinary retention 05/11/2021       No Known Allergies     Past Surgical History:   Procedure Laterality Date   • CATARACT EXTRACTION     • KIDNEY STONE  SURGERY     • KNEE SURGERY Left 2007    REPLACEMENT   • OTHER SURGICAL HISTORY      JOINT SURGERY, UNSPECIFIED   • TUBAL ABDOMINAL LIGATION          Social History     Tobacco Use   • Smoking status: Former Smoker   • Smokeless tobacco: Never Used   Vaping Use   • Vaping Use: Never used   Substance Use Topics   • Alcohol use: Never   • Drug use: Not on file       Family History   Problem Relation Age of Onset   • Heart failure Mother 64        CONGESTIVE   • Arthritis Mother    • Arthritis Sister         Health Maintenance Due   Topic Date Due   • DXA SCAN  Never done   • COLORECTAL CANCER SCREENING  Never done   • Pneumococcal Vaccine 65+ (1 of 2 - PPSV23) Never done   • COVID-19 Vaccine (1) Never done   • TDAP/TD VACCINES (1 - Tdap) Never done   • ZOSTER VACCINE (1 of 2) Never done   • ANNUAL WELLNESS VISIT  06/02/2021   • DIABETIC EYE EXAM  06/02/2021   • DIABETIC FOOT EXAM  06/30/2021   • URINE MICROALBUMIN  06/30/2021        Current Outpatient Medications on File Prior to Visit   Medication Sig   • carvedilol (Coreg) 6.25 MG tablet Coreg 6.25 mg oral tablet take 1 tablet (6.25 mg) by oral route 2 times per day with food   Active   • clindamycin (Cleocin) 300 MG capsule Take 1 capsule by mouth 4 (Four) Times a Day.   • folic acid (FOLVITE) 1 MG tablet folic acid 1 mg oral tablet take 1 tablet (1 mg) by oral route once daily for 30 days 5/18/2021  Active   • furosemide (LASIX) 20 MG tablet furosemide 20 mg oral tablet take 1 tablet (20 mg) by oral route once daily for 30 days   Active   • gabapentin (NEURONTIN) 400 MG capsule gabapentin 400 mg oral capsule take 2 capsules by oral route once a day (at bedtime)   Suspended   • hydrOXYzine (ATARAX) 10 MG tablet Take 1 tablet by mouth 3 (Three) Times a Day As Needed for Itching.   • methylPREDNISolone (MEDROL) 4 MG dose pack Take as directed on package instructions.   • potassium chloride 10 MEQ CR tablet potassium chloride 10 mEq oral tablet extended release take 1  tablet (10 meq) by oral route on Mondays, Wednesdays, and Fridays   Suspended   • warfarin (COUMADIN) 3 MG tablet warfarin 3 mg oral tablet take 1 tablet (3 mg) by oral route once daily   Active   • [DISCONTINUED] mupirocin (Bactroban) 2 % ointment Apply  topically to the appropriate area as directed 2 (Two) Times a Day.     No current facility-administered medications on file prior to visit.       Immunization History   Administered Date(s) Administered   • Flu Vaccine Quad PF >36MO 09/20/2018, 11/01/2019, 09/22/2020       Review of Systems     Objective     /84   Pulse 57   Temp 97 °F (36.1 °C)   SpO2 100%       Physical Exam  Vitals reviewed.   Constitutional:       General: She is not in acute distress.     Appearance: Normal appearance. She is well-developed. She is obese.   HENT:      Head: Normocephalic and atraumatic.   Eyes:      General: No scleral icterus.  Cardiovascular:      Rate and Rhythm: Normal rate and regular rhythm.      Pulses: Normal pulses.      Heart sounds: No murmur heard.     Pulmonary:      Effort: Pulmonary effort is normal. No respiratory distress.      Breath sounds: Normal breath sounds. No wheezing or rhonchi.   Musculoskeletal:         General: Normal range of motion.      Right lower leg: No edema.      Left lower leg: No edema.   Skin:     General: Skin is warm and dry.      Comments: There has been significant improvement in upper extremity edema since her telehealth last week.  The skin is very dry.  She has several areas of scabbing on the bilateral upper extremities.  There is a blister, approximately dime sized, on the back of the right hand.  No obvious drainage or exudate present on exam today.    The lower extremity erythema/scabbing is also significantly improved from last week.   Neurological:      Mental Status: She is alert and oriented to person, place, and time.   Psychiatric:         Mood and Affect: Mood and affect normal.         Behavior: Behavior  normal.         Thought Content: Thought content normal.         Judgment: Judgment normal.         Result Review :     The following data was reviewed by: CAROL Ibarra on 09/03/2021:    INR    Common Labsle 8/17/21 8/25/21 8/27/21 8/31/21 9/2/21 9/3/21   INR 2.90 8.53 (A) 8.19 (A) 2.60 4.40 2.60 (A)   (A) Abnormal value            POC INR (09/03/2021)    Wound Culture - Wound, Arm, Right (08/24/2021 16:11)    Data reviewed: Recent hospitalization notes : ER records 8/25/2021 and 8/27/2021           Assessment and Plan      Diagnoses and all orders for this visit:    1. Diffuse cellulitis (Primary)  -     mupirocin (Bactroban) 2 % ointment; Apply  topically to the appropriate area as directed 2 (Two) Times a Day.  Dispense: 30 g; Refill: 1    2. Swelling of left upper extremity    3. Swelling of right upper extremity    4. Warfarin anticoagulation  -     Cancel: POCT INR    5. Atrial fibrillation, unspecified type (CMS/HCC)  -     Cancel: POCT INR    6. Bullae  -     silver sulfadiazine (Silvadene) 1 % cream; Apply  topically to the appropriate area as directed 2 (Two) Times a Day.  Dispense: 50 g; Refill: 1            Follow Up     Return in about 17 days (around 9/20/2021) for Recheck.    We discussed her wound culture today.  Susceptibility to ampicillin, which is IV, but also Levaquin, Bactrim, and vancomycin.  She is concerned with the cost associated with vancomycin.  She is seeing improvement with her current treatment, which has included steroids, topical mupirocin, and clindamycin.  I have encouraged strict adherence to clindamycin regimen.  She will continue topical mupirocin for any open sores.  I will give her Silvadene to put on the areas of blistering.  Encouraged her to not purposely pop the blisters.    In regards to her INR, which was 2.6 today, she is going to continue to hold warfarin until she is less than 2.  Once she is less than 2 then she will resume warfarin at 3 mg daily and to  have a repeat INR within 72 hours.  She does typically tend to go high when she takes antibiotics.    She will follow-up with me in approximately 1 week.  She notes that she needs a Monday or Tuesday appointment due to transportation issues.  She will call me sooner with any problems or concerns.    Patient was given instructions and counseling regarding her condition or for health maintenance advice. Please see specific information pulled into the AVS if appropriate.

## 2021-09-08 ENCOUNTER — ANTICOAGULATION VISIT (OUTPATIENT)
Dept: FAMILY MEDICINE CLINIC | Facility: CLINIC | Age: 76
End: 2021-09-08

## 2021-09-08 LAB — INR PPP: 1.6

## 2021-09-11 DIAGNOSIS — L03.90: ICD-10-CM

## 2021-09-11 RX ORDER — HYDROXYZINE HYDROCHLORIDE 10 MG/1
TABLET, FILM COATED ORAL
Qty: 42 TABLET | Refills: 0 | Status: SHIPPED | OUTPATIENT
Start: 2021-09-11 | End: 2021-10-14

## 2021-09-14 ENCOUNTER — ANTICOAGULATION VISIT (OUTPATIENT)
Dept: FAMILY MEDICINE CLINIC | Facility: CLINIC | Age: 76
End: 2021-09-14

## 2021-09-14 LAB — INR PPP: 1.3 (ref 2–3)

## 2021-09-17 ENCOUNTER — TELEPHONE (OUTPATIENT)
Dept: FAMILY MEDICINE CLINIC | Facility: CLINIC | Age: 76
End: 2021-09-17

## 2021-09-17 NOTE — TELEPHONE ENCOUNTER
I am not sure why the patient continues to report that no one is contacting her.  Our office needs to be sure that we are contacting her with her instructions, and not relying on home health.  We can call the patient with instructions, and then call home health to go out and check the patient's INR.  Please tell the patient that if home health does not come out before noon on the date of her INR checks we want to be notified by noon that way we can get a nurse out there before the end of the day.    Have her take 5 mg today, 3 mg Saturday, and 3 mg on Sunday, and then I want her repeat INR on Monday.  She has a follow-up with me on Monday so I will check it at her follow-up.  Let home health know that we will be checking it on Monday in our office and we will give them instructions after her visit.

## 2021-09-17 NOTE — TELEPHONE ENCOUNTER
Daly with Home Health called and Isabell's INR today is 1.2. She has not taken anything since her last check. Monday she took 3mg, and Sunday she also took 3mg but has held since Monday due to she never got directions called back to her. I advised that there is a flowsheet in with directions on it on that date, but they said nobody was notified of directions so she has been holding since. Daly asked for us to call her with instructions and she can relay to patient. --FAVIO

## 2021-09-17 NOTE — TELEPHONE ENCOUNTER
I called and spoke to Mrs. Ribera, I gave her instructions and she repeated them back to me and voiced understanding. I also called Daly back with home health and told her instructions as well and that I had already spoken to Isabell as well. Everyone was contacted and voiced understanding on her INR instructions. DARY

## 2021-09-19 DIAGNOSIS — I10 ESSENTIAL HYPERTENSION: Primary | ICD-10-CM

## 2021-09-20 ENCOUNTER — TELEPHONE (OUTPATIENT)
Dept: FAMILY MEDICINE CLINIC | Facility: CLINIC | Age: 76
End: 2021-09-20

## 2021-09-20 ENCOUNTER — OFFICE VISIT (OUTPATIENT)
Dept: FAMILY MEDICINE CLINIC | Facility: CLINIC | Age: 76
End: 2021-09-20

## 2021-09-20 VITALS
OXYGEN SATURATION: 100 % | SYSTOLIC BLOOD PRESSURE: 120 MMHG | DIASTOLIC BLOOD PRESSURE: 80 MMHG | HEART RATE: 50 BPM | TEMPERATURE: 96.3 F

## 2021-09-20 DIAGNOSIS — Z79.01 WARFARIN ANTICOAGULATION: ICD-10-CM

## 2021-09-20 DIAGNOSIS — L03.90: ICD-10-CM

## 2021-09-20 DIAGNOSIS — R41.0 ACUTE CONFUSION: ICD-10-CM

## 2021-09-20 DIAGNOSIS — I48.91 ATRIAL FIBRILLATION, UNSPECIFIED TYPE (HCC): Primary | ICD-10-CM

## 2021-09-20 LAB
ALBUMIN SERPL-MCNC: 3.5 G/DL (ref 3.5–5.2)
ALBUMIN/GLOB SERPL: 1 G/DL
ALP SERPL-CCNC: 72 U/L (ref 39–117)
ALT SERPL W P-5'-P-CCNC: 11 U/L (ref 1–33)
ANION GAP SERPL CALCULATED.3IONS-SCNC: 10.4 MMOL/L (ref 5–15)
AST SERPL-CCNC: 28 U/L (ref 1–32)
BASOPHILS # BLD AUTO: 0.07 10*3/MM3 (ref 0–0.2)
BASOPHILS NFR BLD AUTO: 1.4 % (ref 0–1.5)
BILIRUB SERPL-MCNC: 0.5 MG/DL (ref 0–1.2)
BUN SERPL-MCNC: 12 MG/DL (ref 8–23)
BUN/CREAT SERPL: 9.9 (ref 7–25)
CALCIUM SPEC-SCNC: 9 MG/DL (ref 8.6–10.5)
CHLORIDE SERPL-SCNC: 103 MMOL/L (ref 98–107)
CO2 SERPL-SCNC: 25.6 MMOL/L (ref 22–29)
CREAT SERPL-MCNC: 1.21 MG/DL (ref 0.57–1)
DEPRECATED RDW RBC AUTO: 50.5 FL (ref 37–54)
EOSINOPHIL # BLD AUTO: 0.54 10*3/MM3 (ref 0–0.4)
EOSINOPHIL NFR BLD AUTO: 10.5 % (ref 0.3–6.2)
ERYTHROCYTE [DISTWIDTH] IN BLOOD BY AUTOMATED COUNT: 14.9 % (ref 12.3–15.4)
GFR SERPL CREATININE-BSD FRML MDRD: 43 ML/MIN/1.73
GLOBULIN UR ELPH-MCNC: 3.5 GM/DL
GLUCOSE SERPL-MCNC: 89 MG/DL (ref 65–99)
HCT VFR BLD AUTO: 43.9 % (ref 34–46.6)
HGB BLD-MCNC: 14.4 G/DL (ref 12–15.9)
IMM GRANULOCYTES # BLD AUTO: 0.01 10*3/MM3 (ref 0–0.05)
IMM GRANULOCYTES NFR BLD AUTO: 0.2 % (ref 0–0.5)
INR PPP: 1.1 (ref 0.9–1.1)
LYMPHOCYTES # BLD AUTO: 1.56 10*3/MM3 (ref 0.7–3.1)
LYMPHOCYTES NFR BLD AUTO: 30.2 % (ref 19.6–45.3)
MCH RBC QN AUTO: 29.6 PG (ref 26.6–33)
MCHC RBC AUTO-ENTMCNC: 32.8 G/DL (ref 31.5–35.7)
MCV RBC AUTO: 90.3 FL (ref 79–97)
MONOCYTES # BLD AUTO: 0.49 10*3/MM3 (ref 0.1–0.9)
MONOCYTES NFR BLD AUTO: 9.5 % (ref 5–12)
NEUTROPHILS NFR BLD AUTO: 2.49 10*3/MM3 (ref 1.7–7)
NEUTROPHILS NFR BLD AUTO: 48.2 % (ref 42.7–76)
NRBC BLD AUTO-RTO: 0 /100 WBC (ref 0–0.2)
PLATELET # BLD AUTO: 213 10*3/MM3 (ref 140–450)
PMV BLD AUTO: 10.6 FL (ref 6–12)
POTASSIUM SERPL-SCNC: 4 MMOL/L (ref 3.5–5.2)
PROT SERPL-MCNC: 7 G/DL (ref 6–8.5)
RBC # BLD AUTO: 4.86 10*6/MM3 (ref 3.77–5.28)
SODIUM SERPL-SCNC: 139 MMOL/L (ref 136–145)
WBC # BLD AUTO: 5.16 10*3/MM3 (ref 3.4–10.8)

## 2021-09-20 PROCEDURE — 99214 OFFICE O/P EST MOD 30 MIN: CPT | Performed by: NURSE PRACTITIONER

## 2021-09-20 PROCEDURE — 36415 COLL VENOUS BLD VENIPUNCTURE: CPT | Performed by: NURSE PRACTITIONER

## 2021-09-20 PROCEDURE — 82746 ASSAY OF FOLIC ACID SERUM: CPT | Performed by: NURSE PRACTITIONER

## 2021-09-20 PROCEDURE — 82607 VITAMIN B-12: CPT | Performed by: NURSE PRACTITIONER

## 2021-09-20 PROCEDURE — 85025 COMPLETE CBC W/AUTO DIFF WBC: CPT | Performed by: NURSE PRACTITIONER

## 2021-09-20 PROCEDURE — 80053 COMPREHEN METABOLIC PANEL: CPT | Performed by: NURSE PRACTITIONER

## 2021-09-20 PROCEDURE — 85610 PROTHROMBIN TIME: CPT | Performed by: NURSE PRACTITIONER

## 2021-09-20 PROCEDURE — 36416 COLLJ CAPILLARY BLOOD SPEC: CPT | Performed by: NURSE PRACTITIONER

## 2021-09-20 RX ORDER — CARVEDILOL 6.25 MG/1
TABLET ORAL
Qty: 60 TABLET | Refills: 5 | Status: SHIPPED | OUTPATIENT
Start: 2021-09-20

## 2021-09-20 NOTE — PROGRESS NOTES
"Chief Complaint  Cellulitis (follow up on arms) and Urinary Tract Infection (pt's daughter wants her checked for UTI, she states pt is \"acting odd\" )    Subjective            Isabell Ribera presents to [facility]  History of Present Illness     Follow-up on cellulitis.  She is accompanied by her daughter and her  today.  Interval improvement of cellulitis is noted.  Both her daughter and her  state that she is not taking the antibiotic as prescribed.  She still has several pills of clindamycin remaining, and it was originally prescribed on August 24, 2021.     Her INR is 1.1 in the office today.  She was instructed on Friday to take 5 mg of warfarin on Friday, 3 mg on Saturday, and 3 mg on Sunday and repeat at her visit today.  She has not taken any warfarin over the weekend.  She states that she forgot.    Her daughter and her  are both concerned with the possibility of urinary tract infection due to her not acting right, or seemingly confused.  She has had hospitalization for urinary tract infection in the past, and her primary complaint is generally confusion or altered mental status.  She denies any fever, chills, or body aches.  She denies nausea, vomiting, abdominal pain.  She denies any urinary urgency, frequency, hesitancy, or dysuria.  No gross hematuria.      Past Medical History:   Diagnosis Date   • Afib (CMS/HCC)    • Aftercare following ankle joint replacement surgery 06/01/2015   • Arthritis    • Arthritis of knee 06/01/2015   • Essential hypertension    • Gout    • HTN (hypertension)    • Left knee pain    • Lymphedema    • Recurrent UTI 05/11/2021   • Urinary retention 05/11/2021       No Known Allergies     Past Surgical History:   Procedure Laterality Date   • CATARACT EXTRACTION     • KIDNEY STONE SURGERY     • KNEE SURGERY Left 2007    REPLACEMENT   • OTHER SURGICAL HISTORY      JOINT SURGERY, UNSPECIFIED   • TUBAL ABDOMINAL LIGATION      "     Social History     Tobacco Use   • Smoking status: Former Smoker   • Smokeless tobacco: Never Used   Vaping Use   • Vaping Use: Never used   Substance Use Topics   • Alcohol use: Never   • Drug use: Not on file       Family History   Problem Relation Age of Onset   • Heart failure Mother 64        CONGESTIVE   • Arthritis Mother    • Arthritis Sister         Health Maintenance Due   Topic Date Due   • DXA SCAN  Never done   • COLORECTAL CANCER SCREENING  Never done   • Pneumococcal Vaccine 65+ (1 of 2 - PPSV23) Never done   • COVID-19 Vaccine (1) Never done   • TDAP/TD VACCINES (1 - Tdap) Never done   • ZOSTER VACCINE (1 of 2) Never done   • ANNUAL WELLNESS VISIT  06/02/2021   • DIABETIC EYE EXAM  06/02/2021   • DIABETIC FOOT EXAM  06/30/2021   • URINE MICROALBUMIN  06/30/2021        Current Outpatient Medications on File Prior to Visit   Medication Sig   • carvedilol (COREG) 6.25 MG tablet TAKE ONE TABLET BY MOUTH TWICE A DAY WITH FOOD   • clindamycin (Cleocin) 300 MG capsule Take 1 capsule by mouth 4 (Four) Times a Day.   • folic acid (FOLVITE) 1 MG tablet folic acid 1 mg oral tablet take 1 tablet (1 mg) by oral route once daily for 30 days 5/18/2021  Active   • furosemide (LASIX) 20 MG tablet furosemide 20 mg oral tablet take 1 tablet (20 mg) by oral route once daily for 30 days   Active   • gabapentin (NEURONTIN) 400 MG capsule gabapentin 400 mg oral capsule take 2 capsules by oral route once a day (at bedtime)   Suspended   • hydrOXYzine (ATARAX) 10 MG tablet TAKE ONE TABLET BY MOUTH THREE TIMES A DAY AS NEEDED FOR ITCHING   • methylPREDNISolone (MEDROL) 4 MG dose pack Take as directed on package instructions.   • mupirocin (Bactroban) 2 % ointment Apply  topically to the appropriate area as directed 2 (Two) Times a Day.   • potassium chloride 10 MEQ CR tablet potassium chloride 10 mEq oral tablet extended release take 1 tablet (10 meq) by oral route on Mondays, Wednesdays, and Fridays   Suspended   •  silver sulfadiazine (Silvadene) 1 % cream Apply  topically to the appropriate area as directed 2 (Two) Times a Day.   • warfarin (COUMADIN) 3 MG tablet warfarin 3 mg oral tablet take 1 tablet (3 mg) by oral route once daily   Active   • [DISCONTINUED] carvedilol (Coreg) 6.25 MG tablet Coreg 6.25 mg oral tablet take 1 tablet (6.25 mg) by oral route 2 times per day with food   Active     No current facility-administered medications on file prior to visit.       Immunization History   Administered Date(s) Administered   • Flu Vaccine Quad PF >36MO 09/20/2018, 11/01/2019, 09/22/2020       Review of Systems     Objective     /80   Pulse 50   Temp 96.3 °F (35.7 °C)   SpO2 100%       Physical Exam  Vitals reviewed.   Constitutional:       General: She is not in acute distress.     Appearance: Normal appearance. She is well-developed. She is obese.   HENT:      Head: Normocephalic and atraumatic.   Eyes:      General: No scleral icterus.     Conjunctiva/sclera: Conjunctivae normal.      Pupils: Pupils are equal, round, and reactive to light.   Neck:      Thyroid: No thyroid mass, thyromegaly or thyroid tenderness.      Vascular: No carotid bruit.      Trachea: Trachea normal.   Cardiovascular:      Rate and Rhythm: Normal rate and regular rhythm.      Pulses: Normal pulses.      Heart sounds: No murmur heard.     Pulmonary:      Effort: Pulmonary effort is normal.      Breath sounds: Normal breath sounds. No wheezing or rhonchi.   Musculoskeletal:         General: Normal range of motion.      Cervical back: Normal range of motion and neck supple.      Right lower leg: No edema.      Left lower leg: No edema.      Comments: Sitting comfortably in a wheelchair.   Lymphadenopathy:      Cervical: No cervical adenopathy.   Skin:     General: Skin is warm and dry.      Comments: The skin is no longer erythematous or edematous. There are scattered scabs noted on skin, but no drainage or exudate. The scabs are noted on the  BUE, BLE, and on the upper aspect of her back on the right.    Neurological:      Mental Status: She is alert and oriented to person, place, and time.   Psychiatric:         Mood and Affect: Mood and affect normal.         Behavior: Behavior normal.         Thought Content: Thought content normal.         Judgment: Judgment normal.         Result Review :     The following data was reviewed by: CAROL Ibarra on 09/20/2021:    Common labs    Common Labsle 5/17/21 5/17/21 5/17/21 5/17/21 8/25/21 8/25/21 8/27/21 8/27/21    1510 1510 1510 1510 1148 1148 1143 1143   Glucose      108 (A) 105 (A)    Glucose   153 (A)        BUN   13   12 14    Creatinine   1.14 (A)   1.47 (A) 1.30 (A)    eGFR Non  Am      35 (A) 40 (A)    Sodium   137   138 138    Potassium   4.6   4.0 3.5    Chloride   101   102 103    Calcium   9.5   8.8 8.7    Albumin   3.2 (A)   3.20 (A) 3.10 (A)    Total Bilirubin   0.49   0.8 0.7    Alkaline Phosphatase   81   59 57    AST (SGOT)   21   16 22    ALT (SGPT)   7 (A)   5 6    WBC 5.58    10.80   8.86   Hemoglobin 13.4    14.8   14.7   Hematocrit 42.8    45.1   44.2   Platelets 201    195   192   Total Cholesterol    176       Triglycerides    124       HDL Cholesterol    52       LDL Cholesterol     99       Hemoglobin A1C  5.2         (A) Abnormal value       Comments are available for some flowsheets but are not being displayed.                           Assessment and Plan      Diagnoses and all orders for this visit:    1. Atrial fibrillation, unspecified type (CMS/HCC) (Primary)    2. Warfarin anticoagulation  -     POCT INR    3. Acute confusion  -     Cancel: Urine Culture - Urine, Urine, Catheter In/Out  -     CBC Auto Differential  -     Comprehensive Metabolic Panel  -     Vitamin B12 & Folate  -     Urine Culture - Urine, Urine, Catheter In/Out    4. Diffuse cellulitis  Comments:  Improving            Follow Up     Return for Recheck INR on Wednesday (home health to do).  Follow up results of [facility] to do.     Her  is going to help her with her medications.  Advised 5 mg of warfarin tonight, then 3 mg tomorrow, with repeat INR by home health on Wednesday prior to noon.  In regards to the clindamycin, I have advised the patient to take the medication as prescribed and complete the antibiotic.  Her  is going to assist her with this as well.    We attempted to collect a urinalysis in the office today; however, the specimen was contaminated with stool.  We will get home [facility] to perform in and out catheter for culture.    Patient was given instructions and counseling regarding her condition or for health maintenance advice. Please see specific information pulled into the AVS if appropriate.     Isabell Martinezper  reports that she has quit smoking. She has never used smokeless tobacco.

## 2021-09-20 NOTE — PROGRESS NOTES
Venipuncture Blood Specimen Collection  Venipuncture performed in left arm by Candy De Los Santos with good hemostasis. Patient tolerated the procedure well without complications.   09/20/21   Candy De Los Santos

## 2021-09-20 NOTE — TELEPHONE ENCOUNTER
[facility].  I called and spoke with Arlene Queen at [phone] and told her that Ivanna wanted an in and out catheter either today or tmw.  She stated it would have to be tmw.  I also gave Arlene Queen ms. Carey coumadin directions.  And that Ivanna wanted it rechecked Wednesday by Noon.

## 2021-09-21 ENCOUNTER — TELEPHONE (OUTPATIENT)
Dept: FAMILY MEDICINE CLINIC | Facility: CLINIC | Age: 76
End: 2021-09-21

## 2021-09-21 ENCOUNTER — LAB REQUISITION (OUTPATIENT)
Dept: LAB | Facility: HOSPITAL | Age: 76
End: 2021-09-21

## 2021-09-21 DIAGNOSIS — B37.2 INTERTRIGINOUS CANDIDIASIS: Primary | ICD-10-CM

## 2021-09-21 DIAGNOSIS — N39.0 URINARY TRACT INFECTION, SITE NOT SPECIFIED: ICD-10-CM

## 2021-09-21 LAB
BACTERIA UR QL AUTO: ABNORMAL /HPF
BILIRUB UR QL STRIP: NEGATIVE
CLARITY UR: ABNORMAL
COLOR UR: YELLOW
FOLATE SERPL-MCNC: 2.52 NG/ML (ref 4.78–24.2)
GLUCOSE UR STRIP-MCNC: NEGATIVE MG/DL
HGB UR QL STRIP.AUTO: ABNORMAL
HYALINE CASTS UR QL AUTO: ABNORMAL /LPF
KETONES UR QL STRIP: NEGATIVE
LEUKOCYTE ESTERASE UR QL STRIP.AUTO: ABNORMAL
NITRITE UR QL STRIP: POSITIVE
PH UR STRIP.AUTO: 6 [PH] (ref 5–8)
PROT UR QL STRIP: ABNORMAL
RBC # UR: ABNORMAL /HPF
REF LAB TEST METHOD: ABNORMAL
SP GR UR STRIP: 1.01 (ref 1–1.03)
SQUAMOUS #/AREA URNS HPF: ABNORMAL /HPF
UROBILINOGEN UR QL STRIP: ABNORMAL
VIT B12 BLD-MCNC: 302 PG/ML (ref 211–946)
WBC UR QL AUTO: ABNORMAL /HPF

## 2021-09-21 PROCEDURE — 81001 URINALYSIS AUTO W/SCOPE: CPT | Performed by: NURSE PRACTITIONER

## 2021-09-21 PROCEDURE — 87086 URINE CULTURE/COLONY COUNT: CPT | Performed by: NURSE PRACTITIONER

## 2021-09-21 PROCEDURE — 87186 SC STD MICRODIL/AGAR DIL: CPT | Performed by: NURSE PRACTITIONER

## 2021-09-21 PROCEDURE — 87077 CULTURE AEROBIC IDENTIFY: CPT | Performed by: NURSE PRACTITIONER

## 2021-09-21 RX ORDER — NYSTATIN 100000 [USP'U]/G
POWDER TOPICAL 3 TIMES DAILY
Qty: 60 G | Refills: 1 | Status: SHIPPED | OUTPATIENT
Start: 2021-09-21 | End: 2022-12-27

## 2021-09-21 NOTE — TELEPHONE ENCOUNTER
[facility]er[facility]    Best call back number: [phone]    What medication are you requesting: POWDER    What are your current symptoms: RASH IN GROIN AREA    How long have you been experiencing symptoms: UNSURE    If a prescription is needed, what is your preferred pharmacy and phone number:      [facility] [address] [place] [address] - [address] [facility] [phone]  - [phone] FX      Additional notes: NURSE FROM [employer] CALLED TO SEE ABOUT GETTING SOME ANTI FUNGAL POWDER FOR PATIENTS RASH.

## 2021-09-22 ENCOUNTER — TELEPHONE (OUTPATIENT)
Dept: FAMILY MEDICINE CLINIC | Facility: CLINIC | Age: 76
End: 2021-09-22

## 2021-09-22 NOTE — TELEPHONE ENCOUNTER
Caller: FELIBERTO    Relationship: Home Health    Best call back number: [phone]    What was the call regarding: FELIBERTO WITH [facility] CALLED TO GIVE LAB RESULTS FOR PATIENT.    INR: 1.2  PT: 14.3    Monday PATIENT TOOK 5 MG   Tuesday PATIENT TOOK 3 MG     Do you require a callback: YES, PLEASE ADVISE.

## 2021-09-23 DIAGNOSIS — N30.00 ACUTE CYSTITIS WITHOUT HEMATURIA: Primary | ICD-10-CM

## 2021-09-23 LAB — BACTERIA SPEC AEROBE CULT: ABNORMAL

## 2021-09-23 RX ORDER — NITROFURANTOIN 25; 75 MG/1; MG/1
100 CAPSULE ORAL 2 TIMES DAILY
Qty: 14 CAPSULE | Refills: 0 | Status: SHIPPED | OUTPATIENT
Start: 2021-09-23 | End: 2021-12-06

## 2021-09-24 ENCOUNTER — ANTICOAGULATION VISIT (OUTPATIENT)
Dept: FAMILY MEDICINE CLINIC | Facility: CLINIC | Age: 76
End: 2021-09-24

## 2021-09-24 LAB — INR PPP: 1.7 (ref 2–3)

## 2021-09-30 ENCOUNTER — TELEPHONE (OUTPATIENT)
Dept: FAMILY MEDICINE CLINIC | Facility: CLINIC | Age: 76
End: 2021-09-30

## 2021-09-30 ENCOUNTER — ANTICOAGULATION VISIT (OUTPATIENT)
Dept: FAMILY MEDICINE CLINIC | Facility: CLINIC | Age: 76
End: 2021-09-30

## 2021-09-30 LAB — INR PPP: 1.5

## 2021-09-30 NOTE — TELEPHONE ENCOUNTER
Blanca with Home health called with INR of 1.5.  She states that Ms. Ribera hasn't taken any coumadin for 2 days.  Pt was to be taking 3mg.  Blanca reported that pt is getting forgetful and it maybe from her UTI.    I spoke with Ivanna and she said that ms. Ribera needs to follow up with urology for reoccurring UTI's and that she could order a CT scan of her brain for the forgetfulness.  She wants ms. Ribera to take 5mg today and tmw and recheck on Saturday and call the office.    I called Blanca ([phone]) and let her know this information.  When I called her back she said that she found out that ms. Ribera did not finish the round of antibiotics for her recent UTI.  She is aware of the coumadin dose and will follow up with Ms. Ribera.  I called and spoke with Ms. ribera as well and she was given the coumadin directions.  I asked if she would like to get a CT scan done for her forgetfulness and she declined.

## 2021-10-01 RX ORDER — GABAPENTIN 400 MG/1
CAPSULE ORAL
Qty: 60 CAPSULE | Refills: 0 | Status: SHIPPED | OUTPATIENT
Start: 2021-10-01 | End: 2021-11-22 | Stop reason: SDUPTHER

## 2021-10-04 ENCOUNTER — ANTICOAGULATION VISIT (OUTPATIENT)
Dept: FAMILY MEDICINE CLINIC | Facility: CLINIC | Age: 76
End: 2021-10-04

## 2021-10-04 LAB — INR PPP: 1.7 (ref 2–3)

## 2021-10-06 ENCOUNTER — ANTICOAGULATION VISIT (OUTPATIENT)
Dept: FAMILY MEDICINE CLINIC | Facility: CLINIC | Age: 76
End: 2021-10-06

## 2021-10-06 LAB — INR PPP: 2.1 (ref 2–3)

## 2021-10-08 ENCOUNTER — ANTICOAGULATION VISIT (OUTPATIENT)
Dept: FAMILY MEDICINE CLINIC | Facility: CLINIC | Age: 76
End: 2021-10-08

## 2021-10-08 LAB — INR PPP: 2.6 (ref 2–3)

## 2021-10-14 DIAGNOSIS — L03.90: ICD-10-CM

## 2021-10-14 RX ORDER — HYDROXYZINE HYDROCHLORIDE 10 MG/1
TABLET, FILM COATED ORAL
Qty: 42 TABLET | Refills: 0 | Status: SHIPPED | OUTPATIENT
Start: 2021-10-14 | End: 2022-02-07

## 2021-10-15 ENCOUNTER — ANTICOAGULATION VISIT (OUTPATIENT)
Dept: FAMILY MEDICINE CLINIC | Facility: CLINIC | Age: 76
End: 2021-10-15

## 2021-10-15 LAB — INR PPP: 3.2 (ref 2–3)

## 2021-10-16 ENCOUNTER — CLINICAL SUPPORT (OUTPATIENT)
Dept: FAMILY MEDICINE CLINIC | Facility: CLINIC | Age: 76
End: 2021-10-16

## 2021-10-16 LAB — INR PPP: 3 (ref 2–3)

## 2021-10-18 ENCOUNTER — TELEPHONE (OUTPATIENT)
Dept: FAMILY MEDICINE CLINIC | Facility: CLINIC | Age: 76
End: 2021-10-18

## 2021-10-18 ENCOUNTER — ANTICOAGULATION VISIT (OUTPATIENT)
Dept: FAMILY MEDICINE CLINIC | Facility: CLINIC | Age: 76
End: 2021-10-18

## 2021-10-18 LAB — INR PPP: 1.6

## 2021-10-18 NOTE — TELEPHONE ENCOUNTER
Blanca with [facility] called INR 1.6 today . She held sat and took 4 mg Sunday.I put in anticoagularion chart after you send me directions.

## 2021-10-25 ENCOUNTER — ANTICOAGULATION VISIT (OUTPATIENT)
Dept: FAMILY MEDICINE CLINIC | Facility: CLINIC | Age: 76
End: 2021-10-25

## 2021-10-25 LAB — INR PPP: 2.6 (ref 2–3)

## 2021-11-01 ENCOUNTER — ANTICOAGULATION VISIT (OUTPATIENT)
Dept: FAMILY MEDICINE CLINIC | Facility: CLINIC | Age: 76
End: 2021-11-01

## 2021-11-01 LAB — INR PPP: 2.1

## 2021-11-08 ENCOUNTER — TELEPHONE (OUTPATIENT)
Dept: FAMILY MEDICINE CLINIC | Facility: CLINIC | Age: 76
End: 2021-11-08

## 2021-11-08 LAB — INR PPP: 2.5

## 2021-11-08 NOTE — TELEPHONE ENCOUNTER
Caller: MITZY WITH [facility]    Relationship: Home Health    Best call back number: [phone]    What was the call regarding: HOME HEALTH NURSE CALLING IN INR 2.5    Do you require a callback: ONLY IF NEEDED

## 2021-11-09 ENCOUNTER — ANTICOAGULATION VISIT (OUTPATIENT)
Dept: FAMILY MEDICINE CLINIC | Facility: CLINIC | Age: 76
End: 2021-11-09

## 2021-11-09 DIAGNOSIS — Z79.01 WARFARIN ANTICOAGULATION: ICD-10-CM

## 2021-11-09 DIAGNOSIS — I48.91 ATRIAL FIBRILLATION, UNSPECIFIED TYPE (HCC): ICD-10-CM

## 2021-11-09 RX ORDER — WARFARIN SODIUM 4 MG/1
TABLET ORAL
Qty: 30 TABLET | Refills: 0 | Status: SHIPPED | OUTPATIENT
Start: 2021-11-09 | End: 2022-01-03

## 2021-11-15 ENCOUNTER — TELEPHONE (OUTPATIENT)
Dept: FAMILY MEDICINE CLINIC | Facility: CLINIC | Age: 76
End: 2021-11-15

## 2021-11-15 LAB — INR PPP: 2.7

## 2021-11-15 NOTE — TELEPHONE ENCOUNTER
Mary with [facility] called INR 2.7 .   [phone]  I put the INR lab sheet on your desk and I will put anticoagulant order in after I get directions.

## 2021-11-16 ENCOUNTER — TELEPHONE (OUTPATIENT)
Dept: FAMILY MEDICINE CLINIC | Facility: CLINIC | Age: 76
End: 2021-11-16

## 2021-11-16 ENCOUNTER — ANTICOAGULATION VISIT (OUTPATIENT)
Dept: FAMILY MEDICINE CLINIC | Facility: CLINIC | Age: 76
End: 2021-11-16

## 2021-11-16 NOTE — TELEPHONE ENCOUNTER
Mary with [facility] called last night with INR results for Ms. Ribera.    Latesha took the message and gave the results to PRASAD Frederick for her review this AM.    INR 2.7, Yoly wants her to continue the same dose and recheck in 2 wks.  I contacted Ms. Ribera at 1:30 this afternoon and told her to continue her current dose and we would recheck in 2 wks.    I tried contacting mary [phone] twice this afternoon.  I was not able to reach her and I was not able to leave a .  I found the number to Christine another [facility] nurse.  Who had taken care of ms. Ribera a couple of wks ago  and gave her the directions.  She said she would get a hold of the office and or mary and let them know.

## 2021-11-22 ENCOUNTER — TELEPHONE (OUTPATIENT)
Dept: FAMILY MEDICINE CLINIC | Facility: CLINIC | Age: 76
End: 2021-11-22

## 2021-11-22 ENCOUNTER — ANTICOAGULATION VISIT (OUTPATIENT)
Dept: FAMILY MEDICINE CLINIC | Facility: CLINIC | Age: 76
End: 2021-11-22

## 2021-11-22 DIAGNOSIS — E11.42 DIABETIC PERIPHERAL NEUROPATHY (HCC): Primary | ICD-10-CM

## 2021-11-22 LAB — INR PPP: 3

## 2021-11-22 NOTE — TELEPHONE ENCOUNTER
Blanca with [facility] called on Ms. Ribera's INR     INR was 3.0 - patient takes 5mg on Wednesday and 6mg all others.     Blanca's call back number [phone]

## 2021-11-22 NOTE — TELEPHONE ENCOUNTER
Caller: Mounika, Isabell    Relationship: Self    Best call back number: [phone]    Requested Prescriptions:   Requested Prescriptions     Pending Prescriptions Disp Refills   • gabapentin (NEURONTIN) 400 MG capsule 60 capsule 0     Sig: Take 2 capsules by mouth Every Night.        Pharmacy where request should be sent: [facility] [address] [phone]  - [phone]      Additional details provided by patient: PATIENT SAID SHE HAS ASKED FOR THIS LAST Thursday FROM PHARMACY BUT HAS NOT RECEIVED IT YET.     Does the patient have less than a 3 day supply:  [] Yes  [] No    Loki TORRES Rep   11/22/21 16:05 EST

## 2021-11-24 ENCOUNTER — TELEPHONE (OUTPATIENT)
Dept: FAMILY MEDICINE CLINIC | Facility: CLINIC | Age: 76
End: 2021-11-24

## 2021-11-24 RX ORDER — GABAPENTIN 400 MG/1
800 CAPSULE ORAL NIGHTLY
Qty: 60 CAPSULE | Refills: 0 | Status: SHIPPED | OUTPATIENT
Start: 2021-11-24 | End: 2022-02-07 | Stop reason: SDUPTHER

## 2021-12-06 ENCOUNTER — OFFICE VISIT (OUTPATIENT)
Dept: FAMILY MEDICINE CLINIC | Facility: CLINIC | Age: 76
End: 2021-12-06

## 2021-12-06 VITALS
HEART RATE: 70 BPM | TEMPERATURE: 97.4 F | OXYGEN SATURATION: 96 % | DIASTOLIC BLOOD PRESSURE: 74 MMHG | SYSTOLIC BLOOD PRESSURE: 120 MMHG

## 2021-12-06 DIAGNOSIS — I48.91 ATRIAL FIBRILLATION, UNSPECIFIED TYPE (HCC): Primary | ICD-10-CM

## 2021-12-06 DIAGNOSIS — Z79.01 WARFARIN ANTICOAGULATION: ICD-10-CM

## 2021-12-06 DIAGNOSIS — E11.42 DIABETIC PERIPHERAL NEUROPATHY (HCC): ICD-10-CM

## 2021-12-06 DIAGNOSIS — Z74.09 LIMITED MOBILITY: ICD-10-CM

## 2021-12-06 DIAGNOSIS — Z79.899 HIGH RISK MEDICATION USE: ICD-10-CM

## 2021-12-06 DIAGNOSIS — E11.42 TYPE 2 DIABETES MELLITUS WITH DIABETIC POLYNEUROPATHY, WITHOUT LONG-TERM CURRENT USE OF INSULIN (HCC): ICD-10-CM

## 2021-12-06 LAB — INR PPP: 2.6 (ref 0.9–1.1)

## 2021-12-06 PROCEDURE — 99214 OFFICE O/P EST MOD 30 MIN: CPT | Performed by: NURSE PRACTITIONER

## 2021-12-06 PROCEDURE — 36416 COLLJ CAPILLARY BLOOD SPEC: CPT | Performed by: NURSE PRACTITIONER

## 2021-12-06 PROCEDURE — 85610 PROTHROMBIN TIME: CPT | Performed by: NURSE PRACTITIONER

## 2021-12-06 NOTE — PROGRESS NOTES
Chief Complaint  Arthritis (refills ) and Atrial Fibrillation (check inr)    Subjective            Isabell Ribera presents to [facility]  History of Present Illness     Patient presents to the office today for follow-up on her INR and for gabapentin follow-up.    She has atrial fibrillation and is currently taking warfarin 5 mg on Monday, Wednesday, and Friday.  She is taking 6 mg on Tuesday, Thursday, Saturday, and Sunday.  Her last INR was 2 weeks ago and was 3.  She was getting her INRs checked by home health; however, they stopped coming.  They said that there was no need for them to continue therapy.  She has severely limited mobility and it is very difficult for her family to get her out of the home and into a vehicle and to the office weekly for INR checks.  They are requesting a home health order just for INR monitoring.  She denies any signs or symptoms of easy bleeding or bruising.  She is not noticing any gross hematuria.  No bleeding from her gums.    She is currently prescribed gabapentin 800 mg nightly.  She takes to 400 mg capsules for treatment of diabetic peripheral neuropathy.  Her last A1c was 5.2% 6 months ago.  Over the past 2 years she has been less than 6%.  Her diabetes is well controlled with her diet.  She is not taking any medications for her diabetes.  Her last prescription for gabapentin was written and filled on November 24, 2021.  Her last urine drug screen was in June 2020.  We attempted to get a urine drug screen more recently; however, she incidentally defecated in the sample.  She denies any adverse side effects with use of gabapentin.  No daytime somnolence.  Denies dizziness.  Her neuropathy symptoms are well controlled with use of gabapentin.  In the past when she has attempted to wean the medication she would have recurrence of numbness, tingling, and sensation of electric shock in her legs.    Past Medical History:   Diagnosis Date   • Afib  ([facility])    • Aftercare following ankle joint replacement surgery 06/01/2015   • Arthritis    • Arthritis of knee 06/01/2015   • Essential hypertension    • Gout    • HTN (hypertension)    • Left knee pain    • Lymphedema    • Recurrent UTI 05/11/2021   • Urinary retention 05/11/2021       No Known Allergies     Past Surgical History:   Procedure Laterality Date   • CATARACT EXTRACTION     • KIDNEY STONE SURGERY     • KNEE SURGERY Left 2007    REPLACEMENT   • OTHER SURGICAL HISTORY      JOINT SURGERY, UNSPECIFIED   • TUBAL ABDOMINAL LIGATION          Social History     Tobacco Use   • Smoking status: Former Smoker   • Smokeless tobacco: Never Used   Vaping Use   • Vaping Use: Never used   Substance Use Topics   • Alcohol use: Never   • Drug use: Not on file       Family History   Problem Relation Age of Onset   • Heart failure Mother 64        CONGESTIVE   • Arthritis Mother    • Arthritis Sister         Health Maintenance Due   Topic Date Due   • DXA SCAN  Never done   • COLORECTAL CANCER SCREENING  Never done   • Pneumococcal Vaccine 65+ (1 of 2 - PPSV23) Never done   • COVID-19 Vaccine (1) Never done   • TDAP/TD VACCINES (1 - Tdap) Never done   • ZOSTER VACCINE (1 of 2) Never done   • ANNUAL WELLNESS VISIT  06/02/2021   • DIABETIC EYE EXAM  06/02/2021   • DIABETIC FOOT EXAM  06/30/2021   • URINE MICROALBUMIN  06/30/2021   • INFLUENZA VACCINE  08/01/2021   • HEMOGLOBIN A1C  11/17/2021        Current Outpatient Medications on File Prior to Visit   Medication Sig   • carvedilol (COREG) 6.25 MG tablet TAKE ONE TABLET BY MOUTH TWICE A DAY WITH FOOD   • folic acid (FOLVITE) 1 MG tablet folic acid 1 mg oral tablet take 1 tablet (1 mg) by oral route once daily for 30 days 5/18/2021  Active   • furosemide (LASIX) 20 MG tablet furosemide 20 mg oral tablet take 1 tablet (20 mg) by oral route once daily for 30 days   Active   • gabapentin (NEURONTIN) 400 MG capsule Take 2 capsules by mouth Every Night.   • hydrOXYzine (ATARAX)  10 MG tablet TAKE ONE TABLET BY MOUTH THREE TIMES A DAY AS NEEDED FOR ITCHING   • methylPREDNISolone (MEDROL) 4 MG dose pack Take as directed on package instructions.   • mupirocin (Bactroban) 2 % ointment Apply  topically to the appropriate area as directed 2 (Two) Times a Day.   • nystatin (MYCOSTATIN) 353842 UNIT/GM powder Apply  topically to the appropriate area as directed 3 (Three) Times a Day.   • potassium chloride 10 MEQ CR tablet potassium chloride 10 mEq oral tablet extended release take 1 tablet (10 meq) by oral route on Mondays, Wednesdays, and Fridays   Suspended   • silver sulfadiazine (Silvadene) 1 % cream Apply  topically to the appropriate area as directed 2 (Two) Times a Day.   • warfarin (COUMADIN) 3 MG tablet Take 1 tablet by mouth Daily.   • warfarin (COUMADIN) 4 MG tablet TAKE ONE TABLET BY MOUTH DAILY   • clindamycin (Cleocin) 300 MG capsule Take 1 capsule by mouth 4 (Four) Times a Day.   • [DISCONTINUED] nitrofurantoin, macrocrystal-monohydrate, (Macrobid) 100 MG capsule Take 1 capsule by mouth 2 (Two) Times a Day.     No current facility-administered medications on file prior to visit.       Immunization History   Administered Date(s) Administered   • Flu Vaccine Quad PF >36MO 09/20/2018, 11/01/2019, 09/22/2020       Review of Systems     Objective     /74   Pulse 70   Temp 97.4 °F (36.3 °C)   SpO2 96%       Physical Exam  Vitals reviewed.   Constitutional:       General: She is not in acute distress.     Appearance: Normal appearance. She is well-developed. She is morbidly obese.   HENT:      Head: Normocephalic and atraumatic.   Eyes:      General: No scleral icterus.     Conjunctiva/sclera: Conjunctivae normal.      Pupils: Pupils are equal, round, and reactive to light.   Neck:      Thyroid: No thyroid mass, thyromegaly or thyroid tenderness.      Vascular: No carotid bruit.      Trachea: Trachea normal.   Cardiovascular:      Rate and Rhythm: Normal rate. Rhythm irregular.       Pulses: Normal pulses.      Heart sounds: No murmur heard.       Comments: Atrial fibrillation.  Pulmonary:      Effort: Pulmonary effort is normal.      Breath sounds: Normal breath sounds. No wheezing or rhonchi.   Abdominal:      General: Bowel sounds are normal. There is no distension.      Palpations: Abdomen is soft. There is no mass.      Tenderness: There is no abdominal tenderness.   Musculoskeletal:         General: Normal range of motion.      Right lower leg: No edema.      Left lower leg: No edema.   Lymphadenopathy:      Cervical: No cervical adenopathy.   Skin:     General: Skin is warm and dry.   Neurological:      Mental Status: She is alert and oriented to person, place, and time.   Psychiatric:         Mood and Affect: Mood and affect normal.         Behavior: Behavior normal.         Thought Content: Thought content normal.         Judgment: Judgment normal.         Result Review :     The following data was reviewed by: CAROL Ibarra on 12/06/2021:    CMP    CMP 8/25/21 8/27/21 9/20/21   Glucose 108 (A) 105 (A) 89   BUN 12 14 12   Creatinine 1.47 (A) 1.30 (A) 1.21 (A)   eGFR Non African Am 35 (A) 40 (A) 43 (A)   Sodium 138 138 139   Potassium 4.0 3.5 4.0   Chloride 102 103 103   Calcium 8.8 8.7 9.0   Albumin 3.20 (A) 3.10 (A) 3.50   Total Bilirubin 0.8 0.7 0.5   Alkaline Phosphatase 59 57 72   AST (SGOT) 16 22 28   ALT (SGPT) 5 6 11   (A) Abnormal value            CBC    CBC 8/25/21 8/27/21 9/20/21   WBC 10.80 8.86 5.16   RBC 5.06 4.97 4.86   Hemoglobin 14.8 14.7 14.4   Hematocrit 45.1 44.2 43.9   MCV 89.1 88.9 90.3   MCH 29.2 29.6 29.6   MCHC 32.8 33.3 32.8   RDW 15.2 15.2 14.9   Platelets 195 192 213           Lipid Panel    Lipid Panel 5/17/21   Total Cholesterol 176   Triglycerides 124   HDL Cholesterol 52   VLDL Cholesterol 25   LDL Cholesterol  99      Comments are available for some flowsheets but are not being displayed.           TSH    TSH 5/17/21   TSH 2.730           A1C  Last 3 Results    HGBA1C Last 3 Results 5/17/21   Hemoglobin A1C 5.2      Comments are available for some flowsheets but are not being displayed.           Last Urine Toxicity     LAST URINE TOXICITY RESULTS Latest Ref Rng & Units 6/30/2020    AMPHETAMINES SCREEN, URINE NA NEGATIVE    BARBITURATES SCREEN NA NEGATIVE    BENZODIAZEPINE SCREEN, URINE NA NEGATIVE    COCAINE SCREEN, URINE NA NEGATIVE    METHADONE SCREEN, URINE NA NEGATIVE        INR    Common Labsle 10/25/21 11/1/21 11/8/21 11/15/21 11/22/21 12/6/21   INR 2.60 2.10 2.50 2.70 3.00 2.6 (A)   (A) Abnormal value                        Assessment and Plan      Diagnoses and all orders for this visit:    1. Atrial fibrillation, unspecified type (HCC) (Primary)    2. Warfarin anticoagulation  Comments:  INR 2.6 today. Continue same dose and repeat INR in 1 month.   Orders:  -     POCT INR  -     Ambulatory Referral to Home Health    3. Type 2 diabetes mellitus with diabetic polyneuropathy, without long-term current use of insulin (HCC)  -     CBC Auto Differential  -     Comprehensive Metabolic Panel  -     Hemoglobin A1c  -     Lipid Panel  -     Microalbumin / Creatinine Urine Ratio - Urine, Clean Catch    4. Diabetic peripheral neuropathy (HCC)    5. High risk medication use  -     Urine Drug Screen - Urine, Clean Catch    6. Limited mobility  -     Ambulatory Referral to Home Health            Follow Up     Return in about 3 months (around 3/6/2022).     INR is therapeutic today at 2.6.  She is going to continue her current dose of warfarin as noted in the HPI.  Repeat INR in 1 month.  Her last 2 INRs have been therapeutic.    She will return to the office fasting for her blood work.  Her daughter is going to attempt to bring her the week of [place] if able.  They would really like for home health to come out for her INR checks and blood draws.    In regards to her prescription for gabapentin, her last prescription was sent in on November 24.  She will  call the week sully Barlow for a refill.  I have discussed her refill with my colleague, CAROL España, as I will be out of the office.    Patient was given instructions and counseling regarding her condition or for health maintenance advice. Please see specific information pulled into the AVS if appropriate.     Isabell Ribera  reports that she has quit smoking. She has never used smokeless tobacco.

## 2021-12-10 ENCOUNTER — PATIENT OUTREACH (OUTPATIENT)
Dept: CASE MANAGEMENT | Facility: OTHER | Age: 76
End: 2021-12-10

## 2021-12-10 ENCOUNTER — TELEPHONE (OUTPATIENT)
Dept: FAMILY MEDICINE CLINIC | Facility: CLINIC | Age: 76
End: 2021-12-10

## 2021-12-10 DIAGNOSIS — Z92.29 HISTORY OF LONG TERM ANTICOAGULANT USE: Primary | ICD-10-CM

## 2021-12-10 DIAGNOSIS — R53.1 WEAKNESS: ICD-10-CM

## 2021-12-10 NOTE — OUTREACH NOTE
ASSESSMENT    Staff Spoke With: MSW spoke with patient regarding needs at this time.    Reason for the Referral: Food Insecurity    Patient's Reported Cognitive Status: Alert and Oriented    Does the patient have Advanced Care Planning documents?: No. Patient declined advanced care planning education.    Patient's Reported Physical Status: Needs Assistance    Patient utilizes these assistive devices and/or in home care services: Wheelchair    Patient's Current Living Arrangements (Home Environment, Caregiver Support, Others in Home): Patient lives with spouse.    Patient's  Status: Did not serve in the .    Patient's Employment Status: Patient retired/disabled.    Patient's Spiritual Affiliation/Impact on Care: None noted.    Patient's Behavioral Health/Substance Abuse History: None noted.    [facility] updated and reviewed with the patient during this program:     Financial Resource Strain: High Risk   • Difficulty of Paying Living Expenses: Very hard       Food Insecurity: Food Insecurity Present   • Worried About Running Out of Food in the Last Year: Sometimes true   • Ran Out of Food in the Last Year: Never true       Transportation Needs: Unmet Transportation Needs   • Lack of Transportation (Medical): Yes   • Lack of Transportation (Non-Medical): Yes       Housing Stability: Low Risk    • Unable to Pay for Housing in the Last Year: No   • Number of Places Lived in the Last Year: 1   • Unstable Housing in the Last Year: No      Continuing Care   [facility] [address] [place] [address]    Phone: [phone]    Resource for: Financial Resource Strain, Food Insecurity     Patient Outreach    MSW spoke with patient regarding [employer]. MSW received referral because patient's meals had stopped. MSW reached out to Kym at [facility] and she states that patient called and wanting to stop meals back in September. MSW relayed  this information to patient and she states that she forgot she called and stopped them, but they had been sending the meals so she didn't say anything. Kym states that patient will need call office and complete re-assessment. MSW provided patient with contact information and she states that she will call to sign up again.    DISCUSSION AND PLAN    No new needs. MSW to follow up in a few weeks to ensure meals start again.    Verbal consent obtained to contact/refer to the following individuals and/or agencies: NATHAN Mendiola   , Ambulatory Case Management    12/10/2021 13:39 EST

## 2021-12-10 NOTE — OUTREACH NOTE
Ambulatory Case Management Note    SDOH updated and reviewed with the patient during this program:     Food Insecurity: Food Insecurity Present   • Worried About Running Out of Food in the Last Year: Sometimes true   • Ran Out of Food in the Last Year: Never true     CCM Interim Update    Called to check up on patient. She states that she has not received any mommas meals this month. They depend on these meals. MSW referral placed, Jazzy will call to see why these meals have not been coming.     Home health order was placed on Monday for INR checks and help with coumadin management. Scheduling at office will work on getting referral to [facility]        There are no recently modified care plans to display for this patient.      Marylin Ruelas RN  Ambulatory Case Management    12/10/2021, 11:32 EST

## 2021-12-16 ENCOUNTER — TELEPHONE (OUTPATIENT)
Dept: FAMILY MEDICINE CLINIC | Facility: CLINIC | Age: 76
End: 2021-12-16

## 2021-12-21 ENCOUNTER — ANTICOAGULATION VISIT (OUTPATIENT)
Dept: FAMILY MEDICINE CLINIC | Facility: CLINIC | Age: 76
End: 2021-12-21

## 2021-12-21 ENCOUNTER — CLINICAL SUPPORT (OUTPATIENT)
Dept: FAMILY MEDICINE CLINIC | Facility: CLINIC | Age: 76
End: 2021-12-21

## 2021-12-21 DIAGNOSIS — Z79.01 ANTICOAGULATED: Primary | ICD-10-CM

## 2021-12-21 LAB
ALBUMIN UR-MCNC: 4.1 MG/DL
AMPHET+METHAMPHET UR QL: NEGATIVE
BARBITURATES UR QL SCN: NEGATIVE
BENZODIAZ UR QL SCN: NEGATIVE
CANNABINOIDS SERPL QL: NEGATIVE
COCAINE UR QL: NEGATIVE
CREAT UR-MCNC: 64.5 MG/DL
INR PPP: 2.5 (ref 0.9–1.1)
INR PPP: 2.5 (ref 2–3)
METHADONE UR QL SCN: NEGATIVE
MICROALBUMIN/CREAT UR: 63.6 MG/G
OPIATES UR QL: NEGATIVE
OXYCODONE UR QL SCN: NEGATIVE

## 2021-12-21 PROCEDURE — 80307 DRUG TEST PRSMV CHEM ANLYZR: CPT | Performed by: NURSE PRACTITIONER

## 2021-12-21 PROCEDURE — 36416 COLLJ CAPILLARY BLOOD SPEC: CPT | Performed by: NURSE PRACTITIONER

## 2021-12-21 PROCEDURE — 82570 ASSAY OF URINE CREATININE: CPT | Performed by: NURSE PRACTITIONER

## 2021-12-21 PROCEDURE — 85610 PROTHROMBIN TIME: CPT | Performed by: NURSE PRACTITIONER

## 2021-12-21 PROCEDURE — 82043 UR ALBUMIN QUANTITATIVE: CPT | Performed by: NURSE PRACTITIONER

## 2021-12-22 ENCOUNTER — PATIENT OUTREACH (OUTPATIENT)
Dept: CASE MANAGEMENT | Facility: OTHER | Age: 76
End: 2021-12-22

## 2021-12-22 DIAGNOSIS — I10 HYPERTENSION, UNSPECIFIED TYPE: Primary | ICD-10-CM

## 2021-12-22 DIAGNOSIS — Z59.6 LOW INCOME: ICD-10-CM

## 2021-12-22 SDOH — ECONOMIC STABILITY - INCOME SECURITY: LOW INCOME: Z59.6

## 2021-12-22 NOTE — OUTREACH NOTE
Ambulatory Case Management Note    [facility] End of Month Documentation    This Chronic Medical Management Care Plan for Isabell Ribera, 76 y.o. female, has been monitored and managed and a new plan of care implemented for the month of December.  A cumulative time of 21  minutes was spent on this patient record this month, including chart review; electronic communication with other providers; phone call with patient, Spoke to social work regarding mommas meals, coordinated home health being scheduled.    Regarding the patient's problems: has Recurrent urinary tract infection on their problem list., the following items were addressed: referrals to community service providers, Social work, mommas meals, home health and any changes can be found within the plan section of the note.  A detailed listing of time spent for chronic care management is tracked within each outreach encounter.  Current medications include:  has a current medication list which includes the following prescription(s): carvedilol, clindamycin, folic acid, furosemide, gabapentin, hydroxyzine, methylprednisolone, mupirocin, nystatin, potassium chloride, silver sulfadiazine, warfarin, and warfarin. and the patient is reported to be patient is compliant with medication protocol,  Medications are reported to be effective.  Regarding these diagnoses, referrals were made to the following provider(s):  Medical social work.  All notes on chart for PCP to review.    The patient was monitored remotely for activity level; blood glucose; mood & behavior; pain.    The patient's physical needs include:  needs assistance with ADLs; physical healthcare; help taking medications as prescribed, help with Coumadin management, INR checks.     The patient's mental support needs include:  continued support    The patient's cognitive support needs include:  continued support; requires supervision; needs assistance with ADLs; health care    The patient's psychosocial support needs  include:  continued support    The patient's functional needs include: needs assistance for ADLs; physical healthcare; health care coverage; medication education    The patient's environmental needs include:  resources for disability needs; no access to transportation, Limited access to transportation at this time.     Care Plan overall comments:  No data recorded    Refer to previous outreach notes for more information on the areas listed above.    Monthly Billing Diagnoses  (I10) Hypertension, unspecified type    (Z59.6) Low income    Medications   · Medications have been reconciled    Care Plan progress this month:      Recently Modified Care Plans Updates made since 11/21/2021 12:00 AM    No recently modified care plans.          Instructions   · Patient was provided an electronic copy of care plan  · CCM services were explained and offered and patient has accepted these services.  · Patient has given their written consent to receive CCM services and understands that this includes the authorization of electronic communication of medical information with the other treating providers.  · Patient understands that they may stop CCM services at any time and these changes will be effective at the end of the calendar month and will effectively revocate the agreement of CCM services.  · Patient understands that only one practitioner can furnish and be paid for CCM services during one calendar month.  Patient also understands that there may be co-payment or deductible fees in association with CCM services.  · Patient will continue with at least monthly follow-up calls with the Nurse Navigator.    Marylin Ruelas RN  Ambulatory Case Management    12/22/2021, 16:09 EST    There are no recently modified care plans to display for this patient.      Marylin Ruelas RN  Ambulatory Case Management    12/22/2021, 16:09 EST

## 2021-12-23 ENCOUNTER — TELEPHONE (OUTPATIENT)
Dept: FAMILY MEDICINE CLINIC | Facility: CLINIC | Age: 76
End: 2021-12-23

## 2021-12-23 NOTE — TELEPHONE ENCOUNTER
Caller: Isabell Ribera    Relationship: Self    Best call back number: [phone]     What is the best time to reach you: ANY     Who are you requesting to speak with (clinical staff, provider,  specific staff member): CLINICAL     What was the call regarding: PATIENT STATED SHE WAS IN TO GIVE A URINE SAMPLE AND CALLED IN STATING SHE WOULD LIKE TO KNOW IF SHE NEEDS TO COME IN FOR ANOTHER APPOINTMENT ANYTIME SOON     Do you require a callback: YES

## 2021-12-30 DIAGNOSIS — I48.91 ATRIAL FIBRILLATION, UNSPECIFIED TYPE: ICD-10-CM

## 2021-12-30 DIAGNOSIS — Z79.01 WARFARIN ANTICOAGULATION: ICD-10-CM

## 2022-01-03 ENCOUNTER — CLINICAL SUPPORT (OUTPATIENT)
Dept: FAMILY MEDICINE CLINIC | Facility: CLINIC | Age: 77
End: 2022-01-03

## 2022-01-03 DIAGNOSIS — D89.2 HYPERGAMMAGLOBULINEMIA, UNSPECIFIED: ICD-10-CM

## 2022-01-03 LAB
ALBUMIN SERPL-MCNC: 3.6 G/DL (ref 3.5–5.2)
ALBUMIN/GLOB SERPL: 1.1 G/DL
ALP SERPL-CCNC: 85 U/L (ref 39–117)
ALT SERPL W P-5'-P-CCNC: 14 U/L (ref 1–33)
ANION GAP SERPL CALCULATED.3IONS-SCNC: 7.3 MMOL/L (ref 5–15)
AST SERPL-CCNC: 32 U/L (ref 1–32)
BASOPHILS # BLD AUTO: 0.04 10*3/MM3 (ref 0–0.2)
BASOPHILS NFR BLD AUTO: 0.8 % (ref 0–1.5)
BILIRUB SERPL-MCNC: 0.6 MG/DL (ref 0–1.2)
BUN SERPL-MCNC: 10 MG/DL (ref 8–23)
BUN/CREAT SERPL: 7 (ref 7–25)
CALCIUM SPEC-SCNC: 9.7 MG/DL (ref 8.6–10.5)
CHLORIDE SERPL-SCNC: 104 MMOL/L (ref 98–107)
CHOLEST SERPL-MCNC: 159 MG/DL (ref 0–200)
CO2 SERPL-SCNC: 30.7 MMOL/L (ref 22–29)
CREAT SERPL-MCNC: 1.42 MG/DL (ref 0.57–1)
DEPRECATED RDW RBC AUTO: 42.3 FL (ref 37–54)
EOSINOPHIL # BLD AUTO: 0.5 10*3/MM3 (ref 0–0.4)
EOSINOPHIL NFR BLD AUTO: 9.7 % (ref 0.3–6.2)
ERYTHROCYTE [DISTWIDTH] IN BLOOD BY AUTOMATED COUNT: 13.1 % (ref 12.3–15.4)
GFR SERPL CREATININE-BSD FRML MDRD: 36 ML/MIN/1.73
GLOBULIN UR ELPH-MCNC: 3.4 GM/DL
GLUCOSE SERPL-MCNC: 96 MG/DL (ref 65–99)
HBA1C MFR BLD: 5.68 % (ref 4.8–5.6)
HCT VFR BLD AUTO: 39.2 % (ref 34–46.6)
HDLC SERPL-MCNC: 64 MG/DL (ref 40–60)
HGB BLD-MCNC: 12.7 G/DL (ref 12–15.9)
IMM GRANULOCYTES # BLD AUTO: 0.01 10*3/MM3 (ref 0–0.05)
IMM GRANULOCYTES NFR BLD AUTO: 0.2 % (ref 0–0.5)
LDLC SERPL CALC-MCNC: 78 MG/DL (ref 0–100)
LDLC/HDLC SERPL: 1.19 {RATIO}
LYMPHOCYTES # BLD AUTO: 1.17 10*3/MM3 (ref 0.7–3.1)
LYMPHOCYTES NFR BLD AUTO: 22.7 % (ref 19.6–45.3)
MCH RBC QN AUTO: 28.8 PG (ref 26.6–33)
MCHC RBC AUTO-ENTMCNC: 32.4 G/DL (ref 31.5–35.7)
MCV RBC AUTO: 88.9 FL (ref 79–97)
MONOCYTES # BLD AUTO: 0.55 10*3/MM3 (ref 0.1–0.9)
MONOCYTES NFR BLD AUTO: 10.7 % (ref 5–12)
NEUTROPHILS NFR BLD AUTO: 2.88 10*3/MM3 (ref 1.7–7)
NEUTROPHILS NFR BLD AUTO: 55.9 % (ref 42.7–76)
NRBC BLD AUTO-RTO: 0 /100 WBC (ref 0–0.2)
PLATELET # BLD AUTO: 174 10*3/MM3 (ref 140–450)
PMV BLD AUTO: 11.1 FL (ref 6–12)
POTASSIUM SERPL-SCNC: 4.3 MMOL/L (ref 3.5–5.2)
PROT SERPL-MCNC: 7 G/DL (ref 6–8.5)
RBC # BLD AUTO: 4.41 10*6/MM3 (ref 3.77–5.28)
SODIUM SERPL-SCNC: 142 MMOL/L (ref 136–145)
TRIGL SERPL-MCNC: 94 MG/DL (ref 0–150)
VLDLC SERPL-MCNC: 17 MG/DL (ref 5–40)
WBC NRBC COR # BLD: 5.15 10*3/MM3 (ref 3.4–10.8)

## 2022-01-03 PROCEDURE — 80061 LIPID PANEL: CPT | Performed by: NURSE PRACTITIONER

## 2022-01-03 PROCEDURE — 84165 PROTEIN E-PHORESIS SERUM: CPT | Performed by: NURSE PRACTITIONER

## 2022-01-03 PROCEDURE — 85025 COMPLETE CBC W/AUTO DIFF WBC: CPT | Performed by: NURSE PRACTITIONER

## 2022-01-03 PROCEDURE — 83036 HEMOGLOBIN GLYCOSYLATED A1C: CPT | Performed by: NURSE PRACTITIONER

## 2022-01-03 PROCEDURE — 36415 COLL VENOUS BLD VENIPUNCTURE: CPT | Performed by: NURSE PRACTITIONER

## 2022-01-03 PROCEDURE — 80053 COMPREHEN METABOLIC PANEL: CPT | Performed by: NURSE PRACTITIONER

## 2022-01-03 RX ORDER — WARFARIN SODIUM 4 MG/1
TABLET ORAL
Qty: 30 TABLET | Refills: 0 | Status: SHIPPED | OUTPATIENT
Start: 2022-01-03 | End: 2022-02-07 | Stop reason: SDUPTHER

## 2022-01-03 NOTE — PROGRESS NOTES
Venipuncture Blood Specimen Collection  Venipuncture performed in left arm  by Amy Cano with good hemostasis. Patient tolerated the procedure well without complications.   01/03/22   Amy Cano

## 2022-01-04 ENCOUNTER — PATIENT OUTREACH (OUTPATIENT)
Dept: CASE MANAGEMENT | Facility: OTHER | Age: 77
End: 2022-01-04

## 2022-01-04 LAB
ALBUMIN SERPL ELPH-MCNC: 3.3 G/DL (ref 2.9–4.4)
ALBUMIN/GLOB SERPL: 0.9 {RATIO} (ref 0.7–1.7)
ALPHA1 GLOB SERPL ELPH-MCNC: 0.2 G/DL (ref 0–0.4)
ALPHA2 GLOB SERPL ELPH-MCNC: 0.8 G/DL (ref 0.4–1)
B-GLOBULIN SERPL ELPH-MCNC: 1 G/DL (ref 0.7–1.3)
GAMMA GLOB SERPL ELPH-MCNC: 1.6 G/DL (ref 0.4–1.8)
GLOBULIN SER CALC-MCNC: 3.6 G/DL (ref 2.2–3.9)
LABORATORY COMMENT REPORT: NORMAL
M PROTEIN SERPL ELPH-MCNC: NORMAL G/DL
PROT PATTERN SERPL ELPH-IMP: NORMAL
PROT SERPL-MCNC: 6.9 G/DL (ref 6–8.5)

## 2022-01-04 NOTE — OUTREACH NOTE
Patient Outreach    MSW spoke with patient to follow up on her Meals on Wheels. Patient states that she hasn't called to sign back up for meals. Patient asked for information again. MSW provided contact information for [facility]. Patient states that she will call. MSW to discharge as there are no other needs at this time.    NATHAN Matthews   , Ambulatory Case Management    1/4/2022 13:29 EST

## 2022-01-19 ENCOUNTER — PATIENT OUTREACH (OUTPATIENT)
Dept: CASE MANAGEMENT | Facility: OTHER | Age: 77
End: 2022-01-19

## 2022-01-19 DIAGNOSIS — I10 HYPERTENSION, UNSPECIFIED TYPE: Primary | ICD-10-CM

## 2022-01-19 NOTE — OUTREACH NOTE
Ambulatory Case Management Note    CCM Interim Update    Spoke to patients . He is positive for covid, but she is doing well and has no symptoms. They have started getting [employer] again which is helping with food. They are sill trying to get set up with Medicaid. ZHANE Purcell will reach out.     Patient is doing well at this time and does not need any refills or have any concerns.        There are no recently modified care plans to display for this patient.      Marylin Ruelas RN  Ambulatory Case Management    1/19/2022, 10:26 EST

## 2022-02-06 DIAGNOSIS — E11.42 DIABETIC PERIPHERAL NEUROPATHY: ICD-10-CM

## 2022-02-07 ENCOUNTER — OFFICE VISIT (OUTPATIENT)
Dept: FAMILY MEDICINE CLINIC | Facility: CLINIC | Age: 77
End: 2022-02-07

## 2022-02-07 VITALS
OXYGEN SATURATION: 93 % | SYSTOLIC BLOOD PRESSURE: 138 MMHG | DIASTOLIC BLOOD PRESSURE: 84 MMHG | HEART RATE: 77 BPM | TEMPERATURE: 97 F

## 2022-02-07 DIAGNOSIS — E11.42 DIABETIC PERIPHERAL NEUROPATHY: ICD-10-CM

## 2022-02-07 DIAGNOSIS — I48.91 ATRIAL FIBRILLATION, UNSPECIFIED TYPE: Primary | ICD-10-CM

## 2022-02-07 DIAGNOSIS — Z79.899 HIGH RISK MEDICATION USE: ICD-10-CM

## 2022-02-07 DIAGNOSIS — Z79.01 WARFARIN ANTICOAGULATION: ICD-10-CM

## 2022-02-07 LAB — INR PPP: 3.8 (ref 2–3)

## 2022-02-07 PROCEDURE — 99214 OFFICE O/P EST MOD 30 MIN: CPT | Performed by: NURSE PRACTITIONER

## 2022-02-07 PROCEDURE — 36416 COLLJ CAPILLARY BLOOD SPEC: CPT | Performed by: NURSE PRACTITIONER

## 2022-02-07 PROCEDURE — 85610 PROTHROMBIN TIME: CPT | Performed by: NURSE PRACTITIONER

## 2022-02-07 RX ORDER — WARFARIN SODIUM 5 MG/1
5 TABLET ORAL DAILY
Qty: 30 TABLET | Refills: 0 | Status: SHIPPED | OUTPATIENT
Start: 2022-02-07 | End: 2022-12-27

## 2022-02-07 RX ORDER — GABAPENTIN 400 MG/1
CAPSULE ORAL
Qty: 60 CAPSULE | OUTPATIENT
Start: 2022-02-07

## 2022-02-07 RX ORDER — GABAPENTIN 400 MG/1
400 CAPSULE ORAL NIGHTLY
Qty: 30 CAPSULE | Refills: 2 | Status: SHIPPED | OUTPATIENT
Start: 2022-02-07 | End: 2022-12-27

## 2022-02-07 NOTE — PROGRESS NOTES
Chief Complaint  Coagulation Disorder (follow up)    Alfredo Ribera presents to [facility]  History of Present Illness     She is here today for INR check - INR is 3.8. She denies any changes in medications. She is not taking any antibiotics. She states she hasn't been anywhere since she was here last.     She is currently taking warfarin 5 mg on Monday, Wednesday, and Friday, and 6 mg on Tuesday, Thursday, Saturday, and Sunday.    She is currently prescribed gabapentin 800 mg nightly; however, she has only been taking 400 mg nightly.  Her last prescription was written on November 24, 2021.  She has only recently ran out of her medication.  She is prescribed gabapentin secondary to diabetic peripheral nephropathy.  Her last A1c was 5.68%.  Over the past 2 years she has been less than 6% consistently.  Her diabetes is well controlled with her diet.  She is not taking any medications for her diabetes.  Her last urine drug screen was normal/negative. She denies any adverse side effects with use of gabapentin.  No daytime somnolence.  Denies dizziness.  Her neuropathy symptoms are well controlled with use of gabapentin.        Past Medical History:   Diagnosis Date   • Afib (HCC)    • Aftercare following ankle joint replacement surgery 06/01/2015   • Arthritis    • Arthritis of knee 06/01/2015   • Essential hypertension    • Gout    • HTN (hypertension)    • Left knee pain    • Lymphedema    • Recurrent UTI 05/11/2021   • Urinary retention 05/11/2021       No Known Allergies     Past Surgical History:   Procedure Laterality Date   • CATARACT EXTRACTION     • KIDNEY STONE SURGERY     • KNEE SURGERY Left 2007    REPLACEMENT   • OTHER SURGICAL HISTORY      JOINT SURGERY, UNSPECIFIED   • TUBAL ABDOMINAL LIGATION          Social History     Tobacco Use   • Smoking status: Former Smoker   • Smokeless tobacco: Never Used   Vaping Use   • Vaping Use: Never used   Substance  Use Topics   • Alcohol use: Never   • Drug use: Not on file       Family History   Problem Relation Age of Onset   • Heart failure Mother 64        CONGESTIVE   • Arthritis Mother    • Arthritis Sister         Health Maintenance Due   Topic Date Due   • DXA SCAN  Never done   • COVID-19 Vaccine (1) Never done   • Pneumococcal Vaccine 65+ (1 of 2 - PPSV23) Never done   • TDAP/TD VACCINES (1 - Tdap) Never done   • ZOSTER VACCINE (1 of 2) Never done   • ANNUAL WELLNESS VISIT  06/02/2021   • DIABETIC EYE EXAM  06/02/2021   • INFLUENZA VACCINE  08/01/2021        Current Outpatient Medications on File Prior to Visit   Medication Sig   • carvedilol (COREG) 6.25 MG tablet TAKE ONE TABLET BY MOUTH TWICE A DAY WITH FOOD   • folic acid (FOLVITE) 1 MG tablet folic acid 1 mg oral tablet take 1 tablet (1 mg) by oral route once daily for 30 days 5/18/2021  Active   • furosemide (LASIX) 20 MG tablet furosemide 20 mg oral tablet take 1 tablet (20 mg) by oral route once daily for 30 days   Active   • nystatin (MYCOSTATIN) 044035 UNIT/GM powder Apply  topically to the appropriate area as directed 3 (Three) Times a Day.   • potassium chloride 10 MEQ CR tablet potassium chloride 10 mEq oral tablet extended release take 1 tablet (10 meq) by oral route on Mondays, Wednesdays, and Fridays   Suspended   • warfarin (COUMADIN) 3 MG tablet Take 1 tablet by mouth Daily.   • [DISCONTINUED] gabapentin (NEURONTIN) 400 MG capsule Take 2 capsules by mouth Every Night.   • [DISCONTINUED] warfarin (COUMADIN) 4 MG tablet TAKE ONE TABLET BY MOUTH DAILY   • [DISCONTINUED] clindamycin (Cleocin) 300 MG capsule Take 1 capsule by mouth 4 (Four) Times a Day.   • [DISCONTINUED] hydrOXYzine (ATARAX) 10 MG tablet TAKE ONE TABLET BY MOUTH THREE TIMES A DAY AS NEEDED FOR ITCHING   • [DISCONTINUED] methylPREDNISolone (MEDROL) 4 MG dose pack Take as directed on package instructions.   • [DISCONTINUED] mupirocin (Bactroban) 2 % ointment Apply  topically to the  appropriate area as directed 2 (Two) Times a Day.   • [DISCONTINUED] silver sulfadiazine (Silvadene) 1 % cream Apply  topically to the appropriate area as directed 2 (Two) Times a Day.     No current facility-administered medications on file prior to visit.       Immunization History   Administered Date(s) Administered   • Flu Vaccine Quad PF >36MO 09/20/2018, 11/01/2019, 09/22/2020       Review of Systems     Objective     /84 (BP Location: Right arm, Patient Position: Sitting, Cuff Size: Adult)   Pulse 77   Temp 97 °F (36.1 °C) (Temporal)   SpO2 93%       Physical Exam  Vitals reviewed.   Constitutional:       General: She is not in acute distress.     Appearance: She is well-developed. She is obese.   HENT:      Head: Normocephalic and atraumatic.   Eyes:      General: No scleral icterus.     Conjunctiva/sclera: Conjunctivae normal.   Neck:      Vascular: No carotid bruit.      Trachea: Trachea normal.   Cardiovascular:      Rate and Rhythm: Normal rate. Rhythm irregular.      Pulses: Normal pulses.      Heart sounds: No murmur heard.      Pulmonary:      Effort: Pulmonary effort is normal.      Breath sounds: Normal breath sounds. No wheezing, rhonchi or rales.   Musculoskeletal:         General: Normal range of motion.      Cervical back: Normal range of motion and neck supple.      Right lower leg: Edema present.      Left lower leg: Edema present.      Comments: She is resting in a wheelchair on exam today.  Mobility is limited.    There is trace lower extremity edema, but not pitting today.   Lymphadenopathy:      Cervical: No cervical adenopathy.   Skin:     General: Skin is warm and dry.   Neurological:      Mental Status: She is alert and oriented to person, place, and time.   Psychiatric:         Mood and Affect: Mood and affect normal.         Behavior: Behavior normal.         Thought Content: Thought content normal.         Judgment: Judgment normal.         Result Review :     The following  data was reviewed by: CAROL Ibarra on 02/07/2022:    INR    Common Labsle 11/8/21 11/15/21 11/22/21 12/6/21 12/21/21 2/7/22   INR 2.50 2.70 3.00 2.6 (A) 2.50 3.80 (A)   (A) Abnormal value            Urine Drug Screen - Urine, Clean Catch (12/21/2021 11:21)              Assessment and Plan      Diagnoses and all orders for this visit:    1. Atrial fibrillation, unspecified type (HCC) (Primary)  -     warfarin (COUMADIN) 5 MG tablet; Take 1 tablet by mouth Daily.  Dispense: 30 tablet; Refill: 0    2. Warfarin anticoagulation  Comments:  INR 3.8 - reduce to 5mg daily and repeat INR next week on 02/14/2022  Orders:  -     POCT INR  -     POC INR  -     warfarin (COUMADIN) 5 MG tablet; Take 1 tablet by mouth Daily.  Dispense: 30 tablet; Refill: 0    3. Diabetic peripheral neuropathy (HCC)  -     gabapentin (NEURONTIN) 400 MG capsule; Take 1 capsule by mouth Every Night.  Dispense: 30 capsule; Refill: 2    4. High risk medication use            Follow Up     Return in about 1 week (around 2/14/2022) for recheck INR.     She will need to follow-up in 3 months for her gabapentin.    Patient was given instructions and counseling regarding her condition or for health maintenance advice. Please see specific information pulled into the AVS if appropriate.     Isabell Ribera  reports that she has quit smoking. She has never used smokeless tobacco.

## 2022-02-09 ENCOUNTER — PATIENT OUTREACH (OUTPATIENT)
Dept: CASE MANAGEMENT | Facility: OTHER | Age: 77
End: 2022-02-09

## 2022-02-09 DIAGNOSIS — I10 HYPERTENSION, UNSPECIFIED TYPE: Primary | ICD-10-CM

## 2022-02-09 DIAGNOSIS — I48.20 CHRONIC ATRIAL FIBRILLATION: ICD-10-CM

## 2022-02-09 NOTE — OUTREACH NOTE
Ambulatory Case Management Note    CCM Interim Update    Spoke to patient for monthly outreach. She states she is doing well. Had appointment with PCP on Monday. This went well. She does not have any questions regarding this appointment. A1C is well controlled and in a non-diabetic range. She does not take any medication for this. I discussed changing the patient to a monitoring status, and if no other needs arise within the next month, I will discharge her from [facility]. She verbalizes understanding. They do have social work needs, Jazzy SAUNDERS is on this case.         Care Plan: Community Resources   Updates made since 2/9/2022 12:00 AM      Problem: LIMITED FINANCIAL RESOURCES       Goal: Establish options for financial assistance       Task: Discuss financial planning with patient Completed 2/9/2022   Responsible User: Micaela Ramirez RN      Task: Refer patient to social work Completed 2/9/2022   Responsible User: Micaela Ramirez RN Dana Bruce, RN  Ambulatory Case Management    2/9/2022, 13:11 EST      Care Plan: Community [facility]   Updates made since 2/9/2022 12:00 AM      Problem: LIMITED FINANCIAL RESOURCES       Goal: Establish options for financial assistance       Task: Discuss financial planning with patient Completed 2/9/2022   Responsible User: Micaela Ramirez RN      Task: Refer patient to social work Completed 2/9/2022   Responsible User: Micaela Ramirez RN Dana Bruce, RN  Ambulatory Case Management    2/9/2022, 13:11 EST

## 2022-02-13 ENCOUNTER — HOSPITAL ENCOUNTER (EMERGENCY)
Facility: HOSPITAL | Age: 77
Discharge: HOME | End: 2022-02-13
Attending: EMERGENCY MEDICINE | Admitting: EMERGENCY MEDICINE

## 2022-02-13 VITALS
SYSTOLIC BLOOD PRESSURE: 172 MMHG | BODY MASS INDEX: 31.44 KG/M2 | RESPIRATION RATE: 18 BRPM | HEIGHT: 69 IN | OXYGEN SATURATION: 97 % | DIASTOLIC BLOOD PRESSURE: 98 MMHG | WEIGHT: 212.3 LBS | HEART RATE: 67 BPM | TEMPERATURE: 98.9 F

## 2022-02-13 DIAGNOSIS — N39.0 CHRONIC UTI: Primary | ICD-10-CM

## 2022-02-13 LAB
ALBUMIN SERPL-MCNC: 3.4 G/DL (ref 3.5–5.2)
ALBUMIN/GLOB SERPL: 0.9 G/DL
ALP SERPL-CCNC: 90 U/L (ref 39–117)
ALT SERPL W P-5'-P-CCNC: 13 U/L (ref 1–33)
ANION GAP SERPL CALCULATED.3IONS-SCNC: 11.5 MMOL/L (ref 5–15)
AST SERPL-CCNC: 36 U/L (ref 1–32)
BACTERIA UR QL AUTO: ABNORMAL /HPF
BASOPHILS # BLD AUTO: 0.04 10*3/MM3 (ref 0–0.2)
BASOPHILS NFR BLD AUTO: 0.7 % (ref 0–1.5)
BILIRUB SERPL-MCNC: 1.1 MG/DL (ref 0–1.2)
BILIRUB UR QL STRIP: NEGATIVE
BUN SERPL-MCNC: 9 MG/DL (ref 8–23)
BUN/CREAT SERPL: 7 (ref 7–25)
CALCIUM SPEC-SCNC: 9.3 MG/DL (ref 8.6–10.5)
CHLORIDE SERPL-SCNC: 103 MMOL/L (ref 98–107)
CLARITY UR: ABNORMAL
CO2 SERPL-SCNC: 27.5 MMOL/L (ref 22–29)
COLOR UR: YELLOW
CREAT SERPL-MCNC: 1.29 MG/DL (ref 0.57–1)
DEPRECATED RDW RBC AUTO: 49.6 FL (ref 37–54)
EOSINOPHIL # BLD AUTO: 0.1 10*3/MM3 (ref 0–0.4)
EOSINOPHIL NFR BLD AUTO: 1.7 % (ref 0.3–6.2)
ERYTHROCYTE [DISTWIDTH] IN BLOOD BY AUTOMATED COUNT: 15.4 % (ref 12.3–15.4)
GFR SERPL CREATININE-BSD FRML MDRD: 40 ML/MIN/1.73
GLOBULIN UR ELPH-MCNC: 3.6 GM/DL
GLUCOSE SERPL-MCNC: 99 MG/DL (ref 65–99)
GLUCOSE UR STRIP-MCNC: NEGATIVE MG/DL
HCT VFR BLD AUTO: 39.5 % (ref 34–46.6)
HGB BLD-MCNC: 12.7 G/DL (ref 12–15.9)
HGB UR QL STRIP.AUTO: ABNORMAL
HYALINE CASTS UR QL AUTO: ABNORMAL /LPF
IMM GRANULOCYTES # BLD AUTO: 0.02 10*3/MM3 (ref 0–0.05)
IMM GRANULOCYTES NFR BLD AUTO: 0.3 % (ref 0–0.5)
INR PPP: 2.46 (ref 2–3)
KETONES UR QL STRIP: NEGATIVE
LEUKOCYTE ESTERASE UR QL STRIP.AUTO: ABNORMAL
LYMPHOCYTES # BLD AUTO: 1.02 10*3/MM3 (ref 0.7–3.1)
LYMPHOCYTES NFR BLD AUTO: 17.1 % (ref 19.6–45.3)
MAGNESIUM SERPL-MCNC: 1.7 MG/DL (ref 1.6–2.4)
MCH RBC QN AUTO: 28.3 PG (ref 26.6–33)
MCHC RBC AUTO-ENTMCNC: 32.2 G/DL (ref 31.5–35.7)
MCV RBC AUTO: 88 FL (ref 79–97)
MONOCYTES # BLD AUTO: 0.61 10*3/MM3 (ref 0.1–0.9)
MONOCYTES NFR BLD AUTO: 10.2 % (ref 5–12)
NEUTROPHILS NFR BLD AUTO: 4.17 10*3/MM3 (ref 1.7–7)
NEUTROPHILS NFR BLD AUTO: 70 % (ref 42.7–76)
NITRITE UR QL STRIP: POSITIVE
NRBC BLD AUTO-RTO: 0 /100 WBC (ref 0–0.2)
PH UR STRIP.AUTO: 8 [PH] (ref 5–8)
PLATELET # BLD AUTO: 160 10*3/MM3 (ref 140–450)
PMV BLD AUTO: 9.8 FL (ref 6–12)
POTASSIUM SERPL-SCNC: 3.5 MMOL/L (ref 3.5–5.2)
PROT SERPL-MCNC: 7 G/DL (ref 6–8.5)
PROT UR QL STRIP: ABNORMAL
PROTHROMBIN TIME: 23.8 SECONDS (ref 9.4–12)
QT INTERVAL: 444 MS
RBC # BLD AUTO: 4.49 10*6/MM3 (ref 3.77–5.28)
RBC # UR STRIP: ABNORMAL /HPF
REF LAB TEST METHOD: ABNORMAL
SODIUM SERPL-SCNC: 142 MMOL/L (ref 136–145)
SP GR UR STRIP: 1.01 (ref 1–1.03)
SQUAMOUS #/AREA URNS HPF: ABNORMAL /HPF
TROPONIN T SERPL-MCNC: 0.02 NG/ML (ref 0–0.03)
UROBILINOGEN UR QL STRIP: ABNORMAL
WBC # UR STRIP: ABNORMAL /HPF
WBC NRBC COR # BLD: 5.96 10*3/MM3 (ref 3.4–10.8)

## 2022-02-13 PROCEDURE — 85025 COMPLETE CBC W/AUTO DIFF WBC: CPT | Performed by: EMERGENCY MEDICINE

## 2022-02-13 PROCEDURE — 80053 COMPREHEN METABOLIC PANEL: CPT | Performed by: EMERGENCY MEDICINE

## 2022-02-13 PROCEDURE — 81001 URINALYSIS AUTO W/SCOPE: CPT | Performed by: EMERGENCY MEDICINE

## 2022-02-13 PROCEDURE — 93010 ELECTROCARDIOGRAM REPORT: CPT | Performed by: INTERNAL MEDICINE

## 2022-02-13 PROCEDURE — 83735 ASSAY OF MAGNESIUM: CPT | Performed by: EMERGENCY MEDICINE

## 2022-02-13 PROCEDURE — 84484 ASSAY OF TROPONIN QUANT: CPT | Performed by: EMERGENCY MEDICINE

## 2022-02-13 PROCEDURE — 85610 PROTHROMBIN TIME: CPT | Performed by: EMERGENCY MEDICINE

## 2022-02-13 PROCEDURE — 93005 ELECTROCARDIOGRAM TRACING: CPT | Performed by: EMERGENCY MEDICINE

## 2022-02-13 PROCEDURE — 99283 EMERGENCY DEPT VISIT LOW MDM: CPT

## 2022-02-13 RX ADMIN — SODIUM CHLORIDE 1000 ML: 9 INJECTION, SOLUTION INTRAVENOUS at 14:16

## 2022-02-13 NOTE — ED PROVIDER NOTES
Time: 13:30 EST  Arrived by: car  Chief Complaint: concern for UTI  History provided by: patient  History is limited by: dementia    History of Present Illness:  Patient is a 76 y.o. year old female that presents to the emergency department for concern for UTI.     Family sent patient to the hospital by EMS because she is not acting like her normal self and they would like her to be checked for UTI.     Patient sates she feels fine. She denies any nausea or pain. She states she is here to get check out for a UTI per request of her family.         Urinary Tract Infection  Associated symptoms: no abdominal pain, no fever and no nausea            Patient Care Team  Primary Care Provider: Ivanna Frederick APRN    Past Medical History:     No Known Allergies  Past Medical History:   Diagnosis Date   • Afib (HCC)    • Aftercare following ankle joint replacement surgery 06/01/2015   • Arthritis    • Arthritis of knee 06/01/2015   • Essential hypertension    • Gout    • HTN (hypertension)    • Left knee pain    • Lymphedema    • Recurrent UTI 05/11/2021   • Urinary retention 05/11/2021     Past Surgical History:   Procedure Laterality Date   • CATARACT EXTRACTION     • COLONOSCOPY     • KIDNEY STONE SURGERY     • KNEE SURGERY Left 2007    REPLACEMENT   • OTHER SURGICAL HISTORY      JOINT SURGERY, UNSPECIFIED   • TUBAL ABDOMINAL LIGATION       Family History   Problem Relation Age of Onset   • Heart failure Mother 64        CONGESTIVE   • Arthritis Mother    • Arthritis Sister        Home Medications:  Prior to Admission medications    Medication Sig Start Date End Date Taking? Authorizing Provider   carvedilol (COREG) 6.25 MG tablet TAKE ONE TABLET BY MOUTH TWICE A DAY WITH FOOD 9/20/21   Ivanna Frederick APRN   folic acid (FOLVITE) 1 MG tablet folic acid 1 mg oral tablet take 1 tablet (1 mg) by oral route once daily for 30 days 5/18/2021  Active 5/18/21   Provider, MD Maricarmen   furosemide (LASIX) 20 MG tablet  "furosemide 20 mg oral tablet take 1 tablet (20 mg) by oral route once daily for 30 days   Active    Provider, MD Maricarmen   gabapentin (NEURONTIN) 400 MG capsule Take 1 capsule by mouth Every Night. 2/7/22   Ivanna Frederick APRN   nystatin (MYCOSTATIN) 478552 UNIT/GM powder Apply  topically to the appropriate area as directed 3 (Three) Times a Day. 9/21/21   Ivanna Frederick APRN   potassium chloride 10 MEQ CR tablet potassium chloride 10 mEq oral tablet extended release take 1 tablet (10 meq) by oral route on Mondays, Wednesdays, and Fridays   Suspended    Provider, MD Maricarmen   warfarin (COUMADIN) 3 MG tablet Take 1 tablet by mouth Daily. 9/27/21   Ivanna Frederick APRN   warfarin (COUMADIN) 5 MG tablet Take 1 tablet by mouth Daily. 2/7/22   Ivanna Frederick APRN        Social History:   Social History     Tobacco Use   • Smoking status: Former Smoker   • Smokeless tobacco: Never Used   Vaping Use   • Vaping Use: Never used   Substance Use Topics   • Alcohol use: Never   • Drug use: Not on file       Review of Systems:  Review of Systems   Unable to perform ROS: Dementia   Constitutional: Negative for fever.   Gastrointestinal: Negative for abdominal pain and nausea.   Genitourinary: Negative for dysuria.        Physical Exam:  /98   Pulse 67   Temp 98.9 °F (37.2 °C) (Oral)   Resp 18   Ht 175.3 cm (69\")   Wt 96.3 kg (212 lb 4.9 oz)   SpO2 97%   BMI 31.35 kg/m²     Physical Exam  Vitals and nursing note reviewed.   Constitutional:       General: She is not in acute distress.     Comments: Appears chronically ill   HENT:      Head: Normocephalic and atraumatic.   Cardiovascular:      Rate and Rhythm: Normal rate and regular rhythm.      Heart sounds: No murmur heard.      Pulmonary:      Effort: No respiratory distress.      Breath sounds: Normal breath sounds.   Abdominal:      Palpations: Abdomen is soft.      Tenderness: There is no abdominal tenderness.   Musculoskeletal: "      Cervical back: Neck supple.   Skin:     General: Skin is warm and dry.      Findings: No rash.   Neurological:      General: No focal deficit present.      Mental Status: She is alert and oriented to person, place, and time.                Medications in the Emergency Department:  Medications   sodium chloride 0.9 % bolus 1,000 mL (0 mL Intravenous Stopped 2/13/22 1526)        Labs  Lab Results (last 24 hours)     Procedure Component Value Units Date/Time    Urinalysis With Microscopic If Indicated (No Culture) - Urine, Clean Catch [[id number]]  (Abnormal) Collected: 02/13/22 1322    Specimen: Urine, Clean Catch Updated: 02/13/22 1356     Color, UA Yellow     Appearance, UA Cloudy     pH, UA 8.0     Specific Gravity, UA 1.009     Glucose, UA Negative     Ketones, UA Negative     Bilirubin, UA Negative     Blood, UA Small (1+)     Protein, UA Trace     Leuk Esterase, UA Moderate (2+)     Nitrite, UA Positive     Urobilinogen, UA 0.2 E.U./dL    Urinalysis, Microscopic Only - Urine, Clean Catch [[id number]]  (Abnormal) Collected: 02/13/22 1322    Specimen: Urine, Clean Catch Updated: 02/13/22 1356     RBC, UA 0-2 /HPF      WBC, UA 31-50 /HPF      Bacteria, UA Trace /HPF      Squamous Epithelial Cells, UA None Seen /HPF      Hyaline Casts, UA None Seen /LPF      Methodology Manual Light Microscopy    CBC & Differential [[id number]]  (Abnormal) Collected: 02/13/22 1420    Specimen: Blood Updated: 02/13/22 1449    Narrative:      The following orders were created for panel order CBC & Differential.  Procedure                               Abnormality         Status                     ---------                               -----------         ------                     CBC Auto Differential[[id number]]        Abnormal            Final result                 Please view results for these tests on the individual orders.    Comprehensive Metabolic Panel [[id number]]  (Abnormal) Collected: 02/13/22 1420    Specimen: Blood  Updated: 02/13/22 1510     Glucose 99 mg/dL      BUN 9 mg/dL      Creatinine 1.29 mg/dL      Sodium 142 mmol/L      Potassium 3.5 mmol/L      Chloride 103 mmol/L      CO2 27.5 mmol/L      Calcium 9.3 mg/dL      Total Protein 7.0 g/dL      Albumin 3.40 g/dL      ALT (SGPT) 13 U/L      AST (SGOT) 36 U/L      Alkaline Phosphatase 90 U/L      Total Bilirubin 1.1 mg/dL      eGFR Non African Amer 40 mL/min/1.73      Globulin 3.6 gm/dL      A/G Ratio 0.9 g/dL      BUN/Creatinine Ratio 7.0     Anion Gap 11.5 mmol/L     Narrative:      GFR Normal >60  Chronic Kidney Disease <60  Kidney Failure <15      Magnesium [[id number]]  (Normal) Collected: 02/13/22 1420    Specimen: Blood Updated: 02/13/22 1510     Magnesium 1.7 mg/dL     Troponin [[id number]]  (Normal) Collected: 02/13/22 1420    Specimen: Blood Updated: 02/13/22 1509     Troponin T 0.020 ng/mL     Narrative:      Troponin T Reference Range:  <= 0.03 ng/mL-   Negative for AMI  >0.03 ng/mL-     Abnormal for myocardial necrosis.  Clinicians would have to utilize clinical acumen, EKG, Troponin and serial changes to determine if it is an Acute Myocardial Infarction or myocardial injury due to an underlying chronic condition.       Results may be falsely decreased if patient taking Biotin.      Protime-INR [[id number]]  (Abnormal) Collected: 02/13/22 1420    Specimen: Blood Updated: 02/13/22 1502     Protime 23.8 Seconds      INR 2.46    Narrative:      Suggested Therapeutic Ranges For Oral Anticoagulant Therapy:  Level of Therapy                      INR Target Range  Standard Dose                            2.0-3.0  High Dose                                2.5-3.5  Patients not receiving anticoagulant  Therapy Normal Range                     0.6-1.2    CBC Auto Differential [[id number]]  (Abnormal) Collected: 02/13/22 1420    Specimen: Blood Updated: 02/13/22 1449     WBC 5.96 10*3/mm3      RBC 4.49 10*6/mm3      Hemoglobin 12.7 g/dL      Hematocrit 39.5 %      MCV 88.0 fL       MCH 28.3 pg      MCHC 32.2 g/dL      RDW 15.4 %      RDW-SD 49.6 fl      MPV 9.8 fL      Platelets 160 10*3/mm3      Neutrophil % 70.0 %      Lymphocyte % 17.1 %      Monocyte % 10.2 %      Eosinophil % 1.7 %      Basophil % 0.7 %      Immature Grans % 0.3 %      Neutrophils, Absolute 4.17 10*3/mm3      Lymphocytes, Absolute 1.02 10*3/mm3      Monocytes, Absolute 0.61 10*3/mm3      Eosinophils, Absolute 0.10 10*3/mm3      Basophils, Absolute 0.04 10*3/mm3      Immature Grans, Absolute 0.02 10*3/mm3      nRBC 0.0 /100 WBC            Imaging:  No Radiology Exams Resulted Within Past 24 Hours    Procedures:  Procedures    Progress                            Medical Decision Making:  MDM  Number of Diagnoses or Management Options     Amount and/or Complexity of Data Reviewed  Clinical lab tests: reviewed  Tests in the medicine section of CPT®: reviewed  Decide to obtain previous medical records or to obtain history from someone other than the patient: yes  Review and summarize past medical records: yes       The patient's urinalysis results appear very similar to multiple past urine results.  Multiple past urine results also indicate Klebsiella oxytoca with variable sensitivities.  The only oral antibiotic options would be inappropriate given her anticoagulation.  The patient is completely asymptomatic and afebrile.  She does not appear to be septic and does not have flank tenderness or nausea vomiting be consistent with pyelonephritis.  From my standpoint I think this is chronic colonization.  Will obtain basic labs and if there is no evidence of any renal failure or leukocytosis I think the patient can be considered for conservative therapy.  Final diagnoses:   Chronic UTI        Disposition:  ED Disposition     ED Disposition Condition Comment    Discharge Stable Pt d/c'ed stable and ambulatory. IV cath removed and intact.           Documentation assistance provided by Luz Ortiz acting as scribe for Parminder  DO Jhon. Information recorded by the scribe was done at my direction and has been verified and validated by me.            Luz Ortiz  02/13/22 1350       Parminder Ferreira DO  02/13/22 [id number]

## 2022-02-13 NOTE — DISCHARGE INSTRUCTIONS
You do not have an acute urinary tract infection.  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  Since you are not having any symptoms or fever or abnormal labs I do not think any further treatment will be necessary and I am going to refer you to urologist for further evaluation.

## 2022-02-14 ENCOUNTER — OFFICE VISIT (OUTPATIENT)
Dept: FAMILY MEDICINE CLINIC | Facility: CLINIC | Age: 77
End: 2022-02-14

## 2022-02-14 VITALS — OXYGEN SATURATION: 98 % | TEMPERATURE: 97.6 F | HEART RATE: 76 BPM

## 2022-02-14 DIAGNOSIS — F05 ACUTE CONFUSION DUE TO INFECTION: ICD-10-CM

## 2022-02-14 DIAGNOSIS — Z79.01 WARFARIN ANTICOAGULATION: ICD-10-CM

## 2022-02-14 DIAGNOSIS — Z09 ENCOUNTER FOR EXAMINATION FOLLOWING TREATMENT AT HOSPITAL: Primary | ICD-10-CM

## 2022-02-14 DIAGNOSIS — N30.00 ACUTE CYSTITIS WITHOUT HEMATURIA: ICD-10-CM

## 2022-02-14 PROCEDURE — 99214 OFFICE O/P EST MOD 30 MIN: CPT | Performed by: NURSE PRACTITIONER

## 2022-02-14 RX ORDER — AMOXICILLIN AND CLAVULANATE POTASSIUM 875; 125 MG/1; MG/1
1 TABLET, FILM COATED ORAL 2 TIMES DAILY
Qty: 14 TABLET | Refills: 0 | Status: SHIPPED | OUTPATIENT
Start: 2022-02-14 | End: 2022-02-24 | Stop reason: SDUPTHER

## 2022-02-14 NOTE — PROGRESS NOTES
"Chief Complaint  Urinary Tract Infection (follow up from the ER )    Subjective            Isabell Ribera presents to [facility]  History of Present Illness     Patient presents to the office today for follow-up from ER visit yesterday.  She is being seen outside due to inability to come into the office secondary to mobility.  Her  and daughter are with her and state it was very difficult to get her into the vehicle to get her here.  They took her to the emergency room yesterday due to acute confusion and strong odor.    Reportedly, her urinalysis was abnormal in the emergency room; however, they did not treat with an antibiotic as they told her that it was a \"chronic condition\" and did not require antibiotic treatment.  They are concerned because every other time that she has been admitted for sepsis related to UTI she initially presented with confusion.  Her  reports that her diet has changed as well.  She refused coffee and she never refuses coffee.  They would like her treated.    She was also to follow-up for repeat INR today.  Her last INR was out of range, thus her warfarin was decreased to 5 mg daily.  INR was checked in the emergency room yesterday and was in the therapeutic range.    She currently denies any fever, chills, or body aches.  She denies urinary urgency, frequency, dysuria, or gross hematuria.      Past Medical History:   Diagnosis Date   • Afib (HCC)    • Aftercare following ankle joint replacement surgery 06/01/2015   • Arthritis    • Arthritis of knee 06/01/2015   • Essential hypertension    • Gout    • HTN (hypertension)    • Left knee pain    • Lymphedema    • Recurrent UTI 05/11/2021   • Urinary retention 05/11/2021       No Known Allergies     Past Surgical History:   Procedure Laterality Date   • CATARACT EXTRACTION     • COLONOSCOPY     • KIDNEY STONE SURGERY     • KNEE SURGERY Left 2007    REPLACEMENT   • OTHER SURGICAL HISTORY      " JOINT SURGERY, UNSPECIFIED   • TUBAL ABDOMINAL LIGATION          Social History     Tobacco Use   • Smoking status: Former Smoker   • Smokeless tobacco: Never Used   Vaping Use   • Vaping Use: Never used   Substance Use Topics   • Alcohol use: Never   • Drug use: Not on file       Family History   Problem Relation Age of Onset   • Heart failure Mother 64        CONGESTIVE   • Arthritis Mother    • Arthritis Sister         Health Maintenance Due   Topic Date Due   • DXA SCAN  Never done   • COVID-19 Vaccine (1) Never done   • Pneumococcal Vaccine 65+ (1 of 2 - PPSV23) Never done   • TDAP/TD VACCINES (1 - Tdap) Never done   • ZOSTER VACCINE (1 of 2) Never done   • ANNUAL WELLNESS VISIT  06/02/2021   • DIABETIC EYE EXAM  06/02/2021   • INFLUENZA VACCINE  08/01/2021        Current Outpatient Medications on File Prior to Visit   Medication Sig   • carvedilol (COREG) 6.25 MG tablet TAKE ONE TABLET BY MOUTH TWICE A DAY WITH FOOD   • folic acid (FOLVITE) 1 MG tablet folic acid 1 mg oral tablet take 1 tablet (1 mg) by oral route once daily for 30 days 5/18/2021  Active   • furosemide (LASIX) 20 MG tablet furosemide 20 mg oral tablet take 1 tablet (20 mg) by oral route once daily for 30 days   Active   • gabapentin (NEURONTIN) 400 MG capsule Take 1 capsule by mouth Every Night.   • nystatin (MYCOSTATIN) 601803 UNIT/GM powder Apply  topically to the appropriate area as directed 3 (Three) Times a Day.   • potassium chloride 10 MEQ CR tablet potassium chloride 10 mEq oral tablet extended release take 1 tablet (10 meq) by oral route on Mondays, Wednesdays, and Fridays   Suspended   • warfarin (COUMADIN) 5 MG tablet Take 1 tablet by mouth Daily.     Current Facility-Administered Medications on File Prior to Visit   Medication   • [COMPLETED] sodium chloride 0.9 % bolus 1,000 mL       Immunization History   Administered Date(s) Administered   • Flu Vaccine Quad PF >36MO 09/20/2018, 11/01/2019, 09/22/2020       Review of Systems      Objective     Pulse 76   Temp 97.6 °F (36.4 °C)   SpO2 98%       Physical Exam  Vitals reviewed.   Constitutional:       General: She is not in acute distress.     Appearance: She is well-developed. She is obese.   HENT:      Head: Normocephalic and atraumatic.   Eyes:      General: No scleral icterus.     Conjunctiva/sclera: Conjunctivae normal.   Cardiovascular:      Rate and Rhythm: Normal rate and regular rhythm.      Pulses: Normal pulses.      Heart sounds: No murmur heard.      Pulmonary:      Effort: Pulmonary effort is normal.      Breath sounds: Normal breath sounds. No wheezing, rhonchi or rales.   Abdominal:      General: Bowel sounds are normal. There is no distension.      Palpations: Abdomen is soft. There is no mass.      Tenderness: There is no abdominal tenderness. There is no guarding or rebound.   Musculoskeletal:         General: Normal range of motion.      Right lower leg: Edema present.      Left lower leg: Edema present.      Comments: Trace, non-pitting.   Skin:     General: Skin is warm and dry.   Neurological:      General: No focal deficit present.      Mental Status: She is alert. Mental status is at baseline.   Psychiatric:         Mood and Affect: Mood and affect normal.         Behavior: Behavior normal.         Thought Content: Thought content normal.         Judgment: Judgment normal.         Result Review :     The following data was reviewed by: CAROL Ibarra on 02/14/2022:    CMP    CMP 9/20/21 1/3/22 1/3/22 2/13/22     1054 1054    Glucose 89 96  99   BUN 12 10  9   Creatinine 1.21 (A) 1.42 (A)  1.29 (A)   eGFR Non African Am 43 (A) 36 (A)  40 (A)   Sodium 139 142  142   Potassium 4.0 4.3  3.5   Chloride 103 104  103   Calcium 9.0 9.7  9.3   Total Protein   6.9    Albumin 3.50 3.60 3.3 3.40 (A)   Globulin   3.6    Total Bilirubin 0.5 0.6  1.1   Alkaline Phosphatase 72 85  90   AST (SGOT) 28 32  36 (A)   ALT (SGPT) 11 14  13   (A) Abnormal value            CBC     CBC 9/20/21 1/3/22 2/13/22   WBC 5.16 5.15 5.96   RBC 4.86 4.41 4.49   Hemoglobin 14.4 12.7 12.7   Hematocrit 43.9 39.2 39.5   MCV 90.3 88.9 88.0   MCH 29.6 28.8 28.3   MCHC 32.8 32.4 32.2   RDW 14.9 13.1 15.4   Platelets 213 174 160           UA    Urinalysis 8/27/21 8/27/21 9/21/21 9/21/21 2/13/22 2/13/22    1349 1349 1430 1430 1322 1322   Squamous Epithelial Cells, UA 3-6 (A)   0-2  None Seen   Specific Dana, UA  1.018 1.015  1.009    Ketones, UA  15 mg/dL (1+) (A) Negative  Negative    Blood, UA  Moderate (2+) (A) Trace (A)  Small (1+) (A)    Leukocytes, UA  Moderate (2+) (A) Moderate (2+) (A)  Moderate (2+) (A)    Nitrite, UA  Positive (A) Positive (A)  Positive (A)    RBC, UA 0-2 (A)   None Seen  0-2 (A)   WBC, UA Too Numerous to Count (A)   Too Numerous to Count (A)  31-50 (A)   Bacteria, UA 3+ (A)   4+ (A)  Trace (A)   (A) Abnormal value            INR    [facility] 11/15/21 11/22/21 12/6/21 12/21/21 2/7/22 2/13/22   INR 2.70 3.00 2.6 (A) 2.50 3.80 (A) 2.46   (A) Abnormal value            Data reviewed: Recent hospitalization notes :   ED with Parminder Ferreira DO (02/13/2022)         Assessment and Plan      Diagnoses and all orders for this visit:    1. Encounter for examination following treatment at hospital (Primary)    2. Acute cystitis without hematuria  -     amoxicillin-clavulanate (Augmentin) 875-125 MG per tablet; Take 1 tablet by mouth 2 (Two) Times a Day.  Dispense: 14 tablet; Refill: 0  -     Urinalysis With Culture If Indicated - Urine, Clean Catch; Future    3. Acute confusion due to infection    4. Warfarin anticoagulation  Comments:  INR therapeutic in the ER yesterday. Continue 5mg daily warfarin and repeat INR at follow up next Monday. She tends to go high when on antibiotics.             Follow Up     Return in about 1 week (around 2/21/2022) for Recheck.    Patient was given instructions and counseling regarding her condition or for health maintenance advice. Please see specific  information pulled into the AVS if appropriate.     Isabell Ribera  reports that she has quit smoking. She has never used smokeless tobacco.

## 2022-02-21 ENCOUNTER — CLINICAL SUPPORT (OUTPATIENT)
Dept: FAMILY MEDICINE CLINIC | Facility: CLINIC | Age: 77
End: 2022-02-21

## 2022-02-21 DIAGNOSIS — N39.0 FREQUENT UTI: Primary | ICD-10-CM

## 2022-02-21 DIAGNOSIS — N30.00 ACUTE CYSTITIS WITHOUT HEMATURIA: ICD-10-CM

## 2022-02-21 LAB
BILIRUB BLD-MCNC: NEGATIVE MG/DL
BILIRUB UR QL STRIP: NEGATIVE
CLARITY UR: ABNORMAL
CLARITY, POC: CLEAR
COLOR UR: YELLOW
COLOR UR: YELLOW
EXPIRATION DATE: ABNORMAL
GLUCOSE UR STRIP-MCNC: NEGATIVE MG/DL
GLUCOSE UR STRIP-MCNC: NEGATIVE MG/DL
HGB UR QL STRIP.AUTO: ABNORMAL
KETONES UR QL STRIP: NEGATIVE
KETONES UR QL: NEGATIVE
LEUKOCYTE EST, POC: ABNORMAL
LEUKOCYTE ESTERASE UR QL STRIP.AUTO: ABNORMAL
Lab: ABNORMAL
NITRITE UR QL STRIP: NEGATIVE
NITRITE UR-MCNC: NEGATIVE MG/ML
PH UR STRIP.AUTO: 7.5 [PH] (ref 5–8)
PH UR: 7.5 [PH] (ref 5–8)
PROT UR QL STRIP: ABNORMAL
PROT UR STRIP-MCNC: ABNORMAL MG/DL
RBC # UR STRIP: ABNORMAL /UL
SP GR UR STRIP: 1.01 (ref 1–1.03)
SP GR UR: 1.01 (ref 1–1.03)
UROBILINOGEN UR QL STRIP: ABNORMAL
UROBILINOGEN UR QL: NORMAL

## 2022-02-21 PROCEDURE — 87077 CULTURE AEROBIC IDENTIFY: CPT | Performed by: NURSE PRACTITIONER

## 2022-02-21 PROCEDURE — 81001 URINALYSIS AUTO W/SCOPE: CPT | Performed by: NURSE PRACTITIONER

## 2022-02-21 PROCEDURE — 81003 URINALYSIS AUTO W/O SCOPE: CPT | Performed by: NURSE PRACTITIONER

## 2022-02-21 PROCEDURE — 87186 SC STD MICRODIL/AGAR DIL: CPT | Performed by: NURSE PRACTITIONER

## 2022-02-21 PROCEDURE — 87086 URINE CULTURE/COLONY COUNT: CPT | Performed by: NURSE PRACTITIONER

## 2022-02-22 LAB
BACTERIA UR QL AUTO: ABNORMAL /HPF
COARSE GRAN CASTS URNS QL MICRO: ABNORMAL /LPF
HYALINE CASTS UR QL AUTO: ABNORMAL /LPF
RBC # UR STRIP: ABNORMAL /HPF
REF LAB TEST METHOD: ABNORMAL
SQUAMOUS #/AREA URNS HPF: ABNORMAL /HPF
WBC # UR STRIP: ABNORMAL /HPF

## 2022-02-24 ENCOUNTER — TELEPHONE (OUTPATIENT)
Dept: FAMILY MEDICINE CLINIC | Facility: CLINIC | Age: 77
End: 2022-02-24

## 2022-02-24 DIAGNOSIS — N30.00 ACUTE CYSTITIS WITHOUT HEMATURIA: Primary | ICD-10-CM

## 2022-02-24 DIAGNOSIS — N30.00 ACUTE CYSTITIS WITHOUT HEMATURIA: ICD-10-CM

## 2022-02-24 LAB — BACTERIA SPEC AEROBE CULT: ABNORMAL

## 2022-02-24 RX ORDER — SULFAMETHOXAZOLE AND TRIMETHOPRIM 800; 160 MG/1; MG/1
1 TABLET ORAL 2 TIMES DAILY
Qty: 14 TABLET | Refills: 0 | OUTPATIENT
Start: 2022-02-24 | End: 2022-04-02

## 2022-02-24 RX ORDER — AMOXICILLIN AND CLAVULANATE POTASSIUM 875; 125 MG/1; MG/1
1 TABLET, FILM COATED ORAL 2 TIMES DAILY
Qty: 14 TABLET | Refills: 0 | OUTPATIENT
Start: 2022-02-24 | End: 2022-04-02

## 2022-02-24 NOTE — TELEPHONE ENCOUNTER
Caller: Isabell Ribera    Relationship: Self    Best call back number: [phone]    What is the best time to reach you: ANY    Who are you requesting to speak with (clinical staff, provider,  specific staff member): CLINICAL    What was the call regarding: PATIENT WOULD LIKE TO MAKE SURE ANTIBIOTICS ARE SENT TO [facility] IN [place].     Do you require a callback: YES

## 2022-02-25 ENCOUNTER — NURSE TRIAGE (OUTPATIENT)
Dept: CALL CENTER | Facility: HOSPITAL | Age: 77
End: 2022-02-25

## 2022-02-25 ENCOUNTER — PATIENT OUTREACH (OUTPATIENT)
Dept: CASE MANAGEMENT | Facility: OTHER | Age: 77
End: 2022-02-25

## 2022-02-25 DIAGNOSIS — I10 HYPERTENSION, UNSPECIFIED TYPE: ICD-10-CM

## 2022-02-25 DIAGNOSIS — I48.20 CHRONIC ATRIAL FIBRILLATION: Primary | ICD-10-CM

## 2022-02-25 PROCEDURE — G0511 CCM/BHI BY RHC/FQHC 20MIN MO: HCPCS | Performed by: NURSE PRACTITIONER

## 2022-02-25 NOTE — OUTREACH NOTE
Ambulatory Case Management Note    CCM Interim Update    Patient had recent ER visit for UTI symptoms. States that she doesn't particularly remember now why she went, but that she knows her  was worried about her so he took her in. She called yesterday to the office and has a new antibiotic waiting for her at her pharmacy. She will go pick this up today. She has an appointment on Monday to get her INR checked. She is aware that antibiotics can cause her INR to be abnormal, and she will be on the watch for any abnormal bruising or bleeding. She will call the office or report to the ER with any concerning bleeding. She reports a history of frequent UTI's, but it has been some time since she's had one. She used to see a urologist, but does not any longer.    Patient had been placed in monitoring status, but due to status change and ER visit, I will place her back in engaged.        There are no recently modified care plans to display for this patient.      Marylin Ruelas RN  Ambulatory Case Management    2/25/2022, 10:52 EST

## 2022-02-25 NOTE — TELEPHONE ENCOUNTER
"    Reason for Disposition  • [1] SEVERE weakness (i.e., unable to walk or barely able to walk, requires support) AND [2] new-onset or worsening    Additional Information  • Negative: SEVERE difficulty breathing (e.g., struggling for each breath, speaks in single words)  • Negative: Shock suspected (e.g., cold/pale/clammy skin, too weak to stand, low BP, rapid pulse)  • Negative: Difficult to awaken or acting confused (e.g., disoriented, slurred speech)  • Negative: [1] Fainted > 15 minutes ago AND [2] still feels too weak or dizzy to stand    Answer Assessment - Initial Assessment Questions  1. DESCRIPTION: \"Describe how you are feeling.\"      She cannot get out of bed for 2 weeks  2. SEVERITY: \"How bad is it?\"  \"Can you stand and walk?\"    - MILD - Feels weak or tired, but does not interfere with work, school or normal activities    - MODERATE - Able to stand and walk; weakness interferes with work, school, or normal activities    - SEVERE - Unable to stand or walk      severe  3. ONSET:  \"When did the weakness begin?\"      Two weeks  4. CAUSE: \"What do you think is causing the weakness?\"      UTI  5. MEDICINES: \"Have you recently started a new medicine or had a change in the amount of a medicine?\"      States UTI was chronic and not given antibiotic  6. OTHER SYMPTOMS: \"Do you have any other symptoms?\" (e.g., chest pain, fever, cough, SOB, vomiting, diarrhea, bleeding, other areas of pain)      Diarrhea, she is drinking small amounts of water.  Not taking solid food in two weeks according to caller.  Only fluid intake.    7. PREGNANCY: \"Is there any chance you are pregnant?\" \"When was your last menstrual period?\"      na    Protocols used: WEAKNESS (GENERALIZED) AND FATIGUE-ADULT-AH      "

## 2022-02-28 NOTE — OUTREACH NOTE
[facility] End of Month Documentation    This Chronic Medical Management Care Plan for Isabell Ribera, 76 y.o. female, has been monitored and managed and a new plan of care implemented for the month of February.  A cumulative time of 21  minutes was spent on this patient record this month, including chart review; phone call with patient.    Regarding the patient's problems: has Recurrent urinary tract infection on their problem list., the following items were addressed: No data recorded and any changes can be found within the plan section of the note.  A detailed listing of time spent for chronic care management is tracked within each outreach encounter.  Current medications include:  has a current medication list which includes the following prescription(s): amoxicillin-clavulanate, carvedilol, folic acid, furosemide, gabapentin, nystatin, potassium chloride, sulfamethoxazole-trimethoprim, and warfarin. and the patient is reported to be patient is compliant with medication protocol,  Medications are reported to be effective.  All notes on chart for PCP to review.    The patient was monitored remotely for activity level; blood glucose; mood & behavior; pain.    The patient's physical needs include:  needs assistance with ADLs; physical healthcare; help taking medications as prescribed, help with Coumadin management, INR checks.     The patient's mental support needs include:  continued support    The patient's cognitive support needs include:  continued support; requires supervision; needs assistance with ADLs; health care    The patient's psychosocial support needs include:  continued support    The patient's functional needs include: needs assistance for ADLs; physical healthcare; health care coverage; medication education    The patient's environmental needs include:  resources for disability needs; no access to transportation, Limited access to transportation at this time.     Care Plan overall comments:  No data  recorded    Refer to previous outreach notes for more information on the areas listed above.    Monthly Billing Diagnoses  (I48.20) Chronic atrial fibrillation (HCC)    (I10) Hypertension, unspecified type    Medications   · Medications have been reconciled    Care Plan progress this month:      Recently Modified Care Plans Updates made since 1/28/2022 12:00 AM     Community Resources         Problem Priority Last Modified     LIMITED FINANCIAL RESOURCES --  6/23/2021  3:21 PM by Micaela Ramirez RN              Goal Recent Progress Last Modified     Establish options for financial assistance --  6/23/2021  3:21 PM by Micaela Ramirez RN              Task Due Date Last Modified     Discuss financial planning with patient --  2/9/2022  1:06 PM by Marylin Ruelas RN        Refer patient to social work --  2/9/2022  1:06 PM by Marylin Ruelas RN                      Instructions   · Patient was provided an electronic copy of care plan  · CCM services were explained and offered and patient has accepted these services.  · Patient has given their written consent to receive CCM services and understands that this includes the authorization of electronic communication of medical information with the other treating providers.  · Patient understands that they may stop CCM services at any time and these changes will be effective at the end of the calendar month and will effectively revocate the agreement of CCM services.  · Patient understands that only one practitioner can furnish and be paid for CCM services during one calendar month.  Patient also understands that there may be co-payment or deductible fees in association with CCM services.  · Patient will continue with at least monthly follow-up calls with the Nurse Navigator.    Marylin Ruelas RN  Ambulatory Case Management    2/28/2022, 09:19 EST

## 2022-03-07 ENCOUNTER — TELEPHONE (OUTPATIENT)
Dept: FAMILY MEDICINE CLINIC | Facility: CLINIC | Age: 77
End: 2022-03-07

## 2022-03-07 DIAGNOSIS — R29.898 WEAKNESS OF BOTH LOWER EXTREMITIES: ICD-10-CM

## 2022-03-07 DIAGNOSIS — R26.81 UNSTEADY GAIT: Primary | ICD-10-CM

## 2022-03-07 NOTE — TELEPHONE ENCOUNTER
Caller: MIS YO    Relationship to patient: Emergency Contact    Best call back number: [phone]    Patient is needing: PATIENT'S DAUGHTER WOULD LIKE TO DISCUSS HER MOTHERS CARE WITH APRN [facility]SE[facility]. PATIENT HAS CANCELED 3 APPOINTMENTS AND DAUGHTER HAS ISSUES GETTING HER OUT OF BED. PATIENT'S DAUGHTER IS WANTING TO DISCUSS PHYSICAL THERAPY AT HOME FOR HER MOTHER TO TRY TO HELP STRENGTHEN HER LEGS.

## 2022-03-10 ENCOUNTER — HOME HEALTH ADMISSION (OUTPATIENT)
Dept: HOME HEALTH SERVICES | Facility: HOME HEALTHCARE | Age: 77
End: 2022-03-10

## 2022-03-16 ENCOUNTER — TELEPHONE (OUTPATIENT)
Dept: FAMILY MEDICINE CLINIC | Facility: CLINIC | Age: 77
End: 2022-03-16

## 2022-03-16 DIAGNOSIS — Z74.09 IMPAIRED PHYSICAL MOBILITY: Primary | ICD-10-CM

## 2022-03-16 DIAGNOSIS — Z79.01 WARFARIN ANTICOAGULATION: ICD-10-CM

## 2022-03-16 NOTE — TELEPHONE ENCOUNTER
I gave patient the number for [facility]  [phone]   Pt wanted to let you know that she called [facility]  [phone] for her coumadin therapy to be monitored but they said they had no orders and wanted to know if you would send them again. I can call if needed

## 2022-03-16 NOTE — TELEPHONE ENCOUNTER
Caller: MIS YO    Relationship: Emergency Contact    Best call back number: [phone]     What is the best time to reach you: ANYTIME    What was the call regarding: DAUGHTER IS CALLING IN REGARDS TO THE MESSAGE FROM 3-7-22. DAUGHTER HAS MISPLACED THE PAPER WITH THE NUMBERS FOR [facility] AND HOME [facility].     Do you require a callback: YES

## 2022-03-31 ENCOUNTER — LAB REQUISITION (OUTPATIENT)
Dept: LAB | Facility: HOSPITAL | Age: 77
End: 2022-03-31

## 2022-03-31 DIAGNOSIS — R53.83 OTHER FATIGUE: ICD-10-CM

## 2022-03-31 DIAGNOSIS — N39.0 URINARY TRACT INFECTION, SITE NOT SPECIFIED: ICD-10-CM

## 2022-03-31 LAB
ALBUMIN SERPL-MCNC: 3 G/DL (ref 3.5–5.2)
ALBUMIN/GLOB SERPL: 0.8 G/DL
ALP SERPL-CCNC: 104 U/L (ref 39–117)
ALT SERPL W P-5'-P-CCNC: 15 U/L (ref 1–33)
ANION GAP SERPL CALCULATED.3IONS-SCNC: 13.4 MMOL/L (ref 5–15)
AST SERPL-CCNC: 29 U/L (ref 1–32)
BACTERIA UR QL AUTO: ABNORMAL /HPF
BASOPHILS # BLD AUTO: 0.03 10*3/MM3 (ref 0–0.2)
BASOPHILS NFR BLD AUTO: 0.4 % (ref 0–1.5)
BILIRUB SERPL-MCNC: 1 MG/DL (ref 0–1.2)
BILIRUB UR QL STRIP: NEGATIVE
BUN SERPL-MCNC: 15 MG/DL (ref 8–23)
BUN/CREAT SERPL: 12.5 (ref 7–25)
CALCIUM SPEC-SCNC: 9 MG/DL (ref 8.6–10.5)
CHLORIDE SERPL-SCNC: 100 MMOL/L (ref 98–107)
CLARITY UR: ABNORMAL
CO2 SERPL-SCNC: 23.6 MMOL/L (ref 22–29)
COLOR UR: YELLOW
CREAT SERPL-MCNC: 1.2 MG/DL (ref 0.57–1)
DEPRECATED RDW RBC AUTO: 53 FL (ref 37–54)
EGFRCR SERPLBLD CKD-EPI 2021: 47 ML/MIN/1.73
EOSINOPHIL # BLD AUTO: 0.23 10*3/MM3 (ref 0–0.4)
EOSINOPHIL NFR BLD AUTO: 3 % (ref 0.3–6.2)
ERYTHROCYTE [DISTWIDTH] IN BLOOD BY AUTOMATED COUNT: 17.5 % (ref 12.3–15.4)
GLOBULIN UR ELPH-MCNC: 3.6 GM/DL
GLUCOSE SERPL-MCNC: 89 MG/DL (ref 65–99)
GLUCOSE UR STRIP-MCNC: NEGATIVE MG/DL
HCT VFR BLD AUTO: 53.6 % (ref 34–46.6)
HGB BLD-MCNC: 17.4 G/DL (ref 12–15.9)
HGB UR QL STRIP.AUTO: ABNORMAL
HYALINE CASTS UR QL AUTO: ABNORMAL /LPF
IMM GRANULOCYTES # BLD AUTO: 0.03 10*3/MM3 (ref 0–0.05)
IMM GRANULOCYTES NFR BLD AUTO: 0.4 % (ref 0–0.5)
KETONES UR QL STRIP: NEGATIVE
LEUKOCYTE ESTERASE UR QL STRIP.AUTO: ABNORMAL
LYMPHOCYTES # BLD AUTO: 1.45 10*3/MM3 (ref 0.7–3.1)
LYMPHOCYTES NFR BLD AUTO: 19.1 % (ref 19.6–45.3)
MCH RBC QN AUTO: 28.1 PG (ref 26.6–33)
MCHC RBC AUTO-ENTMCNC: 32.5 G/DL (ref 31.5–35.7)
MCV RBC AUTO: 86.5 FL (ref 79–97)
MONOCYTES # BLD AUTO: 0.69 10*3/MM3 (ref 0.1–0.9)
MONOCYTES NFR BLD AUTO: 9.1 % (ref 5–12)
NEUTROPHILS NFR BLD AUTO: 5.17 10*3/MM3 (ref 1.7–7)
NEUTROPHILS NFR BLD AUTO: 68 % (ref 42.7–76)
NITRITE UR QL STRIP: POSITIVE
NRBC BLD AUTO-RTO: 0 /100 WBC (ref 0–0.2)
PH UR STRIP.AUTO: 8 [PH] (ref 5–8)
PLATELET # BLD AUTO: 248 10*3/MM3 (ref 140–450)
PMV BLD AUTO: 10.7 FL (ref 6–12)
POTASSIUM SERPL-SCNC: 4 MMOL/L (ref 3.5–5.2)
PROT SERPL-MCNC: 6.6 G/DL (ref 6–8.5)
PROT UR QL STRIP: ABNORMAL
RBC # BLD AUTO: 6.2 10*6/MM3 (ref 3.77–5.28)
RBC # UR STRIP: ABNORMAL /HPF
REF LAB TEST METHOD: ABNORMAL
SODIUM SERPL-SCNC: 137 MMOL/L (ref 136–145)
SP GR UR STRIP: 1.01 (ref 1–1.03)
SQUAMOUS #/AREA URNS HPF: ABNORMAL /HPF
TRI-PHOS CRY URNS QL MICRO: ABNORMAL /HPF
UROBILINOGEN UR QL STRIP: ABNORMAL
WBC # UR STRIP: ABNORMAL /HPF
WBC NRBC COR # BLD: 7.6 10*3/MM3 (ref 3.4–10.8)

## 2022-03-31 PROCEDURE — 87186 SC STD MICRODIL/AGAR DIL: CPT | Performed by: NURSE PRACTITIONER

## 2022-03-31 PROCEDURE — 85025 COMPLETE CBC W/AUTO DIFF WBC: CPT | Performed by: NURSE PRACTITIONER

## 2022-03-31 PROCEDURE — 81001 URINALYSIS AUTO W/SCOPE: CPT | Performed by: NURSE PRACTITIONER

## 2022-03-31 PROCEDURE — 80053 COMPREHEN METABOLIC PANEL: CPT | Performed by: NURSE PRACTITIONER

## 2022-03-31 PROCEDURE — 87086 URINE CULTURE/COLONY COUNT: CPT | Performed by: NURSE PRACTITIONER

## 2022-03-31 PROCEDURE — 87077 CULTURE AEROBIC IDENTIFY: CPT | Performed by: NURSE PRACTITIONER

## 2022-04-01 ENCOUNTER — HOSPITAL ENCOUNTER (EMERGENCY)
Facility: HOSPITAL | Age: 77
Discharge: HOME | End: 2022-04-02
Attending: EMERGENCY MEDICINE | Admitting: EMERGENCY MEDICINE

## 2022-04-01 DIAGNOSIS — N39.0 URINARY TRACT INFECTION IN FEMALE: ICD-10-CM

## 2022-04-01 DIAGNOSIS — R79.1 SUPRATHERAPEUTIC INTERNATIONAL NORMALIZED RATIO (INR): Primary | ICD-10-CM

## 2022-04-01 LAB
ALBUMIN SERPL-MCNC: 3.2 G/DL (ref 3.5–5.2)
ALBUMIN/GLOB SERPL: 0.9 G/DL
ALP SERPL-CCNC: 106 U/L (ref 39–117)
ALT SERPL W P-5'-P-CCNC: 15 U/L (ref 1–33)
ANION GAP SERPL CALCULATED.3IONS-SCNC: 11.9 MMOL/L (ref 5–15)
AST SERPL-CCNC: 33 U/L (ref 1–32)
BACTERIA UR QL AUTO: ABNORMAL /HPF
BASOPHILS # BLD AUTO: 0.08 10*3/MM3 (ref 0–0.2)
BASOPHILS NFR BLD AUTO: 1 % (ref 0–1.5)
BILIRUB SERPL-MCNC: 0.8 MG/DL (ref 0–1.2)
BILIRUB UR QL STRIP: NEGATIVE
BUN SERPL-MCNC: 16 MG/DL (ref 8–23)
BUN/CREAT SERPL: 13.7 (ref 7–25)
CALCIUM SPEC-SCNC: 8.9 MG/DL (ref 8.6–10.5)
CHLORIDE SERPL-SCNC: 100 MMOL/L (ref 98–107)
CLARITY UR: ABNORMAL
CO2 SERPL-SCNC: 25.1 MMOL/L (ref 22–29)
COLOR UR: YELLOW
CREAT SERPL-MCNC: 1.17 MG/DL (ref 0.57–1)
DEPRECATED RDW RBC AUTO: 52.7 FL (ref 37–54)
EGFRCR SERPLBLD CKD-EPI 2021: 48.5 ML/MIN/1.73
EOSINOPHIL # BLD AUTO: 0.32 10*3/MM3 (ref 0–0.4)
EOSINOPHIL NFR BLD AUTO: 4 % (ref 0.3–6.2)
ERYTHROCYTE [DISTWIDTH] IN BLOOD BY AUTOMATED COUNT: 18.1 % (ref 12.3–15.4)
GLOBULIN UR ELPH-MCNC: 3.7 GM/DL
GLUCOSE SERPL-MCNC: 129 MG/DL (ref 65–99)
GLUCOSE UR STRIP-MCNC: NEGATIVE MG/DL
HCT VFR BLD AUTO: 51.4 % (ref 34–46.6)
HGB BLD-MCNC: 17.3 G/DL (ref 12–15.9)
HGB UR QL STRIP.AUTO: ABNORMAL
HOLD SPECIMEN: NORMAL
HOLD SPECIMEN: NORMAL
HYALINE CASTS UR QL AUTO: ABNORMAL /LPF
IMM GRANULOCYTES # BLD AUTO: 0.03 10*3/MM3 (ref 0–0.05)
IMM GRANULOCYTES NFR BLD AUTO: 0.4 % (ref 0–0.5)
INR PPP: 3.66 (ref 2–3)
KETONES UR QL STRIP: NEGATIVE
LEUKOCYTE ESTERASE UR QL STRIP.AUTO: ABNORMAL
LYMPHOCYTES # BLD AUTO: 1.67 10*3/MM3 (ref 0.7–3.1)
LYMPHOCYTES NFR BLD AUTO: 20.7 % (ref 19.6–45.3)
MCH RBC QN AUTO: 28.9 PG (ref 26.6–33)
MCHC RBC AUTO-ENTMCNC: 33.7 G/DL (ref 31.5–35.7)
MCV RBC AUTO: 86 FL (ref 79–97)
MONOCYTES # BLD AUTO: 0.78 10*3/MM3 (ref 0.1–0.9)
MONOCYTES NFR BLD AUTO: 9.7 % (ref 5–12)
NEUTROPHILS NFR BLD AUTO: 5.2 10*3/MM3 (ref 1.7–7)
NEUTROPHILS NFR BLD AUTO: 64.2 % (ref 42.7–76)
NITRITE UR QL STRIP: POSITIVE
NRBC BLD AUTO-RTO: 0 /100 WBC (ref 0–0.2)
PH UR STRIP.AUTO: 7.5 [PH] (ref 5–8)
PLATELET # BLD AUTO: 264 10*3/MM3 (ref 140–450)
PMV BLD AUTO: 9.7 FL (ref 6–12)
POTASSIUM SERPL-SCNC: 4 MMOL/L (ref 3.5–5.2)
PROT SERPL-MCNC: 6.9 G/DL (ref 6–8.5)
PROT UR QL STRIP: ABNORMAL
PROTHROMBIN TIME: 35.4 SECONDS (ref 9.4–12)
RBC # BLD AUTO: 5.98 10*6/MM3 (ref 3.77–5.28)
RBC # UR STRIP: ABNORMAL /HPF
REF LAB TEST METHOD: ABNORMAL
SODIUM SERPL-SCNC: 137 MMOL/L (ref 136–145)
SP GR UR STRIP: 1.01 (ref 1–1.03)
SQUAMOUS #/AREA URNS HPF: ABNORMAL /HPF
UROBILINOGEN UR QL STRIP: ABNORMAL
WBC # UR STRIP: ABNORMAL /HPF
WBC NRBC COR # BLD: 8.08 10*3/MM3 (ref 3.4–10.8)
WHOLE BLOOD HOLD SPECIMEN: NORMAL
WHOLE BLOOD HOLD SPECIMEN: NORMAL

## 2022-04-01 PROCEDURE — 80053 COMPREHEN METABOLIC PANEL: CPT | Performed by: EMERGENCY MEDICINE

## 2022-04-01 PROCEDURE — 85025 COMPLETE CBC W/AUTO DIFF WBC: CPT | Performed by: EMERGENCY MEDICINE

## 2022-04-01 PROCEDURE — 87077 CULTURE AEROBIC IDENTIFY: CPT | Performed by: NURSE PRACTITIONER

## 2022-04-01 PROCEDURE — 87086 URINE CULTURE/COLONY COUNT: CPT | Performed by: NURSE PRACTITIONER

## 2022-04-01 PROCEDURE — 96365 THER/PROPH/DIAG IV INF INIT: CPT

## 2022-04-01 PROCEDURE — 25010000002 CEFTRIAXONE PER 250 MG: Performed by: NURSE PRACTITIONER

## 2022-04-01 PROCEDURE — P9612 CATHETERIZE FOR URINE SPEC: HCPCS

## 2022-04-01 PROCEDURE — 81001 URINALYSIS AUTO W/SCOPE: CPT | Performed by: NURSE PRACTITIONER

## 2022-04-01 PROCEDURE — 87186 SC STD MICRODIL/AGAR DIL: CPT | Performed by: NURSE PRACTITIONER

## 2022-04-01 PROCEDURE — 99283 EMERGENCY DEPT VISIT LOW MDM: CPT

## 2022-04-01 PROCEDURE — 85610 PROTHROMBIN TIME: CPT | Performed by: NURSE PRACTITIONER

## 2022-04-01 RX ORDER — CEFTRIAXONE SODIUM 1 G/50ML
1 INJECTION, SOLUTION INTRAVENOUS ONCE
Status: COMPLETED | OUTPATIENT
Start: 2022-04-01 | End: 2022-04-02

## 2022-04-01 RX ADMIN — CEFTRIAXONE SODIUM 1 G: 1 INJECTION, SOLUTION INTRAVENOUS at 23:38

## 2022-04-02 VITALS
DIASTOLIC BLOOD PRESSURE: 95 MMHG | OXYGEN SATURATION: 99 % | HEART RATE: 64 BPM | TEMPERATURE: 97.8 F | WEIGHT: 174.16 LBS | RESPIRATION RATE: 18 BRPM | SYSTOLIC BLOOD PRESSURE: 158 MMHG | HEIGHT: 69 IN | BODY MASS INDEX: 25.8 KG/M2

## 2022-04-02 LAB — BACTERIA SPEC AEROBE CULT: ABNORMAL

## 2022-04-02 RX ORDER — CEPHALEXIN 500 MG/1
500 CAPSULE ORAL 4 TIMES DAILY
Qty: 40 CAPSULE | Refills: 0 | Status: SHIPPED | OUTPATIENT
Start: 2022-04-02 | End: 2022-05-20

## 2022-04-02 NOTE — ED PROVIDER NOTES
"Subjective   Patient presents to the emergency department today stating \"I am just not feeling good\".  She reports that her home health nurse told her that her urine was dark and patient does have a history of chronic urinary tract infections.  She has never seen a urologist.  Her primary care provider is Nola Hackett in [place].  She does have states that she has a kidney doctor but does not know the name.  She denies any symptoms including pain with urination, frequency, urgency, nausea, vomiting, diarrhea.      History provided by:  Parent   used: No        Review of Systems   Constitutional: Negative for chills and fever.        \"I just do not feel good\"   HENT: Negative for congestion, ear pain, rhinorrhea and sore throat.    Eyes: Negative for pain.   Respiratory: Negative for cough and shortness of breath.    Cardiovascular: Negative for chest pain.   Gastrointestinal: Negative for abdominal pain, diarrhea, nausea and vomiting.   Genitourinary: Negative for decreased urine volume, dysuria and flank pain.   Musculoskeletal: Negative for arthralgias and myalgias.   Skin: Negative for rash.   Neurological: Negative for seizures and headaches.   All other systems reviewed and are negative.      Past Medical History:   Diagnosis Date   • Afib (HCC)    • Aftercare following ankle joint replacement surgery 06/01/2015   • Arthritis    • Arthritis of knee 06/01/2015   • Essential hypertension    • Gout    • HTN (hypertension)    • Left knee pain    • Lymphedema    • Recurrent UTI 05/11/2021   • Urinary retention 05/11/2021       No Known Allergies    Past Surgical History:   Procedure Laterality Date   • CATARACT EXTRACTION     • COLONOSCOPY     • KIDNEY STONE SURGERY     • KNEE SURGERY Left 2007    REPLACEMENT   • OTHER SURGICAL HISTORY      JOINT SURGERY, UNSPECIFIED   • TUBAL ABDOMINAL LIGATION         Family History   Problem Relation Age of Onset   • Heart failure Mother 64        " CONGESTIVE   • Arthritis Mother    • Arthritis Sister        Social History     Socioeconomic History   • Marital status:    Tobacco Use   • Smoking status: Former Smoker   • Smokeless tobacco: Never Used   Vaping Use   • Vaping Use: Never used   Substance and Sexual Activity   • Alcohol use: Never           Objective   Physical Exam  Vitals and nursing note reviewed.   Constitutional:       General: She is not in acute distress.     Appearance: Normal appearance. She is normal weight. She is not ill-appearing, toxic-appearing or diaphoretic.   HENT:      Head: Normocephalic and atraumatic.      Right Ear: External ear normal.      Left Ear: External ear normal.   Eyes:      General: No scleral icterus.     Conjunctiva/sclera: Conjunctivae normal.      Pupils: Pupils are equal, round, and reactive to light.   Cardiovascular:      Rate and Rhythm: Normal rate and regular rhythm.      Heart sounds: Normal heart sounds.   Pulmonary:      Effort: Pulmonary effort is normal. No respiratory distress.      Breath sounds: Normal breath sounds.   Abdominal:      General: Bowel sounds are normal. There is no distension.      Palpations: Abdomen is soft.      Tenderness: There is no abdominal tenderness.   Musculoskeletal:         General: No swelling, tenderness, deformity or signs of injury. Normal range of motion.      Cervical back: Normal range of motion and neck supple.   Skin:     General: Skin is warm and dry.      Capillary Refill: Capillary refill takes less than 2 seconds.   Neurological:      General: No focal deficit present.      Mental Status: She is alert and oriented to person, place, and time.   Psychiatric:         Mood and Affect: Mood normal.         Behavior: Behavior normal.         Procedures           ED Course                                                 MDM  Number of Diagnoses or Management Options  Supratherapeutic international normalized ratio (INR): new and requires workup  Urinary  tract infection in female: new and requires workup     Amount and/or Complexity of Data Reviewed  Clinical lab tests: reviewed and ordered  Decide to obtain previous medical records or to obtain history from someone other than the patient: yes    Risk of Complications, Morbidity, and/or Mortality  Presenting problems: moderate  Diagnostic procedures: moderate  Management options: moderate    Patient Progress  Patient progress: stable      Final diagnoses:   Supratherapeutic international normalized ratio (INR)   Urinary tract infection in female       ED Disposition  ED Disposition     ED Disposition   Discharge    Condition   Stable    Comment   --             Ivanna Frederick, APRN  [address] [place] [address] [phone]    In 1 week      Andrea Ac MD  [address] [place] [address] [place] [address] [phone]               Medication List      New Prescriptions    cephalexin 500 MG capsule  Commonly known as: KEFLEX  Take 1 capsule by mouth 4 (Four) Times a Day.        Stop    amoxicillin-clavulanate 875-125 MG per tablet  Commonly known as: Augmentin     sulfamethoxazole-trimethoprim 800-160 MG per tablet  Commonly known as: Bactrim DS           Where to Get Your Medications      These medications were sent to [facility] [place] - [address] [facility] - [phone]  - [phone]   [address] [facility] [place] [address]    Phone: [phone]   · cephalexin 500 MG capsule          Mendy Jessica, APRDANITZA  04/02/22 [id number]

## 2022-04-02 NOTE — DISCHARGE INSTRUCTIONS
As discussed, your INR number is too high indicating that your blood is thinner than desired.  Please skip your next dose of the warfarin and then continue taking it as prescribed.  Please contact your primary care provider to schedule an appointment as soon as possible for reevaluation of your lab testing.    Please take all of your antibiotics as prescribed for your urinary tract infection and schedule an appointment with Dr. Ac with urology to determine if there is anything he can determine that may be causing your continued urinary tract infections.

## 2022-04-03 LAB
BACTERIA SPEC AEROBE CULT: ABNORMAL
BACTERIA SPEC AEROBE CULT: ABNORMAL

## 2022-04-15 ENCOUNTER — TELEPHONE (OUTPATIENT)
Dept: CASE MANAGEMENT | Facility: OTHER | Age: 77
End: 2022-04-15

## 2022-05-05 ENCOUNTER — LAB REQUISITION (OUTPATIENT)
Dept: LAB | Facility: HOSPITAL | Age: 77
End: 2022-05-05

## 2022-05-05 DIAGNOSIS — N39.0 URINARY TRACT INFECTION, SITE NOT SPECIFIED: ICD-10-CM

## 2022-05-05 LAB
BACTERIA UR QL AUTO: ABNORMAL /HPF
BILIRUB UR QL STRIP: NEGATIVE
CLARITY UR: CLEAR
COLOR UR: YELLOW
GLUCOSE UR STRIP-MCNC: NEGATIVE MG/DL
HGB UR QL STRIP.AUTO: ABNORMAL
HYALINE CASTS UR QL AUTO: ABNORMAL /LPF
KETONES UR QL STRIP: NEGATIVE
LEUKOCYTE ESTERASE UR QL STRIP.AUTO: ABNORMAL
NITRITE UR QL STRIP: POSITIVE
PH UR STRIP.AUTO: 7 [PH] (ref 5–8)
PROT UR QL STRIP: ABNORMAL
RBC # UR STRIP: ABNORMAL /HPF
REF LAB TEST METHOD: ABNORMAL
SP GR UR STRIP: 1.01 (ref 1–1.03)
SQUAMOUS #/AREA URNS HPF: ABNORMAL /HPF
UROBILINOGEN UR QL STRIP: ABNORMAL
WBC # UR STRIP: ABNORMAL /HPF

## 2022-05-05 PROCEDURE — 87186 SC STD MICRODIL/AGAR DIL: CPT | Performed by: PHYSICAL MEDICINE & REHABILITATION

## 2022-05-05 PROCEDURE — 87086 URINE CULTURE/COLONY COUNT: CPT | Performed by: PHYSICAL MEDICINE & REHABILITATION

## 2022-05-05 PROCEDURE — 81001 URINALYSIS AUTO W/SCOPE: CPT | Performed by: PHYSICAL MEDICINE & REHABILITATION

## 2022-05-05 PROCEDURE — 87077 CULTURE AEROBIC IDENTIFY: CPT | Performed by: PHYSICAL MEDICINE & REHABILITATION

## 2022-05-07 LAB — BACTERIA SPEC AEROBE CULT: ABNORMAL

## 2022-05-20 ENCOUNTER — HOSPITAL ENCOUNTER (EMERGENCY)
Facility: HOSPITAL | Age: 77
Discharge: HOME | End: 2022-05-20
Attending: EMERGENCY MEDICINE | Admitting: EMERGENCY MEDICINE

## 2022-05-20 VITALS
DIASTOLIC BLOOD PRESSURE: 81 MMHG | TEMPERATURE: 98.8 F | RESPIRATION RATE: 14 BRPM | OXYGEN SATURATION: 97 % | HEART RATE: 69 BPM | HEIGHT: 69 IN | BODY MASS INDEX: 25.72 KG/M2 | SYSTOLIC BLOOD PRESSURE: 133 MMHG

## 2022-05-20 DIAGNOSIS — M62.89 MUSCLE FATIGUE: ICD-10-CM

## 2022-05-20 DIAGNOSIS — R29.898 WEAKNESS OF BOTH LOWER EXTREMITIES: ICD-10-CM

## 2022-05-20 DIAGNOSIS — N39.0 URINARY TRACT INFECTION WITHOUT HEMATURIA, SITE UNSPECIFIED: Primary | ICD-10-CM

## 2022-05-20 LAB
ALBUMIN SERPL-MCNC: 3 G/DL (ref 3.5–5.2)
ALBUMIN/GLOB SERPL: 0.7 G/DL
ALP SERPL-CCNC: 137 U/L (ref 39–117)
ALT SERPL W P-5'-P-CCNC: 12 U/L (ref 1–33)
ANION GAP SERPL CALCULATED.3IONS-SCNC: 12.3 MMOL/L (ref 5–15)
AST SERPL-CCNC: 29 U/L (ref 1–32)
BACTERIA UR QL AUTO: ABNORMAL /HPF
BASOPHILS # BLD AUTO: 0.05 10*3/MM3 (ref 0–0.2)
BASOPHILS NFR BLD AUTO: 0.6 % (ref 0–1.5)
BILIRUB SERPL-MCNC: 0.8 MG/DL (ref 0–1.2)
BILIRUB UR QL STRIP: NEGATIVE
BUN SERPL-MCNC: 21 MG/DL (ref 8–23)
BUN/CREAT SERPL: 18.3 (ref 7–25)
CALCIUM SPEC-SCNC: 9.6 MG/DL (ref 8.6–10.5)
CHLORIDE SERPL-SCNC: 99 MMOL/L (ref 98–107)
CK SERPL-CCNC: 23 U/L (ref 20–180)
CLARITY UR: ABNORMAL
CO2 SERPL-SCNC: 20.7 MMOL/L (ref 22–29)
COLOR UR: YELLOW
CREAT SERPL-MCNC: 1.15 MG/DL (ref 0.57–1)
D-LACTATE SERPL-SCNC: 1.2 MMOL/L (ref 0.5–2)
DEPRECATED RDW RBC AUTO: 49.3 FL (ref 37–54)
EGFRCR SERPLBLD CKD-EPI 2021: 49.5 ML/MIN/1.73
EOSINOPHIL # BLD AUTO: 0.04 10*3/MM3 (ref 0–0.4)
EOSINOPHIL NFR BLD AUTO: 0.4 % (ref 0.3–6.2)
ERYTHROCYTE [DISTWIDTH] IN BLOOD BY AUTOMATED COUNT: 15.1 % (ref 12.3–15.4)
GLOBULIN UR ELPH-MCNC: 4.6 GM/DL
GLUCOSE SERPL-MCNC: 124 MG/DL (ref 65–99)
GLUCOSE UR STRIP-MCNC: NEGATIVE MG/DL
HCT VFR BLD AUTO: 42.7 % (ref 34–46.6)
HGB BLD-MCNC: 14.2 G/DL (ref 12–15.9)
HGB UR QL STRIP.AUTO: ABNORMAL
HOLD SPECIMEN: NORMAL
HOLD SPECIMEN: NORMAL
HYALINE CASTS UR QL AUTO: ABNORMAL /LPF
IMM GRANULOCYTES # BLD AUTO: 0.03 10*3/MM3 (ref 0–0.05)
IMM GRANULOCYTES NFR BLD AUTO: 0.3 % (ref 0–0.5)
INR PPP: 1.66 (ref 0.86–1.15)
KETONES UR QL STRIP: NEGATIVE
LEUKOCYTE ESTERASE UR QL STRIP.AUTO: ABNORMAL
LIPASE SERPL-CCNC: 33 U/L (ref 13–60)
LYMPHOCYTES # BLD AUTO: 1.27 10*3/MM3 (ref 0.7–3.1)
LYMPHOCYTES NFR BLD AUTO: 14 % (ref 19.6–45.3)
MAGNESIUM SERPL-MCNC: 1.8 MG/DL (ref 1.6–2.4)
MCH RBC QN AUTO: 29.4 PG (ref 26.6–33)
MCHC RBC AUTO-ENTMCNC: 33.3 G/DL (ref 31.5–35.7)
MCV RBC AUTO: 88.4 FL (ref 79–97)
MONOCYTES # BLD AUTO: 1.13 10*3/MM3 (ref 0.1–0.9)
MONOCYTES NFR BLD AUTO: 12.4 % (ref 5–12)
NEUTROPHILS NFR BLD AUTO: 6.56 10*3/MM3 (ref 1.7–7)
NEUTROPHILS NFR BLD AUTO: 72.3 % (ref 42.7–76)
NITRITE UR QL STRIP: POSITIVE
NRBC BLD AUTO-RTO: 0 /100 WBC (ref 0–0.2)
PH UR STRIP.AUTO: 7.5 [PH] (ref 5–8)
PLATELET # BLD AUTO: 290 10*3/MM3 (ref 140–450)
PMV BLD AUTO: 9.5 FL (ref 6–12)
POTASSIUM SERPL-SCNC: 4.4 MMOL/L (ref 3.5–5.2)
PROT SERPL-MCNC: 7.6 G/DL (ref 6–8.5)
PROT UR QL STRIP: ABNORMAL
PROTHROMBIN TIME: 19.9 SECONDS (ref 11.8–14.9)
RBC # BLD AUTO: 4.83 10*6/MM3 (ref 3.77–5.28)
RBC # UR STRIP: ABNORMAL /HPF
REF LAB TEST METHOD: ABNORMAL
SODIUM SERPL-SCNC: 132 MMOL/L (ref 136–145)
SP GR UR STRIP: 1.01 (ref 1–1.03)
SQUAMOUS #/AREA URNS HPF: ABNORMAL /HPF
T4 FREE SERPL-MCNC: 0.86 NG/DL (ref 0.93–1.7)
TRI-PHOS CRY URNS QL MICRO: ABNORMAL /HPF
TSH SERPL DL<=0.05 MIU/L-ACNC: 3.57 UIU/ML (ref 0.27–4.2)
UROBILINOGEN UR QL STRIP: ABNORMAL
WBC # UR STRIP: ABNORMAL /HPF
WBC NRBC COR # BLD: 9.08 10*3/MM3 (ref 3.4–10.8)
WHOLE BLOOD HOLD COAG: NORMAL
WHOLE BLOOD HOLD SPECIMEN: NORMAL

## 2022-05-20 PROCEDURE — 84443 ASSAY THYROID STIM HORMONE: CPT | Performed by: EMERGENCY MEDICINE

## 2022-05-20 PROCEDURE — 83690 ASSAY OF LIPASE: CPT | Performed by: EMERGENCY MEDICINE

## 2022-05-20 PROCEDURE — 82550 ASSAY OF CK (CPK): CPT | Performed by: EMERGENCY MEDICINE

## 2022-05-20 PROCEDURE — 81001 URINALYSIS AUTO W/SCOPE: CPT | Performed by: EMERGENCY MEDICINE

## 2022-05-20 PROCEDURE — 80053 COMPREHEN METABOLIC PANEL: CPT | Performed by: EMERGENCY MEDICINE

## 2022-05-20 PROCEDURE — 99283 EMERGENCY DEPT VISIT LOW MDM: CPT

## 2022-05-20 PROCEDURE — 83605 ASSAY OF LACTIC ACID: CPT | Performed by: EMERGENCY MEDICINE

## 2022-05-20 PROCEDURE — 85610 PROTHROMBIN TIME: CPT | Performed by: EMERGENCY MEDICINE

## 2022-05-20 PROCEDURE — 85025 COMPLETE CBC W/AUTO DIFF WBC: CPT | Performed by: EMERGENCY MEDICINE

## 2022-05-20 PROCEDURE — 84439 ASSAY OF FREE THYROXINE: CPT | Performed by: EMERGENCY MEDICINE

## 2022-05-20 PROCEDURE — 83735 ASSAY OF MAGNESIUM: CPT | Performed by: EMERGENCY MEDICINE

## 2022-05-20 PROCEDURE — P9612 CATHETERIZE FOR URINE SPEC: HCPCS

## 2022-05-20 RX ORDER — SODIUM CHLORIDE 0.9 % (FLUSH) 0.9 %
10 SYRINGE (ML) INJECTION AS NEEDED
Status: DISCONTINUED | OUTPATIENT
Start: 2022-05-20 | End: 2022-05-20 | Stop reason: HOSPADM

## 2022-05-20 RX ORDER — CEPHALEXIN 500 MG/1
500 CAPSULE ORAL 4 TIMES DAILY
Qty: 28 CAPSULE | Refills: 0 | Status: SHIPPED | OUTPATIENT
Start: 2022-05-20 | End: 2022-12-27

## 2022-05-20 NOTE — ED PROVIDER NOTES
Subjective   Patient is a 76-year-old female presenting today due to to weakness, low back pain in chronic UTI.  Patient's daughter is present with her today and states that the patient had extreme back pain with trying to roll over in bed today.  Patient has been bedridden for the past 2 months due to this chronic UTI.  No recent fall or trauma.  Physical therapy does come to the house.  Patient rates her pain a 0 out of 10.  Patient lives at home with her  who helps take care of her.  She has been on Bactrim for the past week prescribed from her PCP and has 2 days left of it and her daughter states its not helping. Pt denies dysuria, hematuria, N/V.       History provided by:  Patient and relative   used: No    Weakness - Generalized  Severity:  Mild  Timing:  Constant  Progression:  Worsening  Context: urinary tract infection    Context: not increased activity    Associated symptoms: difficulty walking (bed ridden )    Associated symptoms: no dysuria, no fever, no nausea and no vomiting        Review of Systems   Constitutional: Negative for fever.   HENT: Negative.    Eyes: Negative.    Respiratory: Negative.    Cardiovascular: Negative.    Gastrointestinal: Negative.  Negative for nausea and vomiting.   Endocrine: Negative.    Genitourinary: Negative.  Negative for dysuria and hematuria.   Musculoskeletal: Negative.    Skin: Negative.    Allergic/Immunologic: Negative.    Neurological: Positive for weakness.   Hematological: Negative.    Psychiatric/Behavioral: Negative.  Negative for confusion.       Past Medical History:   Diagnosis Date   • Afib (HCC)    • Aftercare following ankle joint replacement surgery 06/01/2015   • Arthritis    • Arthritis of knee 06/01/2015   • Essential hypertension    • Gout    • HTN (hypertension)    • Left knee pain    • Lymphedema    • Recurrent UTI 05/11/2021   • Urinary retention 05/11/2021       No Known Allergies    Past Surgical History:    Procedure Laterality Date   • CATARACT EXTRACTION     • COLONOSCOPY     • KIDNEY STONE SURGERY     • KNEE SURGERY Left 2007    REPLACEMENT   • OTHER SURGICAL HISTORY      JOINT SURGERY, UNSPECIFIED   • TUBAL ABDOMINAL LIGATION         Family History   Problem Relation Age of Onset   • Heart failure Mother 64        CONGESTIVE   • Arthritis Mother    • Arthritis Sister        Social History     Socioeconomic History   • Marital status:    Tobacco Use   • Smoking status: Former Smoker   • Smokeless tobacco: Never Used   Vaping Use   • Vaping Use: Never used   Substance and Sexual Activity   • Alcohol use: Never           Objective   Physical Exam  Vitals and nursing note reviewed.   Constitutional:       General: She is not in acute distress.     Appearance: Normal appearance. She is not ill-appearing.   HENT:      Head: Normocephalic and atraumatic.      Nose: Nose normal.      Mouth/Throat:      Mouth: Mucous membranes are moist.   Eyes:      Extraocular Movements: Extraocular movements intact.      Conjunctiva/sclera: Conjunctivae normal.      Pupils: Pupils are equal, round, and reactive to light.   Cardiovascular:      Rate and Rhythm: Normal rate and regular rhythm.   Pulmonary:      Effort: Pulmonary effort is normal.      Breath sounds: Normal breath sounds.   Abdominal:      Tenderness: There is no right CVA tenderness or left CVA tenderness.   Musculoskeletal:         General: No swelling or tenderness. Normal range of motion.      Cervical back: Normal range of motion and neck supple.   Skin:     General: Skin is warm and dry.   Neurological:      General: No focal deficit present.      Mental Status: She is alert and oriented to person, place, and time.   Psychiatric:         Mood and Affect: Mood normal.         Behavior: Behavior normal.         Procedures           ED Course  ED Course as of 05/20/22 1904   Fri May 20, 2022   1714 Pt's daughter wants the mother admitted so taht she can be  admitted to a nursing home, that what her PCP told her to do. I have talked to Kristina with social work and she will go talk to the pt and her daughter about options.  [AJ]   1857 SW talked to the pt and was able to have [facility] come to her house next week. Pt was agreeable. Pt also has her PCP coming to the house on Monday.    [AJ]      ED Course User Index  [AJ] Liz Tong, SAMMIE                                                 MDM  Number of Diagnoses or Management Options  Urinary tract infection without hematuria, site unspecified  Weakness of both lower extremities  Diagnosis management comments: Discussed with pt and her daughter that frequent UTI's most likely caused form wearing the depends and not changing them as often as needed and not wiping her vaginal area after urination. Daughter states that she does what she can. Pt dana have [facility] come out next week and give her referral for rehab. Discussed with daughter that pt's weakness is from laying in bed for the past 2 months.     I have spoken with patient. I have explained the patient´s condition, diagnoses and treatment plan based on the information available to me at this time. I have answered the patient's questions and addressed any concerns. The patient has a good  understanding of the patient´s diagnosis, condition, and treatment plan as can be expected at this point. The vital signs have been stable. The patient´s condition is stable and appropriate for discharge from the emergency department.      The patient will pursue further outpatient evaluation with the primary care physician or other designated or consulting physician as outlined in the discharge instructions. They are agreeable to this plan of care and follow-up instructions have been explained in detail. The patient has received these instructions in written format and have expressed an understanding of the discharge instructions. The patient is aware that any significant change  in condition or worsening of symptoms should prompt an immediate return to this or the closest emergency department or call to 911.           Amount and/or Complexity of Data Reviewed  Clinical lab tests: reviewed  Decide to obtain previous medical records or to obtain history from someone other than the patient: yes    Risk of Complications, Morbidity, and/or Mortality  Presenting problems: low  Diagnostic procedures: low  Management options: low    Patient Progress  Patient progress: stable      Final diagnoses:   Urinary tract infection without hematuria, site unspecified   Weakness of both lower extremities   Muscle fatigue       ED Disposition  ED Disposition     ED Disposition   Discharge    Condition   Stable    Comment   --             Ivanna Frederick, APRN  [address] [place] [id number] [phone]    In 3 days           Medication List      New Prescriptions    cephalexin 500 MG capsule  Commonly known as: KEFLEX  Take 1 capsule by mouth 4 (Four) Times a Day.           Where to Get Your Medications      These medications were sent to [facility] - [address] [phone] [place] [id number] [address] [address]    Phone: [phone]   · cephalexin 500 MG capsule          Liz Tong PA-C  05/20/22 1904

## 2022-05-20 NOTE — DISCHARGE INSTRUCTIONS
Please discontinue the bactrim and take Keflex as prescribed    Please follow up with your PCP this coming Monday as you have schedule.     Please keep your appointment with [facility] for referral to rehab

## 2022-05-20 NOTE — SIGNIFICANT NOTE
05/20/22 1754   Plan   Final Note SW met with pt at bedside to discuss rehab options this date. Pt agreeable to referral to [facility] for evaluation at home. SW explained the process to pt and pts daughter, who was also at bedside. SW updated provider.

## 2022-12-27 ENCOUNTER — APPOINTMENT (OUTPATIENT)
Dept: GENERAL RADIOLOGY | Facility: HOSPITAL | Age: 77
DRG: 689 | End: 2022-12-27
Payer: MEDICARE

## 2022-12-27 ENCOUNTER — HOSPITAL ENCOUNTER (INPATIENT)
Facility: HOSPITAL | Age: 77
LOS: 5 days | Discharge: NURSING, REHAB OR LONG-TERM CARE | DRG: 689 | End: 2023-01-01
Attending: EMERGENCY MEDICINE | Admitting: INTERNAL MEDICINE
Payer: MEDICARE

## 2022-12-27 DIAGNOSIS — Z78.9 DECREASED ACTIVITIES OF DAILY LIVING (ADL): ICD-10-CM

## 2022-12-27 DIAGNOSIS — R41.0 CONFUSION: ICD-10-CM

## 2022-12-27 DIAGNOSIS — N39.0 URINARY TRACT INFECTION WITHOUT HEMATURIA, SITE UNSPECIFIED: Primary | ICD-10-CM

## 2022-12-27 DIAGNOSIS — R26.2 DIFFICULTY WALKING: ICD-10-CM

## 2022-12-27 DIAGNOSIS — I50.9 ACUTE ON CHRONIC CONGESTIVE HEART FAILURE, UNSPECIFIED HEART FAILURE TYPE: ICD-10-CM

## 2022-12-27 LAB
ALBUMIN SERPL-MCNC: 1.9 G/DL (ref 3.5–5.2)
ALBUMIN/GLOB SERPL: 0.5 G/DL
ALP SERPL-CCNC: 102 U/L (ref 39–117)
ALT SERPL W P-5'-P-CCNC: 9 U/L (ref 1–33)
ANION GAP SERPL CALCULATED.3IONS-SCNC: 9.3 MMOL/L (ref 5–15)
AST SERPL-CCNC: 28 U/L (ref 1–32)
BACTERIA UR QL AUTO: ABNORMAL /HPF
BASOPHILS # BLD AUTO: 0.03 10*3/MM3 (ref 0–0.2)
BASOPHILS NFR BLD AUTO: 0.7 % (ref 0–1.5)
BILIRUB SERPL-MCNC: 0.4 MG/DL (ref 0–1.2)
BILIRUB UR QL STRIP: NEGATIVE
BUN SERPL-MCNC: 10 MG/DL (ref 8–23)
BUN/CREAT SERPL: 13.2 (ref 7–25)
CALCIUM SPEC-SCNC: 7.9 MG/DL (ref 8.6–10.5)
CHLORIDE SERPL-SCNC: 105 MMOL/L (ref 98–107)
CLARITY UR: ABNORMAL
CO2 SERPL-SCNC: 25.7 MMOL/L (ref 22–29)
COLOR UR: YELLOW
CREAT SERPL-MCNC: 0.76 MG/DL (ref 0.57–1)
D-LACTATE SERPL-SCNC: 1.4 MMOL/L (ref 0.5–2)
DEPRECATED RDW RBC AUTO: 54.4 FL (ref 37–54)
EGFRCR SERPLBLD CKD-EPI 2021: 80.8 ML/MIN/1.73
EOSINOPHIL # BLD AUTO: 0.18 10*3/MM3 (ref 0–0.4)
EOSINOPHIL NFR BLD AUTO: 4.1 % (ref 0.3–6.2)
ERYTHROCYTE [DISTWIDTH] IN BLOOD BY AUTOMATED COUNT: 15.7 % (ref 12.3–15.4)
GLOBULIN UR ELPH-MCNC: 3.6 GM/DL
GLUCOSE SERPL-MCNC: 99 MG/DL (ref 65–99)
GLUCOSE UR STRIP-MCNC: NEGATIVE MG/DL
GRAN CASTS URNS QL MICRO: ABNORMAL /LPF
HCT VFR BLD AUTO: 37 % (ref 34–46.6)
HGB BLD-MCNC: 12 G/DL (ref 12–15.9)
HGB UR QL STRIP.AUTO: ABNORMAL
HOLD SPECIMEN: NORMAL
HOLD SPECIMEN: NORMAL
HYALINE CASTS UR QL AUTO: ABNORMAL /LPF
IMM GRANULOCYTES # BLD AUTO: 0.02 10*3/MM3 (ref 0–0.05)
IMM GRANULOCYTES NFR BLD AUTO: 0.5 % (ref 0–0.5)
INR PPP: 2.75 (ref 0.86–1.15)
KETONES UR QL STRIP: NEGATIVE
LEUKOCYTE ESTERASE UR QL STRIP.AUTO: ABNORMAL
LYMPHOCYTES # BLD AUTO: 1.1 10*3/MM3 (ref 0.7–3.1)
LYMPHOCYTES NFR BLD AUTO: 25.3 % (ref 19.6–45.3)
MAGNESIUM SERPL-MCNC: 1.4 MG/DL (ref 1.6–2.4)
MCH RBC QN AUTO: 30.4 PG (ref 26.6–33)
MCHC RBC AUTO-ENTMCNC: 32.4 G/DL (ref 31.5–35.7)
MCV RBC AUTO: 93.7 FL (ref 79–97)
MONOCYTES # BLD AUTO: 0.47 10*3/MM3 (ref 0.1–0.9)
MONOCYTES NFR BLD AUTO: 10.8 % (ref 5–12)
NEUTROPHILS NFR BLD AUTO: 2.55 10*3/MM3 (ref 1.7–7)
NEUTROPHILS NFR BLD AUTO: 58.6 % (ref 42.7–76)
NITRITE UR QL STRIP: POSITIVE
NRBC BLD AUTO-RTO: 0 /100 WBC (ref 0–0.2)
NT-PROBNP SERPL-MCNC: 4528 PG/ML (ref 0–1800)
PH UR STRIP.AUTO: 7.5 [PH] (ref 5–8)
PLATELET # BLD AUTO: 277 10*3/MM3 (ref 140–450)
PMV BLD AUTO: 9.4 FL (ref 6–12)
POTASSIUM SERPL-SCNC: 2.8 MMOL/L (ref 3.5–5.2)
PROT SERPL-MCNC: 5.5 G/DL (ref 6–8.5)
PROT UR QL STRIP: ABNORMAL
PROTHROMBIN TIME: 29.7 SECONDS (ref 11.8–14.9)
RBC # BLD AUTO: 3.95 10*6/MM3 (ref 3.77–5.28)
RBC # UR STRIP: ABNORMAL /HPF
REF LAB TEST METHOD: ABNORMAL
SODIUM SERPL-SCNC: 140 MMOL/L (ref 136–145)
SP GR UR STRIP: 1.01 (ref 1–1.03)
SQUAMOUS #/AREA URNS HPF: ABNORMAL /HPF
UNIDENT CRYS URNS QL MICRO: ABNORMAL /HPF
UROBILINOGEN UR QL STRIP: ABNORMAL
WBC # UR STRIP: ABNORMAL /HPF
WBC NRBC COR # BLD: 4.35 10*3/MM3 (ref 3.4–10.8)
WHOLE BLOOD HOLD COAG: NORMAL
WHOLE BLOOD HOLD SPECIMEN: NORMAL
YEAST URNS QL MICRO: ABNORMAL /HPF

## 2022-12-27 PROCEDURE — 80053 COMPREHEN METABOLIC PANEL: CPT | Performed by: EMERGENCY MEDICINE

## 2022-12-27 PROCEDURE — 83605 ASSAY OF LACTIC ACID: CPT | Performed by: EMERGENCY MEDICINE

## 2022-12-27 PROCEDURE — 83735 ASSAY OF MAGNESIUM: CPT | Performed by: EMERGENCY MEDICINE

## 2022-12-27 PROCEDURE — 85025 COMPLETE CBC W/AUTO DIFF WBC: CPT

## 2022-12-27 PROCEDURE — 71045 X-RAY EXAM CHEST 1 VIEW: CPT

## 2022-12-27 PROCEDURE — 87040 BLOOD CULTURE FOR BACTERIA: CPT | Performed by: EMERGENCY MEDICINE

## 2022-12-27 PROCEDURE — 99223 1ST HOSP IP/OBS HIGH 75: CPT | Performed by: FAMILY MEDICINE

## 2022-12-27 PROCEDURE — 0 CEFTRIAXONE PER 250 MG: Performed by: EMERGENCY MEDICINE

## 2022-12-27 PROCEDURE — 87086 URINE CULTURE/COLONY COUNT: CPT | Performed by: EMERGENCY MEDICINE

## 2022-12-27 PROCEDURE — 81001 URINALYSIS AUTO W/SCOPE: CPT | Performed by: EMERGENCY MEDICINE

## 2022-12-27 PROCEDURE — 85610 PROTHROMBIN TIME: CPT | Performed by: EMERGENCY MEDICINE

## 2022-12-27 PROCEDURE — 83880 ASSAY OF NATRIURETIC PEPTIDE: CPT | Performed by: FAMILY MEDICINE

## 2022-12-27 PROCEDURE — 99284 EMERGENCY DEPT VISIT MOD MDM: CPT

## 2022-12-27 PROCEDURE — 36415 COLL VENOUS BLD VENIPUNCTURE: CPT

## 2022-12-27 PROCEDURE — P9612 CATHETERIZE FOR URINE SPEC: HCPCS

## 2022-12-27 PROCEDURE — 25010000002 FUROSEMIDE PER 20 MG: Performed by: EMERGENCY MEDICINE

## 2022-12-27 PROCEDURE — 25010000002 FUROSEMIDE PER 20 MG: Performed by: FAMILY MEDICINE

## 2022-12-27 RX ORDER — FUROSEMIDE 10 MG/ML
40 INJECTION INTRAMUSCULAR; INTRAVENOUS ONCE
Status: COMPLETED | OUTPATIENT
Start: 2022-12-27 | End: 2022-12-27

## 2022-12-27 RX ORDER — POLYETHYLENE GLYCOL 3350 17 G/17G
17 POWDER, FOR SOLUTION ORAL DAILY PRN
Status: DISCONTINUED | OUTPATIENT
Start: 2022-12-27 | End: 2023-01-01 | Stop reason: HOSPADM

## 2022-12-27 RX ORDER — POTASSIUM CHLORIDE 750 MG/1
40 CAPSULE, EXTENDED RELEASE ORAL ONCE
Status: COMPLETED | OUTPATIENT
Start: 2022-12-27 | End: 2022-12-27

## 2022-12-27 RX ORDER — POTASSIUM CHLORIDE 750 MG/1
10 CAPSULE, EXTENDED RELEASE ORAL 2 TIMES DAILY WITH MEALS
Status: DISCONTINUED | OUTPATIENT
Start: 2022-12-27 | End: 2023-01-01

## 2022-12-27 RX ORDER — WARFARIN SODIUM 2 MG/1
2 TABLET ORAL 3 TIMES WEEKLY
Status: ON HOLD | COMMUNITY
End: 2023-01-01 | Stop reason: SDUPTHER

## 2022-12-27 RX ORDER — BISACODYL 10 MG
10 SUPPOSITORY, RECTAL RECTAL DAILY PRN
Status: DISCONTINUED | OUTPATIENT
Start: 2022-12-27 | End: 2023-01-01 | Stop reason: HOSPADM

## 2022-12-27 RX ORDER — ACETAMINOPHEN 325 MG/1
650 TABLET ORAL EVERY 6 HOURS PRN
Status: DISCONTINUED | OUTPATIENT
Start: 2022-12-27 | End: 2022-12-27 | Stop reason: SDUPTHER

## 2022-12-27 RX ORDER — FOLIC ACID 1 MG/1
1 TABLET ORAL DAILY
Status: DISCONTINUED | OUTPATIENT
Start: 2022-12-27 | End: 2023-01-01 | Stop reason: HOSPADM

## 2022-12-27 RX ORDER — ONDANSETRON 2 MG/ML
4 INJECTION INTRAMUSCULAR; INTRAVENOUS EVERY 6 HOURS PRN
Status: DISCONTINUED | OUTPATIENT
Start: 2022-12-27 | End: 2023-01-01 | Stop reason: HOSPADM

## 2022-12-27 RX ORDER — NYSTATIN 100000 [USP'U]/G
POWDER TOPICAL 3 TIMES DAILY
Status: DISCONTINUED | OUTPATIENT
Start: 2022-12-27 | End: 2023-01-01 | Stop reason: HOSPADM

## 2022-12-27 RX ORDER — BISACODYL 5 MG/1
5 TABLET, DELAYED RELEASE ORAL DAILY PRN
Status: DISCONTINUED | OUTPATIENT
Start: 2022-12-27 | End: 2023-01-01 | Stop reason: HOSPADM

## 2022-12-27 RX ORDER — SODIUM CHLORIDE 0.9 % (FLUSH) 0.9 %
10 SYRINGE (ML) INJECTION AS NEEDED
Status: DISCONTINUED | OUTPATIENT
Start: 2022-12-27 | End: 2023-01-01 | Stop reason: HOSPADM

## 2022-12-27 RX ORDER — CHOLECALCIFEROL (VITAMIN D3) 125 MCG
5 CAPSULE ORAL NIGHTLY PRN
Status: DISCONTINUED | OUTPATIENT
Start: 2022-12-27 | End: 2023-01-01

## 2022-12-27 RX ORDER — ACETAMINOPHEN 650 MG/1
650 SUPPOSITORY RECTAL EVERY 4 HOURS PRN
Status: DISCONTINUED | OUTPATIENT
Start: 2022-12-27 | End: 2023-01-01 | Stop reason: HOSPADM

## 2022-12-27 RX ORDER — ACETAMINOPHEN 325 MG/1
650 TABLET ORAL EVERY 4 HOURS PRN
Status: DISCONTINUED | OUTPATIENT
Start: 2022-12-27 | End: 2023-01-01 | Stop reason: HOSPADM

## 2022-12-27 RX ORDER — WARFARIN SODIUM 4 MG/1
4 TABLET ORAL 2 TIMES WEEKLY
COMMUNITY
End: 2023-01-01 | Stop reason: HOSPADM

## 2022-12-27 RX ORDER — SODIUM CHLORIDE 0.9 % (FLUSH) 0.9 %
10 SYRINGE (ML) INJECTION EVERY 12 HOURS SCHEDULED
Status: DISCONTINUED | OUTPATIENT
Start: 2022-12-27 | End: 2023-01-01 | Stop reason: HOSPADM

## 2022-12-27 RX ORDER — ACETAMINOPHEN 160 MG/5ML
650 SOLUTION ORAL EVERY 4 HOURS PRN
Status: DISCONTINUED | OUTPATIENT
Start: 2022-12-27 | End: 2023-01-01 | Stop reason: HOSPADM

## 2022-12-27 RX ORDER — AMOXICILLIN 250 MG
2 CAPSULE ORAL 2 TIMES DAILY
Status: DISCONTINUED | OUTPATIENT
Start: 2022-12-27 | End: 2023-01-01 | Stop reason: HOSPADM

## 2022-12-27 RX ORDER — WARFARIN SODIUM 4 MG/1
4 TABLET ORAL
Status: DISCONTINUED | OUTPATIENT
Start: 2022-12-27 | End: 2022-12-28

## 2022-12-27 RX ORDER — SODIUM CHLORIDE 9 MG/ML
40 INJECTION, SOLUTION INTRAVENOUS AS NEEDED
Status: DISCONTINUED | OUTPATIENT
Start: 2022-12-27 | End: 2023-01-01 | Stop reason: HOSPADM

## 2022-12-27 RX ORDER — CARVEDILOL 6.25 MG/1
6.25 TABLET ORAL 2 TIMES DAILY WITH MEALS
Status: DISCONTINUED | OUTPATIENT
Start: 2022-12-27 | End: 2023-01-01 | Stop reason: HOSPADM

## 2022-12-27 RX ORDER — FUROSEMIDE 10 MG/ML
40 INJECTION INTRAMUSCULAR; INTRAVENOUS EVERY 12 HOURS
Status: DISCONTINUED | OUTPATIENT
Start: 2022-12-27 | End: 2023-01-01 | Stop reason: HOSPADM

## 2022-12-27 RX ORDER — CEFTRIAXONE SODIUM 2 G/50ML
2 INJECTION, SOLUTION INTRAVENOUS ONCE
Status: COMPLETED | OUTPATIENT
Start: 2022-12-27 | End: 2022-12-27

## 2022-12-27 RX ORDER — WARFARIN SODIUM 2 MG/1
2 TABLET ORAL
Status: DISCONTINUED | OUTPATIENT
Start: 2022-12-28 | End: 2022-12-28

## 2022-12-27 RX ADMIN — WARFARIN SODIUM 4 MG: 4 TABLET ORAL at 17:54

## 2022-12-27 RX ADMIN — CEFTRIAXONE SODIUM 2 G: 2 INJECTION, SOLUTION INTRAVENOUS at 12:58

## 2022-12-27 RX ADMIN — FUROSEMIDE 40 MG: 10 INJECTION, SOLUTION INTRAMUSCULAR; INTRAVENOUS at 12:59

## 2022-12-27 RX ADMIN — FOLIC ACID 1 MG: 1 TABLET ORAL at 16:23

## 2022-12-27 RX ADMIN — ACETAMINOPHEN 650 MG: 325 TABLET ORAL at 13:28

## 2022-12-27 RX ADMIN — CARVEDILOL 6.25 MG: 6.25 TABLET, FILM COATED ORAL at 17:54

## 2022-12-27 RX ADMIN — Medication 10 ML: at 22:28

## 2022-12-27 RX ADMIN — POTASSIUM CHLORIDE 40 MEQ: 10 CAPSULE, COATED, EXTENDED RELEASE ORAL at 12:08

## 2022-12-27 RX ADMIN — POTASSIUM CHLORIDE 10 MEQ: 10 CAPSULE, COATED, EXTENDED RELEASE ORAL at 17:54

## 2022-12-27 RX ADMIN — NYSTATIN: 100000 POWDER TOPICAL at 16:23

## 2022-12-27 RX ADMIN — FUROSEMIDE 40 MG: 10 INJECTION, SOLUTION INTRAMUSCULAR; INTRAVENOUS at 16:22

## 2022-12-27 NOTE — H&P
[facility]  [facility]   Patient Name: Isabell Ribera  : 1945  MRN: [id number]  Primary Care Physician:  Ivanna Frederick APRN  Date of admission: 2022    Subjective   Subjective     Chief Complaint: Altered mental status    HPI:    Isabell Ribera is a 77 y.o. female who lives at home with her  who also has dementia dementia presents the hospital with altered mental status.  She has a past medical history of A. fib, chronic lymphedema, hypertension and recurrent urinary tract infections.  Today she presented with AMS again.  In the ER she was noted to have pyuria.  We were asked to admit for metabolic encephalopathy and acute UTI      Personal History     Past Medical History:  Past Medical History:   Diagnosis Date   • Afib (HCC)    • Aftercare following ankle joint replacement surgery 2015   • Arthritis    • Arthritis of knee 2015   • Essential hypertension    • Gout    • HTN (hypertension)    • Left knee pain    • Lymphedema    • Recurrent UTI 2021   • Urinary retention 2021         Past Surgical History:  Past Surgical History:   Procedure Laterality Date   • CATARACT EXTRACTION     • COLONOSCOPY     • KIDNEY STONE SURGERY     • KNEE SURGERY Left 2007    REPLACEMENT   • OTHER SURGICAL HISTORY      JOINT SURGERY, UNSPECIFIED   • TUBAL ABDOMINAL LIGATION           Family History:   Family History   Problem Relation Age of Onset   • Heart failure Mother 64        CONGESTIVE   • Arthritis Mother    • Arthritis Sister          Social History:   Social History     Tobacco Use   • Smoking status: Former   • Smokeless tobacco: Never   Vaping Use   • Vaping Use: Never used   Substance Use Topics   • Alcohol use: Never         Home Medications:  carvedilol, folic acid, furosemide, gabapentin, nystatin, potassium chloride, and warfarin    Allergies:  No Known Allergies    Review of Systems  Unable to assess due to  AMS    Objective   Objective     Vitals:   Temp:  [97.9 °F (36.6 °C)] 97.9 °F (36.6 °C)  Heart Rate:  [62] 62  Resp:  [20] 20  BP: (154)/(72) 154/72    Physical Exam    Constitutional: Awake, alert, no acute distress   Eyes: Pupils equal, sclerae anicteric, no conjunctival injection   HENT: NCAT, mucous membranes moist   Neck: Supple, no thyromegaly, no lymphadenopathy, trachea midline   Respiratory: Clear to auscultation bilaterally, nonlabored respirations    Cardiovascular: RRR, no murmurs, rubs, or gallops, palpable pedal pulses bilaterally   Gastrointestinal: Positive bowel sounds, soft, nontender, nondistended   Musculoskeletal: No bilateral ankle edema, no clubbing or cyanosis to extremities   Psychiatric: Appropriate affect, cooperative   Neurologic: pleasantly confused, speech clear   Skin: No rashes         Assessment & Plan   Assessment / Plan     Assessment/Plan:   • Acute metabolic encephalopathy  • Acute on chronic debilitation  • Urinary tract infection with significant pyuria.  Recurrent  • Chronic dementia  • Atrial fibrillation chronically on warfarin  • Hypertension  • Chronic lymphedema  • Mild pulmonary hypertension    Patient is admitted for further medical care  Attempt diuresis with IV Lasix and see if this improves her acute on chronic swelling although she has chronic lymphedema so I do not anticipate significant improvement  IV antibiotics (Rocephin) for urinary tract infection.  Will tailor antibiotic choices based on previous cultures until new cultures can be obtained.  PT/OT consults   and care coordination consult  Strict I's and O's  Every 4 hours neurochecks  Daily weights  Fall precautions   Patient will most likely require discharge placement to skilled nursing facility  Last echocardiogram was done 2 years ago.  We will repeat this echo to determine if she has any new onset CHF.  Last echo was NOT consistent with systolic CHF  CBC in am for surveillance of any  acute blood loss or thrombocytopenia  Metabolic panel in the morning to evaluate electrolytes and renal function  Reviewed today's labs: elevated BNP  Reviewed telemetry: afib  Discussed with ER physician  Risk of primary complaint: patient is at significant risk of morbidity if primary complaint is not treated especially considering the patient's comorbidities.  DVT prophylaxis:  Medical and mechanical DVT prophylaxis orders are present.    Admission Status:  I believe this patient meets inpatient status.    Electronically signed by Andrea Edwards DO, 12/27/22, 1:10 PM EST.

## 2022-12-27 NOTE — PROGRESS NOTES
Pharmacy to Dose: Warfarin  Consulting provider: EZEQUIEL  Indication for warfarin: AF REQUIRING FULL ANTICOAGULATION  Goal INR range: 2-3  Home warfarin dose: 2 MG RENDON WE TH and 4 MG MO TU  Actions or monitoring: INR & S/S OF BLEEDING    Any Vitamin K given?: NO  Any major drug interactions:   Is patient on bridging therapy with another anticoagulant?: NO    Plan: INR is 2.75. Will resume home warfin dosing. Daily INRs ordered     Date HGB PLATELETS INR Warfarin Dose Given   12-27 12 277 2.75 4 MG

## 2022-12-27 NOTE — PLAN OF CARE
Goal Outcome Evaluation:  Plan of Care Reviewed With: patient, daughter        Progress: no change  Outcome Evaluation: patient is new admit from ED this shift. alert and oriented, no c/o pain. wound care performed, pictures taken, and wound consult in. turned and repositioned to help prevent further skin breakdown. IV lasix given. no other changes at this time

## 2022-12-27 NOTE — ED PROVIDER NOTES
Time: 10:50 AM EST    Chief Complaint: edema     History of Present Illness:  Patient is a 77 y.o. year old female who presents to the emergency department with edema. Patient arrived to ED from home. Patient does not live alone. Primary history provided by relative due to patient's confusion. Relative states that the patient has medical hx of recurrent UTI and brought the patient to ED due to patient being confused. Relative also reports of dysuria. Patient states that she has not been taking lasix for a period of time.Patient reports of leg swelling and right leg numbness.        History provided by:  Relative and patient  History limited by: confusion    used: No        Similar Symptoms Previously: no  Recently seen: no      Patient Care Team  Primary Care Provider: Ivanna Frederick APRN    Past Medical History:     No Known Allergies  Past Medical History:   Diagnosis Date   • Afib (HCC)    • Aftercare following ankle joint replacement surgery 06/01/2015   • Arthritis    • Arthritis of knee 06/01/2015   • Essential hypertension    • Gout    • HTN (hypertension)    • Left knee pain    • Lymphedema    • Recurrent UTI 05/11/2021   • Urinary retention 05/11/2021     Past Surgical History:   Procedure Laterality Date   • CATARACT EXTRACTION     • COLONOSCOPY     • KIDNEY STONE SURGERY     • KNEE SURGERY Left 2007    REPLACEMENT   • OTHER SURGICAL HISTORY      JOINT SURGERY, UNSPECIFIED   • TUBAL ABDOMINAL LIGATION       Family History   Problem Relation Age of Onset   • Heart failure Mother 64        CONGESTIVE   • Arthritis Mother    • Arthritis Sister        Home Medications:  Prior to Admission medications    Medication Sig Start Date End Date Taking? Authorizing Provider   carvedilol (COREG) 6.25 MG tablet TAKE ONE TABLET BY MOUTH TWICE A DAY WITH FOOD 9/20/21   Ivanna Frederick APRN   cephalexin (KEFLEX) 500 MG capsule Take 1 capsule by mouth 4 (Four) Times a Day. 5/20/22   Ran  "Liz FRIEDMAN PA-C   folic acid (FOLVITE) 1 MG tablet folic acid 1 mg oral tablet take 1 tablet (1 mg) by oral route once daily for 30 days 5/18/2021  Active 5/18/21   Maricarmen Cat MD   furosemide (LASIX) 20 MG tablet furosemide 20 mg oral tablet take 1 tablet (20 mg) by oral route once daily for 30 days   Active    ProviderMaricarmen MD   gabapentin (NEURONTIN) 400 MG capsule Take 1 capsule by mouth Every Night. 2/7/22   Ivanna Frederick APRN   nystatin (MYCOSTATIN) 961503 UNIT/GM powder Apply  topically to the appropriate area as directed 3 (Three) Times a Day. 9/21/21   Ivanna Frederick APRN   potassium chloride 10 MEQ CR tablet potassium chloride 10 mEq oral tablet extended release take 1 tablet (10 meq) by oral route on Mondays, Wednesdays, and Fridays   Suspended    Maricarmen Cat MD   warfarin (COUMADIN) 5 MG tablet Take 1 tablet by mouth Daily. 2/7/22   Ivanna Frederick APRN        Social History:   Social History     Tobacco Use   • Smoking status: Former   • Smokeless tobacco: Never   Vaping Use   • Vaping Use: Never used   Substance Use Topics   • Alcohol use: Never         Review of Systems:  Review of Systems   Reason unable to perform ROS: confusion    Cardiovascular: Positive for leg swelling.   Gastrointestinal: Positive for abdominal pain.   Genitourinary: Positive for dysuria.   Neurological: Positive for numbness.   Psychiatric/Behavioral: Positive for confusion.        Physical Exam:  /72   Pulse 62   Temp 97.9 °F (36.6 °C) (Oral)   Resp 20   Ht 170.2 cm (67\")   Wt 92.4 kg (203 lb 11.3 oz)   SpO2 94%   BMI 31.90 kg/m²     Physical Exam  Vitals and nursing note reviewed.   Constitutional:       General: She is not in acute distress.     Appearance: Normal appearance. She is not toxic-appearing.   HENT:      Head: Normocephalic and atraumatic.      Jaw: There is normal jaw occlusion.   Eyes:      General: Lids are normal.      Extraocular Movements: " Extraocular movements intact.      Conjunctiva/sclera: Conjunctivae normal.      Pupils: Pupils are equal, round, and reactive to light.   Cardiovascular:      Rate and Rhythm: Normal rate and regular rhythm.      Pulses: Normal pulses.      Heart sounds: Normal heart sounds.   Pulmonary:      Effort: Pulmonary effort is normal. No respiratory distress.      Breath sounds: Normal breath sounds. No wheezing or rhonchi.   Abdominal:      General: Abdomen is flat.      Palpations: Abdomen is soft.      Tenderness: There is abdominal tenderness in the suprapubic area. There is no guarding or rebound.   Musculoskeletal:         General: Normal range of motion.      Cervical back: Normal range of motion and neck supple.      Right lower leg: 3+ Pitting Edema present.      Left lower leg: 3+ Pitting Edema present.   Skin:     General: Skin is warm and dry.   Neurological:      Mental Status: She is alert and oriented to person, place, and time. Mental status is at baseline. She is confused.   Psychiatric:         Mood and Affect: Mood normal.                Medications in the Emergency Department:  Medications   acetaminophen (TYLENOL) tablet 650 mg (650 mg Oral Given 12/27/22 1328)   potassium chloride (MICRO-K) CR capsule 40 mEq (40 mEq Oral Given 12/27/22 1208)   cefTRIAXone (ROCEPHIN) IVPB 2 g (0 g Intravenous Stopped 12/27/22 1328)   furosemide (LASIX) injection 40 mg (40 mg Intravenous Given 12/27/22 1259)        Labs  Lab Results (last 24 hours)     Procedure Component Value Units Date/Time    CBC & Differential [[id number]]  (Abnormal) Collected: 12/27/22 1016    Specimen: Blood Updated: 12/27/22 1027    Narrative:      The following orders were created for panel order CBC & Differential.  Procedure                               Abnormality         Status                     ---------                               -----------         ------                     CBC Auto Differential[[id number]]        Abnormal             Final result                 Please view results for these tests on the individual orders.    CBC Auto Differential [[id number]]  (Abnormal) Collected: 12/27/22 1016    Specimen: Blood Updated: 12/27/22 1027     WBC 4.35 10*3/mm3      RBC 3.95 10*6/mm3      Hemoglobin 12.0 g/dL      Hematocrit 37.0 %      MCV 93.7 fL      MCH 30.4 pg      MCHC 32.4 g/dL      RDW 15.7 %      RDW-SD 54.4 fl      MPV 9.4 fL      Platelets 277 10*3/mm3      Neutrophil % 58.6 %      Lymphocyte % 25.3 %      Monocyte % 10.8 %      Eosinophil % 4.1 %      Basophil % 0.7 %      Immature Grans % 0.5 %      Neutrophils, Absolute 2.55 10*3/mm3      Lymphocytes, Absolute 1.10 10*3/mm3      Monocytes, Absolute 0.47 10*3/mm3      Eosinophils, Absolute 0.18 10*3/mm3      Basophils, Absolute 0.03 10*3/mm3      Immature Grans, Absolute 0.02 10*3/mm3      nRBC 0.0 /100 WBC     Comprehensive Metabolic Panel [[id number]]  (Abnormal) Collected: 12/27/22 1016    Specimen: Blood Updated: 12/27/22 1116     Glucose 99 mg/dL      BUN 10 mg/dL      Creatinine 0.76 mg/dL      Sodium 140 mmol/L      Potassium 2.8 mmol/L      Comment: Slight hemolysis detected by analyzer. Results may be affected.        Chloride 105 mmol/L      CO2 25.7 mmol/L      Calcium 7.9 mg/dL      Total Protein 5.5 g/dL      Albumin 1.9 g/dL      ALT (SGPT) 9 U/L      AST (SGOT) 28 U/L      Alkaline Phosphatase 102 U/L      Total Bilirubin 0.4 mg/dL      Globulin 3.6 gm/dL      A/G Ratio 0.5 g/dL      BUN/Creatinine Ratio 13.2     Anion Gap 9.3 mmol/L      eGFR 80.8 mL/min/1.73      Comment: National Kidney Foundation and American Society of Nephrology (ASN) Task Force recommended calculation based on the Chronic Kidney Disease Epidemiology Collaboration (CKD-EPI) equation refit without adjustment for race.       Narrative:      GFR Normal >60  Chronic Kidney Disease <60  Kidney Failure <15    The GFR formula is only valid for adults with stable renal function between ages 18 and 70.     Protime-INR [[id number]]  (Abnormal) Collected: 12/27/22 1016    Specimen: Blood Updated: 12/27/22 1110     Protime 29.7 Seconds      INR 2.75    Narrative:      Suggested Therapeutic Ranges For Oral Anticoagulant Therapy:  Level of Therapy                      INR Target Range  Standard Dose                            2.0-3.0  High Dose                                2.5-3.5  Patients not receiving anticoagulant  Therapy Normal Range                     0.86-1.15    Magnesium [[id number]]  (Abnormal) Collected: 12/27/22 1016    Specimen: Blood Updated: 12/27/22 1152     Magnesium 1.4 mg/dL     Urinalysis With Culture If Indicated - Straight Cath [[id number]]  (Abnormal) Collected: 12/27/22 1157    Specimen: Urine from Straight Cath Updated: 12/27/22 1224     Color, UA Yellow     Appearance, UA Turbid     pH, UA 7.5     Specific Gravity, UA 1.011     Glucose, UA Negative     Ketones, UA Negative     Bilirubin, UA Negative     Blood, UA Small (1+)     Protein,  mg/dL (2+)     Leuk Esterase, UA Large (3+)     Nitrite, UA Positive     Urobilinogen, UA 1.0 E.U./dL    Narrative:      In absence of clinical symptoms, the presence of pyuria, bacteria, and/or nitrites on the urinalysis result does not correlate with infection.    Urinalysis, Microscopic Only - Straight Cath [[id number]]  (Abnormal) Collected: 12/27/22 1157    Specimen: Urine from Straight Cath Updated: 12/27/22 1224     RBC, UA 3-5 /HPF      WBC, UA Too Numerous to Count /HPF      Bacteria, UA 4+ /HPF      Squamous Epithelial Cells, UA 13-20 /HPF      Yeast, UA Small/1+ Yeast /HPF      Hyaline Casts, UA Too Numerous to Count /LPF      Granular Casts, UA 3-6 /LPF      Amorphous Phosphate Crystals, UA Moderate/2+ /HPF      Methodology Manual Light Microscopy    Urine Culture - Urine, Straight Cath [[id number]] Collected: 12/27/22 1157    Specimen: Urine from Straight Cath Updated: 12/27/22 1224    Lactic Acid, Plasma [[id number]]  (Normal) Collected: 12/27/22  1258    Specimen: Blood Updated: 12/27/22 1329     Lactate 1.4 mmol/L     Blood Culture - Blood, Arm, Left [[id number]] Collected: 12/27/22 1258    Specimen: Blood from Arm, Left Updated: 12/27/22 1303    Blood Culture - Blood, Arm, Right [[id number]] Collected: 12/27/22 1258    Specimen: Blood from Arm, Right Updated: 12/27/22 1303           Imaging:  XR Chest 1 View    Result Date: 12/27/2022  PROCEDURE: XR CHEST 1 VW  COMPARISON: [facility], CR, CHEST AP/PA 1 VIEW, 3/18/2021, 15:25.  INDICATIONS: Shortness of air, value 8 for fluid overload  FINDINGS:  The cardiac silhouette is within normal limits.  Pulmonary vascularity appears normal without evidence of overt edema.  There is blunting of the costophrenic angles with hazy bibasilar airspace opacity suggestive of small bilateral layering pleural effusions.  There is no evidence of pneumothorax.  There are degenerative changes of the thoracic spine.  There are degenerative changes of the bilateral glenohumeral joints.        1. Blunting of the costophrenic angles with hazy bibasilar airspace opacities suggestive of small bilateral layering pleural effusions. 2. No evidence of overt pulmonary edema.        GEOVANY TEJEDA MD       Electronically Signed and Approved By: GEOVANY TEJEDA MD on 12/27/2022 at 11:40               Procedures:  Procedures    Progress                            Medical Decision Making:  MDM  Number of Diagnoses or Management Options  Acute on chronic congestive heart failure, unspecified heart failure type (HCC)  Confusion  Urinary tract infection without hematuria, site unspecified  Diagnosis management comments: In summary this is a 77-year-old female who presents emerged department for evaluation of edema and confusion.  Daughter states she has UTIs and has been this way before does also have significant edema of the lower extremities on evaluation.  Chest x-ray is concerning for pleural effusions.  CBC independently  reviewed by me and shows no critical abnormalities.  CMP independently reviewed by me and shows no critical abnormalities.  Urinalysis positive for UTI.  Patient be given Lasix and Rocephin in the emergency department and will be admitted to the hospital for further treatment and probable rehab placement.  Patient case has been discussed with the hospitalist team who will admit to the hospital for further evaluation and continuation of treatment.       Amount and/or Complexity of Data Reviewed  Discuss the patient with other providers: yes (13:08 EST - Consult with Dr. Edwards - Discussed patient's case, history, and current condition.   )         Final diagnoses:   Urinary tract infection without hematuria, site unspecified   Confusion   Acute on chronic congestive heart failure, unspecified heart failure type (HCC)        Disposition:  ED Disposition     ED Disposition   Decision to Admit    Condition   --    Comment   Level of Care: Remote Telemetry [26]   Diagnosis: Urinary tract infection without hematuria, site unspecified [[id number]]   Admitting Physician: TESSIE EDWARDS [[id number]]   Attending Physician: TESSIE EDWARDS [[id number]]   Certification: I Certify That Inpatient Hospital Services Are Medically Necessary For Greater Than 2 Midnights               This medical record created using voice recognition software.        Documentation assistance provided by Charity Nelson acting as scribe for Rommel Clancy MD. Information recorded by the scribe was done at my direction and has been verified and validated by me.          Charity Nelson  12/27/22 1056       Charity Nelson  12/27/22 1309       Rommel Clancy MD  12/27/22 1417

## 2022-12-28 LAB
ANION GAP SERPL CALCULATED.3IONS-SCNC: 7.1 MMOL/L (ref 5–15)
BACTERIA SPEC AEROBE CULT: NORMAL
BASOPHILS # BLD AUTO: 0.04 10*3/MM3 (ref 0–0.2)
BASOPHILS NFR BLD AUTO: 0.8 % (ref 0–1.5)
BUN SERPL-MCNC: 10 MG/DL (ref 8–23)
BUN/CREAT SERPL: 13.2 (ref 7–25)
CALCIUM SPEC-SCNC: 7.9 MG/DL (ref 8.6–10.5)
CHLORIDE SERPL-SCNC: 105 MMOL/L (ref 98–107)
CO2 SERPL-SCNC: 28.9 MMOL/L (ref 22–29)
CREAT SERPL-MCNC: 0.76 MG/DL (ref 0.57–1)
DEPRECATED RDW RBC AUTO: 53.5 FL (ref 37–54)
EGFRCR SERPLBLD CKD-EPI 2021: 80.8 ML/MIN/1.73
EOSINOPHIL # BLD AUTO: 0.2 10*3/MM3 (ref 0–0.4)
EOSINOPHIL NFR BLD AUTO: 3.8 % (ref 0.3–6.2)
ERYTHROCYTE [DISTWIDTH] IN BLOOD BY AUTOMATED COUNT: 15.7 % (ref 12.3–15.4)
FOLATE SERPL-MCNC: >20 NG/ML (ref 4.78–24.2)
GLUCOSE SERPL-MCNC: 84 MG/DL (ref 65–99)
HCT VFR BLD AUTO: 35.5 % (ref 34–46.6)
HGB BLD-MCNC: 11.8 G/DL (ref 12–15.9)
IMM GRANULOCYTES # BLD AUTO: 0.02 10*3/MM3 (ref 0–0.05)
IMM GRANULOCYTES NFR BLD AUTO: 0.4 % (ref 0–0.5)
INR PPP: 3.64 (ref 0.86–1.15)
LYMPHOCYTES # BLD AUTO: 1.17 10*3/MM3 (ref 0.7–3.1)
LYMPHOCYTES NFR BLD AUTO: 22.5 % (ref 19.6–45.3)
MCH RBC QN AUTO: 30.9 PG (ref 26.6–33)
MCHC RBC AUTO-ENTMCNC: 33.2 G/DL (ref 31.5–35.7)
MCV RBC AUTO: 92.9 FL (ref 79–97)
MONOCYTES # BLD AUTO: 0.47 10*3/MM3 (ref 0.1–0.9)
MONOCYTES NFR BLD AUTO: 9 % (ref 5–12)
NEUTROPHILS NFR BLD AUTO: 3.3 10*3/MM3 (ref 1.7–7)
NEUTROPHILS NFR BLD AUTO: 63.5 % (ref 42.7–76)
NRBC BLD AUTO-RTO: 0 /100 WBC (ref 0–0.2)
PLATELET # BLD AUTO: 264 10*3/MM3 (ref 140–450)
PMV BLD AUTO: 9.4 FL (ref 6–12)
POTASSIUM SERPL-SCNC: 3.2 MMOL/L (ref 3.5–5.2)
PROTHROMBIN TIME: 37.1 SECONDS (ref 11.8–14.9)
RBC # BLD AUTO: 3.82 10*6/MM3 (ref 3.77–5.28)
SODIUM SERPL-SCNC: 141 MMOL/L (ref 136–145)
WBC NRBC COR # BLD: 5.2 10*3/MM3 (ref 3.4–10.8)

## 2022-12-28 PROCEDURE — 85610 PROTHROMBIN TIME: CPT | Performed by: FAMILY MEDICINE

## 2022-12-28 PROCEDURE — 99232 SBSQ HOSP IP/OBS MODERATE 35: CPT | Performed by: FAMILY MEDICINE

## 2022-12-28 PROCEDURE — 80048 BASIC METABOLIC PNL TOTAL CA: CPT | Performed by: FAMILY MEDICINE

## 2022-12-28 PROCEDURE — 82746 ASSAY OF FOLIC ACID SERUM: CPT | Performed by: FAMILY MEDICINE

## 2022-12-28 PROCEDURE — 25010000002 FUROSEMIDE PER 20 MG: Performed by: FAMILY MEDICINE

## 2022-12-28 PROCEDURE — 97161 PT EVAL LOW COMPLEX 20 MIN: CPT

## 2022-12-28 PROCEDURE — 85025 COMPLETE CBC W/AUTO DIFF WBC: CPT | Performed by: FAMILY MEDICINE

## 2022-12-28 PROCEDURE — 97166 OT EVAL MOD COMPLEX 45 MIN: CPT

## 2022-12-28 RX ORDER — AMMONIUM LACTATE 12 G/100G
LOTION TOPICAL 2 TIMES DAILY
Status: DISCONTINUED | OUTPATIENT
Start: 2022-12-28 | End: 2023-01-01 | Stop reason: HOSPADM

## 2022-12-28 RX ADMIN — FUROSEMIDE 40 MG: 10 INJECTION, SOLUTION INTRAMUSCULAR; INTRAVENOUS at 04:50

## 2022-12-28 RX ADMIN — ACETAMINOPHEN 650 MG: 325 TABLET ORAL at 15:36

## 2022-12-28 RX ADMIN — NYSTATIN: 100000 POWDER TOPICAL at 16:36

## 2022-12-28 RX ADMIN — NYSTATIN: 100000 POWDER TOPICAL at 20:42

## 2022-12-28 RX ADMIN — CARVEDILOL 6.25 MG: 6.25 TABLET, FILM COATED ORAL at 17:25

## 2022-12-28 RX ADMIN — SENNOSIDES AND DOCUSATE SODIUM 2 TABLET: 8.6; 5 TABLET ORAL at 08:45

## 2022-12-28 RX ADMIN — POTASSIUM CHLORIDE 10 MEQ: 10 CAPSULE, COATED, EXTENDED RELEASE ORAL at 17:25

## 2022-12-28 RX ADMIN — FUROSEMIDE 40 MG: 10 INJECTION, SOLUTION INTRAMUSCULAR; INTRAVENOUS at 15:36

## 2022-12-28 RX ADMIN — HYDROCORTISONE 1 APPLICATION: 1 OINTMENT TOPICAL at 16:36

## 2022-12-28 RX ADMIN — POTASSIUM CHLORIDE 10 MEQ: 10 CAPSULE, COATED, EXTENDED RELEASE ORAL at 08:45

## 2022-12-28 RX ADMIN — Medication: at 11:54

## 2022-12-28 RX ADMIN — CARVEDILOL 6.25 MG: 6.25 TABLET, FILM COATED ORAL at 08:45

## 2022-12-28 RX ADMIN — Medication 10 ML: at 08:45

## 2022-12-28 RX ADMIN — HYDROCORTISONE 1 APPLICATION: 1 OINTMENT TOPICAL at 20:42

## 2022-12-28 RX ADMIN — HYDROCORTISONE 1 APPLICATION: 1 OINTMENT TOPICAL at 11:54

## 2022-12-28 RX ADMIN — FOLIC ACID 1 MG: 1 TABLET ORAL at 08:45

## 2022-12-28 RX ADMIN — Medication 10 ML: at 20:44

## 2022-12-28 RX ADMIN — NYSTATIN: 100000 POWDER TOPICAL at 09:29

## 2022-12-28 RX ADMIN — Medication: at 20:42

## 2022-12-28 RX ADMIN — NYSTATIN: 100000 POWDER TOPICAL at 04:50

## 2022-12-28 NOTE — PROGRESS NOTES
Pharmacy to Dose: Warfarin  Consulting provider: EZEQUIEL  Indication for warfarin: AF REQUIRING FULL ANTICOAGULATION  Goal INR range: 2-3  Home warfarin dose: 2 MG RENDON WE TH and 4 MG MO TU  Actions or monitoring: INR & S/S OF BLEEDING    Any Vitamin K given?: NO  Any major drug interactions:   Is patient on bridging therapy with another anticoagulant?: NO    Plan: INR supratherapeutic at 3.64 today. Will hold warfarin. Daily INRs ordered     Date HGB PLATELETS INR Warfarin Dose Given   12-27 12 277 2.75 4 MG   12-28 11.8 264 3.64 HOLD

## 2022-12-28 NOTE — PROGRESS NOTES
[facility]   Hospitalist Progress Note  Date: 2022  Patient Name: Isabell Ribera  : 1945  MRN: [id number]  Date of admission: 2022      Subjective   Subjective     Chief Complaint: confusion    Summary: 77 y.o. female who lives at home with her  who also has dementia dementia presents the hospital with altered mental status.  She has a past medical history of A. fib, chronic lymphedema, hypertension and recurrent urinary tract infections.  Today she presented with AMS again.  In the ER she was noted to have pyuria.  She was admitted for metabolic encephalopathy and acute UTI    Interval Followup: patient is almost back to her baseline mental status.  Urine culture pending.  Working with physical therapy.  Eating ok.  No current complaints.      Review of Systems  No chest pain  No abdominal pain    Objective   Objective     Vitals:   Temp:  [97.2 °F (36.2 °C)-97.7 °F (36.5 °C)] 97.7 °F (36.5 °C)  Heart Rate:  [67-76] 76  Resp:  [18-20] 18  BP: ()/(56-66) 107/56  Physical Exam             Constitutional: Awake, alert, no acute distress              Eyes: Pupils equal, sclerae anicteric, no conjunctival injection              HENT: NCAT, mucous membranes moist              Neck: Supple, no thyromegaly, no lymphadenopathy, trachea midline              Respiratory: Clear to auscultation bilaterally, nonlabored respirations               Cardiovascular: RRR, no murmurs, rubs, or gallops, palpable pedal pulses bilaterally              Gastrointestinal: Positive bowel sounds, soft, nontender, nondistended              Musculoskeletal: No bilateral ankle edema, no clubbing or cyanosis to extremities              Psychiatric: Appropriate affect, cooperative              Neurologic: pleasantly confused, speech clear              Skin: No rashes     Assessment & Plan   Assessment / Plan     Assessment/Plan:  • Acute metabolic encephalopathy  • Chronic debilitation  • Urinary tract  infection with significant pyuria.  Recurrent  • Hypokalemia  • Chronic dementia  • Atrial fibrillation chronically on warfarin  • Hypertension  • Chronic lymphedema  • Mild pulmonary hypertension     Patient is admitted for further medical care  Replace potassium  Continue diuresis with IV Lasix and see if this improves her pulmonary edema and effusions.  I doubt it will have much impact on her acute on chronic swelling as she has chronic lymphedema so I do not anticipate significant improvement  IV antibiotics (Rocephin) for urinary tract infection.  Will tailor antibiotic choices based on previous cultures until new cultures can be obtained.  PT/OT    and care coordination consult.  Could possibly dc to [facility] tomorrow.   Strict I's and O's  Every 4 hours neurochecks  Daily weights  Fall precautions   Last echocardiogram was done 2 years ago.  We will repeat this echo to determine if she has any new onset CHF.  Last echo was NOT consistent with systolic CHF.  Echo pending at this time  CBC in am for surveillance of any acute blood loss or thrombocytopenia  Metabolic panel in the morning to evaluate electrolytes and renal function  Reviewed today's labs: elevated BNP  Reviewed telemetry: afib  Discussed with RN and SW  Risk of primary complaint: patient is at significant risk of morbidity if primary complaint is not treated especially considering the patient's comorbidities.      Discussed plan with RN.    DVT prophylaxis:  Medical and mechanical DVT prophylaxis orders are present.

## 2022-12-28 NOTE — CONSULTS
"Nutrition Services    Patient Name: Isabell Ribera  YOB: 1945  MRN: [id number]  Admission date: 12/27/2022      CLINICAL NUTRITION ASSESSMENT      Reason for Assessment  Identified at risk by screening criteria, Physician consult, Nonhealing wound or pressure ulcer     H&P:    Past Medical History:   Diagnosis Date   • Afib (HCC)    • Aftercare following ankle joint replacement surgery 06/01/2015   • Arthritis    • Arthritis of knee 06/01/2015   • Essential hypertension    • Gout    • HTN (hypertension)    • Left knee pain    • Lymphedema    • Recurrent UTI 05/11/2021   • Urinary retention 05/11/2021        Current Problems:   Active Hospital Problems    Diagnosis    • **Urinary tract infection without hematuria, site unspecified         Nutrition/Diet History         Narrative     RD assessed pt 2' to MST of 2, w/ report of large nonhealing wound/PI and wt loss. Per chart review, pt's wt has fluctuated x 4 yrs. This can be attributed to chronic lymphedema. Otherwise, no significant wt loss noted. Pt's intake was 75% @ dinner. RD to order Jaron BID to promote wound healing. Will continue to monitor.      Anthropometrics        Current Height, Weight Height: 170.2 cm (67\")  Weight: 94.2 kg (207 lb 10.8 oz)   Current BMI Body mass index is 32.53 kg/m².       Weight Hx  Wt Readings from Last 30 Encounters:   12/27/22 1500 94.2 kg (207 lb 10.8 oz)   12/27/22 1009 92.4 kg (203 lb 11.3 oz)   04/01/22 2108 79 kg (174 lb 2.6 oz)   02/13/22 1312 96.3 kg (212 lb 4.9 oz)   08/27/21 1127 90.9 kg (200 lb 6.4 oz)   08/25/21 1105 71.4 kg (157 lb 6.5 oz)   06/15/21 1401 84.8 kg (187 lb)   05/11/21 0000 127 kg (281 lb)   07/30/19 0000 123 kg (272 lb)   03/08/19 0000 131 kg (289 lb)   03/06/19 0000 131 kg (289 lb 9 oz)   02/22/19 0000 119 kg (262 lb)   12/28/18 0000 119 kg (262 lb 1 oz)            Wt Change Observation Wt fluctuations x 4 yrs w/o significant wt loss     Estimated/Assessed Needs       Energy " Requirements 25-30 kcal/kg ABW   EST Needs (kcal/day) 9052-8092       Protein Requirements 1.0 g/kg   EST Daily Needs (g/day) 70       Fluid Requirements 25 ml/kg    Estimated Needs (mL/day) 1750     Labs/Medications         Pertinent Labs Reviewed.   Results from last 7 days   Lab Units 12/28/22  0725 12/27/22  1016   SODIUM mmol/L 141 140   POTASSIUM mmol/L 3.2* 2.8*   CHLORIDE mmol/L 105 105   CO2 mmol/L 28.9 25.7   BUN mg/dL 10 10   CREATININE mg/dL 0.76 0.76   CALCIUM mg/dL 7.9* 7.9*   BILIRUBIN mg/dL  --  0.4   ALK PHOS U/L  --  102   ALT (SGPT) U/L  --  9   AST (SGOT) U/L  --  28   GLUCOSE mg/dL 84 99     Results from last 7 days   Lab Units 12/28/22  0725 12/27/22  1016   MAGNESIUM mg/dL  --  1.4*   HEMOGLOBIN g/dL 11.8* 12.0   HEMATOCRIT % 35.5 37.0     Coronavirus (COVID-19)   Date Value Ref Range Status   04/09/2021 NOT DETECTED NA Final     Comment:     The SARS-CoV-2 assay is a real-time, RT-PCR test intended  for the qualitative detection of nucleic acid from the  SARS-CoV-2 in respiratory specimens from individuals,  testing performed at [facility].       Lab Results   Component Value Date    HGBA1C 5.68 (H) 01/03/2022         Pertinent Medications Reviewed.     Current Nutrition Orders & Evaluation of Intake       Oral Nutrition     Current PO Diet Diet: Cardiac Diets, Diabetic Diets; Healthy Heart (2-3 Na+); Consistent Carbohydrate; Texture: Regular Texture (IDDSI 7); Fluid Consistency: Thin (IDDSI 0)   Supplement Orders Placed This Encounter      Dietary Nutrition Supplements Jaron       Malnutrition Severity Assessment                Nutrition Diagnosis         Nutrition Dx Problem 1 Increased nutrient needs related to high micronutrient demands as evidenced by large unhealing wound/PI       Nutrition Intervention         Jaron BID to promote wound healing      Medical Nutrition Therapy/Nutrition Education          Learner     Readiness Patient  N/A     Method     Response N/A  N/A      Monitor/Evaluation        Monitor Per protocol, Weight, Skin status       Nutrition Discharge Plan         Continue Jaron for improved healing        Electronically signed by:  Shayy Youssef RD  12/28/22 09:39 EST

## 2022-12-28 NOTE — PLAN OF CARE
Goal Outcome Evaluation:  Plan of Care Reviewed With: patient        Progress: no change  Outcome Evaluation: Pt remained A&Ox4 this shift. Also, remained on room air maintaining stable oxygen saturation. Pt was medicated with PRN Tyenol to help achieve an acceptable pain tolerance level. Wound/Ostomy RN completed wound care and placed orders. Skin care completed, as per order. Pt tolerated well. All other vital signs remained stable.

## 2022-12-28 NOTE — THERAPY EVALUATION
Patient Name: Isabell Ribera  : 1945    MRN: [id number]                              Today's Date: 2022       Admit Date: 2022    Visit Dx:     ICD-10-CM ICD-9-CM   1. Urinary tract infection without hematuria, site unspecified  N39.0 599.0   2. Confusion  R41.0 298.9   3. Acute on chronic congestive heart failure, unspecified heart failure type (HCC)  I50.9 428.0   4. Decreased activities of daily living (ADL)  Z78.9 V49.89     Patient Active Problem List   Diagnosis   • Recurrent urinary tract infection   • Urinary tract infection without hematuria, site unspecified     Past Medical History:   Diagnosis Date   • Afib (HCC)    • Aftercare following ankle joint replacement surgery 2015   • Arthritis    • Arthritis of knee 2015   • Essential hypertension    • Gout    • HTN (hypertension)    • Left knee pain    • Lymphedema    • Recurrent UTI 2021   • Urinary retention 2021     Past Surgical History:   Procedure Laterality Date   • CATARACT EXTRACTION     • COLONOSCOPY     • KIDNEY STONE SURGERY     • KNEE SURGERY Left 2007    REPLACEMENT   • OTHER SURGICAL HISTORY      JOINT SURGERY, UNSPECIFIED   • TUBAL ABDOMINAL LIGATION        General Information     Row Name 22 1312          OT Time and Intention    Document Type evaluation  -AV     Mode of Treatment individual therapy;occupational therapy  -AV     Row Name 22 1312          General Information    Patient Profile Reviewed yes  -AV     Prior Level of Function independent:  Per EMR, patient was primarily independent.  She has rolling walker and wheelchair at home.  Further specifics are unknown.  -AV     Existing Precautions/Restrictions fall  Impaired skin integrity.  Strict I&O's  -AV     Barriers to Rehab none identified  -AV     Row Name 22 1312          Occupational Profile    Reason for Services/Referral (Occupational Profile) Patient is a 77 year old female admitted to [facility] on  December 27, 2022 with altered mental status.  She is currently on [employer] [address]/room air.   OT consulted due to recent decline in ADL/transfer independence.  No previous OT services for current condition.  -AV     Row Name 12/28/22 1312          Living Environment    People in Home spouse  Per EMR,  has dementia  -AV     Row Name 12/28/22 1312          Cognition    Orientation Status (Cognition) --  Patient arouses easily with verbal prompting.  She is pleasantly confused.  Follows commands with some repeated directions required.  -AV     Row Name 12/28/22 1312          Safety Issues, Functional Mobility    Impairments Affecting Function (Mobility) balance;cognition;endurance/activity tolerance  -AV           User Key  (r) = Recorded By, (t) = Taken By, (c) = Cosigned By    Initials Name Provider Type    Say Jimenez OT Occupational Therapist                 Mobility/ADL's     Row Name 12/28/22 1314          Transfers    Comment, (Transfers) Patient declined testing stating she was cold/pulled covers over her head  -AV     Row Name 12/28/22 1314          Activities of Daily Living    BADL Assessment/Intervention --  Patient is able to feed self independently with set up.  She requires mod/max assist for further ADLs.  Dependent toilet hygiene: pure wick catheter/incontinent.  -AV           User Key  (r) = Recorded By, (t) = Taken By, (c) = Cosigned By    Initials Name Provider Type    Say Jimenez OT Occupational Therapist               Obj/Interventions     Row Name 12/28/22 1315          Sensory Assessment (Somatosensory)    Sensory Assessment (Somatosensory) UE sensation intact  -AV     Row Name 12/28/22 1315          Vision Assessment/Intervention    Visual Impairment/Limitations WFL  -AV     Row Name 12/28/22 1315          Range of Motion Comprehensive    General Range of Motion bilateral upper extremity ROM WFL  Bilateral shoulder AAROM/further BUE AROM WFL  -AV     Row Name 12/28/22 1315           Strength Comprehensive (MMT)    Comment, General Manual Muscle Testing (MMT) Assessment 4 -/5 bilateral upper extremities  -AV     Row Name 12/28/22 1315          Motor Skills    Motor Skills coordination;functional endurance  -AV     Coordination --  Right dominant-not tested  -AV     Functional Endurance Poor plus  -AV     Row Name 12/28/22 1315          Balance    Comment, Balance Unable to assess  -AV           User Key  (r) = Recorded By, (t) = Taken By, (c) = Cosigned By    Initials Name Provider Type    Say Jimenez OT Occupational Therapist               Goals/Plan     Row Name 12/28/22 1316          Transfer Goal 1 (OT)    Activity/Assistive Device (Transfer Goal 1, OT) transfers, all;walker, rolling  -AV     [place] Level/Cues Needed (Transfer Goal 1, OT) contact guard required;minimum assist (75% or more patient effort);verbal cues required  -AV     Time Frame (Transfer Goal 1, OT) long term goal (LTG);10 days  -AV     Row Name 12/28/22 1316          Bathing Goal 1 (OT)    Activity/Device (Bathing Goal 1, OT) bathing skills, all  -AV     [place] Level/Cues Needed (Bathing Goal 1, OT) contact guard required;minimum assist (75% or more patient effort);set-up required;verbal cues required  -AV     Time Frame (Bathing Goal 1, OT) long term goal (LTG);10 days  -AV     Row Name 12/28/22 1316          Dressing Goal 1 (OT)    Activity/Device (Dressing Goal 1, OT) dressing skills, all  -AV     [place]/Cues Needed (Dressing Goal 1, OT) contact guard required;minimum assist (75% or more patient effort);set-up required;verbal cues required  -AV     Time Frame (Dressing Goal 1, OT) long term goal (LTG);10 days  -AV     Row Name 12/28/22 1316          Toileting Goal 1 (OT)    Activity/Device (Toileting Goal 1, OT) toileting skills, all;raised toilet seat  -AV     [place] Level/Cues Needed (Toileting Goal 1, OT) contact guard required;minimum assist (75% or more patient effort);set-up  required;verbal cues required  -AV     Time Frame (Toileting Goal 1, OT) long term goal (LTG);10 days  -AV     Row Name 12/28/22 1316          Grooming Goal 1 (OT)    Activity/Device (Grooming Goal 1, OT) grooming skills, all  -AV     [place] (Grooming Goal 1, OT) contact guard required;minimum assist (75% or more patient effort);verbal cues required  -AV     Time Frame (Grooming Goal 1, OT) long term goal (LTG);10 days  -AV     Row Name 12/28/22 1316          Strength Goal 1 (OT)    Strength Goal 1 (OT) Patient will demonstrate 4/5 bilateral upper extremities to increase ADL and transfer independence.  -AV     Time Frame (Strength Goal 1, OT) long term goal (LTG);10 days  -AV     Row Name 12/28/22 1316          Problem Specific Goal 1 (OT)    Problem Specific Goal 1 (OT) Patient will demonstrate fair plus endurance/activity tolerance needed to support ADLs.  -AV     Time Frame (Problem Specific Goal 1, OT) long term goal (LTG);10 days  -AV     Row Name 12/28/22 1316          Therapy Assessment/Plan (OT)    Planned Therapy Interventions (OT) activity tolerance training;BADL retraining;functional balance retraining;occupation/activity based interventions;patient/caregiver education/training;transfer/mobility retraining;strengthening exercise  -AV           User Key  (r) = Recorded By, (t) = Taken By, (c) = Cosigned By    Initials Name Provider Type    Say Jimenez OT Occupational Therapist               Clinical Impression     Row Name 12/28/22 6105          Pain Assessment    Additional Documentation Pain Scale: FACES Pre/Post-Treatment (Group)  -AV     Row Name 12/28/22 4109          Pain Scale: FACES Pre/Post-Treatment    Pain: FACES Scale, Pretreatment 2-->hurts little bit  -AV     Posttreatment Pain Rating 2-->hurts little bit  -AV     Row Name 12/28/22 7871          Plan of Care Review    Plan of Care Reviewed With patient  -AV     Progress no change  First session evaluation:  -AV     Outcome  Evaluation Patient presents with limitations of cognition, endurance/activity tolerance and likely balance which impede her ability to perform ADL and transfers as prior.  The skills of a therapist will be required to safely and effectively implement treatment plan to restore maximal level of function.  -AV     Row Name 12/28/22 1315          Therapy Assessment/Plan (OT)    Patient/Family Therapy Goal Statement (OT) None stated  -AV     Rehab Potential (OT) good, to achieve stated therapy goals  -AV     Criteria for Skilled Therapeutic Interventions Met (OT) yes;meets criteria;skilled treatment is necessary  -AV     Therapy Frequency (OT) 5 times/wk  -AV     Row Name 12/28/22 1315          Therapy Plan Review/Discharge Plan (OT)    Anticipated Discharge Disposition (OT) sub acute care setting  -AV     Row Name 12/28/22 1315          Vital Signs    O2 Delivery Pre Treatment room air  -AV     O2 Delivery Intra Treatment room air  -AV     O2 Delivery Post Treatment room air  -AV           User Key  (r) = Recorded By, (t) = Taken By, (c) = Cosigned By    Initials Name Provider Type    Say Jimenez, OT Occupational Therapist               Outcome Measures     Row Name 12/28/22 1317          How much help from another is currently needed...    Putting on and taking off regular lower body clothing? 2  -AV     Bathing (including washing, rinsing, and drying) 2  -AV     Toileting (which includes using toilet bed pan or urinal) 1  -AV     Putting on and taking off regular upper body clothing 2  -AV     Taking care of personal grooming (such as brushing teeth) 2  -AV     Eating meals 4  -AV     AM-PAC 6 Clicks Score (OT) 13  -AV     Row Name 12/28/22 1302 12/28/22 0745       How much help from another person do you currently need...    Turning from your back to your side while in flat bed without using bedrails? 2  -MR 2  -MR    Moving from lying on back to sitting on the side of a flat bed without bedrails? 2  -MR 1  -MR     Moving to and from a bed to a chair (including a wheelchair)? 1  -MR 1  -MR    Standing up from a chair using your arms (e.g., wheelchair, bedside chair)? 1  -MR 1  -MR    Climbing 3-5 steps with a railing? 1  -MR 1  -MR    To walk in hospital room? 1  -MR 1  -MR    AM-PAC 6 Clicks Score (PT) 8  -MR 7  -MR    Highest level of mobility 3 --> Sat at edge of bed  -MR 2 --> Bed activities/dependent transfer  -MR    Row Name 12/28/22 1317          Functional Assessment    Outcome Measure Options AM-PAC 6 Clicks Daily Activity (OT);Optimal Instrument  -AV     Row Name 12/28/22 1317          Optimal Instrument    Optimal Instrument Optimal - 3  -AV     Bending/Stooping 5  -AV     Standing 5  -AV     Reaching 2  -AV     From the list, choose the 3 activities you would most like to be able to do without any difficulty Bending/stooping;Standing;Reaching  -AV     Total Score Optimal - 3 12  -AV           User Key  (r) = Recorded By, (t) = Taken By, (c) = Cosigned By    Initials Name Provider Type    Say Jimenez, CONI Occupational Therapist    MR Elsie Pruitt, RN Registered Nurse                Occupational Therapy Education     Title: PT OT SLP Therapies (In Progress)     Topic: Occupational Therapy (In Progress)     Point: ADL training (Done)     Description:   Instruct learner(s) on proper safety adaptation and remediation techniques during self care or transfers.   Instruct in proper use of assistive devices.              Learning Progress Summary           Patient Acceptance, E, VU by AV at 12/28/2022 1318                   Point: Home exercise program (Not Started)     Description:   Instruct learner(s) on appropriate technique for monitoring, assisting and/or progressing therapeutic exercises/activities.              Learner Progress:  Not documented in this visit.          Point: Precautions (Not Started)     Description:   Instruct learner(s) on prescribed precautions during self-care and functional  transfers.              Learner Progress:  Not documented in this visit.          Point: Body mechanics (Not Started)     Description:   Instruct learner(s) on proper positioning and spine alignment during self-care, functional mobility activities and/or exercises.              Learner Progress:  Not documented in this visit.                      User Key     Initials Effective Dates Name Provider Type Discipline    AV 06/16/21 -  Say Brink OT Occupational Therapist OT              OT Recommendation and Plan  Planned Therapy Interventions (OT): activity tolerance training, BADL retraining, functional balance retraining, occupation/activity based interventions, patient/caregiver education/training, transfer/mobility retraining, strengthening exercise  Therapy Frequency (OT): 5 times/wk  Plan of Care Review  Plan of Care Reviewed With: patient  Progress: no change (First session evaluation:)  Outcome Evaluation: Patient presents with limitations of cognition, endurance/activity tolerance and likely balance which impede her ability to perform ADL and transfers as prior.  The skills of a therapist will be required to safely and effectively implement treatment plan to restore maximal level of function.     Time Calculation:    Time Calculation- OT     Row Name 12/28/22 1319             Time Calculation- OT    OT Received On 12/28/22  -AV      OT Goal Re-Cert Due Date 01/06/23  -AV         Untimed Charges    OT Eval/Re-eval Minutes 35  -AV         Total Minutes    Untimed Charges Total Minutes 35  -AV       Total Minutes 35  -AV            User Key  (r) = Recorded By, (t) = Taken By, (c) = Cosigned By    Initials Name Provider Type    AV Say Brink OT Occupational Therapist              Therapy Charges for Today     Code Description Service Date Service Provider Modifiers Qty    [id number] HC OT EVAL MOD COMPLEXITY 3 12/28/2022 Say Brink OT GO 1               Say Brink OT  12/28/2022

## 2022-12-28 NOTE — PLAN OF CARE
Goal Outcome Evaluation:  Plan of Care Reviewed With: patient        Progress: no change  Outcome Evaluation: Patient presents with deficits in balance, strength, transfers, and ambulation. Patient will benefit from skilled PT services to address these mobility deficits and decrease risk of falls. Recommend sub acute rehab following hospital discharge.

## 2022-12-28 NOTE — PLAN OF CARE
Goal Outcome Evaluation:  Plan of Care Reviewed With: patient        Progress: no change (First session evaluation:)  Outcome Evaluation: Patient presents with limitations of cognition, endurance/activity tolerance and likely balance which impede her ability to perform ADL and transfers as prior.  The skills of a therapist will be required to safely and effectively implement treatment plan to restore maximal level of function.    Discharge recommendation: Subacute rehab

## 2022-12-28 NOTE — PLAN OF CARE
Goal Outcome Evaluation:  Plan of Care Reviewed With: patient        Progress: no change  Outcome Evaluation: Pt denied pain/discomfort this shift. Provided skin care as ordered. Pt rested well this shift. No new issues or needs at this time.

## 2022-12-28 NOTE — SIGNIFICANT NOTE
Wound Eval / Progress Noted    GARTH Taylor     Patient Name: Isabell Ribera  : 1945  MRN: [id number]  Today's Date: 2022                 Admit Date: 2022    Visit Dx:    ICD-10-CM ICD-9-CM   1. Urinary tract infection without hematuria, site unspecified  N39.0 599.0   2. Confusion  R41.0 298.9   3. Acute on chronic congestive heart failure, unspecified heart failure type (HCC)  I50.9 428.0       Patient Active Problem List   Diagnosis   • Recurrent urinary tract infection   • Urinary tract infection without hematuria, site unspecified        Past Medical History:   Diagnosis Date   • Afib (HCC)    • Aftercare following ankle joint replacement surgery 2015   • Arthritis    • Arthritis of knee 2015   • Essential hypertension    • Gout    • HTN (hypertension)    • Left knee pain    • Lymphedema    • Recurrent UTI 2021   • Urinary retention 2021        Past Surgical History:   Procedure Laterality Date   • CATARACT EXTRACTION     • COLONOSCOPY     • KIDNEY STONE SURGERY     • KNEE SURGERY Left 2007    REPLACEMENT   • OTHER SURGICAL HISTORY      JOINT SURGERY, UNSPECIFIED   • TUBAL ABDOMINAL LIGATION           Physical Assessment:  Wound 22 1711 sacral spine (Active)   Wound Image   22 1700   Pressure Injury Stage U 22 09   Dressing Appearance dry;intact 22   Closure None 22   Base yellow;slough;moist 22   Periwound redness;blanchable;intact 22   Periwound Temperature warm 22   Periwound Skin Turgor soft 22   Edges open 22 09   Wound Length (cm) 1 cm 22   Wound Width (cm) 1.2 cm 22 09   Wound Surface Area (cm^2) 1.2 cm^2 22 09   Drainage Amount none 2249   Care, Wound cleansed with;sterile normal saline 22 0949   Dressing Care dressing applied;hydrofiber;silver impregnated;hydrocolloid 22 09   Periwound Care absorptive dressing  applied;barrier ointment applied;hydrocolloid barrier applied 12/28/22 0949       Wound 12/27/22 1721 Left lower breast MASD (Moisture associated skin damage) (Active)   Dressing Appearance open to air 12/28/22 0949   Closure None 12/28/22 0949   Base yeast;moist;pink;red 12/28/22 0949   Periwound redness;pink;moist 12/28/22 0949   Periwound Temperature warm 12/28/22 0949   Periwound Skin Turgor soft 12/28/22 0949   Edges rolled/closed 12/28/22 0949   Drainage Characteristics/Odor yellow;creamy 12/28/22 0949   Drainage Amount scant 12/28/22 0949   Care, Wound cleansed with;soap and water;barrier applied 12/28/22 0949   Dressing Care open to air 12/28/22 0949   Periwound Care topical treatment applied 12/28/22 0949       Wound 12/27/22 1721 Right upper abdomen MASD (Moisture associated skin damage) (Active)   Dressing Appearance open to air 12/28/22 0949   Closure None 12/28/22 0949   Base pink;moist;red;yeast 12/28/22 0949   Periwound redness;pink;moist 12/28/22 0949   Periwound Temperature warm 12/28/22 0949   Periwound Skin Turgor soft 12/28/22 0949   Edges rolled/closed 12/28/22 0949   Drainage Characteristics/Odor yellow;creamy 12/28/22 0949   Drainage Amount scant 12/28/22 0949   Care, Wound cleansed with;soap and water;barrier applied 12/28/22 0949   Dressing Care open to air 12/28/22 0949   Periwound Care topical treatment applied 12/28/22 0949       Wound 12/28/22 Left gluteal Pressure Injury (Active)   Pressure Injury Stage DTPI 12/28/22 0949   Dressing Appearance open to air 12/28/22 0949   Closure None 12/28/22 0949   Base maroon/purple;non-blanchable;dry 12/28/22 0949   Periwound redness;intact;blanchable 12/28/22 0949   Periwound Temperature warm 12/28/22 0949   Periwound Skin Turgor soft 12/28/22 0949   Edges rolled/closed 12/28/22 0949   Drainage Amount none 12/28/22 0949   Care, Wound cleansed with;soap and water;barrier applied 12/28/22 0949   Dressing Care open to air 12/28/22 0949   Periwound Care  barrier ointment applied 12/28/22 0949      Wound Check / Follow-up:  Patient seen today for wound consult. Patient with unstageable pressure injury to coccyx. Wound with moist sloughing tissue, obscuring complete view of wound base. Cleansed with normal saline and gauze. Applied silver impregnated hydrofiber (aquacel AG) and secured with hydrocolloid dressing (duoderm). Recommending every other day dressing changes. Surrounding tissue reddened and blanchable. Moisture and redness noted to perineum. Some small areas of superficial tissue loss present but all areas are blanchable. Recommending TID application of magic barrier cream to these areas. Skin folds to bilateral breasts and groin with moisture, redness, and small amount of yeast. Cleansed with soap and water. Applied light dusting of nystatin powder and padded with pillow cases for added moisture absorption. Recommending BID skin care. Implement every two hour turns, offload heels, keep patient free from moisture.    Dry, scaly skin noted to bilateral lower legs and feet. Recommending BID skin care with application of Lac-Hydrin lotion.      Impression: MASD, unstageable pressure injury.      Short term goals:  Regain skin integrity, moisture prevention, pressure reduction, skin protection, topical treatment.      Beatriz Mondragon RN    12/28/2022    10:40 EST

## 2022-12-28 NOTE — THERAPY EVALUATION
"Acute Care - Physical Therapy Initial Evaluation  [facility]     Patient Name: Isabell Ribera  : 1945  MRN: [id number]  Today's Date: 2022      Visit Dx:     ICD-10-CM ICD-9-CM   1. Urinary tract infection without hematuria, site unspecified  N39.0 599.0   2. Confusion  R41.0 298.9   3. Acute on chronic congestive heart failure, unspecified heart failure type (HCC)  I50.9 428.0   4. Decreased activities of daily living (ADL)  Z78.9 V49.89   5. Difficulty walking  R26.2 719.7     Patient Active Problem List   Diagnosis   • Recurrent urinary tract infection   • Urinary tract infection without hematuria, site unspecified     Past Medical History:   Diagnosis Date   • Afib (HCC)    • Aftercare following ankle joint replacement surgery 2015   • Arthritis    • Arthritis of knee 2015   • Essential hypertension    • Gout    • HTN (hypertension)    • Left knee pain    • Lymphedema    • Recurrent UTI 2021   • Urinary retention 2021     Past Surgical History:   Procedure Laterality Date   • CATARACT EXTRACTION     • COLONOSCOPY     • KIDNEY STONE SURGERY     • KNEE SURGERY Left 2007    REPLACEMENT   • OTHER SURGICAL HISTORY      JOINT SURGERY, UNSPECIFIED   • TUBAL ABDOMINAL LIGATION       PT Assessment (last 12 hours)     PT Evaluation and Treatment     Row Name 22 1500          Physical Therapy Time and Intention    Document Type evaluation  -AV     Mode of Treatment individual therapy;physical therapy  -AV     Row Name 22 1500          General Information    Patient Profile Reviewed yes  -AV     Prior Level of Function --  Reports assist for ADLs. Has RW and w/c but reports she has not used her RW \"for awhile.\" No home O2. Patient demonstrating some confusion during evaluation. No family at bedside to verify information.  -AV     Equipment Currently Used at Home walker, rolling;wheelchair  -AV     Existing Precautions/Restrictions fall  -AV     Row Name 22 1500       "    Living Environment    Current Living Arrangements home  -AV     Home Accessibility stairs to enter home  -AV     People in Home spouse  -AV     Row Name 12/28/22 1500          Home Main Entrance    Number of Stairs, Main Entrance two  -AV     Stair Railings, Main Entrance railings on both sides of stairs  -AV     Row Name 12/28/22 1500          Cognition    Affect/Mental Status (Cognition) confused  -AV     Orientation Status (Cognition) oriented to;person;place;time  -AV     Row Name 12/28/22 1500          Range of Motion (ROM)    Range of Motion bilateral lower extremities;ROM is WFL  AAROM  -AV     Row Name 12/28/22 1500          Strength (Manual Muscle Testing)    Strength (Manual Muscle Testing) right lower extremity strength;left lower extremity strength  -AV     Left Lower Extremity Strength hip;knee;ankle  -AV     Hip, Left (Strength) 1/5  -AV     Knee, Left (Strength) 2/5  -AV     Ankle, Left (Strength) 3/5  -AV     Right Lower Extremity Strength hip;knee;ankle  -AV     Hip, Right (Strength) 1/5  -AV     Knee, Right (Strength) 2/5  -AV     Ankle, Right (Strength) 3/5  -AV     Row Name 12/28/22 1500          Bed Mobility    Comment, (Bed Mobility) Patient required moderate assist for supine to long sit and to maintain static sitting.  -AV     Row Name 12/28/22 1500          Transfers    Comment, (Transfers) Declined.  -AV     Row Name 12/28/22 1500          Safety Issues, Functional Mobility    Impairments Affecting Function (Mobility) balance;cognition;endurance/activity tolerance;strength  -AV     Row Name 12/28/22 1500          Balance    Balance Assessment sitting static balance  -AV     Static Sitting Balance moderate assist  -AV     Position, Sitting Balance supported  -AV     Row Name             Wound 12/27/22 1711 sacral spine    Wound - Properties Group Placement Date: 12/27/22  - Placement Time: 1711  - Present on Hospital Admission: Y  - Location: sacral spine  -AH    Retired Wound -  Properties Group Placement Date: 12/27/22  - Placement Time: 1711  -AH Present on Hospital Admission: Y  -AH Location: sacral spine  -AH    Retired Wound - Properties Group Date first assessed: 12/27/22  - Time first assessed: 1711  -AH Present on Hospital Admission: Y  -AH Location: sacral spine  -AH    Row Name             Wound 12/27/22 1721 Left lower breast MASD (Moisture associated skin damage)    Wound - Properties Group Placement Date: 12/27/22  - Placement Time: 1721  -AH Present on Hospital Admission: Y  -AH Side: Left  -AH Orientation: lower  -AH Location: breast  -AH Primary Wound Type: MASD  -AH    Retired Wound - Properties Group Placement Date: 12/27/22  - Placement Time: 1721  -AH Present on Hospital Admission: Y  -AH Side: Left  -AH Orientation: lower  -AH Location: breast  -AH Primary Wound Type: MASD  -AH    Retired Wound - Properties Group Date first assessed: 12/27/22  - Time first assessed: 1721  -AH Present on Hospital Admission: Y  -AH Side: Left  -AH Location: breast  -AH Primary Wound Type: MASD  -AH    Row Name             Wound 12/27/22 1721 Right upper abdomen MASD (Moisture associated skin damage)    Wound - Properties Group Placement Date: 12/27/22  - Placement Time: 1721  -AH Present on Hospital Admission: Y  -AH Side: Right  -AH Orientation: upper  -AH Location: abdomen  -AH Primary Wound Type: MASD  -AH    Retired Wound - Properties Group Placement Date: 12/27/22  - Placement Time: 1721  -AH Present on Hospital Admission: Y  -AH Side: Right  -AH Orientation: upper  -AH Location: abdomen  -AH Primary Wound Type: MASD  -AH    Retired Wound - Properties Group Date first assessed: 12/27/22  - Time first assessed: 1721  -AH Present on Hospital Admission: Y  -AH Side: Right  -AH Location: abdomen  -AH Primary Wound Type: MASD  -AH    Row Name             Wound 12/28/22 Left gluteal Pressure Injury    Wound - Properties Group Placement Date: 12/28/22  -NH Present on  Hospital Admission: Y  -NH Side: Left  -NH Location: gluteal  -NH Primary Wound Type: Pressure inj  -NH    Retired Wound - Properties Group Placement Date: 12/28/22  -NH Present on Hospital Admission: Y  -NH Side: Left  -NH Location: gluteal  -NH Primary Wound Type: Pressure inj  -NH    Retired Wound - Properties Group Date first assessed: 12/28/22  -NH Present on Hospital Admission: Y  -NH Side: Left  -NH Location: gluteal  -NH Primary Wound Type: Pressure inj  -NH    Row Name 12/28/22 1500          Plan of Care Review    Plan of Care Reviewed With patient  -AV     Progress no change  -AV     Outcome Evaluation Patient presents with deficits in balance, strength, transfers, and ambulation. Patient will benefit from skilled PT services to address these mobility deficits and decrease risk of falls. Recommend sub acute rehab following hospital discharge.  -AV     Row Name 12/28/22 1500          Therapy Assessment/Plan (PT)    Rehab Potential (PT) fair, will monitor progress closely  -AV     Criteria for Skilled Interventions Met (PT) yes;skilled treatment is necessary  -AV     Therapy Frequency (PT) daily  -AV     Problem List (PT) problems related to;balance;cognition;mobility;strength  -AV     Activity Limitations Related to Problem List (PT) unable to transfer safely;unable to ambulate safely  -AV     Row Name 12/28/22 1500          PT Evaluation Complexity    History, PT Evaluation Complexity 1-2 personal factors and/or comorbidities  -AV     Examination of Body Systems (PT Eval Complexity) total of 3 or more elements  -AV     Clinical Presentation (PT Evaluation Complexity) stable  -AV     Clinical Decision Making (PT Evaluation Complexity) low complexity  -AV     Overall Complexity (PT Evaluation Complexity) low complexity  -AV     Row Name 12/28/22 1500          Therapy Plan Review/Discharge Plan (PT)    Therapy Plan Review (PT) evaluation/treatment results reviewed;patient  -AV     Row Name 12/28/22 1500           Physical Therapy Goals    Bed Mobility Goal Selection (PT) bed mobility, PT goal 1  -AV     Transfer Goal Selection (PT) transfer, PT goal 1  -AV     Gait Training Goal Selection (PT) gait training, PT goal 1  -AV     Row Name 12/28/22 1500          Bed Mobility Goal 1 (PT)    Activity/Assistive Device (Bed Mobility Goal 1, PT) sit to supine/supine to sit  -AV     [place] Level/Cues Needed (Bed Mobility Goal 1, PT) minimum assist (75% or more patient effort)  -AV     Time Frame (Bed Mobility Goal 1, PT) 10 days  -AV     Row Name 12/28/22 1500          Transfer Goal 1 (PT)    Activity/Assistive Device (Transfer Goal 1, PT) sit-to-stand/stand-to-sit;bed-to-chair/chair-to-bed;walker, rolling  -AV     [place] Level/Cues Needed (Transfer Goal 1, PT) minimum assist (75% or more patient effort)  -AV     Time Frame (Transfer Goal 1, PT) 10 days  -AV     Row Name 12/28/22 1500          Gait Training Goal 1 (PT)    Activity/Assistive Device (Gait Training Goal 1, PT) gait (walking locomotion);assistive device use;walker, rolling  -AV     [place] Level (Gait Training Goal 1, PT) minimum assist (75% or more patient effort)  -AV     Distance (Gait Training Goal 1, PT) 20  -AV     Time Frame (Gait Training Goal 1, PT) 10 days  -AV           User Key  (r) = Recorded By, (t) = Taken By, (c) = Cosigned By    Initials Name Provider Type    Beatriz Horn, RN Registered Nurse    Marielena Flores RN Registered Nurse    Alexandr Govea, PT Physical Therapist                Physical Therapy Education     Title: PT OT SLP Therapies (In Progress)     Topic: Physical Therapy (In Progress)     Point: Mobility training (Done)     Learning Progress Summary           Patient Acceptance, E,TB, VU by AV at 12/28/2022 1514                   Point: Home exercise program (Not Started)     Learner Progress:  Not documented in this visit.          Point: Body mechanics (Done)     Learning Progress Summary            Patient Acceptance, E,TB, VU by AV at 12/28/2022 1514                   Point: Precautions (Done)     Learning Progress Summary           Patient Acceptance, E,TB, VU by AV at 12/28/2022 1514                               User Key     Initials Effective Dates Name Provider Type Discipline     06/11/21 -  Alexandr Marley, PT Physical Therapist PT              PT Recommendation and Plan  Anticipated Discharge Disposition (PT): sub acute care setting  Planned Therapy Interventions (PT): balance training, bed mobility training, gait training, neuromuscular re-education, strengthening, transfer training  Therapy Frequency (PT): daily  Plan of Care Reviewed With: patient  Progress: no change  Outcome Evaluation: Patient presents with deficits in balance, strength, transfers, and ambulation. Patient will benefit from skilled PT services to address these mobility deficits and decrease risk of falls. Recommend sub acute rehab following hospital discharge.   Outcome Measures     Row Name 12/28/22 1500             How much help from another person do you currently need...    Turning from your back to your side while in flat bed without using bedrails? 2  -AV      Moving from lying on back to sitting on the side of a flat bed without bedrails? 2  -AV      Moving to and from a bed to a chair (including a wheelchair)? 2  -AV      Standing up from a chair using your arms (e.g., wheelchair, bedside chair)? 2  -AV      Climbing 3-5 steps with a railing? 1  -AV      To walk in hospital room? 1  -AV      AM-PAC 6 Clicks Score (PT) 10  -AV         Functional Assessment    Outcome Measure Options AM-PAC 6 Clicks Basic Mobility (PT)  -AV            User Key  (r) = Recorded By, (t) = Taken By, (c) = Cosigned By    Initials Name Provider Type    AV Alexandr Marley, PT Physical Therapist                 Time Calculation:    PT Charges     Row Name 12/28/22 1512             Time Calculation    PT Received On 12/28/22  -AV      PT  Goal Re-Cert Due Date 01/06/23  -AV         Untimed Charges    PT Eval/Re-eval Minutes 26  -AV         Total Minutes    Untimed Charges Total Minutes 26  -AV       Total Minutes 26  -AV            User Key  (r) = Recorded By, (t) = Taken By, (c) = Cosigned By    Initials Name Provider Type    AV Alexandr Marley, PT Physical Therapist              Therapy Charges for Today     Code Description Service Date Service Provider Modifiers Qty    68029158720 HC PT EVAL LOW COMPLEXITY 2 12/28/2022 Alexandr Marley, PT GP 1          PT G-Codes  Outcome Measure Options: AM-PAC 6 Clicks Basic Mobility (PT)  AM-PAC 6 Clicks Score (PT): 10  AM-PAC 6 Clicks Score (OT): 13    Alexandr Marley PT  12/28/2022

## 2022-12-29 ENCOUNTER — APPOINTMENT (OUTPATIENT)
Dept: CT IMAGING | Facility: HOSPITAL | Age: 77
DRG: 689 | End: 2022-12-29
Payer: MEDICARE

## 2022-12-29 ENCOUNTER — APPOINTMENT (OUTPATIENT)
Dept: CARDIOLOGY | Facility: HOSPITAL | Age: 77
DRG: 689 | End: 2022-12-29
Payer: MEDICARE

## 2022-12-29 PROBLEM — R79.89 ELEVATED BRAIN NATRIURETIC PEPTIDE (BNP) LEVEL: Status: ACTIVE | Noted: 2022-12-29

## 2022-12-29 PROBLEM — E83.42 HYPOMAGNESEMIA: Status: ACTIVE | Noted: 2022-12-29

## 2022-12-29 PROBLEM — J90 PLEURAL EFFUSION: Status: ACTIVE | Noted: 2022-12-29

## 2022-12-29 PROBLEM — G93.41 METABOLIC ENCEPHALOPATHY: Status: ACTIVE | Noted: 2022-12-29

## 2022-12-29 PROBLEM — E87.6 HYPOKALEMIA: Status: ACTIVE | Noted: 2022-12-29

## 2022-12-29 LAB
ANION GAP SERPL CALCULATED.3IONS-SCNC: 8.2 MMOL/L (ref 5–15)
BASOPHILS # BLD AUTO: 0.04 10*3/MM3 (ref 0–0.2)
BASOPHILS NFR BLD AUTO: 0.8 % (ref 0–1.5)
BUN SERPL-MCNC: 14 MG/DL (ref 8–23)
BUN/CREAT SERPL: 14.4 (ref 7–25)
CALCIUM SPEC-SCNC: 7.8 MG/DL (ref 8.6–10.5)
CHLORIDE SERPL-SCNC: 101 MMOL/L (ref 98–107)
CO2 SERPL-SCNC: 28.8 MMOL/L (ref 22–29)
CREAT SERPL-MCNC: 0.97 MG/DL (ref 0.57–1)
DEPRECATED RDW RBC AUTO: 52.7 FL (ref 37–54)
EGFRCR SERPLBLD CKD-EPI 2021: 60.3 ML/MIN/1.73
EOSINOPHIL # BLD AUTO: 0.17 10*3/MM3 (ref 0–0.4)
EOSINOPHIL NFR BLD AUTO: 3.5 % (ref 0.3–6.2)
ERYTHROCYTE [DISTWIDTH] IN BLOOD BY AUTOMATED COUNT: 15.4 % (ref 12.3–15.4)
GLUCOSE SERPL-MCNC: 87 MG/DL (ref 65–99)
HCT VFR BLD AUTO: 34.7 % (ref 34–46.6)
HGB BLD-MCNC: 11.6 G/DL (ref 12–15.9)
IMM GRANULOCYTES # BLD AUTO: 0.02 10*3/MM3 (ref 0–0.05)
IMM GRANULOCYTES NFR BLD AUTO: 0.4 % (ref 0–0.5)
INR PPP: 3.79 (ref 0.86–1.15)
LYMPHOCYTES # BLD AUTO: 1.42 10*3/MM3 (ref 0.7–3.1)
LYMPHOCYTES NFR BLD AUTO: 29.2 % (ref 19.6–45.3)
MCH RBC QN AUTO: 31.2 PG (ref 26.6–33)
MCHC RBC AUTO-ENTMCNC: 33.4 G/DL (ref 31.5–35.7)
MCV RBC AUTO: 93.3 FL (ref 79–97)
MONOCYTES # BLD AUTO: 0.56 10*3/MM3 (ref 0.1–0.9)
MONOCYTES NFR BLD AUTO: 11.5 % (ref 5–12)
NEUTROPHILS NFR BLD AUTO: 2.66 10*3/MM3 (ref 1.7–7)
NEUTROPHILS NFR BLD AUTO: 54.6 % (ref 42.7–76)
NRBC BLD AUTO-RTO: 0 /100 WBC (ref 0–0.2)
PLATELET # BLD AUTO: 266 10*3/MM3 (ref 140–450)
PMV BLD AUTO: 9.7 FL (ref 6–12)
POTASSIUM SERPL-SCNC: 3.4 MMOL/L (ref 3.5–5.2)
PROTHROMBIN TIME: 38.3 SECONDS (ref 11.8–14.9)
RBC # BLD AUTO: 3.72 10*6/MM3 (ref 3.77–5.28)
SODIUM SERPL-SCNC: 138 MMOL/L (ref 136–145)
WBC NRBC COR # BLD: 4.87 10*3/MM3 (ref 3.4–10.8)

## 2022-12-29 PROCEDURE — 85025 COMPLETE CBC W/AUTO DIFF WBC: CPT | Performed by: FAMILY MEDICINE

## 2022-12-29 PROCEDURE — 99233 SBSQ HOSP IP/OBS HIGH 50: CPT | Performed by: INTERNAL MEDICINE

## 2022-12-29 PROCEDURE — 93306 TTE W/DOPPLER COMPLETE: CPT

## 2022-12-29 PROCEDURE — 74176 CT ABD & PELVIS W/O CONTRAST: CPT

## 2022-12-29 PROCEDURE — 25010000002 FUROSEMIDE PER 20 MG: Performed by: FAMILY MEDICINE

## 2022-12-29 PROCEDURE — 80048 BASIC METABOLIC PNL TOTAL CA: CPT | Performed by: FAMILY MEDICINE

## 2022-12-29 PROCEDURE — 85610 PROTHROMBIN TIME: CPT | Performed by: FAMILY MEDICINE

## 2022-12-29 RX ADMIN — FUROSEMIDE 40 MG: 10 INJECTION, SOLUTION INTRAMUSCULAR; INTRAVENOUS at 04:20

## 2022-12-29 RX ADMIN — NYSTATIN: 100000 POWDER TOPICAL at 15:54

## 2022-12-29 RX ADMIN — Medication 10 ML: at 09:33

## 2022-12-29 RX ADMIN — HYDROCORTISONE 1 APPLICATION: 1 OINTMENT TOPICAL at 15:54

## 2022-12-29 RX ADMIN — FOLIC ACID 1 MG: 1 TABLET ORAL at 09:32

## 2022-12-29 RX ADMIN — SENNOSIDES AND DOCUSATE SODIUM 2 TABLET: 8.6; 5 TABLET ORAL at 09:32

## 2022-12-29 RX ADMIN — Medication: at 09:32

## 2022-12-29 RX ADMIN — POTASSIUM CHLORIDE 10 MEQ: 10 CAPSULE, COATED, EXTENDED RELEASE ORAL at 09:32

## 2022-12-29 RX ADMIN — HYDROCORTISONE 1 APPLICATION: 1 OINTMENT TOPICAL at 09:33

## 2022-12-29 RX ADMIN — NYSTATIN: 100000 POWDER TOPICAL at 09:33

## 2022-12-29 RX ADMIN — POTASSIUM CHLORIDE 10 MEQ: 10 CAPSULE, COATED, EXTENDED RELEASE ORAL at 17:47

## 2022-12-29 RX ADMIN — FUROSEMIDE 40 MG: 10 INJECTION, SOLUTION INTRAMUSCULAR; INTRAVENOUS at 15:54

## 2022-12-29 RX ADMIN — CARVEDILOL 6.25 MG: 6.25 TABLET, FILM COATED ORAL at 17:47

## 2022-12-29 RX ADMIN — CARVEDILOL 6.25 MG: 6.25 TABLET, FILM COATED ORAL at 09:32

## 2022-12-29 NOTE — PROGRESS NOTES
[facility]   Hospitalist Progress Note  Date: 2022  Patient Name: Isabell Ribera  : 1945  MRN: [id number]  Date of admission: 2022      Subjective   Subjective     Chief Complaint: Follow up for UTI    Summary: 77-year-old female with  A. Fib on coumadin, HTN, lymphedema, recurrent UTI who presented with altered mental status.  Afebrile.  No leukocytosis.  UA with a lot of squamous cells, 4+ bacteria, TNTC WBC and positive nitrite.  On Rocephin.    Interval Followup: No issues overnight.  Patient feeling well this morning.  Alert and conversant.  No UTI symptoms.  Tolerating oral intake.  Bedridden at baseline.  No acute complaints    Review of Systems  Constitutional:  No Fever, No Chills  HEENT: Denied complaints  Cardiovascular:  Denied complaints  Respiratory:  Denied complaints  Gastrointestinal:  No Nausea, No Vomiting, No Diarrhea  Genitourinary:  No Dysuria, No Suprapubic or flank pain  Musculoskeletal:  Denied complaints  Neuro:  + Chronic generalized weakness. No HA    Objective   Objective     Vitals:   Temp:  [97.5 °F (36.4 °C)-98.3 °F (36.8 °C)] 97.5 °F (36.4 °C)  Heart Rate:  [65-83] 65  Resp:  [18-20] 18  BP: (102-132)/(51-69) 132/59  Physical Exam    Constitutional: Elderly  female, resting in bed, conversant, NAD   Eyes: Pupils equal and reactive, no conjunctival injection   HENT: NCAT, nares patent, moist mucous membranes   Neck: Supple, trachea midline   Respiratory: Clear to auscultation bilaterally, nonlabored respirations    Cardiovascular: IR/IR, no murmurs, + bilateral lower extremity edema   Gastrointestinal: Positive bowel sounds, soft, nontender, nondistended   Musculoskeletal: No joint deformities, no clubbing or cyanosis to extremities   Psychiatric: Appropriate affect, cooperative   Neurologic: Alert and oriented to person, month/year but not location, Cranial Nerves grossly intact speech clear   Skin: Warm and dry, no rashes     Result Review     Result Review:  I have personally reviewed the following over the last 24 hours (07:00 to 07:00) and agree with the following findings  [x]  Laboratory  CBC    CBC 12/27/22 12/28/22 12/29/22   WBC 4.35 5.20 4.87   RBC 3.95 3.82 3.72 (A)   Hemoglobin 12.0 11.8 (A) 11.6 (A)   Hematocrit 37.0 35.5 34.7   MCV 93.7 92.9 93.3   MCH 30.4 30.9 31.2   MCHC 32.4 33.2 33.4   RDW 15.7 (A) 15.7 (A) 15.4   Platelets 277 264 266   (A) Abnormal value            BMP    BMP 12/27/22 12/28/22 12/29/22   BUN 10 10 14   Creatinine 0.76 0.76 0.97   Sodium 140 141 138   Potassium 2.8 (A) 3.2 (A) 3.4 (A)   Chloride 105 105 101   CO2 25.7 28.9 28.8   Calcium 7.9 (A) 7.9 (A) 7.8 (A)   (A) Abnormal value       Comments are available for some flowsheets but are not being displayed.           [x]  Microbiology  Blood cultures NGTD  Urine culture 100,000 CFU/mL mixed kianna    Reviewed urine cultures positive for E. Coli, Proteus both sensitive to cephalosporins and Bactrim  Did grow ESBL Klebsiella previously      []  Radiology  [x]  EKG/Telemetry monitor reviewed: Atrial fibrillation, heart rate controlled  []  Cardiology/Vascular   []  Pathology  []  Old records  [x]  Other:     Intake/Output Summary (Last 24 hours) at 12/29/2022 0955  Last data filed at 12/29/2022 0926  Gross per 24 hour   Intake 960 ml   Output 2125 ml   Net -1165 ml         Assessment & Plan   Assessment / Plan     Assessment:  Active Hospital Problems    Diagnosis  POA   • **Metabolic encephalopathy [G93.41]  Yes   • Hypomagnesemia [E83.42]  Yes   • Hypokalemia [E87.6]  Yes   • Elevated brain natriuretic peptide (BNP) level [R79.89]  Yes   • Afib (HCC) [I48.91]  Yes   • Lymphedema [I89.0]  Yes   • Essential hypertension [I10]  Yes   • Anticoagulated on Coumadin [Z79.01]  Not Applicable   • Urinary tract infection without hematuria, site unspecified [N39.0]  Yes       Plan:     · I suspect patient has a degree of colonization based off of urine microbiology. Urine culture  this time is growing mixed kianna.  Previous urine culture speciation and susceptibilities noted.  She did grow ESBL Klebsiella previously.  She is nontoxic without systemic signs of infection and not endorsing UTI symptoms.  Completed 3 days of Rocephin.  Will discontinue antibiotic and monitor  · Underwent cystoscopy right retrograde and right stent insertion (03/19/2021) had stent subsequently removed.  Will repeat scan to see if there are any changes that would warrant the intervention   · Check bladder scan and PVR  · Patient previously followed with Dr. Levi - needs follow-up with him at time of discharge  · INR supratherapeutic.  No bleeding events.  Hemoglobin stable.  Hold Coumadin.  Pharmacy on board.  Monitor daily INR.   · Heart rate and blood pressure controlled.  Continue Coreg 6.25 mg twice daily.  Discontinue telemetry  · Creatinine stable.  Continue IV Lasix 40 mg q12 hours  · Continue oral potassium chloride supplementation  · Palliative care consult for advance care planning  Bedridden at baseline. Has bed at [facility] medically appropriate    Discussed plan with RN and her daughter Gissell    DVT prophylaxis:  Medical and mechanical DVT prophylaxis orders are present.    CODE STATUS:   Level Of Support Discussed With: Patient  Code Status (Patient has no pulse and is not breathing): CPR (Attempt to Resuscitate)  Medical Interventions (Patient has pulse or is breathing): Full Support        Electronically signed by Brenden Ornelas DO, 12/29/22, 7:43 AM EST.

## 2022-12-29 NOTE — NURSING NOTE
Palliative care nurse met with patient and daughter Gissell to discuss advance care planning. Reviewed the Conversations that Matter booklet.      Discussed resuscitation with patient and Gissell and explained in detail FULL CODE versus DNR DNI.  Patient and daughter requested DNR DNI.  Living will completed, EMS DNR completed.  MD notified and provided the originals to Gissell and placed copies on the chart.     Discharge planning is for patient to discharge to [facility].      DEVIN MedranoN, RN, CHPN

## 2022-12-29 NOTE — CONSULTS
"Purpose of the visit was to evaluate for: goals of care/advanced care planning. Spoke with RN, patient and family and discussed palliative care, goals of care and advanced care planning.      Assessment:  Patient is currently located on 4nt, palliative care met with patient and introduced self and explained my role.  Patient is alert and oriented to person, place and time.  She denied any pain or shortness of air at the time of the visit.  She reports being bed bound for sometime and her spouse is her care giver at home.  She reported having 5 children but wants Gissell \"to be in charge of medical decisions\".  She reports not having much of an appetite and not liking the food.  She reports feeling anxious to get better and be able to go home.  Briefly discussed resuscitation status and patient reports wanting to be CPR to be attempted but wanted me to talk to Gissell.      Contacted Gissell to verify a meeting this afternoon per patient's request.  Gissell plans to be at the hospital by 1345.  Will follow up to discuss advance care planning and assist with Living will completion.      Recommendations/Plan:  [facility] is following her.      Tasks Completed: Emotional Support.      DEVIN MedranoN, RN, CHPN  "

## 2022-12-29 NOTE — ACP (ADVANCE CARE PLANNING)
Living will and EMS DNR completed with patient and daughter Gissell.  Copies placed on the chart and sent to medical records.  Original given to Gissell.      DEVIN MedranoN, RN, CHPN

## 2022-12-29 NOTE — PLAN OF CARE
Goal Outcome Evaluation:           Progress: no change  Outcome Evaluation: Patient rested well throughout night. No complaints of pain. Vital signs stable, will continue to monitor.

## 2022-12-29 NOTE — PROGRESS NOTES
Pharmacy to Dose: Warfarin  Consulting provider: EZEQUIEL  Indication for warfarin: AF REQUIRING FULL ANTICOAGULATION  Goal INR range: 2-3  Home warfarin dose: 2 MG RENDON WE TH and 4 MG MO TU  Actions or monitoring: INR & S/S OF BLEEDING    Any Vitamin K given?: NO  Any major drug interactions:   Is patient on bridging therapy with another anticoagulant?: NO    Plan: INR remains supratherapeutic at 3.79 today. Will hold warfarin. Daily INRs ordered     Date HGB PLATELETS INR Warfarin Dose Given   12-27 12 277 2.75 4 MG   12-28 11.8 264 3.64 HOLD   12-29 11.6 266 3.79 HOLD

## 2022-12-29 NOTE — PLAN OF CARE
Goal Outcome Evaluation:  Plan of Care Reviewed With: patient, spouse, daughter        Progress: improving    PT is AAOx4, VSS. No reports of pain this shift. On RA with no SOA reported. Incontinent of B&B. Q2H turns. Topical treatments for BLE, folds and buttocks completed. Cardiac monitor dc'd per order. (HX of AFIB).  2-3+ edema remains in BLE (thighs more noticeable). Patient received IV Lasix as ordered. Tolerating diet. Compliant with treatment plan. Potential DC to inpatient rehab when medically stable per MD (see note). Continue to monitor and with current plan of care at this time.

## 2022-12-30 PROBLEM — E87.6 HYPOKALEMIA: Status: RESOLVED | Noted: 2022-12-29 | Resolved: 2022-12-30

## 2022-12-30 PROBLEM — N39.0 URINARY TRACT INFECTION WITHOUT HEMATURIA, SITE UNSPECIFIED: Status: RESOLVED | Noted: 2022-12-27 | Resolved: 2022-12-30

## 2022-12-30 PROBLEM — E83.42 HYPOMAGNESEMIA: Status: RESOLVED | Noted: 2022-12-29 | Resolved: 2022-12-30

## 2022-12-30 PROBLEM — N20.0 NEPHROLITHIASIS: Chronic | Status: ACTIVE | Noted: 2022-12-30

## 2022-12-30 LAB
ALBUMIN UR-MCNC: <1.2 MG/DL
BH CV ECHO MEAS - AO ROOT DIAM: 2.8 CM
BH CV ECHO MEAS - EDV(MOD-SP2): 84.4 ML
BH CV ECHO MEAS - EDV(MOD-SP4): 74.7 ML
BH CV ECHO MEAS - EF(MOD-BP): 65 %
BH CV ECHO MEAS - ESV(MOD-SP2): 29 ML
BH CV ECHO MEAS - ESV(MOD-SP4): 25.8 ML
BH CV ECHO MEAS - IVSD: 0.8 CM
BH CV ECHO MEAS - LA A2CS (ATRIAL LENGTH): 6.2 CM
BH CV ECHO MEAS - LA A4C LENGTH: 6.4 CM
BH CV ECHO MEAS - LA DIMENSION: 4.6 CM
BH CV ECHO MEAS - LAT PEAK E' VEL: 13.6 CM/SEC
BH CV ECHO MEAS - LVIDD: 3.8 CM
BH CV ECHO MEAS - LVIDS: 2.5 CM
BH CV ECHO MEAS - LVOT DIAM: 1.9 CM
BH CV ECHO MEAS - LVPWD: 0.8 CM
BH CV ECHO MEAS - MED PEAK E' VEL: 8.6 CM/SEC
BH CV ECHO MEAS - MV DEC TIME: 238 MSEC
BH CV ECHO MEAS - MV E MAX VEL: 75.3 CM/SEC
BH CV ECHO MEAS - RAP SYSTOLE: 3 MMHG
BH CV ECHO MEAS - RVSP: 34 MMHG
BH CV ECHO MEAS - TR MAX PG: 31 MMHG
BH CV ECHO MEAS - TR MAX VEL: 277 CM/SEC
BH CV ECHO MEASUREMENTS AVERAGE E/E' RATIO: 6.78
CREAT UR-MCNC: 16.8 MG/DL
CREAT UR-MCNC: 19.4 MG/DL
FERRITIN SERPL-MCNC: 302.6 NG/ML (ref 13–150)
HOLD SPECIMEN: NORMAL
INR PPP: 2.73 (ref 0.86–1.15)
IVRT: 53 MSEC
LEFT ATRIUM VOLUME INDEX: 53.6 ML/M2
LEFT ATRIUM VOLUME: 87.9 ML
MAXIMAL PREDICTED HEART RATE: 143 BPM
PROT ?TM UR-MCNC: 7.4 MG/DL
PROT/CREAT UR: 0.38 MG/G{CREAT}
PROTHROMBIN TIME: 29.5 SECONDS (ref 11.8–14.9)
STRESS TARGET HR: 122 BPM

## 2022-12-30 PROCEDURE — 84156 ASSAY OF PROTEIN URINE: CPT | Performed by: INTERNAL MEDICINE

## 2022-12-30 PROCEDURE — 25010000002 FUROSEMIDE PER 20 MG: Performed by: FAMILY MEDICINE

## 2022-12-30 PROCEDURE — 85610 PROTHROMBIN TIME: CPT | Performed by: FAMILY MEDICINE

## 2022-12-30 PROCEDURE — 82728 ASSAY OF FERRITIN: CPT | Performed by: INTERNAL MEDICINE

## 2022-12-30 PROCEDURE — 82043 UR ALBUMIN QUANTITATIVE: CPT | Performed by: INTERNAL MEDICINE

## 2022-12-30 PROCEDURE — 99233 SBSQ HOSP IP/OBS HIGH 50: CPT | Performed by: INTERNAL MEDICINE

## 2022-12-30 PROCEDURE — 82570 ASSAY OF URINE CREATININE: CPT | Performed by: INTERNAL MEDICINE

## 2022-12-30 RX ORDER — WARFARIN SODIUM 2 MG/1
2 TABLET ORAL
Status: COMPLETED | OUTPATIENT
Start: 2022-12-30 | End: 2022-12-30

## 2022-12-30 RX ADMIN — NYSTATIN: 100000 POWDER TOPICAL at 01:56

## 2022-12-30 RX ADMIN — CARVEDILOL 6.25 MG: 6.25 TABLET, FILM COATED ORAL at 09:38

## 2022-12-30 RX ADMIN — Medication 10 ML: at 04:47

## 2022-12-30 RX ADMIN — Medication: at 21:42

## 2022-12-30 RX ADMIN — NYSTATIN: 100000 POWDER TOPICAL at 21:42

## 2022-12-30 RX ADMIN — WARFARIN SODIUM 2 MG: 2 TABLET ORAL at 17:28

## 2022-12-30 RX ADMIN — POTASSIUM CHLORIDE 10 MEQ: 10 CAPSULE, COATED, EXTENDED RELEASE ORAL at 17:25

## 2022-12-30 RX ADMIN — Medication 10 ML: at 21:52

## 2022-12-30 RX ADMIN — Medication 10 ML: at 09:38

## 2022-12-30 RX ADMIN — Medication: at 01:56

## 2022-12-30 RX ADMIN — NYSTATIN: 100000 POWDER TOPICAL at 17:18

## 2022-12-30 RX ADMIN — FUROSEMIDE 40 MG: 10 INJECTION, SOLUTION INTRAMUSCULAR; INTRAVENOUS at 04:47

## 2022-12-30 RX ADMIN — HYDROCORTISONE 1 APPLICATION: 1 OINTMENT TOPICAL at 01:56

## 2022-12-30 RX ADMIN — HYDROCORTISONE 1 APPLICATION: 1 OINTMENT TOPICAL at 17:19

## 2022-12-30 RX ADMIN — FOLIC ACID 1 MG: 1 TABLET ORAL at 09:38

## 2022-12-30 RX ADMIN — SENNOSIDES AND DOCUSATE SODIUM 2 TABLET: 8.6; 5 TABLET ORAL at 09:38

## 2022-12-30 RX ADMIN — CARVEDILOL 6.25 MG: 6.25 TABLET, FILM COATED ORAL at 17:25

## 2022-12-30 RX ADMIN — NYSTATIN: 100000 POWDER TOPICAL at 09:42

## 2022-12-30 RX ADMIN — HYDROCORTISONE 1 APPLICATION: 1 OINTMENT TOPICAL at 09:42

## 2022-12-30 RX ADMIN — POTASSIUM CHLORIDE 10 MEQ: 10 CAPSULE, COATED, EXTENDED RELEASE ORAL at 09:38

## 2022-12-30 RX ADMIN — Medication: at 09:42

## 2022-12-30 RX ADMIN — FUROSEMIDE 40 MG: 10 INJECTION, SOLUTION INTRAMUSCULAR; INTRAVENOUS at 17:25

## 2022-12-30 RX ADMIN — HYDROCORTISONE 1 APPLICATION: 1 OINTMENT TOPICAL at 21:42

## 2022-12-30 NOTE — CONSULTS
Pt has a rehab bed at [facility] once medically stable. Pt can admit to facility over the weekend if ready.

## 2022-12-30 NOTE — PROGRESS NOTES
[facility]   Hospitalist Progress Note  Date: 2022  Patient Name: Isabell Ribera  : 1945  MRN: [id number]  Date of admission: 2022      Subjective   Subjective     Chief Complaint: Follow up for UTI    Summary: 77-year-old female with  A. Fib on coumadin, HTN, lymphedema, recurrent UTI who presented with altered mental status.  Afebrile.  No leukocytosis.  UA with a lot of squamous cells, 4+ bacteria, TNTC WBC and positive nitrite.  Urine culture kianna.  Antibiotic stopped after 3 days.  No longer confused.  Incidental findings on CT scan, work-up ongoing    Interval Followup: No issues overnight.  Hilda afebrile.  Blood pressure well controlled.  Feeling fine this morning.  On room air.  Denies any trouble breathing. No acute complaints.      Review of Systems  Constitutional:  No Fever, No Chills  Cardiovascular:  Denied complaints  Respiratory:  Denied complaints  Gastrointestinal:  No Nausea, No Vomiting  Genitourinary:  No Dysuria, No Suprapubic or flank pain  Musculoskeletal:  Denied complaints  Neuro:  + Chronic generalized weakness.  No confusion.    Objective   Objective     Vitals:   Temp:  [97.7 °F (36.5 °C)-98.2 °F (36.8 °C)] 97.7 °F (36.5 °C)  Heart Rate:  [55-74] 74  Resp:  [12-18] 18  BP: (112-131)/(45-69) 125/63  Physical Exam    Constitutional: Elderly  female, resting in bed, conversant, NAD   Eyes: Pupils equal and reactive, no conjunctival injection   HENT: NCAT, nares patent, moist mucous membranes   Neck: Supple, trachea midline, no JVD   Respiratory: Good aeration bilaterally, bibasilar crackles, nonlabored respiration   Cardiovascular: IR/IR, no murmurs, + nonpitting bilateral lower extremity edema   Gastrointestinal: Positive bowel sounds, soft, nontender, nondistended   Musculoskeletal: No joint deformities, no clubbing or cyanosis to extremities   Psychiatric: Appropriate affect, cooperative   Neurologic: Alert and oriented x2, Cranial Nerves grossly  intact speech clear   Skin: Warm and dry, no rashes, no spider angiomas    Result Review    Result Review:  I have personally reviewed the following over the last 24 hours (07:00 to 07:00) and agree with the following findings  [x]  Laboratory  CBC    CBC 12/27/22 12/28/22 12/29/22   WBC 4.35 5.20 4.87   RBC 3.95 3.82 3.72 (A)   Hemoglobin 12.0 11.8 (A) 11.6 (A)   Hematocrit 37.0 35.5 34.7   MCV 93.7 92.9 93.3   MCH 30.4 30.9 31.2   MCHC 32.4 33.2 33.4   RDW 15.7 (A) 15.7 (A) 15.4   Platelets 277 264 266   (A) Abnormal value            BMP    BMP 12/27/22 12/28/22 12/29/22   BUN 10 10 14   Creatinine 0.76 0.76 0.97   Sodium 140 141 138   Potassium 2.8 (A) 3.2 (A) 3.4 (A)   Chloride 105 105 101   CO2 25.7 28.9 28.8   Calcium 7.9 (A) 7.9 (A) 7.8 (A)   (A) Abnormal value       Comments are available for some flowsheets but are not being displayed.           [x]  Microbiology  Blood cultures NGTD  Urine culture 100,000 CFU/mL mixed kianna  [x]  Radiology  B/L pleural effusion noted on CT scan.  Incidental findings of anasarca and nodular appearing liver  No evidence of obstructive uropathy or hydronephrosis  [x]  EKG/Telemetry monitor reviewed: Atrial fibrillation, heart rate controlled  []  Cardiology/Vascular   []  Pathology  []  Old records  [x]  Other:     Intake/Output Summary (Last 24 hours) at 12/30/2022 1043  Last data filed at 12/30/2022 0820  Gross per 24 hour   Intake 1020 ml   Output 5150 ml   Net -4130 ml         Assessment & Plan   Assessment / Plan     Assessment:  Active Hospital Problems    Diagnosis  POA   • **Metabolic encephalopathy [G93.41]  Yes   • Nephrolithiasis [N20.0]  Yes   • Elevated brain natriuretic peptide (BNP) level [R79.89]  Yes   • Pleural effusion [J90]  Yes   • Afib (HCC) [I48.91]  Yes   • Lymphedema [I89.0]  Yes   • Essential hypertension [I10]  Yes   • Anticoagulated on Coumadin [Z79.01]  Not Applicable       Plan:     · Incidental findings of bilateral pleural effusions, anasarca,  nodular appearing liver on CT scan however limited given lack of contrast  · Last echo from 2020 showed preserved ejection fraction with no significant valvular disease.  proBNP 4528. repeat TTE pending. Robust response to diuretics.  Continue IV Lasix once daily.  Trend renal function and electrolyte  · No known history of chronic liver disease.  No splenomegaly.  Liver enzymes are normal.  Bilirubin normal.  Alkaline phosphatase normal.  PT/INR not reliable given use of Coumadin.  No thrombocytopenia.  No history of chronic hepatitis, significant alcohol use.  BMI 32.  Clinical picture not consistent with cirrhosis.    · Does have hypoalbuminemia contributing to lymphedema and volume overload.  · Creatinine back to normal range.  No history of CKD.  No evidence of cystic renal disease or hydronephrosis on recent imaging. UA with 100 mg/dL protein.  Will check urine protein and creatinine.  · TSH within normal limit  · Patient previously followed with Dr. Levi - needs follow-up with him at time of discharge  · INR now at goal.  Pharmacy consult for dosing.  Plan to resume this evening. monitor daily INR.   · Heart rate and blood pressure controlled.  Continue Coreg 6.25 mg twice daily.  Discontinue telemetry  · Continue oral potassium chloride supplementation  · Low-salt diet  · Palliative care consult for advance care planning  Bedridden at baseline. Has bed at [facility] nursing and rehab medically appropriate    Discussed plan with RN and her daughter Gissell    DVT prophylaxis:  Medical and mechanical DVT prophylaxis orders are present.    CODE STATUS:   Medical Intervention Limits: NO intubation (DNI)  Level Of Support Discussed With: Next of Kin (If No Surrogate); Patient  Code Status (Patient has no pulse and is not breathing): No CPR (Do Not Attempt to Resuscitate)  Medical Interventions (Patient has pulse or is breathing): Limited Support      Electronically signed by Brenden Ornelas DO, 12/30/22, 10:43 AM  EST.

## 2022-12-30 NOTE — PLAN OF CARE
Goal Outcome Evaluation:  Plan of Care Reviewed With: patient        Progress: no change  Outcome Evaluation: No issues overnight. Patient awake off and on

## 2022-12-30 NOTE — PROGRESS NOTES
Pharmacy to Dose: Warfarin  Consulting provider: Grace  Indication for warfarin: A.fib -- requiring full anticoagulation  Goal INR range: 2-3  Home warfarin dose: 2 mg Sunday/Wednesday/Thursday  and 4 mg on Monday/Tuesday  Actions or monitoring: INR, s/sx of bleeding    Any Vitamin K given?: no  Any major drug interactions: none  Is patient on bridging therapy with another anticoagulant?: no    Date HGB Platelets INR Warfarin Dose Given   12-27 12 277 2.75 4 mg   12-28 11.8 264 3.64 HOLD   12-29 11.6 266 3.79 HOLD   12-30 -- -- 2.73 2 mg (ordered)                          Plan: INR therapeutic today at 2.73.  Will give warfarin 2 mg today and reassess with AM labs.      Labs: Daily INR

## 2022-12-30 NOTE — PLAN OF CARE
Goal Outcome Evaluation:  Plan of Care Reviewed With: patient        Progress: no change  Outcome Evaluation: Patient remains alert and oriented x4. No complaints of pain throughout the shift. Skin care performed as ordered, patient tolerated well. No new issues at this time.

## 2022-12-31 LAB
ANION GAP SERPL CALCULATED.3IONS-SCNC: 5.6 MMOL/L (ref 5–15)
BUN SERPL-MCNC: 22 MG/DL (ref 8–23)
BUN/CREAT SERPL: 28.2 (ref 7–25)
CALCIUM SPEC-SCNC: 7.7 MG/DL (ref 8.6–10.5)
CHLORIDE SERPL-SCNC: 99 MMOL/L (ref 98–107)
CO2 SERPL-SCNC: 32.4 MMOL/L (ref 22–29)
CREAT SERPL-MCNC: 0.78 MG/DL (ref 0.57–1)
DEPRECATED RDW RBC AUTO: 51.4 FL (ref 37–54)
EGFRCR SERPLBLD CKD-EPI 2021: 78.3 ML/MIN/1.73
ERYTHROCYTE [DISTWIDTH] IN BLOOD BY AUTOMATED COUNT: 15.1 % (ref 12.3–15.4)
GLUCOSE SERPL-MCNC: 92 MG/DL (ref 65–99)
HCT VFR BLD AUTO: 32.2 % (ref 34–46.6)
HGB BLD-MCNC: 10.7 G/DL (ref 12–15.9)
INR PPP: 2.32 (ref 0.86–1.15)
MCH RBC QN AUTO: 30.6 PG (ref 26.6–33)
MCHC RBC AUTO-ENTMCNC: 33.2 G/DL (ref 31.5–35.7)
MCV RBC AUTO: 92 FL (ref 79–97)
PLATELET # BLD AUTO: 226 10*3/MM3 (ref 140–450)
PMV BLD AUTO: 9.9 FL (ref 6–12)
POTASSIUM SERPL-SCNC: 3.3 MMOL/L (ref 3.5–5.2)
PROTHROMBIN TIME: 25.9 SECONDS (ref 11.8–14.9)
RBC # BLD AUTO: 3.5 10*6/MM3 (ref 3.77–5.28)
SODIUM SERPL-SCNC: 137 MMOL/L (ref 136–145)
WBC NRBC COR # BLD: 5.03 10*3/MM3 (ref 3.4–10.8)

## 2022-12-31 PROCEDURE — 85610 PROTHROMBIN TIME: CPT | Performed by: FAMILY MEDICINE

## 2022-12-31 PROCEDURE — 85027 COMPLETE CBC AUTOMATED: CPT | Performed by: INTERNAL MEDICINE

## 2022-12-31 PROCEDURE — 80048 BASIC METABOLIC PNL TOTAL CA: CPT | Performed by: INTERNAL MEDICINE

## 2022-12-31 PROCEDURE — 25010000002 FUROSEMIDE PER 20 MG: Performed by: FAMILY MEDICINE

## 2022-12-31 PROCEDURE — 99232 SBSQ HOSP IP/OBS MODERATE 35: CPT | Performed by: INTERNAL MEDICINE

## 2022-12-31 RX ORDER — WARFARIN SODIUM 2 MG/1
2 TABLET ORAL
Status: DISCONTINUED | OUTPATIENT
Start: 2022-12-31 | End: 2023-01-01 | Stop reason: HOSPADM

## 2022-12-31 RX ADMIN — HYDROCORTISONE 1 APPLICATION: 1 OINTMENT TOPICAL at 08:33

## 2022-12-31 RX ADMIN — NYSTATIN: 100000 POWDER TOPICAL at 16:45

## 2022-12-31 RX ADMIN — Medication 10 ML: at 08:30

## 2022-12-31 RX ADMIN — CARVEDILOL 6.25 MG: 6.25 TABLET, FILM COATED ORAL at 08:30

## 2022-12-31 RX ADMIN — Medication: at 20:34

## 2022-12-31 RX ADMIN — FUROSEMIDE 40 MG: 10 INJECTION, SOLUTION INTRAMUSCULAR; INTRAVENOUS at 05:43

## 2022-12-31 RX ADMIN — POTASSIUM CHLORIDE 10 MEQ: 10 CAPSULE, COATED, EXTENDED RELEASE ORAL at 08:29

## 2022-12-31 RX ADMIN — FOLIC ACID 1 MG: 1 TABLET ORAL at 08:30

## 2022-12-31 RX ADMIN — NYSTATIN: 100000 POWDER TOPICAL at 20:33

## 2022-12-31 RX ADMIN — FUROSEMIDE 40 MG: 10 INJECTION, SOLUTION INTRAMUSCULAR; INTRAVENOUS at 16:45

## 2022-12-31 RX ADMIN — POTASSIUM CHLORIDE 10 MEQ: 10 CAPSULE, COATED, EXTENDED RELEASE ORAL at 17:35

## 2022-12-31 RX ADMIN — HYDROCORTISONE 1 APPLICATION: 1 OINTMENT TOPICAL at 16:45

## 2022-12-31 RX ADMIN — Medication: at 08:33

## 2022-12-31 RX ADMIN — Medication 10 ML: at 20:34

## 2022-12-31 RX ADMIN — NYSTATIN: 100000 POWDER TOPICAL at 08:33

## 2022-12-31 RX ADMIN — HYDROCORTISONE 1 APPLICATION: 1 OINTMENT TOPICAL at 20:34

## 2022-12-31 RX ADMIN — CARVEDILOL 6.25 MG: 6.25 TABLET, FILM COATED ORAL at 17:35

## 2022-12-31 RX ADMIN — SENNOSIDES AND DOCUSATE SODIUM 2 TABLET: 8.6; 5 TABLET ORAL at 08:29

## 2022-12-31 RX ADMIN — WARFARIN SODIUM 2 MG: 2 TABLET ORAL at 17:35

## 2022-12-31 NOTE — PROGRESS NOTES
[facility]   Hospitalist Progress Note  Date: 2022  Patient Name: Isabell Ribera  : 1945  MRN: [id number]  Date of admission: 2022      Subjective   Subjective     Chief Complaint: Follow up for UTI    Summary: 77-year-old female with  A. Fib on coumadin, HTN, lymphedema, recurrent UTI who presented with altered mental status.  Afebrile.  No leukocytosis.  UA with a lot of squamous cells, 4+ bacteria, TNTC WBC and positive nitrite.  Urine culture kianna.  Antibiotic stopped after 3 days.  No longer confused.  Incidental findings on CT scan showing bilateral pleural effusions, anasarca, and nodular appearing liver.  Work-up ongoing    Interval Followup: No issues overnight.  Vital stable.  Feeling fine this morning.  On room air.  No trouble breathing or cough.  No chest pain or palpitations.  No complaints whatsoever    Review of Systems  Constitutional:  No Fever, No Chills  Cardiovascular:  Denied complaints  Respiratory:  Denied complaints  Gastrointestinal:  No Nausea, No Vomiting  Genitourinary:  Denied complaints    Objective   Objective     Vitals:   Temp:  [97.9 °F (36.6 °C)-98.8 °F (37.1 °C)] 97.9 °F (36.6 °C)  Heart Rate:  [50-77] 73  Resp:  [16-18] 16  BP: ()/(44-63) 119/58  Physical Exam    Constitutional: Elderly  female, resting in bed, conversant, NAD   Eyes: Pupils equal and reactive, no conjunctival injection   HENT: NCAT, nares patent, MMM   Neck: Supple, trachea midline, no JVD   Respiratory: Good aeration bilaterally, nonlabored respiration   Cardiovascular: IR/IR, no murmurs, + nonpitting bilateral lower extremity edema   Gastrointestinal: Positive bowel sounds, soft, nontender, nondistended   Musculoskeletal: No joint deformities, no clubbing or cyanosis to extremities   Psychiatric: Appropriate affect, cooperative   Neurologic: Alert and oriented x3, Cranial Nerves grossly intact speech clear   Skin: Warm and dry, no rashes, no spider angiomas    Result  Review    Result Review:  I have personally reviewed the following over the last 24 hours (07:00 to 07:00) and agree with the following findings  [x]  Laboratory  CBC    CBC 12/28/22 12/29/22 12/31/22   WBC 5.20 4.87 5.03   RBC 3.82 3.72 (A) 3.50 (A)   Hemoglobin 11.8 (A) 11.6 (A) 10.7 (A)   Hematocrit 35.5 34.7 32.2 (A)   MCV 92.9 93.3 92.0   MCH 30.9 31.2 30.6   MCHC 33.2 33.4 33.2   RDW 15.7 (A) 15.4 15.1   Platelets 264 266 226   (A) Abnormal value            BMP    BMP 12/28/22 12/29/22 12/31/22   BUN 10 14 22   Creatinine 0.76 0.97 0.78   Sodium 141 138 137   Potassium 3.2 (A) 3.4 (A) 3.3 (A)   Chloride 105 101 99   CO2 28.9 28.8 32.4 (A)   Calcium 7.9 (A) 7.8 (A) 7.7 (A)   (A) Abnormal value       Comments are available for some flowsheets but are not being displayed.           Ferritin 302  Urine protein creatinine ratio 0.38    [x]  Microbiology  Blood cultures NGTD  []  Radiology  []  EKG/Telemetry   [x]  Cardiology/Vascular    TTE  Preserved ejection fraction, diastolic dysfunction not assessed; calcification of aortic valve noted no hemodynamically significant valvular disease noted  []  Pathology  []  Old records  [x]  Other:     Intake/Output Summary (Last 24 hours) at 12/31/2022 1038  Last data filed at 12/31/2022 0900  Gross per 24 hour   Intake 240 ml   Output 3900 ml   Net -3660 ml         Assessment & Plan   Assessment / Plan     Assessment:  Active Hospital Problems    Diagnosis  POA   • **Metabolic encephalopathy [G93.41]  Yes   • Nephrolithiasis [N20.0]  Yes   • Elevated brain natriuretic peptide (BNP) level [R79.89]  Yes   • Pleural effusion [J90]  Yes   • Afib (HCC) [I48.91]  Yes   • Lymphedema [I89.0]  Yes   • Essential hypertension [I10]  Yes   • Anticoagulated on Coumadin [Z79.01]  Not Applicable       Plan:     · No evidence of heart failure on TTE.  Robust diuresis noted. Creatinine stable.  Will replace low potassium with oral supplementation. Continue IV Lasix 40 mg q12 hours.  Check  daily weight. Trend renal function and electrolytes.   · Clinical picture and preliminary work-up thus far not consistent with cirrhosis. Urine protein creatinine ratio was not elevated and no evidence of CKD.  Can consider further imaging of the liver with MRI as outpatient.   · Heart rate and blood pressure controlled.  Continue Coreg 6.25 mg twice daily.   · INR therapeutic.  Continue nightly warfarin 2 mg.   · Continue low-salt diet  Bedridden at baseline. Has bed at [facility] and rehab.  Will continue to diurese for the next couple days with tentative discharge facility after the holiday weekend.    Discussed plan with RN.    DVT prophylaxis:  Medical and mechanical DVT prophylaxis orders are present.    CODE STATUS:   Medical Intervention Limits: NO intubation (DNI)  Level Of Support Discussed With: Next of Kin (If No Surrogate); Patient  Code Status (Patient has no pulse and is not breathing): No CPR (Do Not Attempt to Resuscitate)  Medical Interventions (Patient has pulse or is breathing): Limited Support    Electronically signed by Brenden Ornelas DO, 12/31/22, 10:39 AM EST.

## 2022-12-31 NOTE — PROGRESS NOTES
Pharmacy to Dose: Warfarin  Consulting provider: Grace  Indication for warfarin: A.fib -- requiring full anticoagulation  Goal INR range: 2-3  Home warfarin dose: 2 mg Sunday/Wednesday/Thursday  and 4 mg on Monday/Tuesday  Actions or monitoring: INR, s/sx of bleeding    Any Vitamin K given?: no  Any major drug interactions: none  Is patient on bridging therapy with another anticoagulant?: no    Date HGB Platelets INR Warfarin Dose Given   12-27 12 277 2.75 4 mg   12-28 11.8 264 3.64 HOLD   12-29 11.6 266 3.79 HOLD   12-30 -- -- 2.73 2 mg (ordered)   12-31 10.7 226 2.32                    Plan:  INR therapeutic today at 2.32.    Will continue 2mg daily.  INR ordered for tomorrow AM.

## 2022-12-31 NOTE — PLAN OF CARE
Goal Outcome Evaluation:  Plan of Care Reviewed With: patient        Progress: no change  Outcome Evaluation: Patient remains alert and oriented x4. No complaints of pain throughout the shift. Skin care performed as ordered, tolerated well. No new issues at this time.

## 2023-01-01 VITALS
DIASTOLIC BLOOD PRESSURE: 50 MMHG | SYSTOLIC BLOOD PRESSURE: 102 MMHG | OXYGEN SATURATION: 99 % | BODY MASS INDEX: 27.61 KG/M2 | TEMPERATURE: 97.3 F | HEIGHT: 67 IN | WEIGHT: 175.93 LBS | RESPIRATION RATE: 20 BRPM | HEART RATE: 68 BPM

## 2023-01-01 PROBLEM — G93.41 METABOLIC ENCEPHALOPATHY: Status: RESOLVED | Noted: 2022-12-29 | Resolved: 2023-01-01

## 2023-01-01 LAB
ANION GAP SERPL CALCULATED.3IONS-SCNC: 8.7 MMOL/L (ref 5–15)
BACTERIA SPEC AEROBE CULT: NORMAL
BACTERIA SPEC AEROBE CULT: NORMAL
BUN SERPL-MCNC: 22 MG/DL (ref 8–23)
BUN/CREAT SERPL: 28.2 (ref 7–25)
CALCIUM SPEC-SCNC: 8 MG/DL (ref 8.6–10.5)
CHLORIDE SERPL-SCNC: 96 MMOL/L (ref 98–107)
CO2 SERPL-SCNC: 32.3 MMOL/L (ref 22–29)
CREAT SERPL-MCNC: 0.78 MG/DL (ref 0.57–1)
DEPRECATED RDW RBC AUTO: 49.5 FL (ref 37–54)
EGFRCR SERPLBLD CKD-EPI 2021: 78.3 ML/MIN/1.73
ERYTHROCYTE [DISTWIDTH] IN BLOOD BY AUTOMATED COUNT: 14.6 % (ref 12.3–15.4)
GLUCOSE SERPL-MCNC: 84 MG/DL (ref 65–99)
HCT VFR BLD AUTO: 36.5 % (ref 34–46.6)
HGB BLD-MCNC: 12.4 G/DL (ref 12–15.9)
INR PPP: 1.96 (ref 0.86–1.15)
MCH RBC QN AUTO: 31.2 PG (ref 26.6–33)
MCHC RBC AUTO-ENTMCNC: 34 G/DL (ref 31.5–35.7)
MCV RBC AUTO: 91.7 FL (ref 79–97)
PLATELET # BLD AUTO: 259 10*3/MM3 (ref 140–450)
PMV BLD AUTO: 9.8 FL (ref 6–12)
POTASSIUM SERPL-SCNC: 3.1 MMOL/L (ref 3.5–5.2)
PROTHROMBIN TIME: 22.7 SECONDS (ref 11.8–14.9)
RBC # BLD AUTO: 3.98 10*6/MM3 (ref 3.77–5.28)
SODIUM SERPL-SCNC: 137 MMOL/L (ref 136–145)
WBC NRBC COR # BLD: 6.25 10*3/MM3 (ref 3.4–10.8)

## 2023-01-01 PROCEDURE — 85610 PROTHROMBIN TIME: CPT | Performed by: FAMILY MEDICINE

## 2023-01-01 PROCEDURE — 99239 HOSP IP/OBS DSCHRG MGMT >30: CPT | Performed by: INTERNAL MEDICINE

## 2023-01-01 PROCEDURE — 25010000002 FUROSEMIDE PER 20 MG: Performed by: FAMILY MEDICINE

## 2023-01-01 PROCEDURE — 85027 COMPLETE CBC AUTOMATED: CPT | Performed by: INTERNAL MEDICINE

## 2023-01-01 PROCEDURE — 80048 BASIC METABOLIC PNL TOTAL CA: CPT | Performed by: INTERNAL MEDICINE

## 2023-01-01 RX ORDER — POTASSIUM CHLORIDE 750 MG/1
20 CAPSULE, EXTENDED RELEASE ORAL DAILY
Start: 2023-01-01

## 2023-01-01 RX ORDER — POTASSIUM CHLORIDE 750 MG/1
40 CAPSULE, EXTENDED RELEASE ORAL 2 TIMES DAILY WITH MEALS
Status: DISCONTINUED | OUTPATIENT
Start: 2023-01-01 | End: 2023-01-01 | Stop reason: HOSPADM

## 2023-01-01 RX ORDER — FUROSEMIDE 40 MG/1
40 TABLET ORAL 2 TIMES DAILY
Start: 2023-01-01 | End: 2023-03-24

## 2023-01-01 RX ORDER — WARFARIN SODIUM 2 MG/1
3 TABLET ORAL NIGHTLY
Start: 2023-01-01 | End: 2023-03-24

## 2023-01-01 RX ADMIN — WARFARIN SODIUM 2 MG: 2 TABLET ORAL at 17:20

## 2023-01-01 RX ADMIN — Medication: at 09:48

## 2023-01-01 RX ADMIN — CARVEDILOL 6.25 MG: 6.25 TABLET, FILM COATED ORAL at 17:18

## 2023-01-01 RX ADMIN — HYDROCORTISONE 1 APPLICATION: 1 OINTMENT TOPICAL at 17:22

## 2023-01-01 RX ADMIN — FOLIC ACID 1 MG: 1 TABLET ORAL at 09:44

## 2023-01-01 RX ADMIN — NYSTATIN: 100000 POWDER TOPICAL at 17:21

## 2023-01-01 RX ADMIN — POTASSIUM CHLORIDE 40 MEQ: 10 CAPSULE, COATED, EXTENDED RELEASE ORAL at 17:18

## 2023-01-01 RX ADMIN — FUROSEMIDE 40 MG: 10 INJECTION, SOLUTION INTRAMUSCULAR; INTRAVENOUS at 05:43

## 2023-01-01 RX ADMIN — NYSTATIN: 100000 POWDER TOPICAL at 09:48

## 2023-01-01 RX ADMIN — Medication 10 ML: at 09:44

## 2023-01-01 RX ADMIN — POTASSIUM CHLORIDE 40 MEQ: 10 CAPSULE, COATED, EXTENDED RELEASE ORAL at 09:44

## 2023-01-01 RX ADMIN — HYDROCORTISONE 1 APPLICATION: 1 OINTMENT TOPICAL at 09:48

## 2023-01-01 RX ADMIN — CARVEDILOL 6.25 MG: 6.25 TABLET, FILM COATED ORAL at 09:44

## 2023-01-01 NOTE — DISCHARGE SUMMARY
[facility]         HOSPITALIST  DISCHARGE SUMMARY    Patient Name: Isabell Ribera  : 1945  MRN: [id number]    Date of Admission: 2022  Date of Discharge:  23  Primary Care Physician: Ivanna Frederick APRN    Consults     Date and Time Order Name Status Description    2022 12:34 PM Inpatient Hospitalist Consult            Active and Resolved Hospital Problems:  Active Hospital Problems    Diagnosis POA   • Nephrolithiasis [N20.0] Yes   • Elevated brain natriuretic peptide (BNP) level [R79.89] Yes   • Pleural effusion [J90] Yes   • Afib (HCC) [I48.91] Yes   • Lymphedema [I89.0] Yes   • Essential hypertension [I10] Yes   • Anticoagulated on Coumadin [Z79.01] Not Applicable      Resolved Hospital Problems    Diagnosis POA   • **Metabolic encephalopathy [G93.41] Yes   • Hypomagnesemia [E83.42] Yes   • Hypokalemia [E87.6] Yes   • Urinary tract infection without hematuria, site unspecified [N39.0] Yes       Hospital Course     Hospital Course:  Isabell Ribera is a 77 y.o. female with paroxysmal atrial fibrillation, chronic anticoagulation with Coumadin, hypertension, lymphedema, debility, recurrent urinary tract infection who presented with confusion and generalized weakness.  No focal deficits.   On presentation afebrile without leukocytosis.  Urinalysis was concerning for infection and she was started on IV Rocephin.  Blood cultures no growth.  Urine culture grew mixed kianna.  Multiple previous urine cultures for E. coli and Proteus.  Her mental status improved quickly and she completed a short course of antibiotics.  CT scan showed kidney stones which she has a history of but no hydronephrosis or obstructive uropathy.  Creatinine was within normal limits.  She did have incidental findings of bilateral pleural effusions, anasarca, nodular appearing liver on CT scan however limited given lack of contrast.   proBNP 4528. TTE showed preserved ejection fraction.  Diastolic  function was not able to be assessed.  Had robust response to IV diuretics. No known history of chronic liver disease.  No splenomegaly.  Liver enzymes are normal.  Bilirubin normal.  Alkaline phosphatase normal.  PT/INR not reliable given use of Coumadin.  No thrombocytopenia.  No history of chronic hepatitis, significant alcohol use.  BMI 32.  Clinical picture not consistent with cirrhosis.  No history of CKD.  No evidence of cystic renal disease or hydronephrosis on recent imaging.  Urine protein/creatinine ratio not elevated. Does have hypoalbuminemia contributing to lymphedema and volume overload.  A. fib under control on Coreg regimen.  Continued on chronic warfarin.  Transition to oral diuretic regimen on discharge.  She remains very debilitated and would benefit from physical therapy.  Once medically stable from a hospital standpoint she was discharged to [facility] re[facility].  I discussed patients care, test results and follow-up plan with her daughter Gissell via telephone on the day of discharge.    DISCHARGE Follow Up Recommendations for labs and diagnostics:   INR check in 2 to 3 days.  Goal INR 2-3  Weekly BMP to monitor creatinine and electrolyte  Recommend outpatient MRI liver protocol if there are any change to liver function test      Day of Discharge     Vital Signs:  Temp:  [97.7 °F (36.5 °C)-99.1 °F (37.3 °C)] 97.7 °F (36.5 °C)  Heart Rate:  [60-97] 66  Resp:  [18-20] 20  BP: (110-120)/(47-69) 120/58  Physical Exam:   GENERAL: The patient is elderly, conversant and nontoxic.  HEENT: PERRLA, Oropharynx clear. Moist mucous membranes.   NECK: Supple, trachea midline  HEART: Regular rate and rhythm.  Bilateral lower extremity nonpitting lymphedema  LUNGS: Equal air entry, clear to auscultation bilaterally.  ABDOMEN: Soft, positive bowel sounds, nontender, nondistended  SKIN: No rash or open wounds   NEUROLOGIC: Alert, cranial nerves grossly intact, speech clear, debilitated but gross motor  function intact      Discharge Details        Discharge Medications      New Medications      Instructions Start Date   furosemide 40 MG tablet  Commonly known as: Lasix   40 mg, Oral, 2 Times Daily      potassium chloride 10 MEQ CR capsule  Commonly known as: MICRO-K   20 mEq, Oral, Daily         Changes to Medications      Instructions Start Date   warfarin 2 MG tablet  Commonly known as: COUMADIN  What changed:   · how much to take  · when to take this  · Another medication with the same name was removed. Continue taking this medication, and follow the directions you see here.   3 mg, Oral, Nightly, Sunday, Wednesday, Thursday         Continue These Medications      Instructions Start Date   carvedilol 6.25 MG tablet  Commonly known as: COREG   TAKE ONE TABLET BY MOUTH TWICE A DAY WITH FOOD             No Known Allergies    Discharge Disposition:  Short Term Hospital (DC - External)    Diet:  Hospital:  Diet Order   Procedures   • Diet: Cardiac Diets, Diabetic Diets; Healthy Heart (2-3 Na+); Consistent Carbohydrate; Texture: Regular Texture (IDDSI 7); Fluid Consistency: Thin (IDDSI 0)       Discharge Activity:   Activity Instructions     Gradually Increase Activity Until at Pre-Hospitalization Level      Up WIth Assist            CODE STATUS:  Code Status and Medical Interventions:   Ordered at: 12/29/22 1707     Medical Intervention Limits:    NO intubation (DNI)     Level Of Support Discussed With:    Next of Kin (If No Surrogate)    Patient     Code Status (Patient has no pulse and is not breathing):    No CPR (Do Not Attempt to Resuscitate)     Medical Interventions (Patient has pulse or is breathing):    Limited Support         No future appointments.    Additional Instructions for the Follow-ups that You Need to Schedule     Discharge Follow-up with PCP   As directed       Currently Documented PCP:    Ivanna Frederick APRN    PCP Phone Number:    [phone]     Follow Up Details: within 1 month          Discharge Follow-up with Specified Provider: Dr Levi; 1 Month   As directed      To: Dr Levi    Follow Up: 1 Month               Pertinent  and/or Most Recent Results     PROCEDURES: NONE    LAB RESULTS:      Lab 01/01/23  0451 12/31/22  0709 12/30/22  0458 12/29/22  0810 12/29/22  0805 12/28/22  0725 12/27/22  1258 12/27/22  1016   WBC 6.25 5.03  --  4.87  --  5.20  --  4.35   HEMOGLOBIN 12.4 10.7*  --  11.6*  --  11.8*  --  12.0   HEMATOCRIT 36.5 32.2*  --  34.7  --  35.5  --  37.0   PLATELETS 259 226  --  266  --  264  --  277   NEUTROS ABS  --   --   --  2.66  --  3.30  --  2.55   IMMATURE GRANS (ABS)  --   --   --  0.02  --  0.02  --  0.02   LYMPHS ABS  --   --   --  1.42  --  1.17  --  1.10   MONOS ABS  --   --   --  0.56  --  0.47  --  0.47   EOS ABS  --   --   --  0.17  --  0.20  --  0.18   MCV 91.7 92.0  --  93.3  --  92.9  --  93.7   LACTATE  --   --   --   --   --   --  1.4  --    PROTIME 22.7* 25.9* 29.5*  --  38.3* 37.1*  --  29.7*         Lab 01/01/23 0451 12/31/22  0709 12/29/22  0805 12/28/22  0725 12/27/22  1016   SODIUM 137 137 138 141 140   POTASSIUM 3.1* 3.3* 3.4* 3.2* 2.8*   CHLORIDE 96* 99 101 105 105   CO2 32.3* 32.4* 28.8 28.9 25.7   ANION GAP 8.7 5.6 8.2 7.1 9.3   BUN 22 22 14 10 10   CREATININE 0.78 0.78 0.97 0.76 0.76   EGFR 78.3 78.3 60.3 80.8 80.8   GLUCOSE 84 92 87 84 99   CALCIUM 8.0* 7.7* 7.8* 7.9* 7.9*   MAGNESIUM  --   --   --   --  1.4*         Lab 12/27/22  1016   TOTAL PROTEIN 5.5*   ALBUMIN 1.9*   GLOBULIN 3.6   ALT (SGPT) 9   AST (SGOT) 28   BILIRUBIN 0.4   ALK PHOS 102         Lab 01/01/23  0451 12/31/22  0709 12/30/22  0458 12/29/22  0805 12/28/22  0725 12/27/22  1016   PROBNP  --   --   --   --   --  4,528.0*   PROTIME 22.7* 25.9* 29.5* 38.3* 37.1* 29.7*   INR 1.96* 2.32* 2.73* 3.79* 3.64* 2.75*             Lab 12/30/22  0623 12/28/22  0725   FERRITIN 302.60*  --    FOLATE  --  >20.00         Brief Urine Lab Results  (Last result in the past 365 days)      Color    Clarity   Blood   Leuk Est   Nitrite   Protein   CREAT   Urine HCG        12/30/22 1409             19.4             Microbiology Results (last 10 days)     Procedure Component Value - Date/Time    Blood Culture - Blood, Arm, Left [[id number]]  (Normal) Collected: 12/27/22 1258    Lab Status: Preliminary result Specimen: Blood from Arm, Left Updated: 12/31/22 1316     Blood Culture No growth at 4 days    Blood Culture - Blood, Arm, Right [[id number]]  (Normal) Collected: 12/27/22 1258    Lab Status: Preliminary result Specimen: Blood from Arm, Right Updated: 12/31/22 1316     Blood Culture No growth at 4 days    Urine Culture - Urine, Straight Cath [[id number]] Collected: 12/27/22 1157    Lab Status: Final result Specimen: Urine from Straight Cath Updated: 12/28/22 1319     Urine Culture >100,000 CFU/mL Mixed Nelly Isolated    Narrative:      Specimen contains mixed organisms of questionable pathogenicity suggestive of contamination. If symptoms persist, suggest recollection.  Colonization of the urinary tract without infection is common. Treatment is discouraged unless the patient is symptomatic, pregnant, or undergoing an invasive urologic procedure.          CT Abdomen Pelvis Without Contrast    Result Date: 12/29/2022  Impression:   1. Bilateral pleural effusions slightly larger on the right. 2. Atelectasis lung bases.  There is very mild ground-glass opacity in the lower lobes, right middle lobe, and lingula.  Very mild pneumonia or edema in the differential. 3. Anasarca seen in the flank soft tissues. 4. Nodular liver could indicate cirrhosis. 5. Cholelithiasis. 6. Left-sided nephrolithiasis measuring up to about 5 millimeters with several stones.  No obstructive uropathy or definite ureteral stone.  Other findings as above.     LIGIA MOLINA MD       Electronically Signed and Approved By: LIGIA MOLINA MD on 12/29/2022 at 15:19             XR Chest 1 View    Result Date: 12/27/2022  Impression:   1.  Blunting of the costophrenic angles with hazy bibasilar airspace opacities suggestive of small bilateral layering pleural effusions. 2. No evidence of overt pulmonary edema.        GEOVANY TEJEDA MD       Electronically Signed and Approved By: GEOVANY TEJEDA MD on 12/27/2022 at 11:40                       Results for orders placed during the hospital encounter of 12/27/22    Adult Transthoracic Echo Complete w/ Color, Spectral and Contrast if necessary per protocol    Interpretation Summary  •  Left ventricular systolic function is normal. Calculated left ventricular EF = 65%  •  Left ventricular diastolic function was not assessed.  •  There is calcification of the aortic valve.      Labs Pending at Discharge:  Pending Labs     Order Current Status    Blood Culture - Blood, Arm, Left Preliminary result    Blood Culture - Blood, Arm, Right Preliminary result          Time spent on Discharge including face to face service: >30 minutes    Electronically signed by Brenden Ornelas DO, 01/01/23, 10:57 AM EST.

## 2023-01-01 NOTE — PLAN OF CARE
Goal Outcome Evaluation:  Plan of Care Reviewed With: patient        Progress: no change  Outcome Evaluation: patient remains alert and oriented x4 on room air. No complaints of pain throughout the shift. Skin and wound care performed as ordered. Report called to Marcelina daigle [facility], EMS called at 1400, awaiting their arrival. IV has been removed. No new issues at this time.

## 2023-01-01 NOTE — PROGRESS NOTES
Pharmacy to Dose: Warfarin  Consulting provider: Grace  Indication for warfarin: A.fib -- requiring full anticoagulation  Goal INR range: 2-3  Home warfarin dose: 2 mg Sunday/Wednesday/Thursday  and 4 mg on Monday/Tuesday  Actions or monitoring: INR, s/sx of bleeding    Any Vitamin K given?: no  Any major drug interactions: none  Is patient on bridging therapy with another anticoagulant?: no    Date HGB Platelets INR Warfarin Dose Given   12-27 12 277 2.75 4 mg   12-28 11.8 264 3.64 HOLD   12-29 11.6 266 3.79 HOLD   12-30 -- -- 2.73 2 mg (ordered)   12-31 10.7 226 2.32 2 mg   1/1 12.4 259 1.96 2 mg            Plan:  INR therapeutic today at 1.96.  Will continue 2mg daily.  INR ordered for tomorrow AM.

## 2023-01-01 NOTE — SIGNIFICANT NOTE
01/01/23 1024   Plan   Plan Comments  --    Final Discharge Disposition Code 03 - skilled nursing facility (SNF)   Final Note Patient is good to discharge to [facility] today.

## 2023-03-23 NOTE — PROGRESS NOTES
Chief Complaint   Rectal bleeding, constipation     History of Present Illness       Isabell Ribera is a 77 y.o. female who presents to [facility] as a new patient with a history of rectal bleeding and constipation.  Patient presents to the office today with a caregiver from [facility] and rehab as well as her daughter.  Patient's daughter reports that the patient has been experiencing rectal bleeding related to her hemorrhoids and severe constipation.  She reports that the patient will leak stool and then pass a very large firm difficult to pass stool about once a week.  She denies any abdominal pain.  Patient's mother reports that she has been struggling with constipation for many years.  When she was at home with her daughter they were doing MiraLAX and Preparation H which was providing better bowel movements.  Currently she is using milk of magnesia while inpatient at the [facility] rehab facility.  Patient is on Coumadin but reports that the doctor at the [facility] and rehab facility has held the Coumadin over the past week due to bleeding.  Patient denies fever, nausea, vomiting, weight loss, night sweats, melena, hematemesis.    No colon or EGD on file for this patient, reported colonoscopy over 10 years ago.    CT abdomen and pelvis w/o contrast on 12.29.2022  1. Bilateral pleural effusions slightly larger on the right.  2. Atelectasis lung bases.  There is very mild ground-glass opacity in the lower lobes, right   middle lobe, and lingula.  Very mild pneumonia or edema in the differential.  3. Anasarca seen in the flank soft tissues.  4. Nodular liver could indicate cirrhosis.  5. Cholelithiasis.  6. Left-sided nephrolithiasis measuring up to about 5 millimeters with several stones.  No   obstructive uropathy or definite ureteral stone.  Other findings as above.    Most recent labs on 01.01.2023 see below.  More recent labs requested  Results       Result Review :        CMP    CMP 12/29/22 12/31/22 1/1/23   Glucose 87 92 84   BUN 14 22 22   Creatinine 0.97 0.78 0.78   eGFR 60.3 78.3 78.3   Sodium 138 137 137   Potassium 3.4 (A) 3.3 (A) 3.1 (A)   Chloride 101 99 96 (A)   Calcium 7.8 (A) 7.7 (A) 8.0 (A)   BUN/Creatinine Ratio 14.4 28.2 (A) 28.2 (A)   Anion Gap 8.2 5.6 8.7   (A) Abnormal value       Comments are available for some flowsheets but are not being displayed.           CBC    CBC 12/29/22 12/31/22 1/1/23   WBC 4.87 5.03 6.25   RBC 3.72 (A) 3.50 (A) 3.98   Hemoglobin 11.6 (A) 10.7 (A) 12.4   Hematocrit 34.7 32.2 (A) 36.5   MCV 93.3 92.0 91.7   MCH 31.2 30.6 31.2   MCHC 33.4 33.2 34.0   RDW 15.4 15.1 14.6   Platelets 266 226 259   (A) Abnormal value                          Past Medical History       Past Medical History:   Diagnosis Date   • Afib (HCC)    • Aftercare following ankle joint replacement surgery 06/01/2015   • Arthritis    • Arthritis of knee 06/01/2015   • Essential hypertension    • Gout    • HTN (hypertension)    • Left knee pain    • Lymphedema    • Recurrent UTI 05/11/2021   • Urinary retention 05/11/2021       Past Surgical History:   Procedure Laterality Date   • CATARACT EXTRACTION Bilateral    • COLONOSCOPY     • KIDNEY STONE SURGERY     • KNEE SURGERY Left 2007    REPLACEMENT   • OTHER SURGICAL HISTORY      JOINT SURGERY, UNSPECIFIED   • TUBAL ABDOMINAL LIGATION           Current Outpatient Medications:   •  carvedilol (COREG) 6.25 MG tablet, TAKE ONE TABLET BY MOUTH TWICE A DAY WITH FOOD, Disp: 60 tablet, Rfl: 5  •  furosemide (LASIX) 20 MG tablet, , Disp: , Rfl:   •  HYDROcodone-acetaminophen (NORCO) 5-325 MG per tablet, , Disp: , Rfl:   •  hydrOXYzine (ATARAX) 25 MG tablet, , Disp: , Rfl:   •  magnesium hydroxide (MILK OF MAGNESIA) 2400 MG/10ML suspension suspension, Take 10 mL by mouth Daily As Needed., Disp: , Rfl:   •  multivitamin with minerals tablet tablet, Take 1 tablet by mouth Daily., Disp: , Rfl:   •  potassium chloride (MICRO-K) 10  "MEQ CR capsule, Take 2 capsules by mouth Daily., Disp: , Rfl:   •  saccharomyces boulardii (FLORASTOR) 250 MG capsule, Take 1 capsule by mouth 2 (Two) Times a Day., Disp: , Rfl:   •  warfarin (COUMADIN) 5 MG tablet, , Disp: , Rfl:   •  zinc sulfate (ZINCATE) 220 (50 Zn) MG capsule, Take 1 capsule by mouth Daily., Disp: , Rfl:   •  Hydrocortisone, Perianal, (Procto-Abiodun) 1 % cream rectal cream, Insert  into the rectum Daily., Disp: 28 g, Rfl: 1  •  polyethylene glycol (MIRALAX) 17 GM/SCOOP powder, Take 17 g by mouth Daily for 30 days., Disp: 527 g, Rfl: 1     Allergies   Allergen Reactions   • Levofloxacin Hives       Family History   Problem Relation Age of Onset   • Heart failure Mother 64        CONGESTIVE   • Arthritis Mother    • Arthritis Sister    • Colon cancer Neg Hx         Social History     Social History Narrative   • Not on file       Objective   Vital Signs:   /63 (BP Location: Right arm, Patient Position: Sitting, Cuff Size: Adult)   Pulse 85   Ht 167.6 cm (66\")   Wt 74.4 kg (164 lb)   SpO2 100%   BMI 26.47 kg/m²       Physical Exam  Constitutional:       General: She is not in acute distress.     Appearance: Normal appearance. She is well-developed and normal weight.   Eyes:      Conjunctiva/sclera: Conjunctivae normal.      Pupils: Pupils are equal, round, and reactive to light.      Visual Fields: Right eye visual fields normal and left eye visual fields normal.   Cardiovascular:      Rate and Rhythm: Normal rate and regular rhythm.      Heart sounds: Normal heart sounds.   Pulmonary:      Effort: Pulmonary effort is normal. No retractions.      Breath sounds: Normal breath sounds and air entry.      Comments: Inspection of chest: normal appearance  Abdominal:      General: Bowel sounds are normal.      Palpations: Abdomen is soft.      Tenderness: There is no abdominal tenderness.      Comments: No appreciable hepatosplenomegaly   Musculoskeletal:      Cervical back: Neck supple.      " Right lower leg: No edema.      Left lower leg: No edema.   Lymphadenopathy:      Cervical: No cervical adenopathy.   Skin:     Findings: No lesion.      Comments: Turgor normal   Neurological:      Mental Status: She is alert and oriented to person, place, and time.      Motor: Weakness present.      Comments: Wheelchair-bound   Psychiatric:         Mood and Affect: Mood and affect normal.           Assessment & Plan          Assessment and Plan    Diagnoses and all orders for this visit:    1. Altered bowel habits (Primary)    2. Chronic idiopathic constipation    3. Hemorrhoids, unspecified hemorrhoid type    4. Rectal bleeding    5. Anticoagulated on Coumadin    6. Atrial fibrillation, unspecified type (HCC)    Other orders  -     Hydrocortisone, Perianal, (Procto-Abiodun) 1 % cream rectal cream; Insert  into the rectum Daily.  Dispense: 28 g; Refill: 1  -     polyethylene glycol (MIRALAX) 17 GM/SCOOP powder; Take 17 g by mouth Daily for 30 days.  Dispense: 527 g; Refill: 1      77-year-old female presenting the office today as a new patient with a history of rectal bleeding and constipation.  I have discussed colonoscopy in detail with the patient and family.  I have discussed the risks, benefits and alternatives.  I have discussed the risk of anesthesia, bleeding and perforation.  Patient understands these risks, benefits and alternatives and wishes to defer due to comorbidities and age.  We will begin with MiraLAX 1-2 scoops daily and Proctofoam for treatment of hemorrhoids.  We will follow-up in 1 to 2 months.  Patient and family agreeable to this plan will call with any questions or concerns.            Follow Up       Follow Up   Return in about 2 months (around 5/24/2023).  Patient was given instructions and counseling regarding her condition or for health maintenance advice. Please see specific information pulled into the AVS if appropriate.

## 2023-03-24 ENCOUNTER — OFFICE VISIT (OUTPATIENT)
Dept: GASTROENTEROLOGY | Facility: CLINIC | Age: 78
End: 2023-03-24
Payer: MEDICARE

## 2023-03-24 VITALS
SYSTOLIC BLOOD PRESSURE: 111 MMHG | DIASTOLIC BLOOD PRESSURE: 63 MMHG | BODY MASS INDEX: 26.36 KG/M2 | HEIGHT: 66 IN | OXYGEN SATURATION: 100 % | WEIGHT: 164 LBS | HEART RATE: 85 BPM

## 2023-03-24 DIAGNOSIS — I48.91 ATRIAL FIBRILLATION, UNSPECIFIED TYPE: Chronic | ICD-10-CM

## 2023-03-24 DIAGNOSIS — K62.5 RECTAL BLEEDING: ICD-10-CM

## 2023-03-24 DIAGNOSIS — K59.04 CHRONIC IDIOPATHIC CONSTIPATION: ICD-10-CM

## 2023-03-24 DIAGNOSIS — K64.9 HEMORRHOIDS, UNSPECIFIED HEMORRHOID TYPE: ICD-10-CM

## 2023-03-24 DIAGNOSIS — Z79.01 ANTICOAGULATED ON COUMADIN: ICD-10-CM

## 2023-03-24 DIAGNOSIS — R19.4 ALTERED BOWEL HABITS: Primary | ICD-10-CM

## 2023-03-24 PROCEDURE — 99214 OFFICE O/P EST MOD 30 MIN: CPT | Performed by: NURSE PRACTITIONER

## 2023-03-24 PROCEDURE — 3074F SYST BP LT 130 MM HG: CPT | Performed by: NURSE PRACTITIONER

## 2023-03-24 PROCEDURE — 1160F RVW MEDS BY RX/DR IN RCRD: CPT | Performed by: NURSE PRACTITIONER

## 2023-03-24 PROCEDURE — 1159F MED LIST DOCD IN RCRD: CPT | Performed by: NURSE PRACTITIONER

## 2023-03-24 PROCEDURE — 3078F DIAST BP <80 MM HG: CPT | Performed by: NURSE PRACTITIONER

## 2023-03-24 RX ORDER — POLYETHYLENE GLYCOL 3350 17 G/17G
17 POWDER, FOR SOLUTION ORAL DAILY
Qty: 527 G | Refills: 1 | Status: SHIPPED | OUTPATIENT
Start: 2023-03-24 | End: 2023-04-23

## 2023-03-24 RX ORDER — HYDROCORTISONE 10 MG/G
CREAM TOPICAL DAILY
Qty: 28 G | Refills: 1 | Status: SHIPPED | OUTPATIENT
Start: 2023-03-24

## 2023-03-24 RX ORDER — WARFARIN SODIUM 5 MG/1
TABLET ORAL
COMMUNITY
Start: 2023-03-03

## 2023-03-24 RX ORDER — HYDROCODONE BITARTRATE AND ACETAMINOPHEN 5; 325 MG/1; MG/1
TABLET ORAL
COMMUNITY
Start: 2023-03-09

## 2023-03-24 RX ORDER — MULTIPLE VITAMINS W/ MINERALS TAB 9MG-400MCG
1 TAB ORAL DAILY
COMMUNITY

## 2023-03-24 RX ORDER — ZINC SULFATE 50(220)MG
220 CAPSULE ORAL DAILY
COMMUNITY

## 2023-03-24 RX ORDER — SACCHAROMYCES BOULARDII 250 MG
250 CAPSULE ORAL 2 TIMES DAILY
COMMUNITY

## 2023-03-24 RX ORDER — FUROSEMIDE 20 MG/1
TABLET ORAL
COMMUNITY
Start: 2023-03-04

## 2023-03-24 RX ORDER — HYDROXYZINE HYDROCHLORIDE 25 MG/1
TABLET, FILM COATED ORAL
COMMUNITY
Start: 2023-03-22

## 2023-03-28 ENCOUNTER — TELEPHONE (OUTPATIENT)
Dept: FAMILY MEDICINE CLINIC | Facility: CLINIC | Age: 78
End: 2023-03-28
Payer: MEDICARE

## 2023-03-28 NOTE — TELEPHONE ENCOUNTER
Daja with Home Health called, she needs order for INR and plan of care. She is faxing over paperwork for the plan of care. I told her we have not monitored patient's INR for over a year. She said she will call INR nurse too.

## 2023-03-28 NOTE — TELEPHONE ENCOUNTER
I called Justine back and she states they had gotten in touch with the NP who did see her prior, and she is going to continue signing orders on Mrs. Ribera.

## 2023-04-12 ENCOUNTER — LAB REQUISITION (OUTPATIENT)
Dept: LAB | Facility: HOSPITAL | Age: 78
End: 2023-04-12
Payer: MEDICARE

## 2023-04-12 DIAGNOSIS — I50.9 HEART FAILURE, UNSPECIFIED: ICD-10-CM

## 2023-04-12 DIAGNOSIS — E11.9 TYPE 2 DIABETES MELLITUS WITHOUT COMPLICATIONS: ICD-10-CM

## 2023-04-12 LAB
CHOLEST SERPL-MCNC: 129 MG/DL (ref 0–200)
HDLC SERPL-MCNC: 36 MG/DL (ref 40–60)
LDLC SERPL CALC-MCNC: 73 MG/DL (ref 0–100)
LDLC/HDLC SERPL: 1.98 {RATIO}
TRIGL SERPL-MCNC: 108 MG/DL (ref 0–150)
VLDLC SERPL-MCNC: 20 MG/DL (ref 5–40)

## 2023-04-12 PROCEDURE — 80061 LIPID PANEL: CPT | Performed by: PHYSICAL MEDICINE & REHABILITATION

## 2023-05-03 ENCOUNTER — LAB REQUISITION (OUTPATIENT)
Dept: LAB | Facility: HOSPITAL | Age: 78
End: 2023-05-03
Payer: MEDICARE

## 2023-05-03 DIAGNOSIS — N39.0 URINARY TRACT INFECTION, SITE NOT SPECIFIED: ICD-10-CM

## 2023-05-03 LAB
BACTERIA UR QL AUTO: ABNORMAL /HPF
BILIRUB UR QL STRIP: NEGATIVE
CLARITY UR: ABNORMAL
COLOR UR: YELLOW
GLUCOSE UR STRIP-MCNC: NEGATIVE MG/DL
HGB UR QL STRIP.AUTO: ABNORMAL
HYALINE CASTS UR QL AUTO: ABNORMAL /LPF
KETONES UR QL STRIP: NEGATIVE
LEUKOCYTE ESTERASE UR QL STRIP.AUTO: ABNORMAL
NITRITE UR QL STRIP: POSITIVE
PH UR STRIP.AUTO: 6.5 [PH] (ref 5–8)
PROT UR QL STRIP: ABNORMAL
RBC # UR STRIP: ABNORMAL /HPF
REF LAB TEST METHOD: ABNORMAL
SP GR UR STRIP: 1.01 (ref 1–1.03)
SQUAMOUS #/AREA URNS HPF: ABNORMAL /HPF
UROBILINOGEN UR QL STRIP: ABNORMAL
WBC # UR STRIP: ABNORMAL /HPF

## 2023-05-03 PROCEDURE — 87186 SC STD MICRODIL/AGAR DIL: CPT | Performed by: OBSTETRICS & GYNECOLOGY

## 2023-05-03 PROCEDURE — 87086 URINE CULTURE/COLONY COUNT: CPT | Performed by: OBSTETRICS & GYNECOLOGY

## 2023-05-03 PROCEDURE — 87077 CULTURE AEROBIC IDENTIFY: CPT | Performed by: OBSTETRICS & GYNECOLOGY

## 2023-05-03 PROCEDURE — 81001 URINALYSIS AUTO W/SCOPE: CPT | Performed by: OBSTETRICS & GYNECOLOGY

## 2023-05-06 LAB
BACTERIA SPEC AEROBE CULT: ABNORMAL

## 2023-09-05 ENCOUNTER — HOSPITAL ENCOUNTER (EMERGENCY)
Facility: HOSPITAL | Age: 78
Discharge: HOME | End: 2023-09-05
Attending: EMERGENCY MEDICINE
Payer: MEDICARE

## 2023-09-05 VITALS
WEIGHT: 213.41 LBS | HEART RATE: 56 BPM | OXYGEN SATURATION: 99 % | RESPIRATION RATE: 17 BRPM | BODY MASS INDEX: 33.49 KG/M2 | HEIGHT: 67 IN | DIASTOLIC BLOOD PRESSURE: 48 MMHG | SYSTOLIC BLOOD PRESSURE: 144 MMHG | TEMPERATURE: 97.8 F

## 2023-09-05 DIAGNOSIS — N39.0 UTI (URINARY TRACT INFECTION), UNCOMPLICATED: Primary | ICD-10-CM

## 2023-09-05 LAB
ALBUMIN SERPL-MCNC: 2.4 G/DL (ref 3.5–5.2)
ALBUMIN/GLOB SERPL: 0.5 G/DL
ALP SERPL-CCNC: 116 U/L (ref 39–117)
ALT SERPL W P-5'-P-CCNC: 7 U/L (ref 1–33)
ANION GAP SERPL CALCULATED.3IONS-SCNC: 9.2 MMOL/L (ref 5–15)
AST SERPL-CCNC: 23 U/L (ref 1–32)
BACTERIA UR QL AUTO: ABNORMAL /HPF
BASOPHILS # BLD AUTO: 0.04 10*3/MM3 (ref 0–0.2)
BASOPHILS NFR BLD AUTO: 0.7 % (ref 0–1.5)
BILIRUB SERPL-MCNC: 0.3 MG/DL (ref 0–1.2)
BILIRUB UR QL STRIP: NEGATIVE
BUN SERPL-MCNC: 23 MG/DL (ref 8–23)
BUN/CREAT SERPL: 27.4 (ref 7–25)
CALCIUM SPEC-SCNC: 8.8 MG/DL (ref 8.6–10.5)
CHLORIDE SERPL-SCNC: 102 MMOL/L (ref 98–107)
CLARITY UR: ABNORMAL
CO2 SERPL-SCNC: 26.8 MMOL/L (ref 22–29)
COLOR UR: YELLOW
CREAT SERPL-MCNC: 0.84 MG/DL (ref 0.57–1)
DEPRECATED RDW RBC AUTO: 44.9 FL (ref 37–54)
EGFRCR SERPLBLD CKD-EPI 2021: 71.7 ML/MIN/1.73
EOSINOPHIL # BLD AUTO: 0.25 10*3/MM3 (ref 0–0.4)
EOSINOPHIL NFR BLD AUTO: 4.5 % (ref 0.3–6.2)
ERYTHROCYTE [DISTWIDTH] IN BLOOD BY AUTOMATED COUNT: 16.6 % (ref 12.3–15.4)
GLOBULIN UR ELPH-MCNC: 4.9 GM/DL
GLUCOSE SERPL-MCNC: 113 MG/DL (ref 65–99)
GLUCOSE UR STRIP-MCNC: NEGATIVE MG/DL
HCT VFR BLD AUTO: 29.5 % (ref 34–46.6)
HGB BLD-MCNC: 9.2 G/DL (ref 12–15.9)
HGB UR QL STRIP.AUTO: ABNORMAL
HOLD SPECIMEN: NORMAL
HOLD SPECIMEN: NORMAL
HYALINE CASTS UR QL AUTO: ABNORMAL /LPF
IMM GRANULOCYTES # BLD AUTO: 0.01 10*3/MM3 (ref 0–0.05)
IMM GRANULOCYTES NFR BLD AUTO: 0.2 % (ref 0–0.5)
KETONES UR QL STRIP: NEGATIVE
LEUKOCYTE ESTERASE UR QL STRIP.AUTO: ABNORMAL
LYMPHOCYTES # BLD AUTO: 1.41 10*3/MM3 (ref 0.7–3.1)
LYMPHOCYTES NFR BLD AUTO: 25.1 % (ref 19.6–45.3)
MCH RBC QN AUTO: 23.5 PG (ref 26.6–33)
MCHC RBC AUTO-ENTMCNC: 31.2 G/DL (ref 31.5–35.7)
MCV RBC AUTO: 75.3 FL (ref 79–97)
MONOCYTES # BLD AUTO: 0.67 10*3/MM3 (ref 0.1–0.9)
MONOCYTES NFR BLD AUTO: 11.9 % (ref 5–12)
NEUTROPHILS NFR BLD AUTO: 3.23 10*3/MM3 (ref 1.7–7)
NEUTROPHILS NFR BLD AUTO: 57.6 % (ref 42.7–76)
NITRITE UR QL STRIP: NEGATIVE
NRBC BLD AUTO-RTO: 0 /100 WBC (ref 0–0.2)
PH UR STRIP.AUTO: 7.5 [PH] (ref 5–8)
PLATELET # BLD AUTO: 314 10*3/MM3 (ref 140–450)
PMV BLD AUTO: 9.4 FL (ref 6–12)
POTASSIUM SERPL-SCNC: 3.9 MMOL/L (ref 3.5–5.2)
PROT SERPL-MCNC: 7.3 G/DL (ref 6–8.5)
PROT UR QL STRIP: ABNORMAL
RBC # BLD AUTO: 3.92 10*6/MM3 (ref 3.77–5.28)
RBC # UR STRIP: ABNORMAL /HPF
REF LAB TEST METHOD: ABNORMAL
SODIUM SERPL-SCNC: 138 MMOL/L (ref 136–145)
SP GR UR STRIP: 1.01 (ref 1–1.03)
SQUAMOUS #/AREA URNS HPF: ABNORMAL /HPF
UROBILINOGEN UR QL STRIP: ABNORMAL
WBC # UR STRIP: ABNORMAL /HPF
WBC NRBC COR # BLD: 5.61 10*3/MM3 (ref 3.4–10.8)
WHOLE BLOOD HOLD COAG: NORMAL
WHOLE BLOOD HOLD SPECIMEN: NORMAL

## 2023-09-05 PROCEDURE — 81001 URINALYSIS AUTO W/SCOPE: CPT | Performed by: EMERGENCY MEDICINE

## 2023-09-05 PROCEDURE — 36415 COLL VENOUS BLD VENIPUNCTURE: CPT

## 2023-09-05 PROCEDURE — 80053 COMPREHEN METABOLIC PANEL: CPT | Performed by: EMERGENCY MEDICINE

## 2023-09-05 PROCEDURE — 85025 COMPLETE CBC W/AUTO DIFF WBC: CPT | Performed by: EMERGENCY MEDICINE

## 2023-09-05 PROCEDURE — 99283 EMERGENCY DEPT VISIT LOW MDM: CPT

## 2023-09-05 PROCEDURE — 87086 URINE CULTURE/COLONY COUNT: CPT | Performed by: EMERGENCY MEDICINE

## 2023-09-05 RX ORDER — CEPHALEXIN 250 MG/1
500 CAPSULE ORAL ONCE
Status: COMPLETED | OUTPATIENT
Start: 2023-09-05 | End: 2023-09-05

## 2023-09-05 RX ORDER — CEPHALEXIN 500 MG/1
500 CAPSULE ORAL 4 TIMES DAILY
Qty: 40 CAPSULE | Refills: 0 | Status: SHIPPED | OUTPATIENT
Start: 2023-09-05 | End: 2023-09-15

## 2023-09-05 RX ADMIN — CEPHALEXIN 500 MG: 250 CAPSULE ORAL at 11:15

## 2023-09-05 NOTE — ED PROVIDER NOTES
Time: 10:55 AM EDT  Date of encounter:  9/5/2023  Independent Historian/Clinical History and Information was obtained by:   Patient and Family    History is limited by: Age    Chief Complaint: dysuria      History of Present Illness:  Patient is a 77 y.o. year old female accompanied by daughter who presents to the emergency department for evaluation of possible urinary tract infection.  Per daughter at bedside she thinks patient has a UTI.  States states she is presenting like her typical when having a UTI.  States there is an increased urge to urinate, her urine has been dark and malodorous and she does not appear to be her normal self.  States symptoms have been going on for approximately 2 weeks.  States that she has advance care home health but they are having difficulty with PCP follow-up.  Denies any fevers, chills, nausea, vomiting.  Patient denies any complaints.  Per daughters at bedside patient is bedridden and they help change her frequently.        Patient Care Team  Primary Care Provider: Provider, No Known    Past Medical History:     Allergies   Allergen Reactions    Levofloxacin Hives     Past Medical History:   Diagnosis Date    Afib     Aftercare following ankle joint replacement surgery 06/01/2015    Arthritis     Arthritis of knee 06/01/2015    Essential hypertension     Gout     HTN (hypertension)     Left knee pain     Lymphedema     Recurrent UTI 05/11/2021    Urinary retention 05/11/2021     Past Surgical History:   Procedure Laterality Date    CATARACT EXTRACTION Bilateral     COLONOSCOPY      KIDNEY STONE SURGERY      KNEE SURGERY Left 2007    REPLACEMENT    OTHER SURGICAL HISTORY      JOINT SURGERY, UNSPECIFIED    TUBAL ABDOMINAL LIGATION       Family History   Problem Relation Age of Onset    Heart failure Mother 64        CONGESTIVE    Arthritis Mother     Arthritis Sister     Colon cancer Neg Hx        Home Medications:  Prior to Admission medications    Medication Sig Start Date End  "Date Taking? Authorizing Provider   carvedilol (COREG) 6.25 MG tablet TAKE ONE TABLET BY MOUTH TWICE A DAY WITH FOOD 9/20/21   Ivanna Frederick APRN   furosemide (LASIX) 20 MG tablet  3/4/23   Maricarmen Cat MD   HYDROcodone-acetaminophen (NORCO) 5-325 MG per tablet  3/9/23   Maricarmen Cat MD   Hydrocortisone, Perianal, (Procto-Abiodun) 1 % cream rectal cream Insert  into the rectum Daily. 3/24/23   Jaky Hancock APRN   hydrOXYzine (ATARAX) 25 MG tablet  3/22/23   Maricarmen Cat MD   magnesium hydroxide (MILK OF MAGNESIA) 2400 MG/10ML suspension suspension Take 10 mL by mouth Daily As Needed.    Maricarmen Cat MD   multivitamin with minerals tablet tablet Take 1 tablet by mouth Daily.    Maricarmen Cat MD   potassium chloride (MICRO-K) 10 MEQ CR capsule Take 2 capsules by mouth Daily. 1/1/23   Brenden Ornelas DO   saccharomyces boulardii (FLORASTOR) 250 MG capsule Take 1 capsule by mouth 2 (Two) Times a Day.    Maricarmen Cat MD   warfarin (COUMADIN) 5 MG tablet  3/3/23   Maricarmen Cat MD   zinc sulfate (ZINCATE) 220 (50 Zn) MG capsule Take 1 capsule by mouth Daily.    Maricarmen Cat MD        Social History:   Social History     Tobacco Use    Smoking status: Former     Passive exposure: Past    Smokeless tobacco: Never   Vaping Use    Vaping Use: Never used   Substance Use Topics    Alcohol use: Never    Drug use: Defer       Physical Exam:  /51   Pulse 52   Temp 97.8 °F (36.6 °C) (Oral)   Resp 17   Ht 170.2 cm (67\")   Wt 96.8 kg (213 lb 6.5 oz)   LMP  (LMP Unknown)   SpO2 99%   BMI 33.42 kg/m²     Physical Exam  Vitals and nursing note reviewed.   Constitutional:       Appearance: Normal appearance.   HENT:      Head: Normocephalic and atraumatic.   Eyes:      Pupils: Pupils are equal, round, and reactive to light.   Pulmonary:      Effort: Pulmonary effort is normal.   Abdominal:      General: Abdomen is flat.      Palpations: Abdomen is " soft.      Tenderness: There is no abdominal tenderness.   Musculoskeletal:      Cervical back: Normal range of motion.   Skin:     General: Skin is warm and dry.   Neurological:      Mental Status: She is alert.   Psychiatric:         Mood and Affect: Mood normal.         Behavior: Behavior normal.                    Medical Decision Making:      Comorbidities that affect care:    Atrial Fibrillation, Hypertension    External Notes reviewed:    Hospital Discharge Summary: Patient admitted 12/27/2022 for UTI but had altered mental status at that time.      The following orders were placed and all results were independently analyzed by me:  Orders Placed This Encounter   Procedures    Urine Culture - Urine,    Comprehensive Metabolic Panel    Urinalysis With Culture If Indicated - Urine, Clean Catch    CBC Auto Differential    [place] Draw    Urinalysis, Microscopic Only - Urine, Clean Catch    CBC & Differential    Green Top (Gel)    Lavender Top    Gold Top - SST    Light Blue Top       Medications Given in the Emergency Department:  Medications   cephalexin (KEFLEX) capsule 500 mg (has no administration in time range)        ED Course:  Discussed ED findings including lab results, treatment plan and discharge plan with patient and family.  They verbalized understanding agree with plan.       Labs:    Lab Results (last 24 hours)       Procedure Component Value Units Date/Time    CBC & Differential [[id number]]  (Abnormal) Collected: 09/05/23 0946    Specimen: Blood Updated: 09/05/23 0956    Narrative:      The following orders were created for panel order CBC & Differential.  Procedure                               Abnormality         Status                     ---------                               -----------         ------                     CBC Auto Differential[[id number]]        Abnormal            Final result                 Please view results for these tests on the individual orders.    Comprehensive  Metabolic Panel [[id number]]  (Abnormal) Collected: 09/05/23 0946    Specimen: Blood Updated: 09/05/23 1015     Glucose 113 mg/dL      BUN 23 mg/dL      Creatinine 0.84 mg/dL      Sodium 138 mmol/L      Potassium 3.9 mmol/L      Chloride 102 mmol/L      CO2 26.8 mmol/L      Calcium 8.8 mg/dL      Total Protein 7.3 g/dL      Albumin 2.4 g/dL      ALT (SGPT) 7 U/L      AST (SGOT) 23 U/L      Alkaline Phosphatase 116 U/L      Total Bilirubin 0.3 mg/dL      Globulin 4.9 gm/dL      A/G Ratio 0.5 g/dL      BUN/Creatinine Ratio 27.4     Anion Gap 9.2 mmol/L      eGFR 71.7 mL/min/1.73     Narrative:      GFR Normal >60  Chronic Kidney Disease <60  Kidney Failure <15    The GFR formula is only valid for adults with stable renal function between ages 18 and 70.    CBC Auto Differential [[id number]]  (Abnormal) Collected: 09/05/23 0946    Specimen: Blood Updated: 09/05/23 0956     WBC 5.61 10*3/mm3      RBC 3.92 10*6/mm3      Hemoglobin 9.2 g/dL      Hematocrit 29.5 %      MCV 75.3 fL      MCH 23.5 pg      MCHC 31.2 g/dL      RDW 16.6 %      RDW-SD 44.9 fl      MPV 9.4 fL      Platelets 314 10*3/mm3      Neutrophil % 57.6 %      Lymphocyte % 25.1 %      Monocyte % 11.9 %      Eosinophil % 4.5 %      Basophil % 0.7 %      Immature Grans % 0.2 %      Neutrophils, Absolute 3.23 10*3/mm3      Lymphocytes, Absolute 1.41 10*3/mm3      Monocytes, Absolute 0.67 10*3/mm3      Eosinophils, Absolute 0.25 10*3/mm3      Basophils, Absolute 0.04 10*3/mm3      Immature Grans, Absolute 0.01 10*3/mm3      nRBC 0.0 /100 WBC     Urinalysis With Culture If Indicated - Urine, Clean Catch [[id number]]  (Abnormal) Collected: 09/05/23 1006    Specimen: Urine, Clean Catch Updated: 09/05/23 1032     Color, UA Yellow     Appearance, UA Cloudy     pH, UA 7.5     Specific Gravity, UA 1.009     Glucose, UA Negative     Ketones, UA Negative     Bilirubin, UA Negative     Blood, UA Small (1+)     Protein, UA Trace     Leuk Esterase, UA Large (3+)     Nitrite,  UA Negative     Urobilinogen, UA 0.2 E.U./dL    Narrative:      In absence of clinical symptoms, the presence of pyuria, bacteria, and/or nitrites on the urinalysis result does not correlate with infection.    Urinalysis, Microscopic Only - Urine, Clean Catch [[id number]]  (Abnormal) Collected: 09/05/23 1006    Specimen: Urine, Clean Catch Updated: 09/05/23 1032     RBC, UA 21-30 /HPF      WBC, UA Too Numerous to Count /HPF      Bacteria, UA None Seen /HPF      Squamous Epithelial Cells, UA 0-2 /HPF      Hyaline Casts, UA 3-6 /LPF      Methodology Automated Microscopy    Urine Culture - Urine, Urine, Clean Catch [[id number]] Collected: 09/05/23 1006    Specimen: Urine, Clean Catch Updated: 09/05/23 1032             Differential Diagnosis and Discussion:    Dysuria: Differential diagnosis includes but is not limited to urethritis, cystitis, pyelonephritis, ureteral calculi, neoplasm, chemical irritant, urethral stricture, and trauma    All labs were reviewed and interpreted by me.    MDM     Amount and/or Complexity of Data Reviewed  Clinical lab tests: reviewed             Patient Care Considerations:    CT ABDOMEN AND PELVIS: I considered ordering a CT scan of the abdomen and pelvis however patient abdomen soft and nontender.  She is afebrile with no nausea vomiting.      Consultants/Shared Management Plan:    None    Social Determinants of Health:    Patient has presented with family members who are responsible, reliable and will ensure follow up care.      Disposition and Care Coordination:    Discharged: I considered escalation of care by admitting this patient to the hospital, however the patient has improved and is suitable and stable for discharge.    I have explained discharge medications and the need for follow up with the patient/caretakers. This was also printed in the discharge instructions. Patient was discharged with the following medications and follow up:      Medication List        New Prescriptions       cephalexin 500 MG capsule  Commonly known as: KEFLEX  Take 1 capsule by mouth 4 (Four) Times a Day for 10 days.               Where to Get Your Medications        These medications were sent to [facility] - [place], [place] - [address] [facility] [address] - [phone]  - [phone] [address] [address]      Phone: [phone]   cephalexin 500 MG capsule      Provider, No Known  [facility]  [place] [address]             Final diagnoses:   UTI (urinary tract infection), uncomplicated        ED Disposition       ED Disposition   Discharge    Condition   Stable    Comment   --               This medical record created using voice recognition software.             Dary Diamond, APRN  09/05/23 1146

## 2023-09-05 NOTE — DISCHARGE INSTRUCTIONS
Drink tons and tons of water.  Take antibiotics as directed.  See PCP for follow-up and return to ED for new or worsening symptoms including fever, vomiting, altered mental status or any other concerns.

## 2023-09-06 LAB — BACTERIA SPEC AEROBE CULT: NORMAL

## 2023-12-22 ENCOUNTER — HOSPITAL ENCOUNTER (INPATIENT)
Facility: HOSPITAL | Age: 78
LOS: 9 days | Discharge: HOME | DRG: 379 | End: 2024-01-03
Attending: EMERGENCY MEDICINE | Admitting: FAMILY MEDICINE
Payer: MEDICARE

## 2023-12-22 ENCOUNTER — APPOINTMENT (OUTPATIENT)
Dept: CT IMAGING | Facility: HOSPITAL | Age: 78
DRG: 379 | End: 2023-12-22
Payer: MEDICARE

## 2023-12-22 ENCOUNTER — APPOINTMENT (OUTPATIENT)
Dept: GENERAL RADIOLOGY | Facility: HOSPITAL | Age: 78
DRG: 379 | End: 2023-12-22
Payer: MEDICARE

## 2023-12-22 DIAGNOSIS — D64.9 ANEMIA: ICD-10-CM

## 2023-12-22 DIAGNOSIS — K92.2 GASTROINTESTINAL HEMORRHAGE, UNSPECIFIED GASTROINTESTINAL HEMORRHAGE TYPE: Primary | ICD-10-CM

## 2023-12-22 DIAGNOSIS — R26.2 DIFFICULTY IN WALKING: ICD-10-CM

## 2023-12-22 DIAGNOSIS — D50.0 IRON DEFICIENCY ANEMIA DUE TO CHRONIC BLOOD LOSS: ICD-10-CM

## 2023-12-22 DIAGNOSIS — Z78.9 DECREASED ACTIVITIES OF DAILY LIVING (ADL): ICD-10-CM

## 2023-12-22 DIAGNOSIS — D64.9 ANEMIA, UNSPECIFIED TYPE: ICD-10-CM

## 2023-12-22 LAB
ABO GROUP BLD: NORMAL
ABO GROUP BLD: NORMAL
ALBUMIN SERPL-MCNC: 2.7 G/DL (ref 3.5–5.2)
ALBUMIN/GLOB SERPL: 0.6 G/DL
ALP SERPL-CCNC: 114 U/L (ref 39–117)
ALT SERPL W P-5'-P-CCNC: 5 U/L (ref 1–33)
ANION GAP SERPL CALCULATED.3IONS-SCNC: 10.5 MMOL/L (ref 5–15)
AST SERPL-CCNC: 21 U/L (ref 1–32)
BASOPHILS # BLD AUTO: 0.05 10*3/MM3 (ref 0–0.2)
BASOPHILS NFR BLD AUTO: 0.7 % (ref 0–1.5)
BILIRUB SERPL-MCNC: 0.3 MG/DL (ref 0–1.2)
BLD GP AB SCN SERPL QL: NEGATIVE
BUN SERPL-MCNC: 28 MG/DL (ref 8–23)
BUN/CREAT SERPL: 33.7 (ref 7–25)
CALCIUM SPEC-SCNC: 8.8 MG/DL (ref 8.6–10.5)
CHLORIDE SERPL-SCNC: 99 MMOL/L (ref 98–107)
CO2 SERPL-SCNC: 25.5 MMOL/L (ref 22–29)
CREAT SERPL-MCNC: 0.83 MG/DL (ref 0.57–1)
D-LACTATE SERPL-SCNC: 1.6 MMOL/L (ref 0.5–2)
DEPRECATED RDW RBC AUTO: 45.4 FL (ref 37–54)
EGFRCR SERPLBLD CKD-EPI 2021: 72.3 ML/MIN/1.73
EOSINOPHIL # BLD AUTO: 0.45 10*3/MM3 (ref 0–0.4)
EOSINOPHIL NFR BLD AUTO: 6.7 % (ref 0.3–6.2)
ERYTHROCYTE [DISTWIDTH] IN BLOOD BY AUTOMATED COUNT: 18.1 % (ref 12.3–15.4)
GLOBULIN UR ELPH-MCNC: 4.9 GM/DL
GLUCOSE SERPL-MCNC: 120 MG/DL (ref 65–99)
HCT VFR BLD AUTO: 23.9 % (ref 34–46.6)
HEMOCCULT STL QL IA: POSITIVE
HGB BLD-MCNC: 6.8 G/DL (ref 12–15.9)
HOLD SPECIMEN: NORMAL
HOLD SPECIMEN: NORMAL
IMM GRANULOCYTES # BLD AUTO: 0.02 10*3/MM3 (ref 0–0.05)
IMM GRANULOCYTES NFR BLD AUTO: 0.3 % (ref 0–0.5)
LYMPHOCYTES # BLD AUTO: 1.25 10*3/MM3 (ref 0.7–3.1)
LYMPHOCYTES NFR BLD AUTO: 18.6 % (ref 19.6–45.3)
MCH RBC QN AUTO: 19.9 PG (ref 26.6–33)
MCHC RBC AUTO-ENTMCNC: 28.5 G/DL (ref 31.5–35.7)
MCV RBC AUTO: 70.1 FL (ref 79–97)
MONOCYTES # BLD AUTO: 0.84 10*3/MM3 (ref 0.1–0.9)
MONOCYTES NFR BLD AUTO: 12.5 % (ref 5–12)
NEUTROPHILS NFR BLD AUTO: 4.12 10*3/MM3 (ref 1.7–7)
NEUTROPHILS NFR BLD AUTO: 61.2 % (ref 42.7–76)
NRBC BLD AUTO-RTO: 0 /100 WBC (ref 0–0.2)
PLATELET # BLD AUTO: 280 10*3/MM3 (ref 140–450)
PMV BLD AUTO: 9.3 FL (ref 6–12)
POTASSIUM SERPL-SCNC: 3.6 MMOL/L (ref 3.5–5.2)
PROT SERPL-MCNC: 7.6 G/DL (ref 6–8.5)
RBC # BLD AUTO: 3.41 10*6/MM3 (ref 3.77–5.28)
RH BLD: POSITIVE
RH BLD: POSITIVE
SODIUM SERPL-SCNC: 135 MMOL/L (ref 136–145)
T&S EXPIRATION DATE: NORMAL
WBC NRBC COR # BLD AUTO: 6.73 10*3/MM3 (ref 3.4–10.8)
WHOLE BLOOD HOLD COAG: NORMAL
WHOLE BLOOD HOLD SPECIMEN: NORMAL

## 2023-12-22 PROCEDURE — 86900 BLOOD TYPING SEROLOGIC ABO: CPT | Performed by: EMERGENCY MEDICINE

## 2023-12-22 PROCEDURE — G0378 HOSPITAL OBSERVATION PER HR: HCPCS

## 2023-12-22 PROCEDURE — 36415 COLL VENOUS BLD VENIPUNCTURE: CPT

## 2023-12-22 PROCEDURE — 86850 RBC ANTIBODY SCREEN: CPT | Performed by: EMERGENCY MEDICINE

## 2023-12-22 PROCEDURE — 99222 1ST HOSP IP/OBS MODERATE 55: CPT | Performed by: FAMILY MEDICINE

## 2023-12-22 PROCEDURE — 30283B1 TRANSFUSION OF NONAUTOLOGOUS 4-FACTOR PROTHROMBIN COMPLEX CONCENTRATE INTO VEIN, PERCUTANEOUS APPROACH: ICD-10-PCS | Performed by: EMERGENCY MEDICINE

## 2023-12-22 PROCEDURE — 86901 BLOOD TYPING SEROLOGIC RH(D): CPT | Performed by: EMERGENCY MEDICINE

## 2023-12-22 PROCEDURE — 85025 COMPLETE CBC W/AUTO DIFF WBC: CPT | Performed by: EMERGENCY MEDICINE

## 2023-12-22 PROCEDURE — 80053 COMPREHEN METABOLIC PANEL: CPT | Performed by: EMERGENCY MEDICINE

## 2023-12-22 PROCEDURE — 25010000002 PROTHROMBIN COMPLEX CONC HUMAN 500 UNITS KIT: Performed by: EMERGENCY MEDICINE

## 2023-12-22 PROCEDURE — 74176 CT ABD & PELVIS W/O CONTRAST: CPT

## 2023-12-22 PROCEDURE — 82274 ASSAY TEST FOR BLOOD FECAL: CPT | Performed by: EMERGENCY MEDICINE

## 2023-12-22 PROCEDURE — 99285 EMERGENCY DEPT VISIT HI MDM: CPT

## 2023-12-22 PROCEDURE — 86901 BLOOD TYPING SEROLOGIC RH(D): CPT

## 2023-12-22 PROCEDURE — 86923 COMPATIBILITY TEST ELECTRIC: CPT

## 2023-12-22 PROCEDURE — 71045 X-RAY EXAM CHEST 1 VIEW: CPT

## 2023-12-22 PROCEDURE — 86900 BLOOD TYPING SEROLOGIC ABO: CPT

## 2023-12-22 PROCEDURE — 83605 ASSAY OF LACTIC ACID: CPT | Performed by: EMERGENCY MEDICINE

## 2023-12-22 RX ORDER — SODIUM CHLORIDE 9 MG/ML
40 INJECTION, SOLUTION INTRAVENOUS AS NEEDED
Status: DISCONTINUED | OUTPATIENT
Start: 2023-12-22 | End: 2024-01-03 | Stop reason: HOSPADM

## 2023-12-22 RX ORDER — SODIUM CHLORIDE 0.9 % (FLUSH) 0.9 %
10 SYRINGE (ML) INJECTION EVERY 12 HOURS SCHEDULED
Status: DISCONTINUED | OUTPATIENT
Start: 2023-12-23 | End: 2024-01-03 | Stop reason: HOSPADM

## 2023-12-22 RX ORDER — APIXABAN 5 MG/1
5 TABLET, FILM COATED ORAL EVERY 12 HOURS SCHEDULED
COMMUNITY
Start: 2023-12-21

## 2023-12-22 RX ORDER — CITALOPRAM HYDROBROMIDE 10 MG/1
10 TABLET ORAL DAILY
COMMUNITY
Start: 2023-12-09

## 2023-12-22 RX ORDER — SODIUM CHLORIDE, SODIUM LACTATE, POTASSIUM CHLORIDE, CALCIUM CHLORIDE 600; 310; 30; 20 MG/100ML; MG/100ML; MG/100ML; MG/100ML
75 INJECTION, SOLUTION INTRAVENOUS CONTINUOUS
Status: ACTIVE | OUTPATIENT
Start: 2023-12-23 | End: 2023-12-23

## 2023-12-22 RX ORDER — NITROGLYCERIN 0.4 MG/1
0.4 TABLET SUBLINGUAL
Status: DISCONTINUED | OUTPATIENT
Start: 2023-12-22 | End: 2023-12-29

## 2023-12-22 RX ORDER — FUROSEMIDE 10 MG/ML
60 INJECTION INTRAMUSCULAR; INTRAVENOUS EVERY 12 HOURS
Status: DISCONTINUED | OUTPATIENT
Start: 2023-12-23 | End: 2023-12-24

## 2023-12-22 RX ORDER — ONDANSETRON 2 MG/ML
4 INJECTION INTRAMUSCULAR; INTRAVENOUS EVERY 6 HOURS PRN
Status: DISCONTINUED | OUTPATIENT
Start: 2023-12-22 | End: 2024-01-03 | Stop reason: HOSPADM

## 2023-12-22 RX ORDER — POTASSIUM CHLORIDE 750 MG/1
1 TABLET, FILM COATED, EXTENDED RELEASE ORAL DAILY
COMMUNITY
Start: 2023-10-10

## 2023-12-22 RX ORDER — SODIUM CHLORIDE 0.9 % (FLUSH) 0.9 %
10 SYRINGE (ML) INJECTION AS NEEDED
Status: DISCONTINUED | OUTPATIENT
Start: 2023-12-22 | End: 2023-12-22

## 2023-12-22 RX ORDER — BISACODYL 10 MG
10 SUPPOSITORY, RECTAL RECTAL DAILY PRN
Status: DISCONTINUED | OUTPATIENT
Start: 2023-12-22 | End: 2024-01-03 | Stop reason: HOSPADM

## 2023-12-22 RX ORDER — SODIUM CHLORIDE 0.9 % (FLUSH) 0.9 %
10 SYRINGE (ML) INJECTION AS NEEDED
Status: DISCONTINUED | OUTPATIENT
Start: 2023-12-22 | End: 2024-01-03 | Stop reason: HOSPADM

## 2023-12-22 RX ORDER — AMOXICILLIN 250 MG
2 CAPSULE ORAL 2 TIMES DAILY
Status: DISCONTINUED | OUTPATIENT
Start: 2023-12-23 | End: 2024-01-03 | Stop reason: HOSPADM

## 2023-12-22 RX ORDER — BISACODYL 5 MG/1
5 TABLET, DELAYED RELEASE ORAL DAILY PRN
Status: DISCONTINUED | OUTPATIENT
Start: 2023-12-22 | End: 2024-01-03 | Stop reason: HOSPADM

## 2023-12-22 RX ORDER — NITROFURANTOIN 25; 75 MG/1; MG/1
100 CAPSULE ORAL DAILY
COMMUNITY
Start: 2023-09-19

## 2023-12-22 RX ORDER — POLYETHYLENE GLYCOL 3350 17 G/17G
17 POWDER, FOR SOLUTION ORAL DAILY PRN
Status: DISCONTINUED | OUTPATIENT
Start: 2023-12-22 | End: 2024-01-03 | Stop reason: HOSPADM

## 2023-12-22 RX ADMIN — PROTHROMBIN, COAGULATION FACTOR VII HUMAN, COAGULATION FACTOR IX HUMAN, COAGULATION FACTOR X HUMAN, PROTEIN C, PROTEIN S HUMAN, AND WATER 5031 UNITS: KIT at 21:35

## 2023-12-22 NOTE — ED PROVIDER NOTES
Time: 3:04 PM EST  Date of encounter:  12/22/2023  Independent Historian/Clinical History and Information was obtained by:   Patient and Family    History is limited by: Dementia    Chief Complaint: Abnormal labs      History of Present Illness:  Patient is a 78 y.o. year old female who presents to the emergency department for evaluation of abnormal labs.  Patient presents with family who reports that she had blood work done recently that they received a phone call today revealing abnormal labs.  Unsure exactly what the labs were although they believe that hemoglobin was low.  They also report patient has had increased peripheral edema and despite increasing diuretic medication no increase in urination.    HPI    Patient Care Team  Primary Care Provider: Provider, No Known    Past Medical History:     Allergies   Allergen Reactions    Levofloxacin Hives     Past Medical History:   Diagnosis Date    Afib     Aftercare following ankle joint replacement surgery 06/01/2015    Arthritis     Arthritis of knee 06/01/2015    Essential hypertension     Gout     HTN (hypertension)     Left knee pain     Lymphedema     Recurrent UTI 05/11/2021    Urinary retention 05/11/2021     Past Surgical History:   Procedure Laterality Date    CATARACT EXTRACTION Bilateral     COLONOSCOPY      KIDNEY STONE SURGERY      KNEE SURGERY Left 2007    REPLACEMENT    OTHER SURGICAL HISTORY      JOINT SURGERY, UNSPECIFIED    TUBAL ABDOMINAL LIGATION       Family History   Problem Relation Age of Onset    Heart failure Mother 64        CONGESTIVE    Arthritis Mother     Arthritis Sister     Colon cancer Neg Hx        Home Medications:  Prior to Admission medications    Medication Sig Start Date End Date Taking? Authorizing Provider   Eliquis 5 MG tablet tablet Take 1 tablet by mouth Every 12 (Twelve) Hours. 12/21/23  Yes Provider, MD Maricarmen   carvedilol (COREG) 6.25 MG tablet TAKE ONE TABLET BY MOUTH TWICE A DAY WITH FOOD 9/20/21   Vessels,  "CAROL Mcfarlane   furosemide (LASIX) 20 MG tablet  3/4/23   Maricarmen Cat MD   HYDROcodone-acetaminophen (NORCO) 5-325 MG per tablet  3/9/23   Maricarmen Cat MD   Hydrocortisone, Perianal, (Procto-Abiodun) 1 % cream rectal cream Insert  into the rectum Daily. 3/24/23   Jaky Hancock APRN   hydrOXYzine (ATARAX) 25 MG tablet  3/22/23   Maricarmen Cat MD   magnesium hydroxide (MILK OF MAGNESIA) 2400 MG/10ML suspension suspension Take 10 mL by mouth Daily As Needed.    Maricarmen Cat MD   multivitamin with minerals tablet tablet Take 1 tablet by mouth Daily.    Maricarmen Cat MD   potassium chloride (MICRO-K) 10 MEQ CR capsule Take 2 capsules by mouth Daily. 1/1/23   Brenden Ornelas DO   saccharomyces boulardii (FLORASTOR) 250 MG capsule Take 1 capsule by mouth 2 (Two) Times a Day.    Maricarmen Cat MD   zinc sulfate (ZINCATE) 220 (50 Zn) MG capsule Take 1 capsule by mouth Daily.    Maricarmen Cat MD   warfarin (COUMADIN) 5 MG tablet  3/3/23 12/22/23  Maricarmen Cat MD        Social History:   Social History     Tobacco Use    Smoking status: Former     Passive exposure: Past    Smokeless tobacco: Never   Vaping Use    Vaping Use: Never used   Substance Use Topics    Alcohol use: Never    Drug use: Defer         Review of Systems:  Review of Systems   Unable to perform ROS: Dementia        Physical Exam:  /47   Pulse 56   Temp 98.2 °F (36.8 °C) (Oral)   Resp 20   Ht 170.2 cm (67\")   Wt 106 kg (233 lb 11 oz)   LMP  (LMP Unknown)   SpO2 98%   BMI 36.60 kg/m²     Physical Exam  Vitals and nursing note reviewed.   Constitutional:       General: She is not in acute distress.     Appearance: Normal appearance. She is not toxic-appearing.   HENT:      Head: Normocephalic and atraumatic.      Jaw: There is normal jaw occlusion.   Eyes:      General: Lids are normal.      Extraocular Movements: Extraocular movements intact.      Conjunctiva/sclera: Conjunctivae " normal.      Pupils: Pupils are equal, round, and reactive to light.   Cardiovascular:      Rate and Rhythm: Normal rate and regular rhythm.      Pulses: Normal pulses.      Heart sounds: Murmur heard.   Pulmonary:      Effort: Pulmonary effort is normal. No respiratory distress.      Breath sounds: Normal breath sounds. No wheezing or rhonchi.   Abdominal:      General: Abdomen is flat.      Palpations: Abdomen is soft.      Tenderness: There is no abdominal tenderness. There is no guarding or rebound.   Musculoskeletal:         General: Normal range of motion.      Cervical back: Normal range of motion and neck supple.      Right lower leg: Edema present.      Left lower leg: Edema present.   Skin:     General: Skin is warm and dry.   Neurological:      General: No focal deficit present.      Mental Status: She is alert. Mental status is at baseline.   Psychiatric:         Mood and Affect: Mood normal.                  Procedures:  Procedures      Medical Decision Making:      Comorbidities that affect care:    Lymphedema, Atrial Fibrillation, Hypertension    External Notes reviewed:    None      The following orders were placed and all results were independently analyzed by me:  Orders Placed This Encounter   Procedures    XR Chest 1 View    CT Abdomen Pelvis Without Contrast    Zullinger Draw    Comprehensive Metabolic Panel    Lactic Acid, Plasma    Occult Blood, Fecal By Immunoassay - Stool, Per Rectum    CBC Auto Differential    NPO Diet NPO Type: Strict NPO    Undress & Gown    Measure Blood Pressure    Vital Signs    Orthostatic Blood Pressure    Verify Informed Consent    Code Status and Medical Interventions:    Gastroenterology (on-call MD unless specified)    Inpatient Hospitalist Consult    Pulse Oximetry    Oxygen Therapy- Nasal Cannula; Titrate 1-6 LPM Per SpO2; 90 - 95%    Type & Screen    ABO RH Specimen Verification    Prepare RBC, 2 Units    Insert Peripheral IV    Initiate Observation Status     CBC & Differential    Green Top (Gel)    Lavender Top    Gold Top - SST    Light Blue Top       Medications Given in the Emergency Department:  Medications   sodium chloride 0.9 % flush 10 mL (has no administration in time range)   prothrombin complex conc human (KCentra) IV solution 5,031 Units (5,031 Units Intravenous Given 12/22/23 2135)        ED Course:         Labs:    Lab Results (last 24 hours)       Procedure Component Value Units Date/Time    CBC & Differential [[id number]]  (Abnormal) Collected: 12/22/23 1503    Specimen: Blood from Arm, Right Updated: 12/22/23 1517    Narrative:      The following orders were created for panel order CBC & Differential.  Procedure                               Abnormality         Status                     ---------                               -----------         ------                     CBC Auto Differential[[id number]]        Abnormal            Final result                 Please view results for these tests on the individual orders.    Comprehensive Metabolic Panel [[id number]]  (Abnormal) Collected: 12/22/23 1503    Specimen: Blood from Arm, Right Updated: 12/22/23 1543     Glucose 120 mg/dL      BUN 28 mg/dL      Creatinine 0.83 mg/dL      Sodium 135 mmol/L      Potassium 3.6 mmol/L      Chloride 99 mmol/L      CO2 25.5 mmol/L      Calcium 8.8 mg/dL      Total Protein 7.6 g/dL      Albumin 2.7 g/dL      ALT (SGPT) 5 U/L      AST (SGOT) 21 U/L      Alkaline Phosphatase 114 U/L      Total Bilirubin 0.3 mg/dL      Globulin 4.9 gm/dL      A/G Ratio 0.6 g/dL      BUN/Creatinine Ratio 33.7     Anion Gap 10.5 mmol/L      eGFR 72.3 mL/min/1.73     Narrative:      GFR Normal >60  Chronic Kidney Disease <60  Kidney Failure <15    The GFR formula is only valid for adults with stable renal function between ages 18 and 70.    Lactic Acid, Plasma [[id number]]  (Normal) Collected: 12/22/23 1503    Specimen: Blood from Arm, Right Updated: 12/22/23 1537     Lactate 1.6 mmol/L      CBC Auto Differential [[id number]]  (Abnormal) Collected: 12/22/23 1503    Specimen: Blood from Arm, Right Updated: 12/22/23 1517     WBC 6.73 10*3/mm3      RBC 3.41 10*6/mm3      Hemoglobin 6.8 g/dL      Hematocrit 23.9 %      MCV 70.1 fL      MCH 19.9 pg      MCHC 28.5 g/dL      RDW 18.1 %      RDW-SD 45.4 fl      MPV 9.3 fL      Platelets 280 10*3/mm3      Neutrophil % 61.2 %      Lymphocyte % 18.6 %      Monocyte % 12.5 %      Eosinophil % 6.7 %      Basophil % 0.7 %      Immature Grans % 0.3 %      Neutrophils, Absolute 4.12 10*3/mm3      Lymphocytes, Absolute 1.25 10*3/mm3      Monocytes, Absolute 0.84 10*3/mm3      Eosinophils, Absolute 0.45 10*3/mm3      Basophils, Absolute 0.05 10*3/mm3      Immature Grans, Absolute 0.02 10*3/mm3      nRBC 0.0 /100 WBC     Occult Blood, Fecal By Immunoassay - Stool, Per Rectum [[id number]]  (Abnormal) Collected: 12/22/23 1802    Specimen: Stool from Per Rectum Updated: 12/22/23 1816     Occult Blood, Fecal by Immunoassay Positive             Imaging:    CT Abdomen Pelvis Without Contrast    Result Date: 12/22/2023  PROCEDURE: CT ABDOMEN PELVIS WO CONTRAST  COMPARISON: [facility], CT, CT ABDOMEN PELVIS WO CONTRAST, 12/29/2022, 14:29.  INDICATIONS: abd pain, anuria  TECHNIQUE: CT images were created without intravenous contrast.   PROTOCOL:   Standard imaging protocol performed    RADIATION:   DLP: 1358.7 mGy*cm   Automated exposure control was utilized to minimize radiation dose.  FINDINGS:  Within the lung bases are small bilateral pleural effusions with minimal scattered atelectasis.  The unenhanced liver, adrenal glands, spleen, and pancreas are unremarkable.  There are several stones in the gallbladder.  There are bilateral nonobstructing renal stones.  There is a small hiatal hernia.  The small bowel appears normal in caliber and configuration.  The colon appears normal.  The appendix appears normal.  There is no ascites or loculated collection.  No  abnormally enlarged lymph nodes are identified.  The rectum is moderately distended with stool.  The uterus and urinary bladder are unremarkable.  No aggressive osseous lesions are identified.        1. No acute process identified within abdomen/pelvis. 2. Small bilateral pleural effusions. 3. Cholelithiasis. 4. Bilateral nonobstructing renal stones. 5. Small hiatal hernia. 6. Rectum moderately distended with stool.     TESSIE MEDRANO MD       Electronically Signed and Approved By: TESSIE MEDRANO MD on 12/22/2023 at 17:15             XR Chest 1 View    Result Date: 12/22/2023  PROCEDURE: XR CHEST 1 VW  COMPARISON: [facility], CR, XR CHEST 1 VW, 12/27/2022, 10:54.  INDICATIONS: AMS, shortness of air  FINDINGS:  No consolidations or pleural effusions are observed.  Chronic changes are noted throughout the lungs.  The cardiac silhouette and mediastinum are unchanged. No definitive acute osseous abnormalities are seen on this single view.        1. Chronic changes are noted.  There is no evidence for acute cardiopulmonary process.         TESSIE DE LUNA MD       Electronically Signed and Approved By: TESSIE DE LUNA MD on 12/22/2023 at 15:50                Differential Diagnosis and Discussion:    Edema: Based on the patient's history, signs, and symptoms, the differential diagnosis includes but is not limited to heart failure, kidney disease, liver disease, venous insufficiency, lymphedema, pregnancy, medications, allergic reactions.  Metabolic: Differential diagnosis includes but is not limited to hypertension, hyperglycemia, hyperkalemia, hypocalcemia, metabolic acidosis, hypokalemia, hypoglycemia, malnutrition, hypothyroidism, hyperthyroidism, and adrenal insufficiency.     All labs were reviewed and interpreted by me.  All X-rays impressions were independently interpreted by me.    MDM  Number of Diagnoses or Management Options  Gastrointestinal hemorrhage, unspecified gastrointestinal hemorrhage  type  Diagnosis management comments: In summary this is a 78-year-old female who presents to the emergency department for evaluation of reportedly abnormal labs.  CBC remarkable for on my review for anemia with a hemoglobin of 6.8.  CMP independently reviewed by me and shows no critical abnormalities.  Occult blood stool positive.  CT scan of the abdomen pelvis unremarkable for acute pathology.  Gastroenterology has been consulted, Dr. Bermudez, requests n.p.o. after midnight with plan for scope tomorrow.  Patient is on Eliquis and has been reversed using Kcentra.  Blood transfusion initiated.  Patient case has been discussed with the hospitalist team who will admit to the hospital for further evaluation and continuation of treatment.         Critical Care Note: Total Critical Care time of 45 minutes. Total critical care time documented does not include time spent on separately billed procedures for services of nurses or physician assistants. I personally saw and examined the patient. I have reviewed all diagnostic interpretations and treatment plans as written. I was present for the key portions of any procedures performed and the inclusive time noted in any critical care statement. Critical care time includes patient management by me, time spent at the patients bedside,  time to review lab and imaging results, discussing patient care, documentation in the medical record, and time spent with family or caregiver.        Patient Care Considerations:    CT HEAD: I considered ordering a noncontrast CT of the head, however nonfocal exam      Consultants/Shared Management Plan:    Hospitalist: I have discussed the case with Dr. Painting who agrees to accept the patient for admission.  Consultant: I have discussed the case with Dr. Bermudez who agrees to consult on the patient.    Social Determinants of Health:    Patient has presented with family members who are responsible, reliable and will ensure follow up  care.      Disposition and Care Coordination:    Admit:   Through independent evaluation of the patient's history, physical, and imperical data, the patient meets criteria for observation/admission to the hospital.        Final diagnoses:   Gastrointestinal hemorrhage, unspecified gastrointestinal hemorrhage type   Anemia, unspecified type        ED Disposition       ED Disposition   Decision to Admit    Condition   --    Comment   Level of Care: Remote Telemetry [26]   Diagnosis: GI bleed [[id number]]   Admitting Physician: BAKARI JUAREZ [[id number]]   Attending Physician: BAKARI JUAREZ [[id number]]                 This medical record created using voice recognition software.             Rommel Clancy MD  12/22/23 6535

## 2023-12-23 ENCOUNTER — ANESTHESIA EVENT (OUTPATIENT)
Dept: GASTROENTEROLOGY | Facility: HOSPITAL | Age: 78
End: 2023-12-23
Payer: MEDICARE

## 2023-12-23 ENCOUNTER — ANESTHESIA (OUTPATIENT)
Dept: GASTROENTEROLOGY | Facility: HOSPITAL | Age: 78
End: 2023-12-23
Payer: MEDICARE

## 2023-12-23 LAB
ALBUMIN SERPL-MCNC: 2.4 G/DL (ref 3.5–5.2)
ALBUMIN/GLOB SERPL: 0.5 G/DL
ALP SERPL-CCNC: 105 U/L (ref 39–117)
ALT SERPL W P-5'-P-CCNC: 5 U/L (ref 1–33)
ANION GAP SERPL CALCULATED.3IONS-SCNC: 9 MMOL/L (ref 5–15)
AST SERPL-CCNC: 20 U/L (ref 1–32)
BILIRUB SERPL-MCNC: 0.8 MG/DL (ref 0–1.2)
BUN SERPL-MCNC: 22 MG/DL (ref 8–23)
BUN/CREAT SERPL: 28.6 (ref 7–25)
CALCIUM SPEC-SCNC: 8.5 MG/DL (ref 8.6–10.5)
CHLORIDE SERPL-SCNC: 102 MMOL/L (ref 98–107)
CO2 SERPL-SCNC: 26 MMOL/L (ref 22–29)
CREAT SERPL-MCNC: 0.77 MG/DL (ref 0.57–1)
EGFRCR SERPLBLD CKD-EPI 2021: 79.1 ML/MIN/1.73
FERRITIN SERPL-MCNC: 19.18 NG/ML (ref 13–150)
FOLATE SERPL-MCNC: >20 NG/ML (ref 4.78–24.2)
GLOBULIN UR ELPH-MCNC: 4.5 GM/DL
GLUCOSE SERPL-MCNC: 98 MG/DL (ref 65–99)
HCT VFR BLD AUTO: 24.7 % (ref 34–46.6)
HCT VFR BLD AUTO: 30.2 % (ref 34–46.6)
HGB BLD-MCNC: 7.1 G/DL (ref 12–15.9)
HGB BLD-MCNC: 9.6 G/DL (ref 12–15.9)
IRON 24H UR-MRATE: 97 MCG/DL (ref 37–145)
IRON SATN MFR SERPL: 26 % (ref 20–50)
MAGNESIUM SERPL-MCNC: 1.8 MG/DL (ref 1.6–2.4)
PHOSPHATE SERPL-MCNC: 3.7 MG/DL (ref 2.5–4.5)
POTASSIUM SERPL-SCNC: 3.5 MMOL/L (ref 3.5–5.2)
PROT SERPL-MCNC: 6.9 G/DL (ref 6–8.5)
RETICS # AUTO: 0.07 10*6/MM3 (ref 0.02–0.13)
RETICS/RBC NFR AUTO: 1.71 % (ref 0.7–1.9)
SODIUM SERPL-SCNC: 137 MMOL/L (ref 136–145)
TIBC SERPL-MCNC: 375 MCG/DL (ref 298–536)
TRANSFERRIN SERPL-MCNC: 252 MG/DL (ref 200–360)
VIT B12 BLD-MCNC: 901 PG/ML (ref 211–946)

## 2023-12-23 PROCEDURE — 0DB68ZX EXCISION OF STOMACH, VIA NATURAL OR ARTIFICIAL OPENING ENDOSCOPIC, DIAGNOSTIC: ICD-10-PCS | Performed by: INTERNAL MEDICINE

## 2023-12-23 PROCEDURE — 82746 ASSAY OF FOLIC ACID SERUM: CPT | Performed by: FAMILY MEDICINE

## 2023-12-23 PROCEDURE — 88342 IMHCHEM/IMCYTCHM 1ST ANTB: CPT | Performed by: INTERNAL MEDICINE

## 2023-12-23 PROCEDURE — 85014 HEMATOCRIT: CPT | Performed by: FAMILY MEDICINE

## 2023-12-23 PROCEDURE — 86900 BLOOD TYPING SEROLOGIC ABO: CPT

## 2023-12-23 PROCEDURE — 88305 TISSUE EXAM BY PATHOLOGIST: CPT | Performed by: INTERNAL MEDICINE

## 2023-12-23 PROCEDURE — 0DB28ZX EXCISION OF MIDDLE ESOPHAGUS, VIA NATURAL OR ARTIFICIAL OPENING ENDOSCOPIC, DIAGNOSTIC: ICD-10-PCS | Performed by: INTERNAL MEDICINE

## 2023-12-23 PROCEDURE — 84100 ASSAY OF PHOSPHORUS: CPT | Performed by: FAMILY MEDICINE

## 2023-12-23 PROCEDURE — 85014 HEMATOCRIT: CPT | Performed by: STUDENT IN AN ORGANIZED HEALTH CARE EDUCATION/TRAINING PROGRAM

## 2023-12-23 PROCEDURE — 85018 HEMOGLOBIN: CPT | Performed by: FAMILY MEDICINE

## 2023-12-23 PROCEDURE — 85045 AUTOMATED RETICULOCYTE COUNT: CPT | Performed by: FAMILY MEDICINE

## 2023-12-23 PROCEDURE — 85018 HEMOGLOBIN: CPT | Performed by: STUDENT IN AN ORGANIZED HEALTH CARE EDUCATION/TRAINING PROGRAM

## 2023-12-23 PROCEDURE — 82728 ASSAY OF FERRITIN: CPT | Performed by: FAMILY MEDICINE

## 2023-12-23 PROCEDURE — G0378 HOSPITAL OBSERVATION PER HR: HCPCS

## 2023-12-23 PROCEDURE — 99222 1ST HOSP IP/OBS MODERATE 55: CPT | Performed by: INTERNAL MEDICINE

## 2023-12-23 PROCEDURE — 25810000003 LACTATED RINGERS PER 1000 ML: Performed by: FAMILY MEDICINE

## 2023-12-23 PROCEDURE — 43239 EGD BIOPSY SINGLE/MULTIPLE: CPT | Performed by: INTERNAL MEDICINE

## 2023-12-23 PROCEDURE — 36430 TRANSFUSION BLD/BLD COMPNT: CPT

## 2023-12-23 PROCEDURE — 84466 ASSAY OF TRANSFERRIN: CPT | Performed by: FAMILY MEDICINE

## 2023-12-23 PROCEDURE — 83735 ASSAY OF MAGNESIUM: CPT | Performed by: FAMILY MEDICINE

## 2023-12-23 PROCEDURE — 25010000002 PROPOFOL 10 MG/ML EMULSION: Performed by: ANESTHESIOLOGY

## 2023-12-23 PROCEDURE — 83540 ASSAY OF IRON: CPT | Performed by: FAMILY MEDICINE

## 2023-12-23 PROCEDURE — P9016 RBC LEUKOCYTES REDUCED: HCPCS

## 2023-12-23 PROCEDURE — 82607 VITAMIN B-12: CPT | Performed by: FAMILY MEDICINE

## 2023-12-23 PROCEDURE — 25010000002 FUROSEMIDE PER 20 MG: Performed by: FAMILY MEDICINE

## 2023-12-23 PROCEDURE — 99232 SBSQ HOSP IP/OBS MODERATE 35: CPT | Performed by: STUDENT IN AN ORGANIZED HEALTH CARE EDUCATION/TRAINING PROGRAM

## 2023-12-23 PROCEDURE — 80053 COMPREHEN METABOLIC PANEL: CPT | Performed by: FAMILY MEDICINE

## 2023-12-23 PROCEDURE — 0DB38ZX EXCISION OF LOWER ESOPHAGUS, VIA NATURAL OR ARTIFICIAL OPENING ENDOSCOPIC, DIAGNOSTIC: ICD-10-PCS | Performed by: INTERNAL MEDICINE

## 2023-12-23 RX ORDER — CITALOPRAM 20 MG/1
10 TABLET ORAL DAILY
Status: DISCONTINUED | OUTPATIENT
Start: 2023-12-23 | End: 2023-12-23

## 2023-12-23 RX ORDER — PANTOPRAZOLE SODIUM 40 MG/10ML
40 INJECTION, POWDER, LYOPHILIZED, FOR SOLUTION INTRAVENOUS EVERY 12 HOURS SCHEDULED
Status: DISCONTINUED | OUTPATIENT
Start: 2023-12-23 | End: 2023-12-29

## 2023-12-23 RX ORDER — SACCHAROMYCES BOULARDII 250 MG
250 CAPSULE ORAL 2 TIMES DAILY
Status: DISCONTINUED | OUTPATIENT
Start: 2023-12-23 | End: 2024-01-03 | Stop reason: HOSPADM

## 2023-12-23 RX ORDER — NYSTATIN 100000 [USP'U]/G
POWDER TOPICAL EVERY 12 HOURS SCHEDULED
Status: DISCONTINUED | OUTPATIENT
Start: 2023-12-23 | End: 2023-12-26

## 2023-12-23 RX ORDER — CARVEDILOL 6.25 MG/1
6.25 TABLET ORAL 2 TIMES DAILY WITH MEALS
Status: DISCONTINUED | OUTPATIENT
Start: 2023-12-23 | End: 2024-01-03 | Stop reason: HOSPADM

## 2023-12-23 RX ORDER — LIDOCAINE HYDROCHLORIDE 20 MG/ML
INJECTION, SOLUTION EPIDURAL; INFILTRATION; INTRACAUDAL; PERINEURAL AS NEEDED
Status: DISCONTINUED | OUTPATIENT
Start: 2023-12-23 | End: 2023-12-23 | Stop reason: SURG

## 2023-12-23 RX ORDER — CITALOPRAM 20 MG/1
10 TABLET ORAL NIGHTLY
Status: DISCONTINUED | OUTPATIENT
Start: 2023-12-24 | End: 2024-01-03 | Stop reason: HOSPADM

## 2023-12-23 RX ORDER — NITROFURANTOIN 25; 75 MG/1; MG/1
100 CAPSULE ORAL DAILY
Status: DISCONTINUED | OUTPATIENT
Start: 2023-12-23 | End: 2024-01-03 | Stop reason: HOSPADM

## 2023-12-23 RX ORDER — MULTIPLE VITAMINS W/ MINERALS TAB 9MG-400MCG
1 TAB ORAL DAILY
Status: DISCONTINUED | OUTPATIENT
Start: 2023-12-23 | End: 2023-12-28

## 2023-12-23 RX ADMIN — SODIUM CHLORIDE, POTASSIUM CHLORIDE, SODIUM LACTATE AND CALCIUM CHLORIDE 75 ML/HR: 600; 310; 30; 20 INJECTION, SOLUTION INTRAVENOUS at 08:03

## 2023-12-23 RX ADMIN — CITALOPRAM HYDROBROMIDE 10 MG: 20 TABLET ORAL at 17:15

## 2023-12-23 RX ADMIN — PANTOPRAZOLE SODIUM 40 MG: 40 INJECTION, POWDER, FOR SOLUTION INTRAVENOUS at 10:32

## 2023-12-23 RX ADMIN — PROPOFOL 150 MCG/KG/MIN: 10 INJECTION, EMULSION INTRAVENOUS at 11:49

## 2023-12-23 RX ADMIN — LIDOCAINE HYDROCHLORIDE 60 MG: 20 INJECTION, SOLUTION EPIDURAL; INFILTRATION; INTRACAUDAL; PERINEURAL at 11:48

## 2023-12-23 RX ADMIN — PANTOPRAZOLE SODIUM 40 MG: 40 INJECTION, POWDER, FOR SOLUTION INTRAVENOUS at 21:28

## 2023-12-23 RX ADMIN — FUROSEMIDE 60 MG: 10 INJECTION, SOLUTION INTRAMUSCULAR; INTRAVENOUS at 00:18

## 2023-12-23 RX ADMIN — Medication 10 ML: at 00:19

## 2023-12-23 RX ADMIN — NYSTATIN: 100000 POWDER TOPICAL at 10:32

## 2023-12-23 RX ADMIN — NITROFURANTOIN MONOHYDRATE/MACROCRYSTALLINE 100 MG: 25; 75 CAPSULE ORAL at 17:15

## 2023-12-23 RX ADMIN — Medication 250 MG: at 21:28

## 2023-12-23 RX ADMIN — Medication 10 ML: at 08:04

## 2023-12-23 RX ADMIN — Medication 1 TABLET: at 17:15

## 2023-12-23 RX ADMIN — Medication 10 ML: at 21:28

## 2023-12-23 RX ADMIN — CARVEDILOL 6.25 MG: 6.25 TABLET, FILM COATED ORAL at 17:15

## 2023-12-23 RX ADMIN — NYSTATIN: 100000 POWDER TOPICAL at 21:28

## 2023-12-23 NOTE — PLAN OF CARE
Goal Outcome Evaluation:  Plan of Care Reviewed With: patient, daughter        Progress: no change  Outcome Evaluation: PT NEW ADMIT FOR GI BLEED, 2 U PRBC GIVEN, AGUILLON CATH INSERTED DUE TO MASD AND PT BEING BED BOUND. ALL WOUNDS CLEANED AND BARRIER CREAM APPLIED AS PT IS INCONTINENT OF BOWEL EACH TIME SHE IS TURNED.Peyton Jackson RN

## 2023-12-23 NOTE — H&P (VIEW-ONLY)
[facility]  Initial Inpatient Consult Note    Referring Provider: ER    Reason for Consultation: anemia    Subjective     History of present illness:    78 y.o. female with history of afib, CHF, HTN, and dementia who presented with c/o abnormal labs.  Pt was noted to have anemia and advised to come to ER for further evaluation.  Pt denies nausea, vomiting, abd pain.  Denies melena, hematochezia.  Pt with formed bowel movement here with brown stool.  Pt is on Eliquis as outpatient.  Pt given KCentra in the ED.  Hgb 6.8 on presentation, 7.1 today (prior to transfusion).  Pt receiving second unit PRBC currently.  Pt denies previous EGD/colonoscopy.    Past Medical History:  Past Medical History:   Diagnosis Date    Afib     Aftercare following ankle joint replacement surgery 06/01/2015    Arthritis     Arthritis of knee 06/01/2015    CHF (congestive heart failure)     Dementia     Essential hypertension     Gout     HTN (hypertension)     Left knee pain     Lymphedema     Recurrent UTI 05/11/2021    Urinary retention 05/11/2021     Past Surgical History:  Past Surgical History:   Procedure Laterality Date    CATARACT EXTRACTION Bilateral     KIDNEY STONE SURGERY      KNEE SURGERY Left 2007    REPLACEMENT    OTHER SURGICAL HISTORY      JOINT SURGERY, UNSPECIFIED    TUBAL ABDOMINAL LIGATION        Social History:   Social History     Tobacco Use    Smoking status: Former     Passive exposure: Past    Smokeless tobacco: Never   Substance Use Topics    Alcohol use: Not Currently      Family History:  Family History   Problem Relation Age of Onset    Heart failure Mother 64        CONGESTIVE    Arthritis Mother     Arthritis Sister     Colon cancer Neg Hx        Home Meds:  Medications Prior to Admission   Medication Sig Dispense Refill Last Dose    carvedilol (COREG) 6.25 MG tablet TAKE ONE TABLET BY MOUTH TWICE A DAY WITH FOOD 60 tablet 5 12/22/2023    citalopram (CeleXA) 10 MG tablet Take 1  tablet by mouth Daily.   12/22/2023    Eliquis 5 MG tablet tablet Take 1 tablet by mouth Every 12 (Twelve) Hours.   12/22/2023    furosemide (LASIX) 20 MG tablet Take 1 tablet by mouth 2 (Two) Times a Day.   12/22/2023    hydrOXYzine (ATARAX) 25 MG tablet Take 1 tablet by mouth 2 (Two) Times a Day As Needed.   12/22/2023    multivitamin with minerals tablet tablet Take 1 tablet by mouth Daily.   12/22/2023    nitrofurantoin, macrocrystal-monohydrate, (MACROBID) 100 MG capsule Take 1 capsule by mouth Daily.   12/22/2023    potassium chloride 10 MEQ CR tablet Take 1 tablet by mouth Daily.   12/22/2023    saccharomyces boulardii (FLORASTOR) 250 MG capsule Take 1 capsule by mouth 2 (Two) Times a Day.   12/22/2023     Current Meds:   furosemide, 60 mg, Intravenous, Q12H  nystatin, , Topical, Q12H  senna-docusate sodium, 2 tablet, Oral, BID  sodium chloride, 10 mL, Intravenous, Q12H      Allergies:  Allergies   Allergen Reactions    Levofloxacin Hives     Review of Systems  Pertinent items are noted in HPI, all other systems reviewed and negative     Objective     Vital Signs  Temp:  [97.7 °F (36.5 °C)-98.2 °F (36.8 °C)] 97.8 °F (36.6 °C)  Heart Rate:  [55-69] 65  Resp:  [16-20] 16  BP: (111-150)/(41-74) 116/41  Physical Exam:  General Appearance:    Alert, cooperative, in no acute distress   Head:    Normocephalic, without obvious abnormality, atraumatic   Eyes:          conjunctivae and sclerae normal, no   icterus   Throat:   no thrush, oral mucosa moist   Neck:   Supple, no adenopathy   Lungs:     Breathing unlabored    Heart:    No chest tenderness   Chest Wall:    No abnormalities observed   Abdomen:     Soft, nondistended, nontender       Skin:   No bruising   Psychiatric:  normal mood and insight; oriented     Results Review:   I reviewed the patient's new clinical results.    Results from last 7 days   Lab Units 12/23/23  0038 12/22/23  1503   WBC 10*3/mm3  --  6.73   HEMOGLOBIN g/dL 7.1* 6.8*   HEMATOCRIT %  24.7* 23.9*   PLATELETS 10*3/mm3  --  280     Results from last 7 days   Lab Units 12/22/23  1503   SODIUM mmol/L 135*   POTASSIUM mmol/L 3.6   CHLORIDE mmol/L 99   CO2 mmol/L 25.5   BUN mg/dL 28*   CREATININE mg/dL 0.83   CALCIUM mg/dL 8.8   BILIRUBIN mg/dL 0.3   ALK PHOS U/L 114   ALT (SGPT) U/L 5   AST (SGOT) U/L 21   GLUCOSE mg/dL 120*         Lab Results   Lab Value Date/Time    LIPASE 32 06/30/2023 1609    LIPASE 33 05/20/2022 1453    LIPASE 45 08/19/2020 2254    LIPASE 10 08/12/2020 1211         Assessment & Plan     GI bleed       Assessment:  Anemia  Occult blood positive stool    Plan:  Will start PPI IV BID  Will proceed with EGD for further evaluation  Benefits vs risks of procedure d/w patient; risks include but are not limited to bleeding, infection, perforation, and risk of sedation  Pt understands risks and agrees to proceed      I discussed the patients findings and my recommendations with patient, nursing staff, and consulting provider.    Domenica Bermudez MD

## 2023-12-23 NOTE — ANESTHESIA PREPROCEDURE EVALUATION
Anesthesia Evaluation     Patient summary reviewed and Nursing notes reviewed   no history of anesthetic complications:   NPO Solid Status: > 8 hours  NPO Liquid Status: > 2 hours           Airway   Mallampati: III  TM distance: >3 FB  Neck ROM: full  No difficulty expected  Dental      Pulmonary - normal exam    breath sounds clear to auscultation  (+) pleural effusion,  Cardiovascular   Exercise tolerance: poor (<4 METS)    ECG reviewed  Rhythm: irregular  Rate: normal    (+) hypertension, dysrhythmias Atrial Fib, CHF       Neuro/Psych  (+) psychiatric history, dementia  GI/Hepatic/Renal/Endo    (+) GI bleeding , renal disease-    Musculoskeletal     Abdominal    Substance History - negative use     OB/GYN negative ob/gyn ROS         Other   arthritis,     ROS/Med Hx Other: PAT Nursing Notes unavailable.               Latest Reference Range & Units 12/23/23 09:01   Sodium 136 - 145 mmol/L 137   Potassium 3.5 - 5.2 mmol/L 3.5   Chloride 98 - 107 mmol/L 102   CO2 22.0 - 29.0 mmol/L 26.0   Anion Gap 5.0 - 15.0 mmol/L 9.0   BUN 8 - 23 mg/dL 22   Creatinine 0.57 - 1.00 mg/dL 0.77   BUN/Creatinine Ratio 7.0 - 25.0  28.6 (H)   eGFR >60.0 mL/min/1.73 79.1   Glucose 65 - 99 mg/dL 98   Calcium 8.6 - 10.5 mg/dL 8.5 (L)   Magnesium 1.6 - 2.4 mg/dL 1.8   Phosphorus 2.5 - 4.5 mg/dL 3.7   Alkaline Phosphatase 39 - 117 U/L 105   Total Protein 6.0 - 8.5 g/dL 6.9   Albumin 3.5 - 5.2 g/dL 2.4 (L)   Globulin gm/dL 4.5   A/G Ratio g/dL 0.5   AST (SGOT) 1 - 32 U/L 20   ALT (SGPT) 1 - 33 U/L 5   Total Bilirubin 0.0 - 1.2 mg/dL 0.8   (H): Data is abnormally high  (L): Data is abnormally low     Latest Reference Range & Units 12/23/23 09:01   Hemoglobin 12.0 - 15.9 g/dL 9.6 (L)   Hematocrit 34.0 - 46.6 % 30.2 (L)   (L): Data is abnormally low     aBNORMAL ECG -  2/13/22  Atrial fibrillation  Low voltage, extremity and precordial leads  Borderline repolarization abnormality    Interpretation Summary echo 12/30/22         Left ventricular  systolic function is normal. Calculated left ventricular EF = 65%    Left ventricular diastolic function was not assessed.    There is calcification of the aortic valve.           Anesthesia Plan    ASA 3     general     (Total IV Anesthesia    Patient understands anesthesia not responsible for dental damage.  )  intravenous induction     Anesthetic plan, risks, benefits, and alternatives have been provided, discussed and informed consent has been obtained with: patient.    Plan discussed with CRNA.        CODE STATUS:    Level Of Support Discussed With: Patient  Code Status (Patient has no pulse and is not breathing): CPR (Attempt to Resuscitate)  Medical Interventions (Patient has pulse or is breathing): Full Support

## 2023-12-23 NOTE — PLAN OF CARE
Goal Outcome Evaluation:  Plan of Care Reviewed With: patient        Progress: no change  Outcome Evaluation: Patient hemoglobin levels increased to 9.6 after 2 unitis PRBC. Patient had EGD done this morning, tolerated procedure well. Patient tolerating regular diet after procedure. Vital signs stable, no complaints of pain at this time.

## 2023-12-23 NOTE — CONSULTS
"Nutrition Services    Patient Name: Isabell Ribera  YOB: 1945  MRN: [id number]  Admission date: 12/22/2023      CLINICAL NUTRITION ASSESSMENT      Reason for Assessment  Identified at Risk by Screening Criteria      H&P:  Past Medical History:   Diagnosis Date    Afib     Aftercare following ankle joint replacement surgery 06/01/2015    Arthritis     Arthritis of knee 06/01/2015    CHF (congestive heart failure)     Dementia     Essential hypertension     Gout     HTN (hypertension)     Left knee pain     Lymphedema     Recurrent UTI 05/11/2021    Urinary retention 05/11/2021        Current Problems:   Active Hospital Problems    Diagnosis     **GI bleed         Nutrition/Diet History         Narrative     Patient admitted with GI bleed.  Noted to have areas of MASD and skin blisters on lower legs.      Patient is currently NPO for planned EGD.  Will follow to monitor adequacy of PO intake once diet is advanced post-procedure.       Anthropometrics        Current Height, Weight Height: 170.2 cm (67\")  Weight: 106 kg (233 lb 4 oz)   Current BMI Body mass index is 36.53 kg/m².   BMI Classification Obese Class II   % %   Adjusted Body Weight (ABW) 72.6 kg   Weight Hx  Wt Readings from Last 30 Encounters:   12/23/23 0516 106 kg (233 lb 4 oz)   12/22/23 2300 106 kg (233 lb 11 oz)   12/22/23 1352 106 kg (233 lb 11 oz)   09/05/23 0935 96.8 kg (213 lb 6.5 oz)   06/30/23 1529 84.6 kg (186 lb 8.2 oz)   03/24/23 1108 74.4 kg (164 lb)   01/01/23 0641 79.8 kg (175 lb 14.8 oz)   12/31/22 0640 83.9 kg (184 lb 15.5 oz)   12/30/22 0505 94.8 kg (208 lb 15.9 oz)   12/27/22 1500 94.2 kg (207 lb 10.8 oz)   12/27/22 1009 92.4 kg (203 lb 11.3 oz)   04/01/22 2108 79 kg (174 lb 2.6 oz)   02/13/22 1312 96.3 kg (212 lb 4.9 oz)   08/27/21 1127 90.9 kg (200 lb 6.4 oz)   08/25/21 1105 71.4 kg (157 lb 6.5 oz)   06/15/21 1401 84.8 kg (187 lb)   05/11/21 0000 127 kg (281 lb)   07/30/19 0000 123 kg (272 lb)   03/08/19 0000 131 " kg (289 lb)   03/06/19 0000 131 kg (289 lb 9 oz)   02/22/19 0000 119 kg (262 lb)   12/28/18 0000 119 kg (262 lb 1 oz)            Wt Change Observation Trending up, +25.3% x 6 months      Estimated/Assessed Needs  Estimated Needs based on: Adjusted Body Weight       Energy Requirements 25 kcal/kg    EST Needs (kcal/day) 1815        Protein Requirements 1.0 g/kg   EST Daily Needs (g/day) 73       Fluid Requirements 25 ml/kg    Estimated Needs (mL/day) 1815     Labs/Medications         Pertinent Labs Reviewed.   Results from last 7 days   Lab Units 12/22/23  1503   SODIUM mmol/L 135*   POTASSIUM mmol/L 3.6   CHLORIDE mmol/L 99   CO2 mmol/L 25.5   BUN mg/dL 28*   CREATININE mg/dL 0.83   CALCIUM mg/dL 8.8   BILIRUBIN mg/dL 0.3   ALK PHOS U/L 114   ALT (SGPT) U/L 5   AST (SGOT) U/L 21   GLUCOSE mg/dL 120*     Results from last 7 days   Lab Units 12/23/23  0038   HEMOGLOBIN g/dL 7.1*   HEMATOCRIT % 24.7*     Coronavirus (COVID-19)   Date Value Ref Range Status   04/09/2021 NOT DETECTED NA Final     Comment:     The SARS-CoV-2 assay is a real-time, RT-PCR test intended  for the qualitative detection of nucleic acid from the  SARS-CoV-2 in respiratory specimens from individuals,  testing performed at [facility].       Lab Results   Component Value Date    HGBA1C 5.68 (H) 01/03/2022         Pertinent Medications Reviewed.     Malnutrition Severity Assessment              Nutrition Diagnosis         Nutrition Dx Problem 1 No nutrition diagnosis at this time.       Nutrition Intervention          Current Nutrition Orders & Evaluation of Intake     Current Nutrition Orders & Evaluation of Intake       Current PO Diet NPO Diet NPO Type: Strict NPO   Supplement No active supplement orders       Nutrition Intervention/Prescription        No further nutrition intervention indicated.       Medical Nutrition Therapy/Nutrition Education          Learner     Readiness Patient  Education not indicated.      Method     Response  N/A  N/A     Monitor/Evaluation        Monitor Per protocol     Nutrition Discharge Plan         No discharge nutrition needs identified.      Electronically signed by:  Domenica Cruz, AIDAN  12/23/23 08:54 EST

## 2023-12-23 NOTE — PROGRESS NOTES
[facility]   Hospitalist Progress Note  Date: 2023  Patient Name: Isabell Ribera  : 1945  MRN: [id number]  Date of admission: 2023  Room/Bed: [facility]/      Subjective   Subjective     Chief Complaint: Abnormal labs    Summary:Isabell Ribera is a 78 y.o. female with history of atrial fibrillation (on Eliquis currently, was on warfarin in the past), type 2 diabetes mellitus with diabetic polyneuropathy, dementia, rectal bleeding and constipation presented after patient was found to have abnormal labs.  Patient is 24/ care.  Patient was seen by primary care physician via telehealth and labs were obtained which was found to have low hemoglobin after which she was directed to the emergency department.  Patient daughter stated that they have not noticed any bleeding, her stool has been darker but not black to dark.  Patient's daughter stated she is not taking any anticoagulation however Eliquis was on her med list for history of atrial fibrillation.  Vitals were stable on presentation.  Lab work showed hemoglobin of 6.8 on presentation.  CT abdomen no acute abdominal/pelvic process; did show small bilateral pleural effusion with cholelithiasis, bilateral nonobstructing renal stones, small hiatal hernia with rectum moderately distended with stool.  GI was consulted and patient was admitted for further evaluation management.    Interval Followup: No acute events overnight.  Patient was n.p.o. after midnight for upper GI endoscopy today.  Patient was evaluated after upper GI endoscopy.  Not in acute distress.  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  Biopsies with normal first portion of duodenum and second portion of duodenum.    Patient stated that she feels comfortable currently.  Denies any fever, chills, rigors, lightheadedness, chest pain or difficulty breathing.  No  active bleeding.    Review of Systems    All systems reviewed and negative except for what is outlined above.      Objective   Objective     Vitals:   Temp:  [97.3 °F (36.3 °C)-98.4 °F (36.9 °C)] 98 °F (36.7 °C)  Heart Rate:  [55-69] 63  Resp:  [16-20] 16  BP: (103-160)/(41-74) 114/42  Flow (L/min):  [4] 4  FiO2 (%):  [30 %] 30 %    Physical Exam   General: Awake, alert, NAD  Cardiovascular: RRR, no murmurs   Pulmonary: CTA bilaterally; no wheezes; no conversational dyspnea  Gastrointestinal: S/ND/NT, +BS  Neuro: Alert, awake; speech clear; no tremor  : Espinosa catheter present; no suprapubic tenderness    Result Review    Result Review:  I have personally reviewed these results:  [x]  Laboratory      Lab 12/23/23  0901 12/23/23  0038 12/22/23  1503   WBC  --   --  6.73   HEMOGLOBIN 9.6* 7.1* 6.8*   HEMATOCRIT 30.2* 24.7* 23.9*   PLATELETS  --   --  280   NEUTROS ABS  --   --  4.12   IMMATURE GRANS (ABS)  --   --  0.02   LYMPHS ABS  --   --  1.25   MONOS ABS  --   --  0.84   EOS ABS  --   --  0.45*   MCV  --   --  70.1*   LACTATE  --   --  1.6         Lab 12/23/23  0901 12/22/23  1503   SODIUM 137 135*   POTASSIUM 3.5 3.6   CHLORIDE 102 99   CO2 26.0 25.5   ANION GAP 9.0 10.5   BUN 22 28*   CREATININE 0.77 0.83   EGFR 79.1 72.3   GLUCOSE 98 120*   CALCIUM 8.5* 8.8   MAGNESIUM 1.8  --    PHOSPHORUS 3.7  --          Lab 12/23/23  0901 12/22/23  1503   TOTAL PROTEIN 6.9 7.6   ALBUMIN 2.4* 2.7*   GLOBULIN 4.5 4.9   ALT (SGPT) 5 5   AST (SGOT) 20 21   BILIRUBIN 0.8 0.3   ALK PHOS 105 114                 Lab 12/23/23  0901 12/22/23  1844 12/22/23  1503   IRON 97  --   --    IRON SATURATION (TSAT) 26  --   --    TIBC 375  --   --    TRANSFERRIN 252  --   --    FERRITIN 19.18  --   --    FOLATE >20.00  --   --    VITAMIN B 12 901  --   --    ABO TYPING  --  A A   RH TYPING  --  Positive Positive   ANTIBODY SCREEN  --   --  Negative         Brief Urine Lab Results  (Last result in the past 365 days)        Color   Clarity    Blood   Leuk Est   Nitrite   Protein   CREAT   Urine HCG        09/05/23 1006 Yellow   Cloudy   Small (1+)   Large (3+)   Negative   Trace                 [x]  Microbiology   Microbiology Results (last 10 days)       ** No results found for the last 240 hours. **          [x]  Radiology  CT Abdomen Pelvis Without Contrast    Result Date: 12/22/2023    1. No acute process identified within abdomen/pelvis. 2. Small bilateral pleural effusions. 3. Cholelithiasis. 4. Bilateral nonobstructing renal stones. 5. Small hiatal hernia. 6. Rectum moderately distended with stool.     TESSIE MEDRANO MD       Electronically Signed and Approved By: TESSIE MEDRANO MD on 12/22/2023 at 17:15             XR Chest 1 View    Result Date: 12/22/2023    1. Chronic changes are noted.  There is no evidence for acute cardiopulmonary process.         TESSIE DE LUNA MD       Electronically Signed and Approved By: TESSIE DE LUNA MD on 12/22/2023 at 15:50            []  EKG/Telemetry   []  Cardiology/Vascular   []  Pathology  []  Old records  []  Other:    Assessment & Plan   Assessment / Plan     Assessment:  Possible GI bleed  Bilateral lower extremity edema  Acute on chronic anemia, likely from GI bleed superimposed on anemia of chronic disease  History of atrial fibrillation, on Eliquis  History of hypertension  History of type 2 diabetes mellitus    Plan:  Patient currently being managed in hospital service.  Presented with hemoglobin of 6.8, s/p 2 units of PRBC transfusion since admission.  Hemoglobin 9.6 today.  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  Biopsies with normal first portion of duodenum and second portion of duodenum.  GI following the patient, plan for colonoscopy after discussion with family member.  Appreciate further input.  Currently on pantoprazole 40 mg every 12 hours.  Continue.  History  of atrial fibrillation, on Eliquis at home.  Received Kcentra on presentation for reversal.  Holding anticoagulation currently for possible GI bleed.  Started on Lasix 60 mg IV every 12 hours for lower extremity edema.  Transthoracic echo ordered, will follow-up.  Continue home dose of carvedilol 6.25 mg twice daily.  Continue escitalopram 10 mg daily.  Also on Macrobid 100 mg per oral for prophylaxis for UTI.  Continue.  Continue rest of the current management.       DVT prophylaxis:  Medical and mechanical DVT prophylaxis orders are present.    CODE STATUS:   Level Of Support Discussed With: Patient  Code Status (Patient has no pulse and is not breathing): CPR (Attempt to Resuscitate)  Medical Interventions (Patient has pulse or is breathing): Full Support      Electronically signed by David Colbert MD, 12/23/23, 3:28 PM EST.

## 2023-12-23 NOTE — CONSULTS
[facility]  Initial Inpatient Consult Note    Referring Provider: ER    Reason for Consultation: anemia    Subjective     History of present illness:    78 y.o. female with history of afib, CHF, HTN, and dementia who presented with c/o abnormal labs.  Pt was noted to have anemia and advised to come to ER for further evaluation.  Pt denies nausea, vomiting, abd pain.  Denies melena, hematochezia.  Pt with formed bowel movement here with brown stool.  Pt is on Eliquis as outpatient.  Pt given KCentra in the ED.  Hgb 6.8 on presentation, 7.1 today (prior to transfusion).  Pt receiving second unit PRBC currently.  Pt denies previous EGD/colonoscopy.    Past Medical History:  Past Medical History:   Diagnosis Date    Afib     Aftercare following ankle joint replacement surgery 06/01/2015    Arthritis     Arthritis of knee 06/01/2015    CHF (congestive heart failure)     Dementia     Essential hypertension     Gout     HTN (hypertension)     Left knee pain     Lymphedema     Recurrent UTI 05/11/2021    Urinary retention 05/11/2021     Past Surgical History:  Past Surgical History:   Procedure Laterality Date    CATARACT EXTRACTION Bilateral     KIDNEY STONE SURGERY      KNEE SURGERY Left 2007    REPLACEMENT    OTHER SURGICAL HISTORY      JOINT SURGERY, UNSPECIFIED    TUBAL ABDOMINAL LIGATION        Social History:   Social History     Tobacco Use    Smoking status: Former     Passive exposure: Past    Smokeless tobacco: Never   Substance Use Topics    Alcohol use: Not Currently      Family History:  Family History   Problem Relation Age of Onset    Heart failure Mother 64        CONGESTIVE    Arthritis Mother     Arthritis Sister     Colon cancer Neg Hx        Home Meds:  Medications Prior to Admission   Medication Sig Dispense Refill Last Dose    carvedilol (COREG) 6.25 MG tablet TAKE ONE TABLET BY MOUTH TWICE A DAY WITH FOOD 60 tablet 5 12/22/2023    citalopram (CeleXA) 10 MG tablet Take 1  tablet by mouth Daily.   12/22/2023    Eliquis 5 MG tablet tablet Take 1 tablet by mouth Every 12 (Twelve) Hours.   12/22/2023    furosemide (LASIX) 20 MG tablet Take 1 tablet by mouth 2 (Two) Times a Day.   12/22/2023    hydrOXYzine (ATARAX) 25 MG tablet Take 1 tablet by mouth 2 (Two) Times a Day As Needed.   12/22/2023    multivitamin with minerals tablet tablet Take 1 tablet by mouth Daily.   12/22/2023    nitrofurantoin, macrocrystal-monohydrate, (MACROBID) 100 MG capsule Take 1 capsule by mouth Daily.   12/22/2023    potassium chloride 10 MEQ CR tablet Take 1 tablet by mouth Daily.   12/22/2023    saccharomyces boulardii (FLORASTOR) 250 MG capsule Take 1 capsule by mouth 2 (Two) Times a Day.   12/22/2023     Current Meds:   furosemide, 60 mg, Intravenous, Q12H  nystatin, , Topical, Q12H  senna-docusate sodium, 2 tablet, Oral, BID  sodium chloride, 10 mL, Intravenous, Q12H      Allergies:  Allergies   Allergen Reactions    Levofloxacin Hives     Review of Systems  Pertinent items are noted in HPI, all other systems reviewed and negative     Objective     Vital Signs  Temp:  [97.7 °F (36.5 °C)-98.2 °F (36.8 °C)] 97.8 °F (36.6 °C)  Heart Rate:  [55-69] 65  Resp:  [16-20] 16  BP: (111-150)/(41-74) 116/41  Physical Exam:  General Appearance:    Alert, cooperative, in no acute distress   Head:    Normocephalic, without obvious abnormality, atraumatic   Eyes:          conjunctivae and sclerae normal, no   icterus   Throat:   no thrush, oral mucosa moist   Neck:   Supple, no adenopathy   Lungs:     Breathing unlabored    Heart:    No chest tenderness   Chest Wall:    No abnormalities observed   Abdomen:     Soft, nondistended, nontender       Skin:   No bruising   Psychiatric:  normal mood and insight; oriented     Results Review:   I reviewed the patient's new clinical results.    Results from last 7 days   Lab Units 12/23/23  0038 12/22/23  1503   WBC 10*3/mm3  --  6.73   HEMOGLOBIN g/dL 7.1* 6.8*   HEMATOCRIT %  24.7* 23.9*   PLATELETS 10*3/mm3  --  280     Results from last 7 days   Lab Units 12/22/23  1503   SODIUM mmol/L 135*   POTASSIUM mmol/L 3.6   CHLORIDE mmol/L 99   CO2 mmol/L 25.5   BUN mg/dL 28*   CREATININE mg/dL 0.83   CALCIUM mg/dL 8.8   BILIRUBIN mg/dL 0.3   ALK PHOS U/L 114   ALT (SGPT) U/L 5   AST (SGOT) U/L 21   GLUCOSE mg/dL 120*         Lab Results   Lab Value Date/Time    LIPASE 32 06/30/2023 1609    LIPASE 33 05/20/2022 1453    LIPASE 45 08/19/2020 2254    LIPASE 10 08/12/2020 1211         Assessment & Plan     GI bleed       Assessment:  Anemia  Occult blood positive stool    Plan:  Will start PPI IV BID  Will proceed with EGD for further evaluation  Benefits vs risks of procedure d/w patient; risks include but are not limited to bleeding, infection, perforation, and risk of sedation  Pt understands risks and agrees to proceed      I discussed the patients findings and my recommendations with patient, nursing staff, and consulting provider.    Domenica Bermudez MD

## 2023-12-23 NOTE — ANESTHESIA POSTPROCEDURE EVALUATION
Patient: Isabell Ribera    Procedure Summary       Date: 12/23/23 Room / Location: [facility] END[facility] 3 / [facility]    Anesthesia Start: 1146 Anesthesia Stop: 1156    Procedure: ESOPHAGOGASTRODUODENOSCOPY WITH BIOPSIES Diagnosis:     Surgeons: Junior Corbin MD Provider: Kevin Medrano MD    Anesthesia Type: general ASA Status: 3            Anesthesia Type: general    Vitals  Vitals Value Taken Time   /66 12/23/23 1206   Temp 36.9 °C (98.4 °F) 12/23/23 1202   Pulse 62 12/23/23 1206   Resp 20 12/23/23 1206   SpO2 100 % 12/23/23 1206           Post Anesthesia Care and Evaluation    Post-procedure mental status: acceptable.  Pain management: satisfactory to patient    Airway patency: patent  Anesthetic complications: No anesthetic complications    Cardiovascular status: acceptable  Respiratory status: acceptable    Comments: Per chart review

## 2023-12-24 LAB
ANION GAP SERPL CALCULATED.3IONS-SCNC: 8.7 MMOL/L (ref 5–15)
ANISOCYTOSIS BLD QL: NORMAL
BASOPHILS # BLD AUTO: 0.04 10*3/MM3 (ref 0–0.2)
BASOPHILS NFR BLD AUTO: 0.6 % (ref 0–1.5)
BH BB BLOOD EXPIRATION DATE: NORMAL
BH BB BLOOD EXPIRATION DATE: NORMAL
BH BB BLOOD TYPE BARCODE: 5100
BH BB BLOOD TYPE BARCODE: 6200
BH BB DISPENSE STATUS: NORMAL
BH BB DISPENSE STATUS: NORMAL
BH BB PRODUCT CODE: NORMAL
BH BB PRODUCT CODE: NORMAL
BH BB UNIT NUMBER: NORMAL
BH BB UNIT NUMBER: NORMAL
BUN SERPL-MCNC: 28 MG/DL (ref 8–23)
BUN/CREAT SERPL: 30.8 (ref 7–25)
CALCIUM SPEC-SCNC: 8.4 MG/DL (ref 8.6–10.5)
CHLORIDE SERPL-SCNC: 102 MMOL/L (ref 98–107)
CO2 SERPL-SCNC: 26.3 MMOL/L (ref 22–29)
CREAT SERPL-MCNC: 0.91 MG/DL (ref 0.57–1)
CROSSMATCH INTERPRETATION: NORMAL
CROSSMATCH INTERPRETATION: NORMAL
DEPRECATED RDW RBC AUTO: 55.4 FL (ref 37–54)
EGFRCR SERPLBLD CKD-EPI 2021: 64.7 ML/MIN/1.73
EOSINOPHIL # BLD AUTO: 0.34 10*3/MM3 (ref 0–0.4)
EOSINOPHIL NFR BLD AUTO: 5.2 % (ref 0.3–6.2)
ERYTHROCYTE [DISTWIDTH] IN BLOOD BY AUTOMATED COUNT: 21.4 % (ref 12.3–15.4)
GLUCOSE SERPL-MCNC: 88 MG/DL (ref 65–99)
HCT VFR BLD AUTO: 29.8 % (ref 34–46.6)
HGB BLD-MCNC: 9.1 G/DL (ref 12–15.9)
HYPOCHROMIA BLD QL: NORMAL
IMM GRANULOCYTES # BLD AUTO: 0.01 10*3/MM3 (ref 0–0.05)
IMM GRANULOCYTES NFR BLD AUTO: 0.2 % (ref 0–0.5)
LYMPHOCYTES # BLD AUTO: 1.5 10*3/MM3 (ref 0.7–3.1)
LYMPHOCYTES NFR BLD AUTO: 23.1 % (ref 19.6–45.3)
MAGNESIUM SERPL-MCNC: 1.8 MG/DL (ref 1.6–2.4)
MCH RBC QN AUTO: 22.2 PG (ref 26.6–33)
MCHC RBC AUTO-ENTMCNC: 30.5 G/DL (ref 31.5–35.7)
MCV RBC AUTO: 72.9 FL (ref 79–97)
MICROCYTES BLD QL: NORMAL
MONOCYTES # BLD AUTO: 0.93 10*3/MM3 (ref 0.1–0.9)
MONOCYTES NFR BLD AUTO: 14.3 % (ref 5–12)
NEUTROPHILS NFR BLD AUTO: 3.68 10*3/MM3 (ref 1.7–7)
NEUTROPHILS NFR BLD AUTO: 56.6 % (ref 42.7–76)
NRBC BLD AUTO-RTO: 0 /100 WBC (ref 0–0.2)
OVALOCYTES BLD QL SMEAR: NORMAL
PHOSPHATE SERPL-MCNC: 3.2 MG/DL (ref 2.5–4.5)
PLAT MORPH BLD: NORMAL
PLATELET # BLD AUTO: 230 10*3/MM3 (ref 140–450)
PMV BLD AUTO: 9.3 FL (ref 6–12)
POLYCHROMASIA BLD QL SMEAR: NORMAL
POTASSIUM SERPL-SCNC: 3.3 MMOL/L (ref 3.5–5.2)
RBC # BLD AUTO: 4.09 10*6/MM3 (ref 3.77–5.28)
SODIUM SERPL-SCNC: 137 MMOL/L (ref 136–145)
UNIT  ABO: NORMAL
UNIT  ABO: NORMAL
UNIT  RH: NORMAL
UNIT  RH: NORMAL
WBC MORPH BLD: NORMAL
WBC NRBC COR # BLD AUTO: 6.5 10*3/MM3 (ref 3.4–10.8)

## 2023-12-24 PROCEDURE — 25010000002 FUROSEMIDE PER 20 MG: Performed by: STUDENT IN AN ORGANIZED HEALTH CARE EDUCATION/TRAINING PROGRAM

## 2023-12-24 PROCEDURE — 99214 OFFICE O/P EST MOD 30 MIN: CPT | Performed by: INTERNAL MEDICINE

## 2023-12-24 PROCEDURE — 84100 ASSAY OF PHOSPHORUS: CPT | Performed by: STUDENT IN AN ORGANIZED HEALTH CARE EDUCATION/TRAINING PROGRAM

## 2023-12-24 PROCEDURE — 99232 SBSQ HOSP IP/OBS MODERATE 35: CPT | Performed by: STUDENT IN AN ORGANIZED HEALTH CARE EDUCATION/TRAINING PROGRAM

## 2023-12-24 PROCEDURE — 80048 BASIC METABOLIC PNL TOTAL CA: CPT | Performed by: STUDENT IN AN ORGANIZED HEALTH CARE EDUCATION/TRAINING PROGRAM

## 2023-12-24 PROCEDURE — G0378 HOSPITAL OBSERVATION PER HR: HCPCS

## 2023-12-24 PROCEDURE — 83735 ASSAY OF MAGNESIUM: CPT | Performed by: STUDENT IN AN ORGANIZED HEALTH CARE EDUCATION/TRAINING PROGRAM

## 2023-12-24 PROCEDURE — 85025 COMPLETE CBC W/AUTO DIFF WBC: CPT | Performed by: STUDENT IN AN ORGANIZED HEALTH CARE EDUCATION/TRAINING PROGRAM

## 2023-12-24 PROCEDURE — 85007 BL SMEAR W/DIFF WBC COUNT: CPT | Performed by: STUDENT IN AN ORGANIZED HEALTH CARE EDUCATION/TRAINING PROGRAM

## 2023-12-24 RX ORDER — POLYETHYLENE GLYCOL 3350 17 G/17G
17 POWDER, FOR SOLUTION ORAL DAILY
Status: DISCONTINUED | OUTPATIENT
Start: 2023-12-24 | End: 2023-12-25

## 2023-12-24 RX ORDER — POTASSIUM CHLORIDE 750 MG/1
40 CAPSULE, EXTENDED RELEASE ORAL ONCE
Status: COMPLETED | OUTPATIENT
Start: 2023-12-24 | End: 2023-12-24

## 2023-12-24 RX ORDER — FUROSEMIDE 10 MG/ML
60 INJECTION INTRAMUSCULAR; INTRAVENOUS EVERY 12 HOURS
Status: DISCONTINUED | OUTPATIENT
Start: 2023-12-24 | End: 2023-12-27

## 2023-12-24 RX ORDER — SODIUM CHLORIDE, SODIUM LACTATE, POTASSIUM CHLORIDE, CALCIUM CHLORIDE 600; 310; 30; 20 MG/100ML; MG/100ML; MG/100ML; MG/100ML
30 INJECTION, SOLUTION INTRAVENOUS CONTINUOUS
Status: DISCONTINUED | OUTPATIENT
Start: 2023-12-24 | End: 2023-12-28

## 2023-12-24 RX ADMIN — Medication 250 MG: at 08:32

## 2023-12-24 RX ADMIN — PANTOPRAZOLE SODIUM 40 MG: 40 INJECTION, POWDER, FOR SOLUTION INTRAVENOUS at 20:11

## 2023-12-24 RX ADMIN — Medication 250 MG: at 20:11

## 2023-12-24 RX ADMIN — CARVEDILOL 6.25 MG: 6.25 TABLET, FILM COATED ORAL at 17:42

## 2023-12-24 RX ADMIN — Medication 1 TABLET: at 08:32

## 2023-12-24 RX ADMIN — POTASSIUM CHLORIDE 40 MEQ: 10 CAPSULE, COATED, EXTENDED RELEASE ORAL at 11:05

## 2023-12-24 RX ADMIN — PANTOPRAZOLE SODIUM 40 MG: 40 INJECTION, POWDER, FOR SOLUTION INTRAVENOUS at 08:32

## 2023-12-24 RX ADMIN — NYSTATIN: 100000 POWDER TOPICAL at 20:11

## 2023-12-24 RX ADMIN — Medication 10 ML: at 20:11

## 2023-12-24 RX ADMIN — NITROFURANTOIN MONOHYDRATE/MACROCRYSTALLINE 100 MG: 25; 75 CAPSULE ORAL at 08:32

## 2023-12-24 RX ADMIN — CITALOPRAM HYDROBROMIDE 10 MG: 20 TABLET ORAL at 20:10

## 2023-12-24 RX ADMIN — CARVEDILOL 6.25 MG: 6.25 TABLET, FILM COATED ORAL at 08:32

## 2023-12-24 RX ADMIN — FUROSEMIDE 60 MG: 10 INJECTION, SOLUTION INTRAMUSCULAR; INTRAVENOUS at 17:42

## 2023-12-24 RX ADMIN — NYSTATIN: 100000 POWDER TOPICAL at 11:05

## 2023-12-24 RX ADMIN — Medication 10 ML: at 08:33

## 2023-12-24 NOTE — PLAN OF CARE
Goal Outcome Evaluation:  Plan of Care Reviewed With: patient        Progress: no change  Outcome Evaluation: VSS. Alert and oriented 4 with intermitent confusion. Had multiple incontinent bowel movement. Espinosa draining yellow urine. Nystatin powder applied to skin folds. Turned every 2 hours. No complaint of pain.

## 2023-12-24 NOTE — PLAN OF CARE
Goal Outcome Evaluation:  Plan of Care Reviewed With: patient        Progress: improving          Patient pleasant throughout the shift. Oriented x4 throughout the day.no complaints of pain or nausea. Family visited at bedside this morning. Encouraged frequent weight shifting. Bed in lowest locked position with call light and tray table within reach.

## 2023-12-24 NOTE — PROGRESS NOTES
[facility]  Inpatient Progress Note    Reason for Follow Up: Anemia    Subjective     Interval History:   Anemia secondary to possible chronic GI blood loss.  Patient has not had any rectal bleeding I had discussion with patient's 2 daughters.  They both believe the patient should have a colonoscopy understand the risks and benefits.    Current Facility-Administered Medications:     sennosides-docusate (PERICOLACE) 8.6-50 MG per tablet 2 tablet, 2 tablet, Oral, BID **AND** polyethylene glycol (MIRALAX) packet 17 g, 17 g, Oral, Daily PRN **AND** bisacodyl (DULCOLAX) EC tablet 5 mg, 5 mg, Oral, Daily PRN **AND** bisacodyl (DULCOLAX) suppository 10 mg, 10 mg, Rectal, Daily PRN, Junior Corbin MD    carvedilol (COREG) tablet 6.25 mg, 6.25 mg, Oral, BID With Meals, David Colbert MD, 6.25 mg at 12/24/23 0832    citalopram (CeleXA) tablet 10 mg, 10 mg, Oral, Nightly, David Colbert MD    [Held by provider] furosemide (LASIX) injection 60 mg, 60 mg, Intravenous, Q12H, [employer] DO, 60 mg at 12/23/23 0018    lactated ringers infusion, 30 mL/hr, Intravenous, Continuous, Kevin Medrano MD    multivitamin with minerals 1 tablet, 1 tablet, Oral, Daily, David Colbert MD, 1 tablet at 12/24/23 0832    nitrofurantoin (macrocrystal-monohydrate) (MACROBID) capsule 100 mg, 100 mg, Oral, Daily, David Colbert MD, 100 mg at 12/24/23 0832    nitroglycerin (NITROSTAT) SL tablet 0.4 mg, 0.4 mg, Sublingual, Q5 Min PRN, Junior Corbin MD    nystatin (MYCOSTATIN) powder, , Topical, Q12H, Junior Corbin MD, Given at 12/24/23 1105    ondansetron (ZOFRAN) injection 4 mg, 4 mg, Intravenous, Q6H PRN, Junior Corbin MD    pantoprazole (PROTONIX) injection 40 mg, 40 mg, Intravenous, Q12H, Junior Corbin MD, 40 mg at 12/24/23 0832    saccharomyces boulardii (FLORASTOR) capsule 250 mg, 250 mg, Oral, BID, David Colbert MD, 250 mg at 12/24/23 0832    sodium chloride 0.9 % flush 10  mL, 10 mL, Intravenous, Q12H, Junior Corbin MD, 10 mL at 12/24/23 0833    sodium chloride 0.9 % flush 10 mL, 10 mL, Intravenous, PRN, Junior Corbin MD    sodium chloride 0.9 % infusion 40 mL, 40 mL, Intravenous, PRN, Junior Corbin MD  Review of Systems:    All system ROS is negative except what's mentioned in HPI.    Objective     Vital Signs  Temp:  [97.3 °F (36.3 °C)-98.4 °F (36.9 °C)] 97.3 °F (36.3 °C)  Heart Rate:  [57-95] 67  Resp:  [14-20] 14  BP: (103-160)/(42-66) 120/46  FiO2 (%):  [30 %] 30 %  Body mass index is 37.22 kg/m².    Intake/Output Summary (Last 24 hours) at 12/24/2023 1144  Last data filed at 12/24/2023 0851  Gross per 24 hour   Intake 2826.25 ml   Output 1250 ml   Net 1576.25 ml     I/O this shift:  In: 220 [P.O.:220]  Out: -      Physical Exam:  General     Alert and oriented, normal affect   Head:    Normocephalic, without obvious abnormality, atraumatic   Eyes:          conjunctivae and sclerae normal, no icterus, pupils round and reactive to light   Oral cavity: Moist and intact, normal dentation   Neck:   Supple, no adenopathy   Chest Wall/Lungs:    No abnormalities observed, equal on expansion bilaterally, no use of extrarespiratory muscles noted   Abdomen:     Soft, nondistended, nontender; normal bowel sounds, no hepatospleenomegaly   Extremities:   no edema, clubbing, or cyanosis   Skin:   No bruising or rash   Psychiatric:  normal mood and insight          Results Review:      Results from last 7 days   Lab Units 12/24/23  0345 12/23/23  0901 12/23/23  0038 12/22/23  1503   WBC 10*3/mm3 6.50  --   --  6.73   HEMOGLOBIN g/dL 9.1* 9.6* 7.1* 6.8*   HEMATOCRIT % 29.8* 30.2* 24.7* 23.9*   PLATELETS 10*3/mm3 230  --   --  280     Results from last 7 days   Lab Units 12/24/23  0345 12/23/23  0901 12/22/23  1503   SODIUM mmol/L 137 137 135*   POTASSIUM mmol/L 3.3* 3.5 3.6   CHLORIDE mmol/L 102 102 99   CO2 mmol/L 26.3 26.0 25.5   BUN mg/dL 28* 22 28*   CREATININE mg/dL  0.91 0.77 0.83   CALCIUM mg/dL 8.4* 8.5* 8.8   BILIRUBIN mg/dL  --  0.8 0.3   ALK PHOS U/L  --  105 114   ALT (SGPT) U/L  --  5 5   AST (SGOT) U/L  --  20 21   GLUCOSE mg/dL 88 98 120*         Lab Results   Lab Value Date/Time    LIPASE 32 06/30/2023 1609    LIPASE 33 05/20/2022 1453    LIPASE 45 08/19/2020 2254    LIPASE 10 08/12/2020 1211         Assessment & Plan   Assessment:   Anemia secondary to GI blood loss      Plan:   Possible colonoscopy on Tuesday    I discussed the patients findings and my recommendations with patient and the family.    Candi Corbin MD

## 2023-12-25 LAB
ANION GAP SERPL CALCULATED.3IONS-SCNC: 11.3 MMOL/L (ref 5–15)
BASOPHILS # BLD AUTO: 0.03 10*3/MM3 (ref 0–0.2)
BASOPHILS NFR BLD AUTO: 0.5 % (ref 0–1.5)
BUN SERPL-MCNC: 26 MG/DL (ref 8–23)
BUN/CREAT SERPL: 29.5 (ref 7–25)
CALCIUM SPEC-SCNC: 8.6 MG/DL (ref 8.6–10.5)
CHLORIDE SERPL-SCNC: 100 MMOL/L (ref 98–107)
CO2 SERPL-SCNC: 25.7 MMOL/L (ref 22–29)
CREAT SERPL-MCNC: 0.88 MG/DL (ref 0.57–1)
DEPRECATED RDW RBC AUTO: 58 FL (ref 37–54)
EGFRCR SERPLBLD CKD-EPI 2021: 67.4 ML/MIN/1.73
EOSINOPHIL # BLD AUTO: 0.49 10*3/MM3 (ref 0–0.4)
EOSINOPHIL NFR BLD AUTO: 7.4 % (ref 0.3–6.2)
ERYTHROCYTE [DISTWIDTH] IN BLOOD BY AUTOMATED COUNT: 22.5 % (ref 12.3–15.4)
GLUCOSE SERPL-MCNC: 90 MG/DL (ref 65–99)
HCT VFR BLD AUTO: 30.6 % (ref 34–46.6)
HGB BLD-MCNC: 9.4 G/DL (ref 12–15.9)
IMM GRANULOCYTES # BLD AUTO: 0.01 10*3/MM3 (ref 0–0.05)
IMM GRANULOCYTES NFR BLD AUTO: 0.2 % (ref 0–0.5)
LYMPHOCYTES # BLD AUTO: 1.59 10*3/MM3 (ref 0.7–3.1)
LYMPHOCYTES NFR BLD AUTO: 24 % (ref 19.6–45.3)
MAGNESIUM SERPL-MCNC: 1.7 MG/DL (ref 1.6–2.4)
MCH RBC QN AUTO: 22.6 PG (ref 26.6–33)
MCHC RBC AUTO-ENTMCNC: 30.7 G/DL (ref 31.5–35.7)
MCV RBC AUTO: 73.6 FL (ref 79–97)
MONOCYTES # BLD AUTO: 0.92 10*3/MM3 (ref 0.1–0.9)
MONOCYTES NFR BLD AUTO: 13.9 % (ref 5–12)
NEUTROPHILS NFR BLD AUTO: 3.58 10*3/MM3 (ref 1.7–7)
NEUTROPHILS NFR BLD AUTO: 54 % (ref 42.7–76)
NRBC BLD AUTO-RTO: 0 /100 WBC (ref 0–0.2)
PHOSPHATE SERPL-MCNC: 3.3 MG/DL (ref 2.5–4.5)
PLATELET # BLD AUTO: 249 10*3/MM3 (ref 140–450)
PMV BLD AUTO: 9.6 FL (ref 6–12)
POTASSIUM SERPL-SCNC: 3.3 MMOL/L (ref 3.5–5.2)
RBC # BLD AUTO: 4.16 10*6/MM3 (ref 3.77–5.28)
SODIUM SERPL-SCNC: 137 MMOL/L (ref 136–145)
WBC NRBC COR # BLD AUTO: 6.62 10*3/MM3 (ref 3.4–10.8)

## 2023-12-25 PROCEDURE — 25010000002 FUROSEMIDE PER 20 MG: Performed by: STUDENT IN AN ORGANIZED HEALTH CARE EDUCATION/TRAINING PROGRAM

## 2023-12-25 PROCEDURE — 84100 ASSAY OF PHOSPHORUS: CPT | Performed by: STUDENT IN AN ORGANIZED HEALTH CARE EDUCATION/TRAINING PROGRAM

## 2023-12-25 PROCEDURE — 99214 OFFICE O/P EST MOD 30 MIN: CPT | Performed by: INTERNAL MEDICINE

## 2023-12-25 PROCEDURE — 99232 SBSQ HOSP IP/OBS MODERATE 35: CPT | Performed by: STUDENT IN AN ORGANIZED HEALTH CARE EDUCATION/TRAINING PROGRAM

## 2023-12-25 PROCEDURE — 85025 COMPLETE CBC W/AUTO DIFF WBC: CPT | Performed by: STUDENT IN AN ORGANIZED HEALTH CARE EDUCATION/TRAINING PROGRAM

## 2023-12-25 PROCEDURE — 83735 ASSAY OF MAGNESIUM: CPT | Performed by: STUDENT IN AN ORGANIZED HEALTH CARE EDUCATION/TRAINING PROGRAM

## 2023-12-25 PROCEDURE — 97161 PT EVAL LOW COMPLEX 20 MIN: CPT

## 2023-12-25 PROCEDURE — 80048 BASIC METABOLIC PNL TOTAL CA: CPT | Performed by: STUDENT IN AN ORGANIZED HEALTH CARE EDUCATION/TRAINING PROGRAM

## 2023-12-25 RX ORDER — POTASSIUM CHLORIDE 750 MG/1
40 CAPSULE, EXTENDED RELEASE ORAL ONCE
Status: COMPLETED | OUTPATIENT
Start: 2023-12-25 | End: 2023-12-25

## 2023-12-25 RX ADMIN — DOCUSATE SODIUM 50MG AND SENNOSIDES 8.6MG 2 TABLET: 8.6; 5 TABLET, FILM COATED ORAL at 09:26

## 2023-12-25 RX ADMIN — FUROSEMIDE 60 MG: 10 INJECTION, SOLUTION INTRAMUSCULAR; INTRAVENOUS at 04:53

## 2023-12-25 RX ADMIN — POLYETHYLENE GLYCOL 3350, SODIUM SULFATE ANHYDROUS, SODIUM BICARBONATE, SODIUM CHLORIDE, POTASSIUM CHLORIDE 2000 ML: 236; 22.74; 6.74; 5.86; 2.97 POWDER, FOR SOLUTION ORAL at 20:53

## 2023-12-25 RX ADMIN — NITROFURANTOIN MONOHYDRATE/MACROCRYSTALLINE 100 MG: 25; 75 CAPSULE ORAL at 09:26

## 2023-12-25 RX ADMIN — CITALOPRAM HYDROBROMIDE 10 MG: 20 TABLET ORAL at 20:53

## 2023-12-25 RX ADMIN — Medication 10 ML: at 20:53

## 2023-12-25 RX ADMIN — CARVEDILOL 6.25 MG: 6.25 TABLET, FILM COATED ORAL at 17:57

## 2023-12-25 RX ADMIN — Medication 1 TABLET: at 09:26

## 2023-12-25 RX ADMIN — Medication 250 MG: at 20:53

## 2023-12-25 RX ADMIN — NYSTATIN: 100000 POWDER TOPICAL at 20:53

## 2023-12-25 RX ADMIN — PANTOPRAZOLE SODIUM 40 MG: 40 INJECTION, POWDER, FOR SOLUTION INTRAVENOUS at 09:26

## 2023-12-25 RX ADMIN — NYSTATIN: 100000 POWDER TOPICAL at 09:26

## 2023-12-25 RX ADMIN — FUROSEMIDE 60 MG: 10 INJECTION, SOLUTION INTRAMUSCULAR; INTRAVENOUS at 17:57

## 2023-12-25 RX ADMIN — POTASSIUM CHLORIDE 40 MEQ: 10 CAPSULE, COATED, EXTENDED RELEASE ORAL at 09:26

## 2023-12-25 RX ADMIN — Medication 10 ML: at 09:26

## 2023-12-25 RX ADMIN — CARVEDILOL 6.25 MG: 6.25 TABLET, FILM COATED ORAL at 09:26

## 2023-12-25 RX ADMIN — PANTOPRAZOLE SODIUM 40 MG: 40 INJECTION, POWDER, FOR SOLUTION INTRAVENOUS at 20:53

## 2023-12-25 RX ADMIN — Medication 250 MG: at 09:26

## 2023-12-25 NOTE — THERAPY EVALUATION
Acute Care - Physical Therapy Initial Evaluation  [facility]     Patient Name: Isabell Ribera  : 1945  MRN: [id number]  Today's Date: 2023     Admit date: 2023     Referring Physician: David Colbert MD     Surgery Date:2023   Procedure(s) (LRB):  ESOPHAGOGASTRODUODENOSCOPY WITH BIOPSIES (N/A)          Visit Dx:     ICD-10-CM ICD-9-CM   1. Gastrointestinal hemorrhage, unspecified gastrointestinal hemorrhage type  K92.2 578.9   2. Anemia, unspecified type  D64.9 285.9   3. Anemia  D64.9 285.9   4. Difficulty in walking  R26.2 719.7     Patient Active Problem List   Diagnosis    Recurrent urinary tract infection    Elevated brain natriuretic peptide (BNP) level    Afib    Lymphedema    Essential hypertension    Anticoagulated on Coumadin    Pleural effusion    Nephrolithiasis    GI bleed     Past Medical History:   Diagnosis Date    Afib     Aftercare following ankle joint replacement surgery 2015    Arthritis     Arthritis of knee 2015    CHF (congestive heart failure)     Dementia     Essential hypertension     Gout     HTN (hypertension)     Left knee pain     Lymphedema     Recurrent UTI 2021    Urinary retention 2021     Past Surgical History:   Procedure Laterality Date    CATARACT EXTRACTION Bilateral     KIDNEY STONE SURGERY      KNEE SURGERY Left 2007    REPLACEMENT    OTHER SURGICAL HISTORY      JOINT SURGERY, UNSPECIFIED    TUBAL ABDOMINAL LIGATION       PT Assessment (last 12 hours)       PT Evaluation and Treatment       Row Name 23 1100          Physical Therapy Time and Intention    Subjective Information no complaints  -KATHRYN     Document Type evaluation  -KATHRYN     Mode of Treatment individual therapy;physical therapy  -KATHRYN     Patient Effort good  -KATHRYN       Row Name 23 1100          General Information    Patient Observations alert;cooperative;agree to therapy  -KATHRYN     Prior Level of Function dependent:;all household mobility  bedbound at  baseline  -KATHRYN     Existing Precautions/Restrictions fall  -KATHRYN     Barriers to Rehab previous functional deficit  -KATHRYN       Row Name 12/25/23 1100          Living Environment    Current Living Arrangements home  -KATHRYN     People in Home child(jonny), adult  -KATHRYN       Row Name 12/25/23 1100          Range of Motion (ROM)    Range of Motion bilateral lower extremities  severe knee extension and ankle PF contractures noted  -KATHRYN       Row Name 12/25/23 1100          Strength (Manual Muscle Testing)    Strength (Manual Muscle Testing) bilateral lower extremities  1/5  -KATHRYN       Row Name 12/25/23 1100          Bed Mobility    Bed Mobility bed mobility (all) activities;supine-sit  -KATHRYN     All Activities, Branch (Bed Mobility) dependent (less than 25% patient effort)  -KATHRYN     Supine-Sit Branch (Bed Mobility) dependent (less than 25% patient effort)  -KATHRYN       Row Name 12/25/23 1100          Safety Issues, Functional Mobility    Impairments Affecting Function (Mobility) balance;strength;range of motion (ROM);postural/trunk control;endurance/activity tolerance  -KATHRYN       Row Name             Wound Right lower leg Blisters    Wound - Properties Group Side: Right  -NJ Orientation: lower  -NJ Location: leg  -NJ Primary Wound Type: Blisters  -NJ    Retired Wound - Properties Group Side: Right  -NJ Orientation: lower  -NJ Location: leg  -NJ Primary Wound Type: Blisters  -NJ    Retired Wound - Properties Group Side: Right  -NJ Location: leg  -NJ Primary Wound Type: Blisters  -NJ      Row Name             Wound Left lower leg Blisters    Wound - Properties Group Side: Left  -NJ Orientation: lower  -NJ Location: leg  -NJ Primary Wound Type: Blisters  -NJ    Retired Wound - Properties Group Side: Left  -NJ Orientation: lower  -NJ Location: leg  -NJ Primary Wound Type: Blisters  -NJ    Retired Wound - Properties Group Side: Left  -NJ Location: leg  -NJ Primary Wound Type: Blisters  -NJ      Row Name             Wound groin MASD  (Moisture associated skin damage)    Wound - Properties Group Location: groin  -NJ Primary Wound Type: MASD  -NJ    Retired Wound - Properties Group Location: groin  -NJ Primary Wound Type: MASD  -NJ    Retired Wound - Properties Group Location: groin  -NJ Primary Wound Type: MASD  -NJ      Row Name             Wound 12/27/22 1721 Left lower breast MASD (Moisture associated skin damage)    Wound - Properties Group Placement Date: 12/27/22  - Placement Time: 1721  -AH Present on Original Admission: Y  -AH Side: Left  -AH Orientation: lower  -AH Location: breast  -AH Primary Wound Type: MASD  -AH    Retired Wound - Properties Group Placement Date: 12/27/22  - Placement Time: 1721  -AH Present on Original Admission: Y  -AH Side: Left  -AH Orientation: lower  -AH Location: breast  -AH Primary Wound Type: MASD  -AH    Retired Wound - Properties Group Date first assessed: 12/27/22  - Time first assessed: 1721  -AH Present on Original Admission: Y  -AH Side: Left  -AH Location: breast  -AH Primary Wound Type: MASD  -AH      Row Name             Wound 12/27/22 1721 Right upper abdomen MASD (Moisture associated skin damage)    Wound - Properties Group Placement Date: 12/27/22  - Placement Time: 1721  -AH Present on Original Admission: Y  -AH Side: Right  -AH Orientation: upper  -AH Location: abdomen  -AH Primary Wound Type: MASD  -AH    Retired Wound - Properties Group Placement Date: 12/27/22  - Placement Time: 1721  -AH Present on Original Admission: Y  -AH Side: Right  -AH Orientation: upper  -AH Location: abdomen  -AH Primary Wound Type: MASD  -AH    Retired Wound - Properties Group Date first assessed: 12/27/22  - Time first assessed: 1721  -AH Present on Original Admission: Y  -AH Side: Right  -AH Location: abdomen  -AH Primary Wound Type: MASD  -AH      Row Name             Wound 12/28/22 Left gluteal Pressure Injury    Wound - Properties Group Placement Date: 12/28/22  -NH Present on Original Admission: Y   -NH Side: Left  -NH Location: gluteal  -NH Primary Wound Type: Pressure inj  -NH    Retired Wound - Properties Group Placement Date: 12/28/22  -NH Present on Original Admission: Y  -NH Side: Left  -NH Location: gluteal  -NH Primary Wound Type: Pressure inj  -NH    Retired Wound - Properties Group Date first assessed: 12/28/22  -NH Present on Original Admission: Y  -NH Side: Left  -NH Location: gluteal  -NH Primary Wound Type: Pressure inj  -NH      Row Name             Wound 12/23/23 0520 Left posterior popliteal MASD (Moisture associated skin damage)    Wound - Properties Group Placement Date: 12/23/23 -CC Placement Time: 0520  -CC Present on Original Admission: Y  -CC Side: Left  -CC Orientation: posterior  -CC Location: popliteal  -CC Primary Wound Type: MASD  -CC    Retired Wound - Properties Group Placement Date: 12/23/23 -CC Placement Time: 0520  -CC Present on Original Admission: Y  -CC Side: Left  -CC Orientation: posterior  -CC Location: popliteal  -CC Primary Wound Type: MASD  -CC    Retired Wound - Properties Group Date first assessed: 12/23/23 -CC Time first assessed: 0520  -CC Present on Original Admission: Y  -CC Side: Left  -CC Location: popliteal  -CC Primary Wound Type: MASD  -CC      Row Name 12/25/23 1100          Plan of Care Review    Plan of Care Reviewed With patient  -KATHRYN     Outcome Evaluation Patient is not appropriate for skilled physical therapy services at this time.  She is bedbound and dependent for all activity at baseline and has significant contractures of the lower extremity.  Recommend positioning in bed to prevent pressure areas and if necessary out of bed activity using a Yves lift.  She will be discharged from inpatient physical therapy services at this time  -KATHRYN       Row Name 12/25/23 1100          Therapy Assessment/Plan (PT)    Criteria for Skilled Interventions Met (PT) does not meet criteria for skilled intervention  -KATHRYN     Therapy Frequency (PT) evaluation only  -KATHRYN        Row Name 12/25/23 1100          Therapy Plan Review/Discharge Plan (PT)    Therapy Plan Review (PT) evaluation/treatment results reviewed;care plan/treatment goals reviewed;daughter  -KATHRYN               User Key  (r) = Recorded By, (t) = Taken By, (c) = Cosigned By      Initials Name Provider Type    CC Peyton Jackson, RN Registered Nurse    Beatriz Horn RN Registered Nurse    Marielena Flores, RN Registered Nurse    Buzz Goyal RN Registered Nurse    Alfredito Jade, PT Physical Therapist                    Physical Therapy Education       Title: PT OT SLP Therapies (Not Started)       Topic: Physical Therapy (Not Started)       Point: Mobility training (Not Started)       Learner Progress:  Not documented in this visit.              Point: Home exercise program (Not Started)       Learner Progress:  Not documented in this visit.              Point: Body mechanics (Not Started)       Learner Progress:  Not documented in this visit.              Point: Precautions (Not Started)       Learner Progress:  Not documented in this visit.                                  PT Recommendation and Plan  Anticipated Discharge Disposition (PT): home with 24/7 care  Therapy Frequency (PT): evaluation only  Plan of Care Reviewed With: patient  Outcome Evaluation: Patient is not appropriate for skilled physical therapy services at this time.  She is bedbound and dependent for all activity at baseline and has significant contractures of the lower extremity.  Recommend positioning in bed to prevent pressure areas and if necessary out of bed activity using a Yves lift.  She will be discharged from inpatient physical therapy services at this time   Outcome Measures       Row Name 12/25/23 1100             How much help from another person do you currently need...    Turning from your back to your side while in flat bed without using bedrails? 1  -KATHRYN      Moving from lying on back to sitting on the side  of a flat bed without bedrails? 1  -KATHRYN      Moving to and from a bed to a chair (including a wheelchair)? 1  -KATHRYN      Standing up from a chair using your arms (e.g., wheelchair, bedside chair)? 1  -KATHRYN      Climbing 3-5 steps with a railing? 1  -KATHRYN      To walk in hospital room? 1  -KATHRYN      AM-PAC 6 Clicks Score (PT) 6  -KATHRYN      Highest Level of Mobility Goal 2 --> Bed activities/dependent transfer  -KATHRYN         Functional Assessment    Outcome Measure Options AM-PAC 6 Clicks Basic Mobility (PT)  -KATHRYN                User Key  (r) = Recorded By, (t) = Taken By, (c) = Cosigned By      Initials Name Provider Type    Alfredito Jade, PT Physical Therapist                     Time Calculation:    PT Charges       Row Name 12/25/23 1103             Time Calculation    PT Received On 12/25/23  -KATHRYN         Untimed Charges    PT Eval/Re-eval Minutes 20  -KATHRYN         Total Minutes    Untimed Charges Total Minutes 20  -KATHRYN       Total Minutes 20  -KATHRYN                User Key  (r) = Recorded By, (t) = Taken By, (c) = Cosigned By      Initials Name Provider Type    Alfredito Jade, PT Physical Therapist                  Therapy Charges for Today       Code Description Service Date Service Provider Modifiers Qty    50417858340 HC PT EVAL LOW COMPLEXITY 2 12/25/2023 Alfredito Hartman PT GP 1            PT G-Codes  Outcome Measure Options: AM-PAC 6 Clicks Basic Mobility (PT)  AM-PAC 6 Clicks Score (PT): 6    Alfredito Hartman PT  12/25/2023

## 2023-12-25 NOTE — PROGRESS NOTES
[facility]  Inpatient Progress Note    Reason for Follow Up: Anemia.    Subjective     Interval History:   Patient's daughter is by the bedside.  There is no overt bleeding since yesterday.  Patient seems to be doing fairly well.    Current Facility-Administered Medications:     sennosides-docusate (PERICOLACE) 8.6-50 MG per tablet 2 tablet, 2 tablet, Oral, BID, 2 tablet at 12/25/23 0926 **AND** polyethylene glycol (MIRALAX) packet 17 g, 17 g, Oral, Daily PRN **AND** bisacodyl (DULCOLAX) EC tablet 5 mg, 5 mg, Oral, Daily PRN **AND** bisacodyl (DULCOLAX) suppository 10 mg, 10 mg, Rectal, Daily PRN, Junior Corbin MD    carvedilol (COREG) tablet 6.25 mg, 6.25 mg, Oral, BID With Meals, David Colbert MD, 6.25 mg at 12/25/23 0926    citalopram (CeleXA) tablet 10 mg, 10 mg, Oral, Nightly, David Colbert MD, 10 mg at 12/24/23 2010    furosemide (LASIX) injection 60 mg, 60 mg, Intravenous, Q12H, David Colbert MD, 60 mg at 12/25/23 0453    lactated ringers infusion, 30 mL/hr, Intravenous, Continuous, Kevin Medrano MD    multivitamin with minerals 1 tablet, 1 tablet, Oral, Daily, David Colbert MD, 1 tablet at 12/25/23 0926    nitrofurantoin (macrocrystal-monohydrate) (MACROBID) capsule 100 mg, 100 mg, Oral, Daily, David Colbert MD, 100 mg at 12/25/23 0926    nitroglycerin (NITROSTAT) SL tablet 0.4 mg, 0.4 mg, Sublingual, Q5 Min PRN, Junior Corbin MD    nystatin (MYCOSTATIN) powder, , Topical, Q12H, Junior Corbin MD, Given at 12/25/23 0926    ondansetron (ZOFRAN) injection 4 mg, 4 mg, Intravenous, Q6H PRN, Junior Corbin MD    pantoprazole (PROTONIX) injection 40 mg, 40 mg, Intravenous, Q12H, Junior Corbin MD, 40 mg at 12/25/23 0926    polyethylene glycol (GoLYTELY) solution 2,000 mL, 2,000 mL, Oral, Q8H, Junior Corbin MD    saccharomyces boulardii (FLORASTOR) capsule 250 mg, 250 mg, Oral, BID, David Colbert MD, 250 mg at 12/25/23 0926    sodium  chloride 0.9 % flush 10 mL, 10 mL, Intravenous, Q12H, Junior Corbin MD, 10 mL at 12/25/23 0926    sodium chloride 0.9 % flush 10 mL, 10 mL, Intravenous, PRN, Junior Corbin MD    sodium chloride 0.9 % infusion 40 mL, 40 mL, Intravenous, PRN, Junior Corbin MD  Review of Systems:    All system ROS is negative except what's mentioned in HPI.    Objective     Vital Signs  Temp:  [97.3 °F (36.3 °C)-98.2 °F (36.8 °C)] 98 °F (36.7 °C)  Heart Rate:  [59-85] 62  Resp:  [14-20] 18  BP: (117-124)/(39-55) 123/55  Body mass index is 36.15 kg/m².    Intake/Output Summary (Last 24 hours) at 12/25/2023 1041  Last data filed at 12/25/2023 0448  Gross per 24 hour   Intake 430 ml   Output 2325 ml   Net -1895 ml     No intake/output data recorded.     Physical Exam:  General     Alert and oriented, normal affect   Head:    Normocephalic, without obvious abnormality, atraumatic   Eyes:          conjunctivae and sclerae normal, no icterus, pupils round and reactive to light   Oral cavity: Moist and intact, normal dentation   Neck:   Supple, no adenopathy   Chest Wall/Lungs:    No abnormalities observed, equal on expansion bilaterally, no use of extrarespiratory muscles noted   Abdomen:     Soft, nondistended, nontender; normal bowel sounds, no hepatospleenomegaly   Extremities:   no edema, clubbing, or cyanosis   Skin:   No bruising or rash   Psychiatric:  normal mood and insight          Results Review:      Results from last 7 days   Lab Units 12/25/23  0421 12/24/23  0345 12/23/23  0901 12/23/23  0038 12/22/23  1503   WBC 10*3/mm3 6.62 6.50  --   --  6.73   HEMOGLOBIN g/dL 9.4* 9.1* 9.6*   < > 6.8*   HEMATOCRIT % 30.6* 29.8* 30.2*   < > 23.9*   PLATELETS 10*3/mm3 249 230  --   --  280    < > = values in this interval not displayed.     Results from last 7 days   Lab Units 12/25/23  0421 12/24/23  0345 12/23/23  0901 12/22/23  1503   SODIUM mmol/L 137 137 137 135*   POTASSIUM mmol/L 3.3* 3.3* 3.5 3.6   CHLORIDE  mmol/L 100 102 102 99   CO2 mmol/L 25.7 26.3 26.0 25.5   BUN mg/dL 26* 28* 22 28*   CREATININE mg/dL 0.88 0.91 0.77 0.83   CALCIUM mg/dL 8.6 8.4* 8.5* 8.8   BILIRUBIN mg/dL  --   --  0.8 0.3   ALK PHOS U/L  --   --  105 114   ALT (SGPT) U/L  --   --  5 5   AST (SGOT) U/L  --   --  20 21   GLUCOSE mg/dL 90 88 98 120*         Lab Results   Lab Value Date/Time    LIPASE 32 06/30/2023 1609    LIPASE 33 05/20/2022 1453    LIPASE 45 08/19/2020 2254    LIPASE 10 08/12/2020 1211         Assessment & Plan   Assessment:   Anemia secondary to chronic GI blood loss      Plan:     Colonoscopy in the morning risk and benefits discussed with patient and the daughter  I discussed the patients findings and my recommendations with patient and family    Candi Corbin MD

## 2023-12-25 NOTE — PLAN OF CARE
Goal Outcome Evaluation:  Plan of Care Reviewed With: patient           Outcome Evaluation: Patient is not appropriate for skilled physical therapy services at this time.  She is bedbound and dependent for all activity at baseline and has significant contractures of the lower extremity.  Recommend positioning in bed to prevent pressure areas and if necessary out of bed activity using a Yves lift.  She will be discharged from inpatient physical therapy services at this time      Anticipated Discharge Disposition (PT): home with 24/7 care

## 2023-12-25 NOTE — PLAN OF CARE
Goal Outcome Evaluation:      Pt turned q2h. Wound care completed. Tolerating diet. Smear BM this shift. Bowel prep tonight. No concerns at this time.  Belkys Watts RN

## 2023-12-25 NOTE — H&P (VIEW-ONLY)
[facility]  Inpatient Progress Note    Reason for Follow Up: Anemia.    Subjective     Interval History:   Patient's daughter is by the bedside.  There is no overt bleeding since yesterday.  Patient seems to be doing fairly well.    Current Facility-Administered Medications:     sennosides-docusate (PERICOLACE) 8.6-50 MG per tablet 2 tablet, 2 tablet, Oral, BID, 2 tablet at 12/25/23 0926 **AND** polyethylene glycol (MIRALAX) packet 17 g, 17 g, Oral, Daily PRN **AND** bisacodyl (DULCOLAX) EC tablet 5 mg, 5 mg, Oral, Daily PRN **AND** bisacodyl (DULCOLAX) suppository 10 mg, 10 mg, Rectal, Daily PRN, Junior Corbin MD    carvedilol (COREG) tablet 6.25 mg, 6.25 mg, Oral, BID With Meals, David Colbert MD, 6.25 mg at 12/25/23 0926    citalopram (CeleXA) tablet 10 mg, 10 mg, Oral, Nightly, David Colbert MD, 10 mg at 12/24/23 2010    furosemide (LASIX) injection 60 mg, 60 mg, Intravenous, Q12H, David Colbert MD, 60 mg at 12/25/23 0453    lactated ringers infusion, 30 mL/hr, Intravenous, Continuous, Kevin Medrano MD    multivitamin with minerals 1 tablet, 1 tablet, Oral, Daily, David Colbert MD, 1 tablet at 12/25/23 0926    nitrofurantoin (macrocrystal-monohydrate) (MACROBID) capsule 100 mg, 100 mg, Oral, Daily, David Colbert MD, 100 mg at 12/25/23 0926    nitroglycerin (NITROSTAT) SL tablet 0.4 mg, 0.4 mg, Sublingual, Q5 Min PRN, Junior Corbin MD    nystatin (MYCOSTATIN) powder, , Topical, Q12H, Junior Corbin MD, Given at 12/25/23 0926    ondansetron (ZOFRAN) injection 4 mg, 4 mg, Intravenous, Q6H PRN, Junior Corbin MD    pantoprazole (PROTONIX) injection 40 mg, 40 mg, Intravenous, Q12H, Junior Corbin MD, 40 mg at 12/25/23 0926    polyethylene glycol (GoLYTELY) solution 2,000 mL, 2,000 mL, Oral, Q8H, Junior Corbin MD    saccharomyces boulardii (FLORASTOR) capsule 250 mg, 250 mg, Oral, BID, David Colbert MD, 250 mg at 12/25/23 0926    sodium  chloride 0.9 % flush 10 mL, 10 mL, Intravenous, Q12H, Junior Corbin MD, 10 mL at 12/25/23 0926    sodium chloride 0.9 % flush 10 mL, 10 mL, Intravenous, PRN, Junior Corbin MD    sodium chloride 0.9 % infusion 40 mL, 40 mL, Intravenous, PRN, Junior Corbin MD  Review of Systems:    All system ROS is negative except what's mentioned in HPI.    Objective     Vital Signs  Temp:  [97.3 °F (36.3 °C)-98.2 °F (36.8 °C)] 98 °F (36.7 °C)  Heart Rate:  [59-85] 62  Resp:  [14-20] 18  BP: (117-124)/(39-55) 123/55  Body mass index is 36.15 kg/m².    Intake/Output Summary (Last 24 hours) at 12/25/2023 1041  Last data filed at 12/25/2023 0448  Gross per 24 hour   Intake 430 ml   Output 2325 ml   Net -1895 ml     No intake/output data recorded.     Physical Exam:  General     Alert and oriented, normal affect   Head:    Normocephalic, without obvious abnormality, atraumatic   Eyes:          conjunctivae and sclerae normal, no icterus, pupils round and reactive to light   Oral cavity: Moist and intact, normal dentation   Neck:   Supple, no adenopathy   Chest Wall/Lungs:    No abnormalities observed, equal on expansion bilaterally, no use of extrarespiratory muscles noted   Abdomen:     Soft, nondistended, nontender; normal bowel sounds, no hepatospleenomegaly   Extremities:   no edema, clubbing, or cyanosis   Skin:   No bruising or rash   Psychiatric:  normal mood and insight          Results Review:      Results from last 7 days   Lab Units 12/25/23  0421 12/24/23  0345 12/23/23  0901 12/23/23  0038 12/22/23  1503   WBC 10*3/mm3 6.62 6.50  --   --  6.73   HEMOGLOBIN g/dL 9.4* 9.1* 9.6*   < > 6.8*   HEMATOCRIT % 30.6* 29.8* 30.2*   < > 23.9*   PLATELETS 10*3/mm3 249 230  --   --  280    < > = values in this interval not displayed.     Results from last 7 days   Lab Units 12/25/23  0421 12/24/23  0345 12/23/23  0901 12/22/23  1503   SODIUM mmol/L 137 137 137 135*   POTASSIUM mmol/L 3.3* 3.3* 3.5 3.6   CHLORIDE  mmol/L 100 102 102 99   CO2 mmol/L 25.7 26.3 26.0 25.5   BUN mg/dL 26* 28* 22 28*   CREATININE mg/dL 0.88 0.91 0.77 0.83   CALCIUM mg/dL 8.6 8.4* 8.5* 8.8   BILIRUBIN mg/dL  --   --  0.8 0.3   ALK PHOS U/L  --   --  105 114   ALT (SGPT) U/L  --   --  5 5   AST (SGOT) U/L  --   --  20 21   GLUCOSE mg/dL 90 88 98 120*         Lab Results   Lab Value Date/Time    LIPASE 32 06/30/2023 1609    LIPASE 33 05/20/2022 1453    LIPASE 45 08/19/2020 2254    LIPASE 10 08/12/2020 1211         Assessment & Plan   Assessment:   Anemia secondary to chronic GI blood loss      Plan:     Colonoscopy in the morning risk and benefits discussed with patient and the daughter  I discussed the patients findings and my recommendations with patient and family    Candi Corbin MD

## 2023-12-25 NOTE — PLAN OF CARE
Goal Outcome Evaluation:  Plan of Care Reviewed With: patient        Progress: no change  Outcome Evaluation: VSS. No compliant of pain. Alert and oriented x4. Had multiple incontinent bowel movement. Espinosa draining yellow urine. Turned every 2 hours.

## 2023-12-26 ENCOUNTER — ANESTHESIA EVENT (OUTPATIENT)
Dept: GASTROENTEROLOGY | Facility: HOSPITAL | Age: 78
End: 2023-12-26
Payer: MEDICARE

## 2023-12-26 PROBLEM — D64.9 ANEMIA: Status: ACTIVE | Noted: 2023-12-22

## 2023-12-26 LAB
ANION GAP SERPL CALCULATED.3IONS-SCNC: 9.1 MMOL/L (ref 5–15)
BASOPHILS # BLD AUTO: 0.05 10*3/MM3 (ref 0–0.2)
BASOPHILS NFR BLD AUTO: 0.6 % (ref 0–1.5)
BUN SERPL-MCNC: 25 MG/DL (ref 8–23)
BUN/CREAT SERPL: 26.9 (ref 7–25)
CALCIUM SPEC-SCNC: 8.7 MG/DL (ref 8.6–10.5)
CHLORIDE SERPL-SCNC: 99 MMOL/L (ref 98–107)
CO2 SERPL-SCNC: 28.9 MMOL/L (ref 22–29)
CREAT SERPL-MCNC: 0.93 MG/DL (ref 0.57–1)
DEPRECATED RDW RBC AUTO: 60 FL (ref 37–54)
EGFRCR SERPLBLD CKD-EPI 2021: 63 ML/MIN/1.73
EOSINOPHIL # BLD AUTO: 0.45 10*3/MM3 (ref 0–0.4)
EOSINOPHIL NFR BLD AUTO: 5.6 % (ref 0.3–6.2)
ERYTHROCYTE [DISTWIDTH] IN BLOOD BY AUTOMATED COUNT: 23.5 % (ref 12.3–15.4)
GLUCOSE SERPL-MCNC: 106 MG/DL (ref 65–99)
HCT VFR BLD AUTO: 30.4 % (ref 34–46.6)
HGB BLD-MCNC: 9.3 G/DL (ref 12–15.9)
IMM GRANULOCYTES # BLD AUTO: 0.03 10*3/MM3 (ref 0–0.05)
IMM GRANULOCYTES NFR BLD AUTO: 0.4 % (ref 0–0.5)
LYMPHOCYTES # BLD AUTO: 1.79 10*3/MM3 (ref 0.7–3.1)
LYMPHOCYTES NFR BLD AUTO: 22.2 % (ref 19.6–45.3)
MAGNESIUM SERPL-MCNC: 1.8 MG/DL (ref 1.6–2.4)
MCH RBC QN AUTO: 22.6 PG (ref 26.6–33)
MCHC RBC AUTO-ENTMCNC: 30.6 G/DL (ref 31.5–35.7)
MCV RBC AUTO: 74 FL (ref 79–97)
MONOCYTES # BLD AUTO: 0.98 10*3/MM3 (ref 0.1–0.9)
MONOCYTES NFR BLD AUTO: 12.2 % (ref 5–12)
NEUTROPHILS NFR BLD AUTO: 4.75 10*3/MM3 (ref 1.7–7)
NEUTROPHILS NFR BLD AUTO: 59 % (ref 42.7–76)
NRBC BLD AUTO-RTO: 0 /100 WBC (ref 0–0.2)
PHOSPHATE SERPL-MCNC: 3.2 MG/DL (ref 2.5–4.5)
PLATELET # BLD AUTO: 275 10*3/MM3 (ref 140–450)
PMV BLD AUTO: 9.6 FL (ref 6–12)
POTASSIUM SERPL-SCNC: 3.5 MMOL/L (ref 3.5–5.2)
RBC # BLD AUTO: 4.11 10*6/MM3 (ref 3.77–5.28)
SODIUM SERPL-SCNC: 137 MMOL/L (ref 136–145)
WBC NRBC COR # BLD AUTO: 8.05 10*3/MM3 (ref 3.4–10.8)

## 2023-12-26 PROCEDURE — 25010000002 FUROSEMIDE PER 20 MG: Performed by: STUDENT IN AN ORGANIZED HEALTH CARE EDUCATION/TRAINING PROGRAM

## 2023-12-26 PROCEDURE — 84100 ASSAY OF PHOSPHORUS: CPT | Performed by: STUDENT IN AN ORGANIZED HEALTH CARE EDUCATION/TRAINING PROGRAM

## 2023-12-26 PROCEDURE — 99233 SBSQ HOSP IP/OBS HIGH 50: CPT | Performed by: INTERNAL MEDICINE

## 2023-12-26 PROCEDURE — 85025 COMPLETE CBC W/AUTO DIFF WBC: CPT | Performed by: STUDENT IN AN ORGANIZED HEALTH CARE EDUCATION/TRAINING PROGRAM

## 2023-12-26 PROCEDURE — 83735 ASSAY OF MAGNESIUM: CPT | Performed by: STUDENT IN AN ORGANIZED HEALTH CARE EDUCATION/TRAINING PROGRAM

## 2023-12-26 PROCEDURE — 80048 BASIC METABOLIC PNL TOTAL CA: CPT | Performed by: STUDENT IN AN ORGANIZED HEALTH CARE EDUCATION/TRAINING PROGRAM

## 2023-12-26 RX ORDER — NYSTATIN 100000 [USP'U]/G
POWDER TOPICAL
Status: DISCONTINUED | OUTPATIENT
Start: 2023-12-26 | End: 2024-01-03 | Stop reason: HOSPADM

## 2023-12-26 RX ORDER — MAGNESIUM CARB/ALUMINUM HYDROX 105-160MG
296 TABLET,CHEWABLE ORAL ONCE
Status: DISCONTINUED | OUTPATIENT
Start: 2023-12-26 | End: 2024-01-03 | Stop reason: HOSPADM

## 2023-12-26 RX ADMIN — PANTOPRAZOLE SODIUM 40 MG: 40 INJECTION, POWDER, FOR SOLUTION INTRAVENOUS at 08:07

## 2023-12-26 RX ADMIN — NYSTATIN: 100000 POWDER TOPICAL at 21:17

## 2023-12-26 RX ADMIN — PANTOPRAZOLE SODIUM 40 MG: 40 INJECTION, POWDER, FOR SOLUTION INTRAVENOUS at 20:48

## 2023-12-26 RX ADMIN — POLYETHYLENE GLYCOL 3350, SODIUM SULFATE ANHYDROUS, SODIUM BICARBONATE, SODIUM CHLORIDE, POTASSIUM CHLORIDE 2000 ML: 236; 22.74; 6.74; 5.86; 2.97 POWDER, FOR SOLUTION ORAL at 21:17

## 2023-12-26 RX ADMIN — Medication 1 TABLET: at 08:07

## 2023-12-26 RX ADMIN — Medication 10 ML: at 08:08

## 2023-12-26 RX ADMIN — CARVEDILOL 6.25 MG: 6.25 TABLET, FILM COATED ORAL at 08:08

## 2023-12-26 RX ADMIN — POLYETHYLENE GLYCOL 3350, SODIUM SULFATE ANHYDROUS, SODIUM BICARBONATE, SODIUM CHLORIDE, POTASSIUM CHLORIDE 2000 ML: 236; 22.74; 6.74; 5.86; 2.97 POWDER, FOR SOLUTION ORAL at 05:25

## 2023-12-26 RX ADMIN — FUROSEMIDE 60 MG: 10 INJECTION, SOLUTION INTRAMUSCULAR; INTRAVENOUS at 05:25

## 2023-12-26 RX ADMIN — FUROSEMIDE 60 MG: 10 INJECTION, SOLUTION INTRAMUSCULAR; INTRAVENOUS at 17:55

## 2023-12-26 RX ADMIN — DOCUSATE SODIUM 50MG AND SENNOSIDES 8.6MG 2 TABLET: 8.6; 5 TABLET, FILM COATED ORAL at 20:49

## 2023-12-26 RX ADMIN — CARVEDILOL 6.25 MG: 6.25 TABLET, FILM COATED ORAL at 17:55

## 2023-12-26 RX ADMIN — Medication 10 ML: at 20:49

## 2023-12-26 RX ADMIN — NYSTATIN: 100000 POWDER TOPICAL at 08:14

## 2023-12-26 RX ADMIN — CITALOPRAM HYDROBROMIDE 10 MG: 20 TABLET ORAL at 20:49

## 2023-12-26 RX ADMIN — Medication 250 MG: at 20:49

## 2023-12-26 RX ADMIN — Medication 250 MG: at 08:08

## 2023-12-26 RX ADMIN — NITROFURANTOIN MONOHYDRATE/MACROCRYSTALLINE 100 MG: 25; 75 CAPSULE ORAL at 08:07

## 2023-12-26 NOTE — PLAN OF CARE
Goal Outcome Evaluation:  Plan of Care Reviewed With: patient        Progress: no change  Outcome Evaluation: VSS. Pt started on golytely for plan for colonoscopy this morning. Encouraged patient to drink all night. Pt still drinking bowel prep this morning. Endo was notified. Stool is still brown, liquid, and formed chunks. Espinosa draining yellow urine.

## 2023-12-26 NOTE — SIGNIFICANT NOTE
Wound Eval / Progress Noted     [facility]     Patient Name: Isabell Ribera  : 1945  MRN: [id number]  Today's Date: 2023                 Admit Date: 2023    Visit Dx:    ICD-10-CM ICD-9-CM   1. Gastrointestinal hemorrhage, unspecified gastrointestinal hemorrhage type  K92.2 578.9   2. Anemia, unspecified type  D64.9 285.9   3. Anemia  D64.9 285.9   4. Difficulty in walking  R26.2 719.7   5. Iron deficiency anemia due to chronic blood loss  D50.0 280.0         GI bleed    Anemia        Past Medical History:   Diagnosis Date    Afib     Aftercare following ankle joint replacement surgery 2015    Arthritis     Arthritis of knee 2015    CHF (congestive heart failure)     Dementia     Essential hypertension     Gout     HTN (hypertension)     Left knee pain     Lymphedema     Recurrent UTI 2021    Urinary retention 2021        Past Surgical History:   Procedure Laterality Date    CATARACT EXTRACTION Bilateral     ENDOSCOPY N/A 2023    Procedure: ESOPHAGOGASTRODUODENOSCOPY WITH BIOPSIES;  Surgeon: Junior Corbin MD;  Location: [facility] ENDOSCOPY;  Service: Gastroenterology;  Laterality: N/A;  GASTRITIS, MATA'S, HIATAL HERNIA    KIDNEY STONE SURGERY      KNEE SURGERY Left 2007    REPLACEMENT    OTHER SURGICAL HISTORY      JOINT SURGERY, UNSPECIFIED    TUBAL ABDOMINAL LIGATION           Physical Assessment:  Wound 22 1721 Left lower breast MASD (Moisture associated skin damage) (Active)   Dressing Appearance open to air 23   Closure None 23   Base red;dry 23   Periwound intact;dry;redness 23   Periwound Temperature warm 23   Periwound Skin Turgor soft 23   Edges rolled/closed 23   Drainage Amount none 23   Care, Wound cleansed with;sterile normal saline 23   Dressing Care open to air 23   Periwound Care topical treatment applied 23       Wound  12/27/22 1721 Right upper abdomen MASD (Moisture associated skin damage) (Active)   Dressing Appearance open to air 12/26/23 0920   Closure None 12/26/23 0920   Base red;moist 12/26/23 0920   Periwound dry;pink 12/26/23 0920   Periwound Temperature warm 12/26/23 0920   Periwound Skin Turgor soft 12/26/23 0920   Edges rolled/closed 12/26/23 0920   Drainage Amount none 12/26/23 0920   Care, Wound cleansed with;sterile normal saline 12/26/23 0920   Dressing Care open to air 12/26/23 0920   Periwound Care topical treatment applied 12/26/23 0920       Wound 12/28/22 Left gluteal Pressure Injury (Active)   Wound Image    12/26/23 0920   Pressure Injury Stage O 12/26/23 0920   Dressing Appearance open to air 12/26/23 0920   Closure None 12/26/23 0920   Base blanchable;dry;red;moist 12/26/23 0920   Red (%), Wound Tissue Color 100 12/26/23 0920   Periwound intact;dry;pink 12/26/23 0920   Periwound Temperature warm 12/26/23 0920   Periwound Skin Turgor soft 12/26/23 0920   Edges rolled/closed 12/26/23 0920   Drainage Amount none 12/26/23 0920   Care, Wound cleansed with;sterile normal saline 12/26/23 0920   Dressing Care open to air 12/26/23 0920   Periwound Care barrier ointment applied;topical treatment applied 12/26/23 0920       Wound groin MASD (Moisture associated skin damage) (Active)   Dressing Appearance open to air 12/26/23 0920   Closure None 12/26/23 0920   Base red;dry 12/26/23 0920   Periwound excoriated;pink;dry 12/26/23 0920   Periwound Temperature warm 12/26/23 0920   Periwound Skin Turgor soft 12/26/23 0920   Edges rolled/closed 12/26/23 0920   Drainage Amount none 12/26/23 0920   Care, Wound cleansed with;sterile normal saline 12/26/23 0920   Dressing Care open to air 12/26/23 0920   Periwound Care topical treatment applied 12/26/23 0920       Wound 12/23/23 0520 Left posterior popliteal MASD (Moisture associated skin damage) (Active)   Dressing Appearance open to air 12/26/23 0920   Closure None 12/26/23  0920   Base moist;red 12/26/23 0920   Periwound dry;pink 12/26/23 0920   Periwound Temperature warm 12/26/23 0920   Periwound Skin Turgor soft 12/26/23 0920   Edges rolled/closed 12/26/23 0920   Drainage Amount none 12/26/23 0920   Care, Wound cleansed with;sterile normal saline 12/26/23 0920   Dressing Care open to air 12/26/23 0920   Periwound Care topical treatment applied 12/26/23 0920        Wound Check / Follow-up:  Patient seen today for a wound consult. Family present at bedside. Patient was incontinent prior to arrival to unit; provided incontinence care with the help of primary RN. Patient currently drinking Golytely for upcoming procedure.   Patient with significant MASD to the groin, buttocks, and left ischial tuberosity; tissue is intact, red and moist at this time. Tissue to the buttocks and left ischium remain blanchable at this time. MASD to the bilateral breast folds as well; tissue is red and dry at this time. Some areas of intermittent tissue loss noted to the left breat fold. Cleansed all areas with NS. Applied topical powder to the groin and breast folds and orange top barrier cream to the buttocks and left ischium. Recommending TID / PRN application of the orange top barrier cream to the buttocks / left ischium. BID application of nystatin powder to the groin, abdominal, and breast folds and then apply dry fit sheets or pillow cases to the creases. Implement Q2H turns and offload at all times. Keep the patient clean and free from all moisture.    Bilateral lower legs and feet with dry flaking skin present; no open wounds or blistering noted during the assessment. Recommending daily cleansing with soap and water and application of the purple top moisturizer.     Impression: MASD to buttocks, ischium, bilateral breasts, and groin    Short term goals: regain skin integrity, skin protection, moisture management, topical treatment, pressure reduction    Fabiana Nunez RN    12/26/2023    11:03  EST

## 2023-12-26 NOTE — PROGRESS NOTES
[facility]   Hospitalist Progress Note  Date: 2023  Patient Name: Isabell Ribera  : 1945  MRN: [id number]  Date of admission: 2023  Room/Bed: [facility]/      Subjective   Subjective     Chief Complaint: Abnormal labs    Summary:Isabell Ribera is a 78 y.o. female with history of atrial fibrillation (on Eliquis currently, was on warfarin in the past), type 2 diabetes mellitus with diabetic polyneuropathy, dementia, rectal bleeding and constipation presented after patient was found to have abnormal labs.  Patient is 24/ care.  Patient was seen by primary care physician via telehealth and labs were obtained which was found to have low hemoglobin after which she was directed to the emergency department.  Patient daughter stated that they have not noticed any bleeding, her stool has been darker but not black to dark.  Patient's daughter stated she is not taking any anticoagulation however Eliquis was on her med list for history of atrial fibrillation.  Vitals were stable on presentation.  Lab work showed hemoglobin of 6.8 on presentation.  CT abdomen no acute abdominal/pelvic process; did show small bilateral pleural effusion with cholelithiasis, bilateral nonobstructing renal stones, small hiatal hernia with rectum moderately distended with stool.  GI was consulted and patient was admitted for further evaluation management.  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  Biopsies with normal first portion of duodenum and second portion of duodenum.  No concern for bleeding since admission.  GI following the patient.    Interval Followup: No acute events overnight.  Patient is lying in bed comfortably.  Denied any fever, chills, rigors, lightheadedness, nausea, chest pain, difficulty breathing or abdominal pain.  Stated she feels fine today.  Hemoglobin 9.4  today.    Review of Systems    All systems reviewed and negative except for what is outlined above.      Objective   Objective     Vitals:   Temp:  [97.6 °F (36.4 °C)-98.5 °F (36.9 °C)] 98.1 °F (36.7 °C)  Heart Rate:  [61-70] 64  Resp:  [18-20] 18  BP: (119-127)/(42-55) 120/52    Physical Exam   General: Awake, alert, NAD  Cardiovascular: RRR, no murmurs   Pulmonary: CTA bilaterally; no wheezes; no conversational dyspnea  Gastrointestinal: S/ND/NT, +BS  Neuro: Alert, awake; speech clear; no tremor  : Espinosa catheter present; no suprapubic tenderness    Result Review    Result Review:  I have personally reviewed these results:  [x]  Laboratory      Lab 12/25/23 0421 12/24/23 0345 12/23/23  0901 12/23/23  0038 12/22/23  1503   WBC 6.62 6.50  --   --  6.73   HEMOGLOBIN 9.4* 9.1* 9.6*   < > 6.8*   HEMATOCRIT 30.6* 29.8* 30.2*   < > 23.9*   PLATELETS 249 230  --   --  280   NEUTROS ABS 3.58 3.68  --   --  4.12   IMMATURE GRANS (ABS) 0.01 0.01  --   --  0.02   LYMPHS ABS 1.59 1.50  --   --  1.25   MONOS ABS 0.92* 0.93*  --   --  0.84   EOS ABS 0.49* 0.34  --   --  0.45*   MCV 73.6* 72.9*  --   --  70.1*   LACTATE  --   --   --   --  1.6    < > = values in this interval not displayed.         Lab 12/25/23 0421 12/24/23  0345 12/23/23  0901   SODIUM 137 137 137   POTASSIUM 3.3* 3.3* 3.5   CHLORIDE 100 102 102   CO2 25.7 26.3 26.0   ANION GAP 11.3 8.7 9.0   BUN 26* 28* 22   CREATININE 0.88 0.91 0.77   EGFR 67.4 64.7 79.1   GLUCOSE 90 88 98   CALCIUM 8.6 8.4* 8.5*   MAGNESIUM 1.7 1.8 1.8   PHOSPHORUS 3.3 3.2 3.7         Lab 12/23/23  0901 12/22/23  1503   TOTAL PROTEIN 6.9 7.6   ALBUMIN 2.4* 2.7*   GLOBULIN 4.5 4.9   ALT (SGPT) 5 5   AST (SGOT) 20 21   BILIRUBIN 0.8 0.3   ALK PHOS 105 114                 Lab 12/23/23  0901 12/22/23  1844 12/22/23  1503   IRON 97  --   --    IRON SATURATION (TSAT) 26  --   --    TIBC 375  --   --    TRANSFERRIN 252  --   --    FERRITIN 19.18  --   --    FOLATE >20.00  --   --    VITAMIN B  12 901  --   --    ABO TYPING  --  A A   RH TYPING  --  Positive Positive   ANTIBODY SCREEN  --   --  Negative         Brief Urine Lab Results  (Last result in the past 365 days)        Color   Clarity   Blood   Leuk Est   Nitrite   Protein   CREAT   Urine HCG        09/05/23 1006 Yellow   Cloudy   Small (1+)   Large (3+)   Negative   Trace                 [x]  Microbiology   Microbiology Results (last 10 days)       ** No results found for the last 240 hours. **          [x]  Radiology  CT Abdomen Pelvis Without Contrast    Result Date: 12/22/2023    1. No acute process identified within abdomen/pelvis. 2. Small bilateral pleural effusions. 3. Cholelithiasis. 4. Bilateral nonobstructing renal stones. 5. Small hiatal hernia. 6. Rectum moderately distended with stool.     TESSIE MEDRANO MD       Electronically Signed and Approved By: TESSIE MEDRANO MD on 12/22/2023 at 17:15             XR Chest 1 View    Result Date: 12/22/2023    1. Chronic changes are noted.  There is no evidence for acute cardiopulmonary process.         TESSIE DE LUNA MD       Electronically Signed and Approved By: TESSIE DE LUNA MD on 12/22/2023 at 15:50            []  EKG/Telemetry   []  Cardiology/Vascular   []  Pathology  []  Old records  []  Other:    Assessment & Plan   Assessment / Plan     Assessment:  Possible GI bleed  Bilateral lower extremity edema  Acute on chronic anemia, likely from GI bleed superimposed on anemia of chronic disease  History of atrial fibrillation, on Eliquis  History of hypertension  History of type 2 diabetes mellitus    Plan:  Patient currently being managed in hospital service.  Presented with hemoglobin of 6.8, s/p 2 units of PRBC transfusion since admission.  Hemoglobin 9.4 today.  Underwent upper GI endoscopy on 12/23 showed medium sized hiatal hernia; sufficient mucosal changes classified as Bobo's esophagus which was biopsied; gastritis.  Biopsies with normal first portion of duodenum and second  portion of duodenum.  GI following the patient, plan for colonoscopy tomorrow.  Appreciate further input.  Bowel prep ordered.  Patient placed n.p.o. after midnight for the procedure.  Currently on pantoprazole 40 mg every 12 hours.  Continue.  History of atrial fibrillation, on Eliquis at home.  Received Kcentra on presentation for reversal.  Holding anticoagulation currently.  Was started on Lasix 60 mg IV every 12 hours for lower extremity edema.  Will continue.  Continue home dose of carvedilol 6.25 mg twice daily.  Continue escitalopram 10 mg daily.  Also on Macrobid 100 mg per oral for prophylaxis for UTI.  Continue.  Continue rest of the current management.       DVT prophylaxis:  Medical and mechanical DVT prophylaxis orders are present.    CODE STATUS:   Level Of Support Discussed With: Patient  Code Status (Patient has no pulse and is not breathing): CPR (Attempt to Resuscitate)  Medical Interventions (Patient has pulse or is breathing): Full Support      Electronically signed by David Colbert MD, 12/25/23, 3:28 PM EST.

## 2023-12-26 NOTE — PLAN OF CARE
Goal Outcome Evaluation:   Pt resting in bed. Frequent bowel movements, incontinent. Completed bowel prep this am. Must repeat tonight. Q2h turn with family at bedside most of day. Periods of intermittent confusion. No complaints at this time.   Fay Molina RNA

## 2023-12-26 NOTE — PROGRESS NOTES
[facility]   Hospitalist Progress Note  Date: 2023  Patient Name: Isabell Ribera  : 1945  MRN: [id number]  Date of admission: 2023    Subjective   Subjective     Chief Complaint:   Anemia    Summary:   Isabell Ribera is a 78 y.o. female with history of atrial fibrillation (on Eliquis currently, was on warfarin in the past), type 2 diabetes mellitus with diabetic polyneuropathy, dementia, rectal bleeding and constipation presented after patient was found to have abnormal labs.  Patient is 24/ care.  Patient was seen by primary care physician via telehealth and labs were obtained which was found to have low hemoglobin after which she was directed to the emergency department.  Patient daughter stated that they have not noticed any bleeding, her stool has been darker but not black to dark.  Patient's daughter stated she is not taking any anticoagulation however Eliquis was on her med list for history of atrial fibrillation.  Vitals were stable on presentation.  Lab work showed hemoglobin of 6.8 on presentation.  CT abdomen no acute abdominal/pelvic process; did show small bilateral pleural effusion with cholelithiasis, bilateral nonobstructing renal stones, small hiatal hernia with rectum moderately distended with stool.  GI was consulted and patient was admitted for further evaluation management.  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  Biopsies with normal first portion of duodenum and second portion of duodenum.  No concern for bleeding since admission.  GI following the patient.     Interval Followup:   No acute events overnight, patient resting comfortably in bed, tolerating prep    Objective   Objective     Vitals:   Temp:  [97.7 °F (36.5 °C)-98.1 °F (36.7 °C)] 97.7 °F (36.5 °C)  Heart Rate:  [64-69] 69  Resp:  [18-20] 20  BP: (120-149)/(49-70)  138/61    Physical Exam   GEN: No acute distress  HEENT: Moist mucous membranes  LUNGS: Equal chest rise bilaterally  CARDIAC: Regular rate and rhythm  NEURO: Moving all 4 extremities spontaneously  SKIN: No obvious breakdown    Result Review    Result Review:  I have personally reviewed the results as below  [x]  Laboratory CBC, CMP personally reviewed  CBC          12/24/2023    03:45 12/25/2023    04:21 12/26/2023    05:22   CBC   WBC 6.50  6.62  8.05    RBC 4.09  4.16  4.11    Hemoglobin 9.1  9.4  9.3    Hematocrit 29.8  30.6  30.4    MCV 72.9  73.6  74.0    MCH 22.2  22.6  22.6    MCHC 30.5  30.7  30.6    RDW 21.4  22.5  23.5    Platelets 230  249  275      CMP          12/24/2023    03:45 12/25/2023    04:21 12/26/2023    05:22   CMP   Glucose 88  90  106    BUN 28  26  25    Creatinine 0.91  0.88  0.93    EGFR 64.7  67.4  63.0    Sodium 137  137  137    Potassium 3.3  3.3  3.5    Chloride 102  100  99    Calcium 8.4  8.6  8.7    BUN/Creatinine Ratio 30.8  29.5  26.9    Anion Gap 8.7  11.3  9.1      []  Microbiology  []  Radiology  []  EKG/Telemetry   []  Cardiology/Vascular   []  Pathology  []  Old records  []  Other:    Assessment & Plan   Assessment / Plan     Assessment:    GI bleed    Afib    Essential hypertension    Anemia    Plan:  Patient admitted to the hospital for further workup and management of above  Monitor hemoglobin closely, patient status post 2 units PRBCs this admission  Gastroenterology consulted, appreciate their recommendations  Patient status post EGD which was significant for medium size hiatal hernia, esophagus, gastritis  Patient n.p.o. and completing prep for colonoscopy today  Continue IV PPI  Patient received Kcentra for INR reversal, continue to hold anticoagulation pending further endoscopic workup  Continue IV diuretics for volume overload, monitor renal function closely  Continue carvedilol  Continue Lexapro  Continue Macrobid for UTI prophylaxis  CBC, CMP reviewed  12/26/2023  Repeat CBC, CMP, mag and Phos in a.m. 12/26/2023  CT scan of the abdomen and pelvis personally reviewed, mild bilateral pleural effusions, cholelithiasis without choledocholithiasis,  Discussed management plan regarding patient's bleeding with GI     Reviewed patients labs and imaging, and discussed with patient and nurse at bedside.    DVT prophylaxis:  Medical and mechanical DVT prophylaxis orders are present.    CODE STATUS:   Level Of Support Discussed With: Patient  Code Status (Patient has no pulse and is not breathing): CPR (Attempt to Resuscitate)  Medical Interventions (Patient has pulse or is breathing): Full Support        Electronically signed by Devon Lee MD, 12/26/23, 12:18 PM EST.

## 2023-12-26 NOTE — SIGNIFICANT NOTE
12/26/23 8072   Plan   Plan Comments YESSI, RN met with patient and daughters Gissell and Say at bedside.  Patient has dementia adn is unable to care for self.  Daughters take turns staying with patient and her spouse.  Patient is bedbound.  Reports using [employer] for PCP.  Reports teleheath and visits once yearly.  CM updated contact information to include daughter Say.  Daughters reports patient will need EMS transport home.

## 2023-12-27 ENCOUNTER — ANESTHESIA (OUTPATIENT)
Dept: GASTROENTEROLOGY | Facility: HOSPITAL | Age: 78
End: 2023-12-27
Payer: MEDICARE

## 2023-12-27 LAB
ALBUMIN SERPL-MCNC: 2.7 G/DL (ref 3.5–5.2)
ALBUMIN/GLOB SERPL: 0.5 G/DL
ALP SERPL-CCNC: 104 U/L (ref 39–117)
ALT SERPL W P-5'-P-CCNC: 6 U/L (ref 1–33)
ANION GAP SERPL CALCULATED.3IONS-SCNC: 12.2 MMOL/L (ref 5–15)
ANISOCYTOSIS BLD QL: NORMAL
AST SERPL-CCNC: 25 U/L (ref 1–32)
BASOPHILS # BLD AUTO: 0.07 10*3/MM3 (ref 0–0.2)
BASOPHILS NFR BLD AUTO: 1 % (ref 0–1.5)
BILIRUB SERPL-MCNC: 0.6 MG/DL (ref 0–1.2)
BUN SERPL-MCNC: 18 MG/DL (ref 8–23)
BUN/CREAT SERPL: 19.6 (ref 7–25)
CALCIUM SPEC-SCNC: 9 MG/DL (ref 8.6–10.5)
CHLORIDE SERPL-SCNC: 98 MMOL/L (ref 98–107)
CO2 SERPL-SCNC: 30.8 MMOL/L (ref 22–29)
CREAT SERPL-MCNC: 0.92 MG/DL (ref 0.57–1)
CYTO UR: NORMAL
DEPRECATED RDW RBC AUTO: 62.9 FL (ref 37–54)
EGFRCR SERPLBLD CKD-EPI 2021: 63.9 ML/MIN/1.73
EOSINOPHIL # BLD AUTO: 0.44 10*3/MM3 (ref 0–0.4)
EOSINOPHIL NFR BLD AUTO: 6.1 % (ref 0.3–6.2)
ERYTHROCYTE [DISTWIDTH] IN BLOOD BY AUTOMATED COUNT: 24.3 % (ref 12.3–15.4)
GLOBULIN UR ELPH-MCNC: 5.1 GM/DL
GLUCOSE BLDC GLUCOMTR-MCNC: 99 MG/DL (ref 70–99)
GLUCOSE SERPL-MCNC: 98 MG/DL (ref 65–99)
HCT VFR BLD AUTO: 35.5 % (ref 34–46.6)
HGB BLD-MCNC: 10.7 G/DL (ref 12–15.9)
IMM GRANULOCYTES # BLD AUTO: 0.02 10*3/MM3 (ref 0–0.05)
IMM GRANULOCYTES NFR BLD AUTO: 0.3 % (ref 0–0.5)
LAB AP CASE REPORT: NORMAL
LAB AP CLINICAL INFORMATION: NORMAL
LAB AP SPECIAL STAINS: NORMAL
LYMPHOCYTES # BLD AUTO: 1.61 10*3/MM3 (ref 0.7–3.1)
LYMPHOCYTES NFR BLD AUTO: 22.3 % (ref 19.6–45.3)
MAGNESIUM SERPL-MCNC: 1.8 MG/DL (ref 1.6–2.4)
MCH RBC QN AUTO: 22.5 PG (ref 26.6–33)
MCHC RBC AUTO-ENTMCNC: 30.1 G/DL (ref 31.5–35.7)
MCV RBC AUTO: 74.6 FL (ref 79–97)
MICROCYTES BLD QL: NORMAL
MONOCYTES # BLD AUTO: 0.92 10*3/MM3 (ref 0.1–0.9)
MONOCYTES NFR BLD AUTO: 12.8 % (ref 5–12)
NEUTROPHILS NFR BLD AUTO: 4.15 10*3/MM3 (ref 1.7–7)
NEUTROPHILS NFR BLD AUTO: 57.5 % (ref 42.7–76)
NRBC BLD AUTO-RTO: 0 /100 WBC (ref 0–0.2)
PATH REPORT.FINAL DX SPEC: NORMAL
PATH REPORT.GROSS SPEC: NORMAL
PHOSPHATE SERPL-MCNC: 2.9 MG/DL (ref 2.5–4.5)
PLATELET # BLD AUTO: 298 10*3/MM3 (ref 140–450)
PMV BLD AUTO: 9.7 FL (ref 6–12)
POTASSIUM SERPL-SCNC: 3.3 MMOL/L (ref 3.5–5.2)
PROT SERPL-MCNC: 7.8 G/DL (ref 6–8.5)
RBC # BLD AUTO: 4.76 10*6/MM3 (ref 3.77–5.28)
SMALL PLATELETS BLD QL SMEAR: ADEQUATE
SODIUM SERPL-SCNC: 141 MMOL/L (ref 136–145)
WBC MORPH BLD: NORMAL
WBC NRBC COR # BLD AUTO: 7.21 10*3/MM3 (ref 3.4–10.8)

## 2023-12-27 PROCEDURE — 0W3P8ZZ CONTROL BLEEDING IN GASTROINTESTINAL TRACT, VIA NATURAL OR ARTIFICIAL OPENING ENDOSCOPIC: ICD-10-PCS | Performed by: INTERNAL MEDICINE

## 2023-12-27 PROCEDURE — 84100 ASSAY OF PHOSPHORUS: CPT | Performed by: INTERNAL MEDICINE

## 2023-12-27 PROCEDURE — 85025 COMPLETE CBC W/AUTO DIFF WBC: CPT | Performed by: INTERNAL MEDICINE

## 2023-12-27 PROCEDURE — 25010000002 PROPOFOL 10 MG/ML EMULSION

## 2023-12-27 PROCEDURE — 25010000002 FUROSEMIDE PER 20 MG: Performed by: STUDENT IN AN ORGANIZED HEALTH CARE EDUCATION/TRAINING PROGRAM

## 2023-12-27 PROCEDURE — 45385 COLONOSCOPY W/LESION REMOVAL: CPT | Performed by: INTERNAL MEDICINE

## 2023-12-27 PROCEDURE — 0DBK8ZZ EXCISION OF ASCENDING COLON, VIA NATURAL OR ARTIFICIAL OPENING ENDOSCOPIC: ICD-10-PCS | Performed by: INTERNAL MEDICINE

## 2023-12-27 PROCEDURE — 82948 REAGENT STRIP/BLOOD GLUCOSE: CPT

## 2023-12-27 PROCEDURE — 99232 SBSQ HOSP IP/OBS MODERATE 35: CPT | Performed by: INTERNAL MEDICINE

## 2023-12-27 PROCEDURE — 43255 EGD CONTROL BLEEDING ANY: CPT | Performed by: INTERNAL MEDICINE

## 2023-12-27 PROCEDURE — 25010000002 POTASSIUM CHLORIDE 10 MEQ/100ML SOLUTION: Performed by: INTERNAL MEDICINE

## 2023-12-27 PROCEDURE — 97165 OT EVAL LOW COMPLEX 30 MIN: CPT

## 2023-12-27 PROCEDURE — 25810000003 LACTATED RINGERS PER 1000 ML

## 2023-12-27 PROCEDURE — 85007 BL SMEAR W/DIFF WBC COUNT: CPT | Performed by: INTERNAL MEDICINE

## 2023-12-27 PROCEDURE — 80053 COMPREHEN METABOLIC PANEL: CPT | Performed by: INTERNAL MEDICINE

## 2023-12-27 PROCEDURE — 88305 TISSUE EXAM BY PATHOLOGIST: CPT | Performed by: INTERNAL MEDICINE

## 2023-12-27 PROCEDURE — 83735 ASSAY OF MAGNESIUM: CPT | Performed by: INTERNAL MEDICINE

## 2023-12-27 DEVICE — DEV CLIP ENDO RESOLUTION360 CONTRL ROT 235CM: Type: IMPLANTABLE DEVICE | Site: COLON | Status: FUNCTIONAL

## 2023-12-27 RX ORDER — LIDOCAINE HYDROCHLORIDE 20 MG/ML
INJECTION, SOLUTION EPIDURAL; INFILTRATION; INTRACAUDAL; PERINEURAL AS NEEDED
Status: DISCONTINUED | OUTPATIENT
Start: 2023-12-27 | End: 2023-12-27 | Stop reason: SURG

## 2023-12-27 RX ORDER — POTASSIUM CHLORIDE 7.45 MG/ML
10 INJECTION INTRAVENOUS
Status: COMPLETED | OUTPATIENT
Start: 2023-12-27 | End: 2023-12-27

## 2023-12-27 RX ORDER — SODIUM CHLORIDE, SODIUM LACTATE, POTASSIUM CHLORIDE, CALCIUM CHLORIDE 600; 310; 30; 20 MG/100ML; MG/100ML; MG/100ML; MG/100ML
30 INJECTION, SOLUTION INTRAVENOUS CONTINUOUS
Status: DISCONTINUED | OUTPATIENT
Start: 2023-12-27 | End: 2023-12-28

## 2023-12-27 RX ADMIN — PANTOPRAZOLE SODIUM 40 MG: 40 INJECTION, POWDER, FOR SOLUTION INTRAVENOUS at 08:54

## 2023-12-27 RX ADMIN — LIDOCAINE HYDROCHLORIDE 80 MG: 20 INJECTION, SOLUTION EPIDURAL; INFILTRATION; INTRACAUDAL; PERINEURAL at 13:37

## 2023-12-27 RX ADMIN — CITALOPRAM HYDROBROMIDE 10 MG: 20 TABLET ORAL at 22:14

## 2023-12-27 RX ADMIN — NYSTATIN: 100000 POWDER TOPICAL at 22:15

## 2023-12-27 RX ADMIN — NYSTATIN: 100000 POWDER TOPICAL at 11:45

## 2023-12-27 RX ADMIN — DOCUSATE SODIUM 50MG AND SENNOSIDES 8.6MG 2 TABLET: 8.6; 5 TABLET, FILM COATED ORAL at 22:14

## 2023-12-27 RX ADMIN — POTASSIUM CHLORIDE 10 MEQ: 7.46 INJECTION, SOLUTION INTRAVENOUS at 12:44

## 2023-12-27 RX ADMIN — Medication 10 ML: at 22:15

## 2023-12-27 RX ADMIN — PROPOFOL 200 MCG/KG/MIN: 10 INJECTION, EMULSION INTRAVENOUS at 13:37

## 2023-12-27 RX ADMIN — Medication 10 ML: at 08:54

## 2023-12-27 RX ADMIN — POTASSIUM CHLORIDE 10 MEQ: 7.46 INJECTION, SOLUTION INTRAVENOUS at 18:10

## 2023-12-27 RX ADMIN — CARVEDILOL 6.25 MG: 6.25 TABLET, FILM COATED ORAL at 08:54

## 2023-12-27 RX ADMIN — SODIUM CHLORIDE, POTASSIUM CHLORIDE, SODIUM LACTATE AND CALCIUM CHLORIDE 30 ML/HR: 600; 310; 30; 20 INJECTION, SOLUTION INTRAVENOUS at 11:44

## 2023-12-27 RX ADMIN — Medication 250 MG: at 22:14

## 2023-12-27 RX ADMIN — FUROSEMIDE 60 MG: 10 INJECTION, SOLUTION INTRAMUSCULAR; INTRAVENOUS at 04:43

## 2023-12-27 RX ADMIN — POLYETHYLENE GLYCOL 3350, SODIUM SULFATE ANHYDROUS, SODIUM BICARBONATE, SODIUM CHLORIDE, POTASSIUM CHLORIDE 2000 ML: 236; 22.74; 6.74; 5.86; 2.97 POWDER, FOR SOLUTION ORAL at 05:14

## 2023-12-27 RX ADMIN — PANTOPRAZOLE SODIUM 40 MG: 40 INJECTION, POWDER, FOR SOLUTION INTRAVENOUS at 22:14

## 2023-12-27 RX ADMIN — POTASSIUM CHLORIDE 10 MEQ: 7.46 INJECTION, SOLUTION INTRAVENOUS at 16:10

## 2023-12-27 RX ADMIN — POTASSIUM CHLORIDE 10 MEQ: 7.46 INJECTION, SOLUTION INTRAVENOUS at 17:19

## 2023-12-27 RX ADMIN — CARVEDILOL 6.25 MG: 6.25 TABLET, FILM COATED ORAL at 18:10

## 2023-12-27 NOTE — PROGRESS NOTES
[facility]   Hospitalist Progress Note  Date: 2023  Patient Name: Isabell Ribera  : 1945  MRN: [id number]  Date of admission: 2023    Subjective   Subjective     Chief Complaint:   Anemia    Summary:   Isabell Ribera is a 78 y.o. female with history of atrial fibrillation (on Eliquis currently, was on warfarin in the past), type 2 diabetes mellitus with diabetic polyneuropathy, dementia, rectal bleeding and constipation presented after patient was found to have abnormal labs.  Patient is 24/ care.  Patient was seen by primary care physician via telehealth and labs were obtained which was found to have low hemoglobin after which she was directed to the emergency department.  Patient daughter stated that they have not noticed any bleeding, her stool has been darker but not black to dark.  Patient's daughter stated she is not taking any anticoagulation however Eliquis was on her med list for history of atrial fibrillation.  Vitals were stable on presentation.  Lab work showed hemoglobin of 6.8 on presentation.  CT abdomen no acute abdominal/pelvic process; did show small bilateral pleural effusion with cholelithiasis, bilateral nonobstructing renal stones, small hiatal hernia with rectum moderately distended with stool.  GI was consulted and patient was admitted for further evaluation management.  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  Biopsies with normal first portion of duodenum and second portion of duodenum.  No concern for bleeding since admission.  GI following the patient.     Interval Followup:   No acute events overnight, patient did not undergo colonoscopy yesterday secondary to suboptimal prep    Objective   Objective     Vitals:   Temp:  [97.6 °F (36.4 °C)-98.3 °F (36.8 °C)] 98 °F (36.7 °C)  Heart Rate:  [59-71] 71  Resp:  [16-18] 16  BP:  (128-153)/(51-74) 138/51    Physical Exam   GEN: No acute distress  HEENT: Moist mucous membranes  LUNGS: Equal chest rise bilaterally  CARDIAC: Regular rate and rhythm  NEURO: Moving all 4 extremities spontaneously  SKIN: No obvious breakdown    Result Review    Result Review:  I have personally reviewed the results as below  [x]  Laboratory CBC, CMP personally reviewed  CBC          12/25/2023    04:21 12/26/2023    05:22 12/27/2023    05:04   CBC   WBC 6.62  8.05  7.21    RBC 4.16  4.11  4.76    Hemoglobin 9.4  9.3  10.7    Hematocrit 30.6  30.4  35.5    MCV 73.6  74.0  74.6    MCH 22.6  22.6  22.5    MCHC 30.7  30.6  30.1    RDW 22.5  23.5  24.3    Platelets 249  275  298      CMP          12/25/2023    04:21 12/26/2023    05:22 12/27/2023    05:04   CMP   Glucose 90  106  98    BUN 26  25  18    Creatinine 0.88  0.93  0.92    EGFR 67.4  63.0  63.9    Sodium 137  137  141    Potassium 3.3  3.5  3.3    Chloride 100  99  98    Calcium 8.6  8.7  9.0    Total Protein   7.8    Albumin   2.7    Globulin   5.1    Total Bilirubin   0.6    Alkaline Phosphatase   104    AST (SGOT)   25    ALT (SGPT)   6    Albumin/Globulin Ratio   0.5    BUN/Creatinine Ratio 29.5  26.9  19.6    Anion Gap 11.3  9.1  12.2      []  Microbiology  []  Radiology  []  EKG/Telemetry   []  Cardiology/Vascular   []  Pathology  []  Old records  []  Other:    Assessment & Plan   Assessment / Plan     Assessment:    GI bleed    Hypokalemia    Afib    Essential hypertension    Anemia    Plan:  Patient admitted to the hospital for further workup and management of above  Monitor hemoglobin closely, patient status post 2 units PRBCs this admission, hemoglobin stable  Gastroenterology consulted, appreciate their recommendations, planning on colonoscopy today  Patient status post EGD which was significant for medium size hiatal hernia, esophagus, gastritis  N.p.o. currently, can resume diet following colonoscopy  Continue IV PPI  Patient received Kcentra  for INR reversal, continue to hold anticoagulation pending further endoscopic workup  Patient received IV diuretics this a.m., hold next dose as patient underwent bowel prep with GI losses  Continue carvedilol  Continue Lexapro  Continue Macrobid for UTI prophylaxis  CBC, CMP reviewed 12/27/2023  Repeat CBC, CMP, mag and Phos in a.m. 12/27/2023  Replace potassium IV today with 40 mill equivalents  CT scan of the abdomen and pelvis personally reviewed, mild bilateral pleural effusions, cholelithiasis without choledocholithiasis,       Reviewed patients labs and imaging, and discussed with patient and nurse at bedside.    DVT prophylaxis:  Medical and mechanical DVT prophylaxis orders are present.    CODE STATUS:   Level Of Support Discussed With: Patient  Code Status (Patient has no pulse and is not breathing): CPR (Attempt to Resuscitate)  Medical Interventions (Patient has pulse or is breathing): Full Support        Electronically signed by Devon Lee MD, 12/27/23, 11:18 AM EST.

## 2023-12-27 NOTE — PLAN OF CARE
Goal Outcome Evaluation:  Plan of Care Reviewed With: patient, daughter        Progress: no change  Outcome Evaluation: Patient does not present with any significant decline in function and does not require inpatient occupational therapy, therefore they will be discharged from services at this time. She is bedbound and dependent of BADLs at baseline per patient and her daughters. It is recommended she discharge home with 24/7 care when medically able to do so. Patient is aware and agreeable with treatment plan at this time.      Anticipated Discharge Disposition (OT): home with 24/7 care

## 2023-12-27 NOTE — THERAPY EVALUATION
Patient Name: Isabell Ribera  : 1945    MRN: [id number]                              Today's Date: 2023       Admit Date: 2023    Visit Dx:     ICD-10-CM ICD-9-CM   1. Gastrointestinal hemorrhage, unspecified gastrointestinal hemorrhage type  K92.2 578.9   2. Anemia, unspecified type  D64.9 285.9   3. Anemia  D64.9 285.9   4. Difficulty in walking  R26.2 719.7   5. Iron deficiency anemia due to chronic blood loss  D50.0 280.0   6. Decreased activities of daily living (ADL)  Z78.9 V49.89     Patient Active Problem List   Diagnosis    Recurrent urinary tract infection    Hypokalemia    Elevated brain natriuretic peptide (BNP) level    Afib    Lymphedema    Essential hypertension    Anticoagulated on Coumadin    Pleural effusion    Nephrolithiasis    GI bleed    Anemia     Past Medical History:   Diagnosis Date    Afib     Aftercare following ankle joint replacement surgery 2015    Arthritis     Arthritis of knee 2015    CHF (congestive heart failure)     Dementia     Essential hypertension     Gout     HTN (hypertension)     Left knee pain     Lymphedema     Recurrent UTI 2021    Urinary retention 2021     Past Surgical History:   Procedure Laterality Date    CATARACT EXTRACTION Bilateral     ENDOSCOPY N/A 2023    Procedure: ESOPHAGOGASTRODUODENOSCOPY WITH BIOPSIES;  Surgeon: Junior Corbin MD;  Location: [facility] ENDOSCOPY;  Service: Gastroenterology;  Laterality: N/A;  GASTRITIS, MATA'S, HIATAL HERNIA    KIDNEY STONE SURGERY      KNEE SURGERY Left 2007    REPLACEMENT    OTHER SURGICAL HISTORY      JOINT SURGERY, UNSPECIFIED    TUBAL ABDOMINAL LIGATION        General Information       Row Name 23 1454          OT Time and Intention    Document Type evaluation  -LF     Mode of Treatment individual therapy;occupational therapy  -LF       Row Name 23 1454          General Information    Patient Profile Reviewed yes  -LF     Prior Level of Function --   Per pt and her daughters she is bedbound to her hospital bed, dependent of ADLs, incontinent of bowels/bladder, and no home O2.  -     Existing Precautions/Restrictions fall  -     Barriers to Rehab previous functional deficit  -       Row Name 12/27/23 1454          Occupational Profile    Reason for Services/Referral (Occupational Profile) Patient is a 78 year old female admitted to [facility] for abnormal labs on December 22nd, 2023. Occupational therapy consulted due to recent decline in ADLs/functional transfers. No previous occupational therapy services for current condition.  -       Row Name 12/27/23 1454          Living Environment    People in Home child(jonny), adult  -       Row Name 12/27/23 1454          Cognition    Orientation Status (Cognition) oriented x 3  -       Row Name 12/27/23 1454          Safety Issues, Functional Mobility    Impairments Affecting Function (Mobility) balance;endurance/activity tolerance;range of motion (ROM);strength;other (see comments)  at baseline  -               User Key  (r) = Recorded By, (t) = Taken By, (c) = Cosigned By      Initials Name Provider Type     Natasha Torres OT Occupational Therapist                     Mobility/ADL's       Row Name 12/27/23 0444          Bed Mobility    Bed Mobility bed mobility (all) activities  -     All Activities, [place] (Bed Mobility) dependent (less than 25% patient effort)  -     Comment, (Bed Mobility) Further functional transfers deferred d/t patient being bedbound at baseline.  -       Row Name 12/27/23 1511          Activities of Daily Living    BADL Assessment/Intervention bathing;upper body dressing;lower body dressing;grooming;feeding;toileting  -       Row Name 12/27/23 6486          Bathing Assessment/Intervention    [place] Level (Bathing) bathing skills;upper body;lower body;maximum assist (25% patient effort);dependent (less than 25% patient effort)  -       Row Name  12/27/23 1510          Upper Body Dressing Assessment/Training    [place] Level (Upper Body Dressing) upper body dressing skills;maximum assist (25% patient effort)  -       Row Name 12/27/23 1510          Lower Body Dressing Assessment/Training    [place] Level (Lower Body Dressing) lower body dressing skills;dependent (less than 25% patient effort)  -       Row Name 12/27/23 1510          Grooming Assessment/Training    [place] Level (Grooming) grooming skills;minimum assist (75% patient effort)  -[facility] Name 12/27/23 1510          Self-Feeding Assessment/Training    [place] Level (Feeding) feeding skills;minimum assist (75% patient effort)  -[facility] Name 12/27/23 1510          Toileting Assessment/Training    [place] Level (Toileting) toileting skills;dependent (less than 25% patient effort)  -               User Key  (r) = Recorded By, (t) = Taken By, (c) = Cosigned By      Initials Name Provider Type     Natasha Torres OT Occupational Therapist                   Obj/Interventions       [facility] Name 12/27/23 1511          Sensory Assessment (Somatosensory)    Sensory Assessment (Somatosensory) UE sensation intact  -[facility] Name 12/27/23 1511          Vision Assessment/Intervention    Visual Impairment/Limitations WFL  -[facility] Name 12/27/23 1511          Range of Motion Comprehensive    Comment, General Range of Motion Bilateral shoulder flexion impaired d/t arthritis, full AROM distally.  -[facility] Name 12/27/23 1511          Strength Comprehensive (MMT)    Comment, General Manual Muscle Testing (MMT) Assessment 4-/5 distally  -[facility] Name 12/27/23 1511          Motor Skills    Motor Skills coordination;functional endurance  -     Coordination bilateral;upper extremity;minimal impairment  likely d/t proximal weakness/instability  -     Functional Endurance Poor  -[facility] Name 12/27/23 1511          Balance    Comment, Balance Not tested,  impaired at baseline  -               User Key  (r) = Recorded By, (t) = Taken By, (c) = Cosigned By      Initials Name Provider Type     Natasha Torres, OT Occupational Therapist                   Goals/Plan    No documentation.                  Clinical Impression       Row Name 12/27/23 1512          Pain Assessment    Additional Documentation Pain Scale: FACES Pre/Post-Treatment (Group)  -       Row Name 12/27/23 1512          Pain Scale: FACES Pre/Post-Treatment    Pain: FACES Scale, Pretreatment 0-->no hurt  -     Posttreatment Pain Rating 0-->no hurt  -       Row Name 12/27/23 1512          Plan of Care Review    Plan of Care Reviewed With patient;daughter  -     Progress no change  -     Outcome Evaluation Patient does not present with any significant decline in function and does not require inpatient occupational therapy, therefore they will be discharged from services at this time. She is bedbound and dependent of BADLs at baseline per patient and her daughters. It is recommended she discharge home with 24/7 care when medically able to do so. Patient is aware and agreeable with treatment plan at this time.  -       Row Name 12/27/23 1512          Therapy Assessment/Plan (OT)    Patient/Family Therapy Goal Statement (OT) None reported  -     Rehab Potential (OT) other (see comments)  N/A  -     Criteria for Skilled Therapeutic Interventions Met (OT) no problems identified which require skilled intervention  -     Therapy Frequency (OT) evaluation only  -       Row Name 12/27/23 1512          Therapy Plan Review/Discharge Plan (OT)    Anticipated Discharge Disposition (OT) home with 24/7 care  -       Row Name 12/27/23 1512          Vital Signs    O2 Delivery Pre Treatment room air  -     O2 Delivery Intra Treatment room air  -     O2 Delivery Post Treatment room air  -       Row Name 12/27/23 1512          Positioning and Restraints    Pre-Treatment Position in bed  -      Post Treatment Position bed  -LF     In Bed supine;call light within reach;encouraged to call for assist;exit alarm on;with family/caregiver  -LF               User Key  (r) = Recorded By, (t) = Taken By, (c) = Cosigned By      Initials Name Provider Type    LF Natasha Torres, OT Occupational Therapist                   Outcome Measures       Row Name 12/27/23 1514          How much help from another is currently needed...    Putting on and taking off regular lower body clothing? 1  -LF     Bathing (including washing, rinsing, and drying) 1  -LF     Toileting (which includes using toilet bed pan or urinal) 1  -LF     Putting on and taking off regular upper body clothing 2  -LF     Taking care of personal grooming (such as brushing teeth) 3  -LF     Eating meals 3  -LF     AM-PAC 6 Clicks Score (OT) 11  -LF       Row Name 12/27/23 0850          How much help from another person do you currently need...    Turning from your back to your side while in flat bed without using bedrails? 1  -AR     Moving from lying on back to sitting on the side of a flat bed without bedrails? 1  -AR     Moving to and from a bed to a chair (including a wheelchair)? 1  -AR     Standing up from a chair using your arms (e.g., wheelchair, bedside chair)? 1  -AR     Climbing 3-5 steps with a railing? 1  -AR     To walk in hospital room? 1  -AR     AM-PAC 6 Clicks Score (PT) 6  -AR     Highest Level of Mobility Goal 2 --> Bed activities/dependent transfer  -AR       Row Name 12/27/23 1514          Functional Assessment    Outcome Measure Options AM-PAC 6 Clicks Daily Activity (OT);Optimal Instrument  -LF       Row Name 12/27/23 1514          Optimal Instrument    Optimal Instrument Optimal - 3  -LF     Bending/Stooping 5  -LF     Standing 5  -LF     Reaching 4  -LF     From the list, choose the 3 activities you would most like to be able to do without any difficulty Bending/stooping;Standing;Reaching  -LF     Total Score Optimal - 3 14  -LF                User Key  (r) = Recorded By, (t) = Taken By, (c) = Cosigned By      Initials Name Provider Type    Ami Barclay, RN Registered Nurse    Natasha Arzate OT Occupational Therapist                    Occupational Therapy Education       Title: PT OT SLP Therapies (Done)       Topic: Occupational Therapy (Done)       Point: ADL training (Done)       Description:   Instruct learner(s) on proper safety adaptation and remediation techniques during self care or transfers.   Instruct in proper use of assistive devices.                  Learning Progress Summary             Patient Acceptance, E,TB, VU by  at 12/27/2023 1514    Acceptance, E,TB, VU by CF at 12/26/2023 1748   Family Acceptance, E,TB, VU by  at 12/27/2023 1514                         Point: Home exercise program (Done)       Description:   Instruct learner(s) on appropriate technique for monitoring, assisting and/or progressing therapeutic exercises/activities.                  Learning Progress Summary             Patient Acceptance, E,TB, VU by CF at 12/26/2023 1748                         Point: Precautions (Done)       Description:   Instruct learner(s) on prescribed precautions during self-care and functional transfers.                  Learning Progress Summary             Patient Acceptance, E,TB, VU by  at 12/27/2023 1514    Acceptance, E,TB, VU by CF at 12/26/2023 1748   Family Acceptance, E,TB, VU by  at 12/27/2023 1514                         Point: Body mechanics (Done)       Description:   Instruct learner(s) on proper positioning and spine alignment during self-care, functional mobility activities and/or exercises.                  Learning Progress Summary             Patient Acceptance, E,TB, VU by  at 12/27/2023 1514    Acceptance, E,TB, VU by CF at 12/26/2023 1748   Family Acceptance, E,TB, VU by  at 12/27/2023 1514                                         User Key       Initials Effective Dates Name Provider  Type Discipline    LF 06/16/21 -  Natasha Torres OT Occupational Therapist OT    CF 12/19/23 - 12/26/23 Fay Molina RNA Registered Nurse Nurse                  OT Recommendation and Plan  Therapy Frequency (OT): evaluation only  Plan of Care Review  Plan of Care Reviewed With: patient, daughter  Progress: no change  Outcome Evaluation: Patient does not present with any significant decline in function and does not require inpatient occupational therapy, therefore they will be discharged from services at this time. She is bedbound and dependent of BADLs at baseline per patient and her daughters. It is recommended she discharge home with 24/7 care when medically able to do so. Patient is aware and agreeable with treatment plan at this time.     Time Calculation:   Evaluation Complexity (OT)  Review Occupational Profile/Medical/Therapy History Complexity: brief/low complexity  Assessment, Occupational Performance/Identification of Deficit Complexity: 5 or more performance deficits (at baseline)  Clinical Decision Making Complexity (OT): problem focused assessment/low complexity  Overall Complexity of Evaluation (OT): low complexity     Time Calculation- OT       Row Name 12/27/23 1514             Time Calculation- OT    OT Received On 12/27/23  -LF         Untimed Charges    OT Eval/Re-eval Minutes 33  -LF         Total Minutes    Untimed Charges Total Minutes 33  -LF       Total Minutes 33  -LF                User Key  (r) = Recorded By, (t) = Taken By, (c) = Cosigned By      Initials Name Provider Type    LF Natasha Torres OT Occupational Therapist                  Therapy Charges for Today       Code Description Service Date Service Provider Modifiers Qty    [id number] HC OT EVAL LOW COMPLEXITY 3 12/27/2023 Natasha Torres OT GO 1                 Natasha Torres OT  12/27/2023

## 2023-12-27 NOTE — ANESTHESIA POSTPROCEDURE EVALUATION
Patient: Isabell Ribera    Procedure Summary       Date: 12/27/23 Room / Location: [facility] ENDOSCOPY 4 / [facility] ENDOSCOPY    Anesthesia Start: 1335 Anesthesia Stop: 1411    Procedure: COLONOSCOPY WITH POLYPECTOMY/SNARE/CLIP APPLICATION X1/FULGURATION OF AVMS USING APC MACHINE Diagnosis:       Anemia      (Anemia [D64.9])    Surgeons: Junior Corbin MD Provider: Lorena Diehl CRNA    Anesthesia Type: general ASA Status: 3            Anesthesia Type: general    Vitals  Vitals Value Taken Time   BP 78/43 12/27/23 1415   Temp 36.3 °C (97.3 °F) 12/27/23 1408   Pulse 80 12/27/23 1417   Resp 16 12/27/23 1415   SpO2 91 % 12/27/23 1417   Vitals shown include unfiled device data.        Post Anesthesia Care and Evaluation    Patient location during evaluation: bedside  Patient participation: complete - patient participated  Level of consciousness: awake  Pain management: adequate    Airway patency: patent  Anesthetic complications: No anesthetic complications  PONV Status: none  Cardiovascular status: acceptable and stable  Respiratory status: acceptable  Hydration status: acceptable

## 2023-12-27 NOTE — PLAN OF CARE
Goal Outcome Evaluation:  Plan of Care Reviewed With: patient                VSS, room air. Bedrest with Q2 turns and skin care throughout shift. Colonoscopy today. Tolerated well. Resumed regular diet post procedure, tolerating well. No s/s of bleeding this shift. Electrolytes replaced per MAR. Continued [place] catheter for incontinence and skin damage. Patient alert and oriented, able to make needs known. Family at bedside throughout shift. No complaints at this time. Will CTM and provide care.

## 2023-12-28 LAB
ALBUMIN SERPL-MCNC: 2.4 G/DL (ref 3.5–5.2)
ALBUMIN/GLOB SERPL: 0.6 G/DL
ALP SERPL-CCNC: 86 U/L (ref 39–117)
ALT SERPL W P-5'-P-CCNC: 5 U/L (ref 1–33)
ANION GAP SERPL CALCULATED.3IONS-SCNC: 8.9 MMOL/L (ref 5–15)
AST SERPL-CCNC: 21 U/L (ref 1–32)
BASOPHILS # BLD AUTO: 0.04 10*3/MM3 (ref 0–0.2)
BASOPHILS NFR BLD AUTO: 0.5 % (ref 0–1.5)
BILIRUB SERPL-MCNC: 0.6 MG/DL (ref 0–1.2)
BUN SERPL-MCNC: 15 MG/DL (ref 8–23)
BUN/CREAT SERPL: 16.7 (ref 7–25)
CALCIUM SPEC-SCNC: 8.4 MG/DL (ref 8.6–10.5)
CHLORIDE SERPL-SCNC: 97 MMOL/L (ref 98–107)
CO2 SERPL-SCNC: 29.1 MMOL/L (ref 22–29)
CREAT SERPL-MCNC: 0.9 MG/DL (ref 0.57–1)
DEPRECATED RDW RBC AUTO: 63.6 FL (ref 37–54)
EGFRCR SERPLBLD CKD-EPI 2021: 65.6 ML/MIN/1.73
EOSINOPHIL # BLD AUTO: 0.36 10*3/MM3 (ref 0–0.4)
EOSINOPHIL NFR BLD AUTO: 4.6 % (ref 0.3–6.2)
ERYTHROCYTE [DISTWIDTH] IN BLOOD BY AUTOMATED COUNT: 24.7 % (ref 12.3–15.4)
GLOBULIN UR ELPH-MCNC: 4.3 GM/DL
GLUCOSE SERPL-MCNC: 100 MG/DL (ref 65–99)
HCT VFR BLD AUTO: 30.7 % (ref 34–46.6)
HGB BLD-MCNC: 9.6 G/DL (ref 12–15.9)
IMM GRANULOCYTES # BLD AUTO: 0.02 10*3/MM3 (ref 0–0.05)
IMM GRANULOCYTES NFR BLD AUTO: 0.3 % (ref 0–0.5)
LYMPHOCYTES # BLD AUTO: 1.79 10*3/MM3 (ref 0.7–3.1)
LYMPHOCYTES NFR BLD AUTO: 22.7 % (ref 19.6–45.3)
MAGNESIUM SERPL-MCNC: 1.6 MG/DL (ref 1.6–2.4)
MCH RBC QN AUTO: 23.2 PG (ref 26.6–33)
MCHC RBC AUTO-ENTMCNC: 31.3 G/DL (ref 31.5–35.7)
MCV RBC AUTO: 74.2 FL (ref 79–97)
MONOCYTES # BLD AUTO: 1.03 10*3/MM3 (ref 0.1–0.9)
MONOCYTES NFR BLD AUTO: 13.1 % (ref 5–12)
NEUTROPHILS NFR BLD AUTO: 4.63 10*3/MM3 (ref 1.7–7)
NEUTROPHILS NFR BLD AUTO: 58.8 % (ref 42.7–76)
NRBC BLD AUTO-RTO: 0 /100 WBC (ref 0–0.2)
PHOSPHATE SERPL-MCNC: 2.6 MG/DL (ref 2.5–4.5)
PLATELET # BLD AUTO: 243 10*3/MM3 (ref 140–450)
PMV BLD AUTO: 9.3 FL (ref 6–12)
POTASSIUM SERPL-SCNC: 3.3 MMOL/L (ref 3.5–5.2)
PROT SERPL-MCNC: 6.7 G/DL (ref 6–8.5)
RBC # BLD AUTO: 4.14 10*6/MM3 (ref 3.77–5.28)
SODIUM SERPL-SCNC: 135 MMOL/L (ref 136–145)
WBC NRBC COR # BLD AUTO: 7.87 10*3/MM3 (ref 3.4–10.8)

## 2023-12-28 PROCEDURE — 99232 SBSQ HOSP IP/OBS MODERATE 35: CPT | Performed by: INTERNAL MEDICINE

## 2023-12-28 PROCEDURE — 25810000003 LACTATED RINGERS PER 1000 ML: Performed by: INTERNAL MEDICINE

## 2023-12-28 PROCEDURE — 83735 ASSAY OF MAGNESIUM: CPT | Performed by: INTERNAL MEDICINE

## 2023-12-28 PROCEDURE — 84100 ASSAY OF PHOSPHORUS: CPT | Performed by: INTERNAL MEDICINE

## 2023-12-28 PROCEDURE — 80053 COMPREHEN METABOLIC PANEL: CPT | Performed by: INTERNAL MEDICINE

## 2023-12-28 PROCEDURE — 85025 COMPLETE CBC W/AUTO DIFF WBC: CPT | Performed by: INTERNAL MEDICINE

## 2023-12-28 RX ORDER — MULTIVIT AND MINERALS-FERROUS GLUCONATE 9 MG IRON/15 ML ORAL LIQUID 9 MG/15 ML
15 LIQUID (ML) ORAL DAILY
Status: DISCONTINUED | OUTPATIENT
Start: 2023-12-28 | End: 2024-01-03 | Stop reason: HOSPADM

## 2023-12-28 RX ORDER — POTASSIUM CHLORIDE 750 MG/1
40 CAPSULE, EXTENDED RELEASE ORAL EVERY 4 HOURS
Status: COMPLETED | OUTPATIENT
Start: 2023-12-28 | End: 2023-12-28

## 2023-12-28 RX ADMIN — CARVEDILOL 6.25 MG: 6.25 TABLET, FILM COATED ORAL at 17:57

## 2023-12-28 RX ADMIN — NYSTATIN: 100000 POWDER TOPICAL at 22:23

## 2023-12-28 RX ADMIN — Medication 10 ML: at 09:07

## 2023-12-28 RX ADMIN — Medication 10 ML: at 20:10

## 2023-12-28 RX ADMIN — CITALOPRAM HYDROBROMIDE 10 MG: 20 TABLET ORAL at 22:23

## 2023-12-28 RX ADMIN — POTASSIUM CHLORIDE 40 MEQ: 10 CAPSULE, COATED, EXTENDED RELEASE ORAL at 11:46

## 2023-12-28 RX ADMIN — PANTOPRAZOLE SODIUM 40 MG: 40 INJECTION, POWDER, FOR SOLUTION INTRAVENOUS at 20:10

## 2023-12-28 RX ADMIN — POTASSIUM CHLORIDE 40 MEQ: 10 CAPSULE, COATED, EXTENDED RELEASE ORAL at 14:39

## 2023-12-28 RX ADMIN — APIXABAN 5 MG: 5 TABLET, FILM COATED ORAL at 20:10

## 2023-12-28 RX ADMIN — Medication 15 ML: at 11:46

## 2023-12-28 RX ADMIN — Medication 250 MG: at 20:10

## 2023-12-28 RX ADMIN — SODIUM CHLORIDE, POTASSIUM CHLORIDE, SODIUM LACTATE AND CALCIUM CHLORIDE 30 ML/HR: 600; 310; 30; 20 INJECTION, SOLUTION INTRAVENOUS at 04:21

## 2023-12-28 RX ADMIN — Medication 250 MG: at 09:06

## 2023-12-28 RX ADMIN — NYSTATIN: 100000 POWDER TOPICAL at 09:07

## 2023-12-28 RX ADMIN — PANTOPRAZOLE SODIUM 40 MG: 40 INJECTION, POWDER, FOR SOLUTION INTRAVENOUS at 09:07

## 2023-12-28 RX ADMIN — POTASSIUM CHLORIDE 40 MEQ: 10 CAPSULE, COATED, EXTENDED RELEASE ORAL at 17:57

## 2023-12-28 RX ADMIN — CARVEDILOL 6.25 MG: 6.25 TABLET, FILM COATED ORAL at 09:07

## 2023-12-28 RX ADMIN — NITROFURANTOIN MONOHYDRATE/MACROCRYSTALLINE 100 MG: 25; 75 CAPSULE ORAL at 09:06

## 2023-12-28 NOTE — SIGNIFICANT NOTE
12/28/23 1055   Coping/Psychosocial   Observed Emotional State calm;cooperative   Verbalized Emotional State relief;hopefulness   Trust Relationship/Rapport empathic listening provided   Involvement in Care interacting with patient   Additional Documentation Spiritual Care (Group)   Spiritual Care   Use of Spiritual Resources spirituality for coping, indicated strong use of   Spiritual Care Source  initiative   Spiritual Care Follow-Up follow-up, none required as presently assessed   Response to Spiritual Care receptive of support;engaged in conversation   Spiritual Care Interventions supportive conversation provided;prayer support provided   Spiritual Care Visit Type initial   Receptivity to Spiritual Care visit welcomed

## 2023-12-28 NOTE — PROGRESS NOTES
[facility]   Hospitalist Progress Note  Date: 2023  Patient Name: Isabell Ribera  : 1945  MRN: [id number]  Date of admission: 2023    Subjective   Subjective     Chief Complaint:   Anemia    Summary:   Isabell Ribera is a 78 y.o. female with history of atrial fibrillation (on Eliquis currently, was on warfarin in the past), type 2 diabetes mellitus with diabetic polyneuropathy, dementia, rectal bleeding and constipation presented after patient was found to have abnormal labs.  Patient is  care.  Patient was seen by primary care physician via telehealth and labs were obtained which was found to have low hemoglobin after which she was directed to the emergency department.  Patient daughter stated that they have not noticed any bleeding, her stool has been darker but not black to dark.  Patient's daughter stated she is not taking any anticoagulation however Eliquis was on her med list for history of atrial fibrillation.  Vitals were stable on presentation.  Lab work showed hemoglobin of 6.8 on presentation.  CT abdomen no acute abdominal/pelvic process; did show small bilateral pleural effusion with cholelithiasis, bilateral nonobstructing renal stones, small hiatal hernia with rectum moderately distended with stool.  GI was consulted and patient was admitted for further evaluation management.  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  Biopsies with normal first portion of duodenum and second portion of duodenum.  No concern for bleeding since admission.  GI following the patient.     Interval Followup:   No acute events overnight, hemoglobin stable, patient underwent colonoscopy yesterday with polyp removed and AVM ablated    Objective   Objective     Vitals:   Temp:  [97.3 °F (36.3 °C)-99.3 °F (37.4 °C)] 98.8 °F (37.1 °C)  Heart Rate:  [66-79]  75  Resp:  [12-17] 16  BP: ()/(43-87) 112/87  Flow (L/min):  [4] 4    Physical Exam   GEN: No acute distress  HEENT: Moist mucous membranes  LUNGS: Equal chest rise bilaterally  CARDIAC: Regular rate and rhythm  NEURO: Moving all 4 extremities spontaneously  SKIN: No obvious breakdown    Result Review    Result Review:  I have personally reviewed the results as below  [x]  Laboratory CBC, CMP personally reviewed  CBC          12/26/2023    05:22 12/27/2023    05:04 12/28/2023    05:04   CBC   WBC 8.05  7.21  7.87    RBC 4.11  4.76  4.14    Hemoglobin 9.3  10.7  9.6    Hematocrit 30.4  35.5  30.7    MCV 74.0  74.6  74.2    MCH 22.6  22.5  23.2    MCHC 30.6  30.1  31.3    RDW 23.5  24.3  24.7    Platelets 275  298  243      CMP          12/26/2023    05:22 12/27/2023    05:04 12/28/2023    05:04   CMP   Glucose 106  98  100    BUN 25  18  15    Creatinine 0.93  0.92  0.90    EGFR 63.0  63.9  65.6    Sodium 137  141  135    Potassium 3.5  3.3  3.3    Chloride 99  98  97    Calcium 8.7  9.0  8.4    Total Protein  7.8  6.7    Albumin  2.7  2.4    Globulin  5.1  4.3    Total Bilirubin  0.6  0.6    Alkaline Phosphatase  104  86    AST (SGOT)  25  21    ALT (SGPT)  6  5    Albumin/Globulin Ratio  0.5  0.6    BUN/Creatinine Ratio 26.9  19.6  16.7    Anion Gap 9.1  12.2  8.9      []  Microbiology  []  Radiology  []  EKG/Telemetry   []  Cardiology/Vascular   []  Pathology  []  Old records  []  Other:    Assessment & Plan   Assessment / Plan     Assessment:    GI bleed    Hypokalemia    Afib    Essential hypertension    Anemia    Plan:  Patient admitted to the hospital for further workup and management of above  Monitor hemoglobin closely, patient status post 2 units PRBCs this admission, hemoglobin stable  Gastroenterology consulted, appreciate their recommendations, planning on colonoscopy today  Patient status post EGD which was significant for medium size hiatal hernia, esophagus, gastritis  Colonoscopy with polyp  removed as above  Continue IV PPI  Replace potassium orally  Resume diuretics tomorrow  Continue carvedilol  Continue Lexapro  Continue Macrobid for UTI prophylaxis  CBC, CMP reviewed 12/28/2023  Repeat CBC, CMP, mag and Phos in a.m. 12/28/2023  Replace potassium IV today with 40 mill equivalents  CT scan of the abdomen and pelvis personally reviewed, mild bilateral pleural effusions, cholelithiasis without choledocholithiasis,       Reviewed patients labs and imaging, and discussed with patient and nurse at bedside.    DVT prophylaxis:  Medical and mechanical DVT prophylaxis orders are present.    CODE STATUS:   Level Of Support Discussed With: Patient  Code Status (Patient has no pulse and is not breathing): CPR (Attempt to Resuscitate)  Medical Interventions (Patient has pulse or is breathing): Full Support        Electronically signed by Devon Lee MD, 12/28/23, 12:17 PM EST.

## 2023-12-28 NOTE — PLAN OF CARE
Goal Outcome Evaluation:        No significant change noted this shift. VSS. Will continue POC.

## 2023-12-28 NOTE — PLAN OF CARE
Goal Outcome Evaluation:  Plan of Care Reviewed With: patient        Progress: improving          Patient pleasant throughout the shift. No complaints of nausea. Complaints of discomfort with wound and pericare. Family visited at bedside today. Bed in lowest locked position with call light and tray table within reach.

## 2023-12-29 LAB
ALBUMIN SERPL-MCNC: 2.4 G/DL (ref 3.5–5.2)
ALBUMIN/GLOB SERPL: 0.6 G/DL
ALP SERPL-CCNC: 212 U/L (ref 39–117)
ALT SERPL W P-5'-P-CCNC: 27 U/L (ref 1–33)
ANION GAP SERPL CALCULATED.3IONS-SCNC: 9.6 MMOL/L (ref 5–15)
AST SERPL-CCNC: 121 U/L (ref 1–32)
BASO STIPL COARSE BLD QL SMEAR: NORMAL
BASOPHILS # BLD AUTO: 0.04 10*3/MM3 (ref 0–0.2)
BASOPHILS NFR BLD AUTO: 0.8 % (ref 0–1.5)
BILIRUB SERPL-MCNC: 0.7 MG/DL (ref 0–1.2)
BUN SERPL-MCNC: 20 MG/DL (ref 8–23)
BUN/CREAT SERPL: 22.2 (ref 7–25)
CALCIUM SPEC-SCNC: 8.3 MG/DL (ref 8.6–10.5)
CHLORIDE SERPL-SCNC: 101 MMOL/L (ref 98–107)
CO2 SERPL-SCNC: 27.4 MMOL/L (ref 22–29)
CREAT SERPL-MCNC: 0.9 MG/DL (ref 0.57–1)
CYTO UR: NORMAL
DEPRECATED RDW RBC AUTO: 64.6 FL (ref 37–54)
EGFRCR SERPLBLD CKD-EPI 2021: 65.6 ML/MIN/1.73
EOSINOPHIL # BLD AUTO: 0.33 10*3/MM3 (ref 0–0.4)
EOSINOPHIL NFR BLD AUTO: 7 % (ref 0.3–6.2)
ERYTHROCYTE [DISTWIDTH] IN BLOOD BY AUTOMATED COUNT: 24.6 % (ref 12.3–15.4)
GLOBULIN UR ELPH-MCNC: 4.2 GM/DL
GLUCOSE SERPL-MCNC: 92 MG/DL (ref 65–99)
HCT VFR BLD AUTO: 31.8 % (ref 34–46.6)
HGB BLD-MCNC: 9.4 G/DL (ref 12–15.9)
HYPOCHROMIA BLD QL: NORMAL
IMM GRANULOCYTES # BLD AUTO: 0.01 10*3/MM3 (ref 0–0.05)
IMM GRANULOCYTES NFR BLD AUTO: 0.2 % (ref 0–0.5)
LAB AP CASE REPORT: NORMAL
LAB AP CLINICAL INFORMATION: NORMAL
LYMPHOCYTES # BLD AUTO: 1.08 10*3/MM3 (ref 0.7–3.1)
LYMPHOCYTES NFR BLD AUTO: 22.9 % (ref 19.6–45.3)
MAGNESIUM SERPL-MCNC: 1.7 MG/DL (ref 1.6–2.4)
MCH RBC QN AUTO: 22.4 PG (ref 26.6–33)
MCHC RBC AUTO-ENTMCNC: 29.6 G/DL (ref 31.5–35.7)
MCV RBC AUTO: 75.7 FL (ref 79–97)
MICROCYTES BLD QL: NORMAL
MONOCYTES # BLD AUTO: 0.57 10*3/MM3 (ref 0.1–0.9)
MONOCYTES NFR BLD AUTO: 12.1 % (ref 5–12)
NEUTROPHILS NFR BLD AUTO: 2.68 10*3/MM3 (ref 1.7–7)
NEUTROPHILS NFR BLD AUTO: 57 % (ref 42.7–76)
NRBC BLD AUTO-RTO: 0 /100 WBC (ref 0–0.2)
PATH REPORT.FINAL DX SPEC: NORMAL
PATH REPORT.GROSS SPEC: NORMAL
PLATELET # BLD AUTO: 220 10*3/MM3 (ref 140–450)
PMV BLD AUTO: 9.5 FL (ref 6–12)
POTASSIUM SERPL-SCNC: 4.3 MMOL/L (ref 3.5–5.2)
PROT SERPL-MCNC: 6.6 G/DL (ref 6–8.5)
RBC # BLD AUTO: 4.2 10*6/MM3 (ref 3.77–5.28)
SMALL PLATELETS BLD QL SMEAR: ADEQUATE
SODIUM SERPL-SCNC: 138 MMOL/L (ref 136–145)
WBC MORPH BLD: NORMAL
WBC NRBC COR # BLD AUTO: 4.71 10*3/MM3 (ref 3.4–10.8)

## 2023-12-29 PROCEDURE — 83735 ASSAY OF MAGNESIUM: CPT | Performed by: INTERNAL MEDICINE

## 2023-12-29 PROCEDURE — 25010000002 MAGNESIUM SULFATE IN D5W 1G/100ML (PREMIX) 1-5 GM/100ML-% SOLUTION: Performed by: INTERNAL MEDICINE

## 2023-12-29 PROCEDURE — 85007 BL SMEAR W/DIFF WBC COUNT: CPT | Performed by: INTERNAL MEDICINE

## 2023-12-29 PROCEDURE — 99232 SBSQ HOSP IP/OBS MODERATE 35: CPT | Performed by: INTERNAL MEDICINE

## 2023-12-29 PROCEDURE — 25010000002 ONDANSETRON PER 1 MG: Performed by: INTERNAL MEDICINE

## 2023-12-29 PROCEDURE — 80053 COMPREHEN METABOLIC PANEL: CPT | Performed by: INTERNAL MEDICINE

## 2023-12-29 PROCEDURE — 85025 COMPLETE CBC W/AUTO DIFF WBC: CPT | Performed by: INTERNAL MEDICINE

## 2023-12-29 RX ORDER — FUROSEMIDE 40 MG/1
40 TABLET ORAL DAILY
Status: DISCONTINUED | OUTPATIENT
Start: 2023-12-29 | End: 2023-12-30

## 2023-12-29 RX ORDER — MAGNESIUM SULFATE 1 G/100ML
1 INJECTION INTRAVENOUS ONCE
Status: COMPLETED | OUTPATIENT
Start: 2023-12-29 | End: 2023-12-29

## 2023-12-29 RX ORDER — PANTOPRAZOLE SODIUM 40 MG/1
40 TABLET, DELAYED RELEASE ORAL
Status: DISCONTINUED | OUTPATIENT
Start: 2023-12-30 | End: 2024-01-03 | Stop reason: HOSPADM

## 2023-12-29 RX ADMIN — PANTOPRAZOLE SODIUM 40 MG: 40 INJECTION, POWDER, FOR SOLUTION INTRAVENOUS at 08:51

## 2023-12-29 RX ADMIN — Medication 10 ML: at 08:51

## 2023-12-29 RX ADMIN — MAGNESIUM SULFATE 1 G: 1 INJECTION INTRAVENOUS at 12:46

## 2023-12-29 RX ADMIN — Medication 10 ML: at 21:10

## 2023-12-29 RX ADMIN — NITROFURANTOIN MONOHYDRATE/MACROCRYSTALLINE 100 MG: 25; 75 CAPSULE ORAL at 08:51

## 2023-12-29 RX ADMIN — CITALOPRAM HYDROBROMIDE 10 MG: 20 TABLET ORAL at 21:05

## 2023-12-29 RX ADMIN — ONDANSETRON 4 MG: 2 INJECTION INTRAMUSCULAR; INTRAVENOUS at 16:44

## 2023-12-29 RX ADMIN — APIXABAN 5 MG: 5 TABLET, FILM COATED ORAL at 08:51

## 2023-12-29 RX ADMIN — Medication 15 ML: at 08:51

## 2023-12-29 RX ADMIN — Medication 250 MG: at 21:05

## 2023-12-29 RX ADMIN — APIXABAN 5 MG: 5 TABLET, FILM COATED ORAL at 21:05

## 2023-12-29 RX ADMIN — NYSTATIN: 100000 POWDER TOPICAL at 09:15

## 2023-12-29 RX ADMIN — FUROSEMIDE 40 MG: 40 TABLET ORAL at 12:45

## 2023-12-29 RX ADMIN — NYSTATIN: 100000 POWDER TOPICAL at 21:06

## 2023-12-29 RX ADMIN — CARVEDILOL 6.25 MG: 6.25 TABLET, FILM COATED ORAL at 08:51

## 2023-12-29 RX ADMIN — Medication 250 MG: at 08:51

## 2023-12-29 NOTE — PLAN OF CARE
Goal Outcome Evaluation:  Plan of Care Reviewed With: patient        Progress: improving          Patient pleasant throughout the shift. Intermittent confusion and forgetfullness but at patient's baseline. No reports of nausea. Reports of intermittent discomfort with wound care. Bed in lowest locked position. Bed alert in place. Reinforced to call out for assistance. Call light and tray table within reach.

## 2023-12-29 NOTE — PROGRESS NOTES
[facility]   Hospitalist Progress Note  Date: 2023  Patient Name: Isabell Ribera  : 1945  MRN: [id number]  Date of admission: 2023    Subjective   Subjective     Chief Complaint:   Anemia    Summary:   Isabell Ribera is a 78 y.o. female with history of atrial fibrillation (on Eliquis currently, was on warfarin in the past), type 2 diabetes mellitus with diabetic polyneuropathy, dementia, rectal bleeding and constipation presented after patient was found to have abnormal labs.  Patient is  care.  Patient was seen by primary care physician via telehealth and labs were obtained which was found to have low hemoglobin after which she was directed to the emergency department.  Patient daughter stated that they have not noticed any bleeding, her stool has been darker but not black to dark.  Patient's daughter stated she is not taking any anticoagulation however Eliquis was on her med list for history of atrial fibrillation.  Vitals were stable on presentation.  Lab work showed hemoglobin of 6.8 on presentation.  CT abdomen no acute abdominal/pelvic process; did show small bilateral pleural effusion with cholelithiasis, bilateral nonobstructing renal stones, small hiatal hernia with rectum moderately distended with stool.  GI was consulted and patient was admitted for further evaluation management.  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  Biopsies with normal first portion of duodenum and second portion of duodenum.  No concern for bleeding since admission.  GI following the patient.     Interval Followup:   No acute events overnight, patient seems to have tolerated blood thinners, hemoglobin remained stable, Espinosa catheter removed and patient voiding bladder well    Objective   Objective     Vitals:   Temp:  [98 °F (36.7 °C)-99.1 °F (37.3 °C)] 98.1 °F  (36.7 °C)  Heart Rate:  [57-61] 57  Resp:  [16-18] 16  BP: (110-136)/(42-65) 121/42    Physical Exam   GEN: No acute distress  HEENT: Moist mucous membranes  LUNGS: Equal chest rise bilaterally  CARDIAC: Regular rate and rhythm  NEURO: Moving all 4 extremities spontaneously  SKIN: No obvious breakdown    Result Review    Result Review:  I have personally reviewed the results as below  [x]  Laboratory CBC, CMP personally reviewed  CBC          12/27/2023    05:04 12/28/2023    05:04 12/29/2023    07:54   CBC   WBC 7.21  7.87  4.71    RBC 4.76  4.14  4.20    Hemoglobin 10.7  9.6  9.4    Hematocrit 35.5  30.7  31.8    MCV 74.6  74.2  75.7    MCH 22.5  23.2  22.4    MCHC 30.1  31.3  29.6    RDW 24.3  24.7  24.6    Platelets 298  243  220      CMP          12/27/2023    05:04 12/28/2023    05:04 12/29/2023    07:54   CMP   Glucose 98  100  92    BUN 18  15  20    Creatinine 0.92  0.90  0.90    EGFR 63.9  65.6  65.6    Sodium 141  135  138    Potassium 3.3  3.3  4.3    Chloride 98  97  101    Calcium 9.0  8.4  8.3    Total Protein 7.8  6.7  6.6    Albumin 2.7  2.4  2.4    Globulin 5.1  4.3  4.2    Total Bilirubin 0.6  0.6  0.7    Alkaline Phosphatase 104  86  212    AST (SGOT) 25  21  121    ALT (SGPT) 6  5  27    Albumin/Globulin Ratio 0.5  0.6  0.6    BUN/Creatinine Ratio 19.6  16.7  22.2    Anion Gap 12.2  8.9  9.6      []  Microbiology  []  Radiology  []  EKG/Telemetry   []  Cardiology/Vascular   []  Pathology  []  Old records  []  Other:    Assessment & Plan   Assessment / Plan     Assessment:    GI bleed    Hypokalemia    Afib    Essential hypertension    Anemia    Plan:  Patient admitted to the hospital for further workup and management of above  Monitor hemoglobin closely, patient status post 2 units PRBCs this admission, hemoglobin stable  Gastroenterology consulted, appreciate their recommendations, planning on colonoscopy today  Patient status post EGD which was significant for medium size hiatal hernia,  esophagus, gastritis  Colonoscopy with polyp removed as above  Transition to oral PPI  Replace potassium orally  Resume diuretics with oral Lasix 40 mg once daily today  Continue carvedilol  Continue Lexapro  Continue Macrobid for UTI prophylaxis  CBC, CMP reviewed 12/29/2023  Repeat CBC, CMP, mag and Phos in a.m. 12/29/2023  Potassium improved after replacement, give 1 g IV magnesium today  CT scan of the abdomen and pelvis personally reviewed, mild bilateral pleural effusions, cholelithiasis without choledocholithiasis,       Reviewed patients labs and imaging, and discussed with patient and nurse at bedside.    DVT prophylaxis:  Medical and mechanical DVT prophylaxis orders are present.    CODE STATUS:   Level Of Support Discussed With: Patient  Code Status (Patient has no pulse and is not breathing): CPR (Attempt to Resuscitate)  Medical Interventions (Patient has pulse or is breathing): Full Support        Electronically signed by Devon Lee MD, 12/29/23, 12:22 PM EST.

## 2023-12-30 ENCOUNTER — APPOINTMENT (OUTPATIENT)
Dept: ULTRASOUND IMAGING | Facility: HOSPITAL | Age: 78
DRG: 379 | End: 2023-12-30
Payer: MEDICARE

## 2023-12-30 LAB
ALBUMIN SERPL-MCNC: 2.4 G/DL (ref 3.5–5.2)
ALBUMIN/GLOB SERPL: 0.5 G/DL
ALP SERPL-CCNC: 269 U/L (ref 39–117)
ALT SERPL W P-5'-P-CCNC: 71 U/L (ref 1–33)
ANION GAP SERPL CALCULATED.3IONS-SCNC: 8.4 MMOL/L (ref 5–15)
AST SERPL-CCNC: 303 U/L (ref 1–32)
BASOPHILS # BLD AUTO: 0.03 10*3/MM3 (ref 0–0.2)
BASOPHILS NFR BLD AUTO: 0.6 % (ref 0–1.5)
BILIRUB SERPL-MCNC: 2 MG/DL (ref 0–1.2)
BUN SERPL-MCNC: 20 MG/DL (ref 8–23)
BUN/CREAT SERPL: 19.8 (ref 7–25)
CALCIUM SPEC-SCNC: 8.5 MG/DL (ref 8.6–10.5)
CHLORIDE SERPL-SCNC: 100 MMOL/L (ref 98–107)
CO2 SERPL-SCNC: 26.6 MMOL/L (ref 22–29)
CREAT SERPL-MCNC: 1.01 MG/DL (ref 0.57–1)
DEPRECATED RDW RBC AUTO: 66 FL (ref 37–54)
EGFRCR SERPLBLD CKD-EPI 2021: 57.1 ML/MIN/1.73
EOSINOPHIL # BLD AUTO: 0.3 10*3/MM3 (ref 0–0.4)
EOSINOPHIL NFR BLD AUTO: 5.9 % (ref 0.3–6.2)
ERYTHROCYTE [DISTWIDTH] IN BLOOD BY AUTOMATED COUNT: 24.9 % (ref 12.3–15.4)
GLOBULIN UR ELPH-MCNC: 4.4 GM/DL
GLUCOSE SERPL-MCNC: 93 MG/DL (ref 65–99)
HCT VFR BLD AUTO: 32.6 % (ref 34–46.6)
HGB BLD-MCNC: 9.7 G/DL (ref 12–15.9)
IMM GRANULOCYTES # BLD AUTO: 0.01 10*3/MM3 (ref 0–0.05)
IMM GRANULOCYTES NFR BLD AUTO: 0.2 % (ref 0–0.5)
LYMPHOCYTES # BLD AUTO: 1.17 10*3/MM3 (ref 0.7–3.1)
LYMPHOCYTES NFR BLD AUTO: 23 % (ref 19.6–45.3)
MAGNESIUM SERPL-MCNC: 1.8 MG/DL (ref 1.6–2.4)
MCH RBC QN AUTO: 22.9 PG (ref 26.6–33)
MCHC RBC AUTO-ENTMCNC: 29.8 G/DL (ref 31.5–35.7)
MCV RBC AUTO: 76.9 FL (ref 79–97)
MONOCYTES # BLD AUTO: 0.52 10*3/MM3 (ref 0.1–0.9)
MONOCYTES NFR BLD AUTO: 10.2 % (ref 5–12)
NEUTROPHILS NFR BLD AUTO: 3.06 10*3/MM3 (ref 1.7–7)
NEUTROPHILS NFR BLD AUTO: 60.1 % (ref 42.7–76)
NRBC BLD AUTO-RTO: 0 /100 WBC (ref 0–0.2)
PHOSPHATE SERPL-MCNC: 3 MG/DL (ref 2.5–4.5)
PLATELET # BLD AUTO: 227 10*3/MM3 (ref 140–450)
PMV BLD AUTO: 9.8 FL (ref 6–12)
POTASSIUM SERPL-SCNC: 4 MMOL/L (ref 3.5–5.2)
PROT SERPL-MCNC: 6.8 G/DL (ref 6–8.5)
RBC # BLD AUTO: 4.24 10*6/MM3 (ref 3.77–5.28)
SODIUM SERPL-SCNC: 135 MMOL/L (ref 136–145)
WBC NRBC COR # BLD AUTO: 5.09 10*3/MM3 (ref 3.4–10.8)

## 2023-12-30 PROCEDURE — 99232 SBSQ HOSP IP/OBS MODERATE 35: CPT | Performed by: INTERNAL MEDICINE

## 2023-12-30 PROCEDURE — 76705 ECHO EXAM OF ABDOMEN: CPT

## 2023-12-30 PROCEDURE — 84100 ASSAY OF PHOSPHORUS: CPT | Performed by: INTERNAL MEDICINE

## 2023-12-30 PROCEDURE — 83735 ASSAY OF MAGNESIUM: CPT | Performed by: INTERNAL MEDICINE

## 2023-12-30 PROCEDURE — 80053 COMPREHEN METABOLIC PANEL: CPT | Performed by: INTERNAL MEDICINE

## 2023-12-30 PROCEDURE — 85025 COMPLETE CBC W/AUTO DIFF WBC: CPT | Performed by: INTERNAL MEDICINE

## 2023-12-30 RX ADMIN — PANTOPRAZOLE SODIUM 40 MG: 40 TABLET, DELAYED RELEASE ORAL at 05:45

## 2023-12-30 RX ADMIN — CARVEDILOL 6.25 MG: 6.25 TABLET, FILM COATED ORAL at 09:20

## 2023-12-30 RX ADMIN — Medication 250 MG: at 09:19

## 2023-12-30 RX ADMIN — Medication 10 ML: at 09:20

## 2023-12-30 RX ADMIN — CITALOPRAM HYDROBROMIDE 10 MG: 20 TABLET ORAL at 21:15

## 2023-12-30 RX ADMIN — Medication 15 ML: at 09:21

## 2023-12-30 RX ADMIN — CARVEDILOL 6.25 MG: 6.25 TABLET, FILM COATED ORAL at 17:34

## 2023-12-30 RX ADMIN — NITROFURANTOIN MONOHYDRATE/MACROCRYSTALLINE 100 MG: 25; 75 CAPSULE ORAL at 09:21

## 2023-12-30 RX ADMIN — NYSTATIN: 100000 POWDER TOPICAL at 09:20

## 2023-12-30 RX ADMIN — Medication 250 MG: at 21:15

## 2023-12-30 RX ADMIN — NYSTATIN: 100000 POWDER TOPICAL at 21:16

## 2023-12-30 RX ADMIN — Medication 10 ML: at 21:19

## 2023-12-30 NOTE — PLAN OF CARE
Goal Outcome Evaluation: Pt pleasant and compliant with care. Pt's VSS. Pt has had no reports of pain so far. Pt turned Q2H to help prevent further skin breakdown. Skin care performed as ordered. Pt alert and able to make needs known. Call light in reach and bed in lowest locked position. Pt has had no additional changes or complaints so far. Iman Garcia RN

## 2023-12-30 NOTE — PROGRESS NOTES
[facility]   Hospitalist Progress Note  Date: 2023  Patient Name: Isabell Ribera  : 1945  MRN: [id number]  Date of admission: 2023    Subjective   Subjective     Chief Complaint:   Anemia    Summary:   Isabell Ribera is a 78 y.o. female with history of atrial fibrillation (on Eliquis currently, was on warfarin in the past), type 2 diabetes mellitus with diabetic polyneuropathy, dementia, rectal bleeding and constipation presented after patient was found to have abnormal labs.  Patient is  care.  Patient was seen by primary care physician via telehealth and labs were obtained which was found to have low hemoglobin after which she was directed to the emergency department.  Patient daughter stated that they have not noticed any bleeding, her stool has been darker but not black to dark.  Patient's daughter stated she is not taking any anticoagulation however Eliquis was on her med list for history of atrial fibrillation.  Vitals were stable on presentation.  Lab work showed hemoglobin of 6.8 on presentation.  CT abdomen no acute abdominal/pelvic process; did show small bilateral pleural effusion with cholelithiasis, bilateral nonobstructing renal stones, small hiatal hernia with rectum moderately distended with stool.  GI was consulted and patient was admitted for further evaluation management.  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  Biopsies with normal first portion of duodenum and second portion of duodenum.  No concern for bleeding since admission.  GI following the patient.     Interval Followup:   No acute events overnight, yesterday patient did have some nausea and vomiting, patient denies abdominal pain    Objective   Objective     Vitals:   Temp:  [97.9 °F (36.6 °C)-98.3 °F (36.8 °C)] 98.3 °F (36.8 °C)  Heart Rate:  [54-73] 62  Resp:  [16-18]  18  BP: ()/(42-60) 128/59    Physical Exam   GEN: No acute distress  HEENT: Moist mucous membranes  LUNGS: Equal chest rise bilaterally  CARDIAC: Regular rate and rhythm  NEURO: Moving all 4 extremities spontaneously  SKIN: No obvious breakdown    Result Review    Result Review:  I have personally reviewed the results as below  [x]  Laboratory CBC, CMP personally reviewed  CBC          12/28/2023    05:04 12/29/2023    07:54 12/30/2023    04:49   CBC   WBC 7.87  4.71  5.09    RBC 4.14  4.20  4.24    Hemoglobin 9.6  9.4  9.7    Hematocrit 30.7  31.8  32.6    MCV 74.2  75.7  76.9    MCH 23.2  22.4  22.9    MCHC 31.3  29.6  29.8    RDW 24.7  24.6  24.9    Platelets 243  220  227      CMP          12/28/2023    05:04 12/29/2023    07:54 12/30/2023    04:49   CMP   Glucose 100  92  93    BUN 15  20  20    Creatinine 0.90  0.90  1.01    EGFR 65.6  65.6  57.1    Sodium 135  138  135    Potassium 3.3  4.3  4.0    Chloride 97  101  100    Calcium 8.4  8.3  8.5    Total Protein 6.7  6.6  6.8    Albumin 2.4  2.4  2.4    Globulin 4.3  4.2  4.4    Total Bilirubin 0.6  0.7  2.0    Alkaline Phosphatase 86  212  269    AST (SGOT) 21  121  303    ALT (SGPT) 5  27  71    Albumin/Globulin Ratio 0.6  0.6  0.5    BUN/Creatinine Ratio 16.7  22.2  19.8    Anion Gap 8.9  9.6  8.4      []  Microbiology  []  Radiology  []  EKG/Telemetry   []  Cardiology/Vascular   []  Pathology  []  Old records  []  Other:    Assessment & Plan   Assessment / Plan     Assessment:    GI bleed    Hypokalemia    Afib    Essential hypertension    Anemia    Plan:  Patient admitted to the hospital for further workup and management of above  Monitor hemoglobin closely, patient status post 2 units PRBCs this admission, hemoglobin stable  Gastroenterology consulted, appreciate their recommendations, planning on colonoscopy today  Patient status post EGD which was significant for medium size hiatal hernia, esophagus, gastritis  Colonoscopy with polyp removed as  above  Continue oral PPI  Replace potassium orally as needed  Hold diuretics  Continue carvedilol  Continue Lexapro  Continue Macrobid for UTI prophylaxis  Check ultrasound of the liver given elevated LFTs and concern for cholelithiasis on CT scan  CBC, CMP reviewed 12/30/2023  Repeat CBC, CMP, mag and Phos in a.m. 12/30/2023  CT scan of the abdomen and pelvis personally reviewed, mild bilateral pleural effusions, cholelithiasis without choledocholithiasis,       Reviewed patients labs and imaging, and discussed with patient and nurse at bedside.    DVT prophylaxis:  Medical and mechanical DVT prophylaxis orders are present.    CODE STATUS:   Level Of Support Discussed With: Patient  Code Status (Patient has no pulse and is not breathing): CPR (Attempt to Resuscitate)  Medical Interventions (Patient has pulse or is breathing): Full Support        Electronically signed by Devon Lee MD, 12/30/23, 12:52 PM EST.

## 2023-12-31 ENCOUNTER — APPOINTMENT (OUTPATIENT)
Dept: MRI IMAGING | Facility: HOSPITAL | Age: 78
DRG: 379 | End: 2023-12-31
Payer: MEDICARE

## 2023-12-31 LAB
ALBUMIN SERPL-MCNC: 2.4 G/DL (ref 3.5–5.2)
ALBUMIN/GLOB SERPL: 0.6 G/DL
ALP SERPL-CCNC: 294 U/L (ref 39–117)
ALT SERPL W P-5'-P-CCNC: 97 U/L (ref 1–33)
ANION GAP SERPL CALCULATED.3IONS-SCNC: 10.9 MMOL/L (ref 5–15)
AST SERPL-CCNC: 342 U/L (ref 1–32)
BASOPHILS # BLD AUTO: 0.05 10*3/MM3 (ref 0–0.2)
BASOPHILS NFR BLD AUTO: 0.8 % (ref 0–1.5)
BILIRUB SERPL-MCNC: 2.7 MG/DL (ref 0–1.2)
BUN SERPL-MCNC: 22 MG/DL (ref 8–23)
BUN/CREAT SERPL: 21.8 (ref 7–25)
CALCIUM SPEC-SCNC: 8.5 MG/DL (ref 8.6–10.5)
CHLORIDE SERPL-SCNC: 100 MMOL/L (ref 98–107)
CO2 SERPL-SCNC: 25.1 MMOL/L (ref 22–29)
CREAT SERPL-MCNC: 1.01 MG/DL (ref 0.57–1)
DEPRECATED RDW RBC AUTO: 65.8 FL (ref 37–54)
EGFRCR SERPLBLD CKD-EPI 2021: 57.1 ML/MIN/1.73
EOSINOPHIL # BLD AUTO: 0.25 10*3/MM3 (ref 0–0.4)
EOSINOPHIL NFR BLD AUTO: 4.2 % (ref 0.3–6.2)
ERYTHROCYTE [DISTWIDTH] IN BLOOD BY AUTOMATED COUNT: 24.9 % (ref 12.3–15.4)
GLOBULIN UR ELPH-MCNC: 4.2 GM/DL
GLUCOSE SERPL-MCNC: 110 MG/DL (ref 65–99)
HCT VFR BLD AUTO: 30.4 % (ref 34–46.6)
HGB BLD-MCNC: 9.1 G/DL (ref 12–15.9)
IMM GRANULOCYTES # BLD AUTO: 0.01 10*3/MM3 (ref 0–0.05)
IMM GRANULOCYTES NFR BLD AUTO: 0.2 % (ref 0–0.5)
LYMPHOCYTES # BLD AUTO: 0.95 10*3/MM3 (ref 0.7–3.1)
LYMPHOCYTES NFR BLD AUTO: 16.1 % (ref 19.6–45.3)
MAGNESIUM SERPL-MCNC: 1.9 MG/DL (ref 1.6–2.4)
MCH RBC QN AUTO: 22.8 PG (ref 26.6–33)
MCHC RBC AUTO-ENTMCNC: 29.9 G/DL (ref 31.5–35.7)
MCV RBC AUTO: 76.2 FL (ref 79–97)
MONOCYTES # BLD AUTO: 0.59 10*3/MM3 (ref 0.1–0.9)
MONOCYTES NFR BLD AUTO: 10 % (ref 5–12)
NEUTROPHILS NFR BLD AUTO: 4.05 10*3/MM3 (ref 1.7–7)
NEUTROPHILS NFR BLD AUTO: 68.7 % (ref 42.7–76)
NRBC BLD AUTO-RTO: 0 /100 WBC (ref 0–0.2)
PHOSPHATE SERPL-MCNC: 3 MG/DL (ref 2.5–4.5)
PLATELET # BLD AUTO: 215 10*3/MM3 (ref 140–450)
PMV BLD AUTO: 10 FL (ref 6–12)
POTASSIUM SERPL-SCNC: 3.8 MMOL/L (ref 3.5–5.2)
PROT SERPL-MCNC: 6.6 G/DL (ref 6–8.5)
RBC # BLD AUTO: 3.99 10*6/MM3 (ref 3.77–5.28)
SODIUM SERPL-SCNC: 136 MMOL/L (ref 136–145)
WBC NRBC COR # BLD AUTO: 5.9 10*3/MM3 (ref 3.4–10.8)

## 2023-12-31 PROCEDURE — 84100 ASSAY OF PHOSPHORUS: CPT | Performed by: INTERNAL MEDICINE

## 2023-12-31 PROCEDURE — 80053 COMPREHEN METABOLIC PANEL: CPT | Performed by: INTERNAL MEDICINE

## 2023-12-31 PROCEDURE — 83735 ASSAY OF MAGNESIUM: CPT | Performed by: INTERNAL MEDICINE

## 2023-12-31 PROCEDURE — 99232 SBSQ HOSP IP/OBS MODERATE 35: CPT | Performed by: INTERNAL MEDICINE

## 2023-12-31 PROCEDURE — 85025 COMPLETE CBC W/AUTO DIFF WBC: CPT | Performed by: INTERNAL MEDICINE

## 2023-12-31 RX ORDER — LORAZEPAM 0.5 MG/1
2 TABLET ORAL ONCE AS NEEDED
Status: DISCONTINUED | OUTPATIENT
Start: 2023-12-31 | End: 2024-01-03 | Stop reason: HOSPADM

## 2023-12-31 RX ADMIN — CITALOPRAM HYDROBROMIDE 10 MG: 20 TABLET ORAL at 22:31

## 2023-12-31 RX ADMIN — PANTOPRAZOLE SODIUM 40 MG: 40 TABLET, DELAYED RELEASE ORAL at 05:20

## 2023-12-31 RX ADMIN — CARVEDILOL 6.25 MG: 6.25 TABLET, FILM COATED ORAL at 18:00

## 2023-12-31 RX ADMIN — Medication 15 ML: at 10:25

## 2023-12-31 RX ADMIN — NYSTATIN: 100000 POWDER TOPICAL at 22:32

## 2023-12-31 RX ADMIN — Medication 250 MG: at 10:26

## 2023-12-31 RX ADMIN — Medication 10 ML: at 22:32

## 2023-12-31 RX ADMIN — NYSTATIN: 100000 POWDER TOPICAL at 14:30

## 2023-12-31 RX ADMIN — Medication 250 MG: at 22:31

## 2023-12-31 RX ADMIN — NITROFURANTOIN MONOHYDRATE/MACROCRYSTALLINE 100 MG: 25; 75 CAPSULE ORAL at 10:26

## 2023-12-31 NOTE — PLAN OF CARE
Goal Outcome Evaluation:         VSS.  No c/o pain or n/v.  UOP.  Skin care completed; Q2 turns continued.  Heels floated.  MRCP not completed d/t failed screening; colon clip placed 12/27  recommendation waiting 1 week per MRI tech-MD aware.

## 2023-12-31 NOTE — PLAN OF CARE
Goal Outcome Evaluation: Pt pleasant and compliant with care. Pt's VSS. Pt's skin care performed as ordered. Pt alert and able to make needs known. Call light in reach, bed alert active and audible, and bed in lowest locked position. Pt has had no additional complaints so far. Iman Garcia RN

## 2023-12-31 NOTE — PROGRESS NOTES
[facility]   Hospitalist Progress Note  Date: 2023  Patient Name: Isabell Ribera  : 1945  MRN: [id number]  Date of admission: 2023    Subjective   Subjective     Chief Complaint:   Anemia    Summary:   Isabell Ribera is a 78 y.o. female with history of atrial fibrillation (on Eliquis currently, was on warfarin in the past), type 2 diabetes mellitus with diabetic polyneuropathy, dementia, rectal bleeding and constipation presented after patient was found to have abnormal labs.  Patient is 24 care.  Patient was seen by primary care physician via telehealth and labs were obtained which was found to have low hemoglobin after which she was directed to the emergency department.  Patient daughter stated that they have not noticed any bleeding, her stool has been darker but not black to dark.  Patient's daughter stated she is not taking any anticoagulation however Eliquis was on her med list for history of atrial fibrillation.  Vitals were stable on presentation.  Lab work showed hemoglobin of 6.8 on presentation.  CT abdomen no acute abdominal/pelvic process; did show small bilateral pleural effusion with cholelithiasis, bilateral nonobstructing renal stones, small hiatal hernia with rectum moderately distended with stool.  GI was consulted and patient was admitted for further evaluation management.  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  Biopsies with normal first portion of duodenum and second portion of duodenum.  No concern for bleeding since admission.  GI following the patient.  Patient's hemoglobin stabilized, her volume status was appropriate, patient did have some elevation in her LFTs which are undergoing further workup, patient has known cholelithiasis    Interval Followup:   Denies abdominal pain, LFTs slightly worse today with increasing  bilirubin, increased AST and ALT, ultrasound of the gallbladder and cystic duct unrevealing    Objective   Objective     Vitals:   Temp:  [98.2 °F (36.8 °C)-98.7 °F (37.1 °C)] 98.5 °F (36.9 °C)  Heart Rate:  [56-70] 63  Resp:  [16-18] 18  BP: ()/(41-55) 126/41    Physical Exam   GEN: No acute distress  HEENT: Moist mucous membranes  LUNGS: Equal chest rise bilaterally  CARDIAC: Regular rate and rhythm  NEURO: Moving all 4 extremities spontaneously  SKIN: No obvious breakdown    Result Review    Result Review:  I have personally reviewed the results as below  [x]  Laboratory CBC, CMP personally reviewed  CBC          12/29/2023    07:54 12/30/2023    04:49 12/31/2023    05:08   CBC   WBC 4.71  5.09  5.90    RBC 4.20  4.24  3.99    Hemoglobin 9.4  9.7  9.1    Hematocrit 31.8  32.6  30.4    MCV 75.7  76.9  76.2    MCH 22.4  22.9  22.8    MCHC 29.6  29.8  29.9    RDW 24.6  24.9  24.9    Platelets 220  227  215      CMP          12/29/2023    07:54 12/30/2023    04:49 12/31/2023    05:08   CMP   Glucose 92  93  110    BUN 20  20  22    Creatinine 0.90  1.01  1.01    EGFR 65.6  57.1  57.1    Sodium 138  135  136    Potassium 4.3  4.0  3.8    Chloride 101  100  100    Calcium 8.3  8.5  8.5    Total Protein 6.6  6.8  6.6    Albumin 2.4  2.4  2.4    Globulin 4.2  4.4  4.2    Total Bilirubin 0.7  2.0  2.7    Alkaline Phosphatase 212  269  294    AST (SGOT) 121  303  342    ALT (SGPT) 27  71  97    Albumin/Globulin Ratio 0.6  0.5  0.6    BUN/Creatinine Ratio 22.2  19.8  21.8    Anion Gap 9.6  8.4  10.9      []  Microbiology  []  Radiology  []  EKG/Telemetry   []  Cardiology/Vascular   []  Pathology  []  Old records  []  Other:    Assessment & Plan   Assessment / Plan     Assessment:    GI bleed    Hypokalemia    Afib    Essential hypertension    Anemia    Plan:  Patient admitted to the hospital for further workup and management of above  Monitor hemoglobin closely, patient status post 2 units PRBCs this admission,  hemoglobin remained stable  Gastroenterology consulted, appreciate their recommendations,   Will check MRCP today to rule out CBD stone  Patient status post EGD which was significant for medium size hiatal hernia, esophagus, gastritis  Colonoscopy with polyp removed as above  Continue oral PPI  Continue to hold diuretics currently  Continue carvedilol  Continue Lexapro  Continue Macrobid for UTI prophylaxis  Ultrasound liver personally reviewed, unrevealing, planning for MRCP as above  CBC, CMP reviewed 12/31/2023  Repeat CBC, CMP, mag and Phos in a.m. 12/31/2023     Reviewed patients labs and imaging, and discussed with patient and nurse at bedside.    DVT prophylaxis:  Medical and mechanical DVT prophylaxis orders are present.    CODE STATUS:   Level Of Support Discussed With: Patient  Code Status (Patient has no pulse and is not breathing): CPR (Attempt to Resuscitate)  Medical Interventions (Patient has pulse or is breathing): Full Support        Electronically signed by Devon Lee MD, 12/31/23, 11:09 AM EST.

## 2024-01-01 LAB
ALBUMIN SERPL-MCNC: 2.4 G/DL (ref 3.5–5.2)
ALBUMIN/GLOB SERPL: 0.6 G/DL
ALP SERPL-CCNC: 318 U/L (ref 39–117)
ALT SERPL W P-5'-P-CCNC: 106 U/L (ref 1–33)
ANION GAP SERPL CALCULATED.3IONS-SCNC: 9 MMOL/L (ref 5–15)
AST SERPL-CCNC: 310 U/L (ref 1–32)
BASOPHILS # BLD AUTO: 0.03 10*3/MM3 (ref 0–0.2)
BASOPHILS NFR BLD AUTO: 0.5 % (ref 0–1.5)
BILIRUB SERPL-MCNC: 2.2 MG/DL (ref 0–1.2)
BUN SERPL-MCNC: 21 MG/DL (ref 8–23)
BUN/CREAT SERPL: 22.6 (ref 7–25)
CALCIUM SPEC-SCNC: 8.4 MG/DL (ref 8.6–10.5)
CHLORIDE SERPL-SCNC: 100 MMOL/L (ref 98–107)
CO2 SERPL-SCNC: 26 MMOL/L (ref 22–29)
CREAT SERPL-MCNC: 0.93 MG/DL (ref 0.57–1)
DEPRECATED RDW RBC AUTO: 64.4 FL (ref 37–54)
EGFRCR SERPLBLD CKD-EPI 2021: 63 ML/MIN/1.73
EOSINOPHIL # BLD AUTO: 0.42 10*3/MM3 (ref 0–0.4)
EOSINOPHIL NFR BLD AUTO: 6.9 % (ref 0.3–6.2)
ERYTHROCYTE [DISTWIDTH] IN BLOOD BY AUTOMATED COUNT: 25.1 % (ref 12.3–15.4)
GLOBULIN UR ELPH-MCNC: 4.3 GM/DL
GLUCOSE SERPL-MCNC: 105 MG/DL (ref 65–99)
HCT VFR BLD AUTO: 28.9 % (ref 34–46.6)
HGB BLD-MCNC: 8.8 G/DL (ref 12–15.9)
IMM GRANULOCYTES # BLD AUTO: 0.01 10*3/MM3 (ref 0–0.05)
IMM GRANULOCYTES NFR BLD AUTO: 0.2 % (ref 0–0.5)
LYMPHOCYTES # BLD AUTO: 1.2 10*3/MM3 (ref 0.7–3.1)
LYMPHOCYTES NFR BLD AUTO: 19.6 % (ref 19.6–45.3)
MAGNESIUM SERPL-MCNC: 1.8 MG/DL (ref 1.6–2.4)
MCH RBC QN AUTO: 22.8 PG (ref 26.6–33)
MCHC RBC AUTO-ENTMCNC: 30.4 G/DL (ref 31.5–35.7)
MCV RBC AUTO: 74.9 FL (ref 79–97)
MONOCYTES # BLD AUTO: 0.74 10*3/MM3 (ref 0.1–0.9)
MONOCYTES NFR BLD AUTO: 12.1 % (ref 5–12)
NEUTROPHILS NFR BLD AUTO: 3.73 10*3/MM3 (ref 1.7–7)
NEUTROPHILS NFR BLD AUTO: 60.7 % (ref 42.7–76)
NRBC BLD AUTO-RTO: 0 /100 WBC (ref 0–0.2)
PHOSPHATE SERPL-MCNC: 2.7 MG/DL (ref 2.5–4.5)
PLATELET # BLD AUTO: 233 10*3/MM3 (ref 140–450)
PMV BLD AUTO: 10.3 FL (ref 6–12)
POTASSIUM SERPL-SCNC: 3.9 MMOL/L (ref 3.5–5.2)
PROT SERPL-MCNC: 6.7 G/DL (ref 6–8.5)
RBC # BLD AUTO: 3.86 10*6/MM3 (ref 3.77–5.28)
SODIUM SERPL-SCNC: 135 MMOL/L (ref 136–145)
WBC NRBC COR # BLD AUTO: 6.13 10*3/MM3 (ref 3.4–10.8)

## 2024-01-01 PROCEDURE — 83735 ASSAY OF MAGNESIUM: CPT | Performed by: INTERNAL MEDICINE

## 2024-01-01 PROCEDURE — 84100 ASSAY OF PHOSPHORUS: CPT | Performed by: INTERNAL MEDICINE

## 2024-01-01 PROCEDURE — 80053 COMPREHEN METABOLIC PANEL: CPT | Performed by: INTERNAL MEDICINE

## 2024-01-01 PROCEDURE — 99232 SBSQ HOSP IP/OBS MODERATE 35: CPT | Performed by: INTERNAL MEDICINE

## 2024-01-01 PROCEDURE — 85025 COMPLETE CBC W/AUTO DIFF WBC: CPT | Performed by: INTERNAL MEDICINE

## 2024-01-01 RX ADMIN — NITROFURANTOIN MONOHYDRATE/MACROCRYSTALLINE 100 MG: 25; 75 CAPSULE ORAL at 09:27

## 2024-01-01 RX ADMIN — Medication 250 MG: at 22:05

## 2024-01-01 RX ADMIN — Medication 10 ML: at 09:29

## 2024-01-01 RX ADMIN — CITALOPRAM HYDROBROMIDE 10 MG: 20 TABLET ORAL at 22:05

## 2024-01-01 RX ADMIN — CARVEDILOL 6.25 MG: 6.25 TABLET, FILM COATED ORAL at 18:06

## 2024-01-01 RX ADMIN — Medication 15 ML: at 09:29

## 2024-01-01 RX ADMIN — Medication 250 MG: at 09:29

## 2024-01-01 RX ADMIN — Medication 10 ML: at 22:06

## 2024-01-01 RX ADMIN — PANTOPRAZOLE SODIUM 40 MG: 40 TABLET, DELAYED RELEASE ORAL at 05:53

## 2024-01-01 RX ADMIN — CARVEDILOL 6.25 MG: 6.25 TABLET, FILM COATED ORAL at 09:29

## 2024-01-01 RX ADMIN — NYSTATIN: 100000 POWDER TOPICAL at 22:06

## 2024-01-01 RX ADMIN — NYSTATIN: 100000 POWDER TOPICAL at 15:42

## 2024-01-01 RX ADMIN — DOCUSATE SODIUM 50MG AND SENNOSIDES 8.6MG 2 TABLET: 8.6; 5 TABLET, FILM COATED ORAL at 09:29

## 2024-01-01 NOTE — PROGRESS NOTES
[facility]   Hospitalist Progress Note  Date: 2024  Patient Name: Isabell Ribera  : 1945  MRN: [id number]  Date of admission: 2023    Subjective   Subjective     Chief Complaint:   Anemia    Summary:   Isabell Ribera is a 78 y.o. female with history of atrial fibrillation (on Eliquis currently, was on warfarin in the past), type 2 diabetes mellitus with diabetic polyneuropathy, dementia, rectal bleeding and constipation presented after patient was found to have abnormal labs.  Patient is  care.  Patient was seen by primary care physician via telehealth and labs were obtained which was found to have low hemoglobin after which she was directed to the emergency department.  Patient daughter stated that they have not noticed any bleeding, her stool has been darker but not black to dark.  Patient's daughter stated she is not taking any anticoagulation however Eliquis was on her med list for history of atrial fibrillation.  Vitals were stable on presentation.  Lab work showed hemoglobin of 6.8 on presentation.  CT abdomen no acute abdominal/pelvic process; did show small bilateral pleural effusion with cholelithiasis, bilateral nonobstructing renal stones, small hiatal hernia with rectum moderately distended with stool.  GI was consulted and patient was admitted for further evaluation management.  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  Biopsies with normal first portion of duodenum and second portion of duodenum.  No concern for bleeding since admission.  GI following the patient.  Patient's hemoglobin stabilized, her volume status was appropriate, patient did have some elevation in her LFTs which are undergoing further workup, patient has known cholelithiasis    Interval Followup:   No acute events overnight, LFTs remain elevated, hemoglobin is stable,  patient tolerating her diet    Objective   Objective     Vitals:   Temp:  [98.2 °F (36.8 °C)-99.5 °F (37.5 °C)] 98.3 °F (36.8 °C)  Heart Rate:  [60-72] 64  Resp:  [14-18] 16  BP: (106-135)/(45-81) 131/81    Physical Exam   GEN: No acute distress  HEENT: Moist mucous membranes  LUNGS: Equal chest rise bilaterally  CARDIAC: Regular rate and rhythm  NEURO: Moving all 4 extremities spontaneously  SKIN: No obvious breakdown    Result Review    Result Review:  I have personally reviewed the results as below  [x]  Laboratory CBC, CMP personally reviewed  CBC          12/30/2023    04:49 12/31/2023    05:08 1/1/2024    05:03   CBC   WBC 5.09  5.90  6.13    RBC 4.24  3.99  3.86    Hemoglobin 9.7  9.1  8.8    Hematocrit 32.6  30.4  28.9    MCV 76.9  76.2  74.9    MCH 22.9  22.8  22.8    MCHC 29.8  29.9  30.4    RDW 24.9  24.9  25.1    Platelets 227  215  233      CMP          12/30/2023    04:49 12/31/2023    05:08 1/1/2024    05:03   CMP   Glucose 93  110  105    BUN 20  22  21    Creatinine 1.01  1.01  0.93    EGFR 57.1  57.1  63.0    Sodium 135  136  135    Potassium 4.0  3.8  3.9    Chloride 100  100  100    Calcium 8.5  8.5  8.4    Total Protein 6.8  6.6  6.7    Albumin 2.4  2.4  2.4    Globulin 4.4  4.2  4.3    Total Bilirubin 2.0  2.7  2.2    Alkaline Phosphatase 269  294  318    AST (SGOT) 303  342  310    ALT (SGPT) 71  97  106    Albumin/Globulin Ratio 0.5  0.6  0.6    BUN/Creatinine Ratio 19.8  21.8  22.6    Anion Gap 8.4  10.9  9.0      []  Microbiology  []  Radiology  []  EKG/Telemetry   []  Cardiology/Vascular   []  Pathology  []  Old records  []  Other:    Assessment & Plan   Assessment / Plan     Assessment:    GI bleed    Hypokalemia    Afib    Essential hypertension    Anemia    Plan:  Patient admitted to the hospital for further workup and management of above  Monitor hemoglobin closely, patient status post 2 units PRBCs this admission, hemoglobin remained stable  Gastroenterology consulted, appreciate  their recommendations,   MRCP unable to be performed secondary to recent Endo Clip, discussed with gastroenterology, possible ERCP tomorrow  Patient status post EGD which was significant for medium size hiatal hernia, esophagus, gastritis  Colonoscopy with polyp removed as above  Continue oral PPI  Continue to hold diuretics currently  Continue carvedilol  Continue Lexapro  Continue Macrobid for UTI prophylaxis  Ultrasound liver personally reviewed, unrevealing, MRCP unable to be obtained as above  CBC, CMP reviewed 1/1/2024  Repeat CBC, CMP, mag and Phos in a.m. 1/1/2024     Reviewed patients labs and imaging, and discussed with patient and nurse at bedside.    DVT prophylaxis:  Medical and mechanical DVT prophylaxis orders are present.    CODE STATUS:   Level Of Support Discussed With: Patient  Code Status (Patient has no pulse and is not breathing): CPR (Attempt to Resuscitate)  Medical Interventions (Patient has pulse or is breathing): Full Support        Electronically signed by Devon Lee MD, 01/01/24, 12:35 PM EST.

## 2024-01-01 NOTE — PLAN OF CARE
Goal Outcome Evaluation:     Patient rested well this shift. No complaints of pain or nausea. No BM or bloody stools noted this shift. Dark ramon urine noted per external catheter. Skin care and treatments completed this shift. VSS.

## 2024-01-01 NOTE — PROGRESS NOTES
[facility]  Inpatient Progress Note    Reason for Follow Up:  elevated liver enzymes    Subjective     Interval History:   Over the last few days, pt noted to have elevation of liver enzymes.  Pt denies any abd pain.  Tolerating solid food today but was experiencing nausea, vomiting.  RUQ US with gallstones, no biliary ductal dilatation.  Recent EGD/colonoscopy for anemia with colon AVM treated.    Current Facility-Administered Medications:     [Held by provider] apixaban (ELIQUIS) tablet 5 mg, 5 mg, Oral, Q12H, Devon Lee MD, 5 mg at 12/29/23 2105    sennosides-docusate (PERICOLACE) 8.6-50 MG per tablet 2 tablet, 2 tablet, Oral, BID, 2 tablet at 01/01/24 0929 **AND** polyethylene glycol (MIRALAX) packet 17 g, 17 g, Oral, Daily PRN **AND** bisacodyl (DULCOLAX) EC tablet 5 mg, 5 mg, Oral, Daily PRN **AND** bisacodyl (DULCOLAX) suppository 10 mg, 10 mg, Rectal, Daily PRN, Junior Corbin MD    carvedilol (COREG) tablet 6.25 mg, 6.25 mg, Oral, BID With Meals, Junior Corbin MD, 6.25 mg at 01/01/24 0929    citalopram (CeleXA) tablet 10 mg, 10 mg, Oral, Nightly, Junior Corbin MD, 10 mg at 12/31/23 2231    LORazepam (ATIVAN) tablet 2 mg, 2 mg, Oral, Once PRN, Devon Lee MD    magnesium citrate solution 296 mL, 296 mL, Oral, Once, Junior Corbin MD    multivitamin and minerals liquid 15 mL, 15 mL, Oral, Daily, Junior Corbin MD, 15 mL at 01/01/24 0929    nitrofurantoin (macrocrystal-monohydrate) (MACROBID) capsule 100 mg, 100 mg, Oral, Daily, Devon Lee MD, 100 mg at 01/01/24 0927    nystatin (MYCOSTATIN) powder, , Topical, 2 times per day, Junior Corbin MD, Given at 12/31/23 2232    ondansetron (ZOFRAN) injection 4 mg, 4 mg, Intravenous, Q6H PRN, Junior Corbin MD, 4 mg at 12/29/23 1644    pantoprazole (PROTONIX) EC tablet 40 mg, 40 mg, Oral, Q AM, Devon Lee MD, 40 mg at 01/01/24 0575    saccharomyces  boulardii (FLORASTOR) capsule 250 mg, 250 mg, Oral, BID, Junior Corbin MD, 250 mg at 01/01/24 0929    sodium chloride 0.9 % flush 10 mL, 10 mL, Intravenous, Q12H, Junior Corbin MD, 10 mL at 01/01/24 0929    sodium chloride 0.9 % flush 10 mL, 10 mL, Intravenous, PRN, Junior Corbin MD    sodium chloride 0.9 % infusion 40 mL, 40 mL, Intravenous, PRN, Junior Corbin MD  Review of Systems:    The following systems were reviewed and negative;  constitution, respiratory, and cardiovascular    Objective     Vital Signs  Temp:  [98.2 °F (36.8 °C)-99.5 °F (37.5 °C)] 98.3 °F (36.8 °C)  Heart Rate:  [60-72] 71  Resp:  [14-18] 14  BP: (106-135)/(41-58) 127/46  Body mass index is 36.46 kg/m².    Intake/Output Summary (Last 24 hours) at 1/1/2024 1056  Last data filed at 1/1/2024 0836  Gross per 24 hour   Intake 900 ml   Output 900 ml   Net 0 ml     I/O this shift:  In: 220 [P.O.:220]  Out: -      Physical Exam:   General: awake, alert and in no acute distress   Eyes: eyes move symmetrical in all directions, no scleral icterus   Neck: supple, trachea is midline   Skin: warm and dry, not jaundiced   Cardiovascular: no chest tenderness   Pulm: breathing unlabored   Abdomen: soft, nontender, nondistended   Rectal: deferred   Extremities: no rash or edema   Psychiatric: mental status within normal limits     Results Review:     I reviewed the patient's new clinical results.    Results from last 7 days   Lab Units 01/01/24  0503 12/31/23  0508 12/30/23  0449   WBC 10*3/mm3 6.13 5.90 5.09   HEMOGLOBIN g/dL 8.8* 9.1* 9.7*   HEMATOCRIT % 28.9* 30.4* 32.6*   PLATELETS 10*3/mm3 233 215 227     Results from last 7 days   Lab Units 01/01/24  0503 12/31/23  0508 12/30/23  0449   SODIUM mmol/L 135* 136 135*   POTASSIUM mmol/L 3.9 3.8 4.0   CHLORIDE mmol/L 100 100 100   CO2 mmol/L 26.0 25.1 26.6   BUN mg/dL 21 22 20   CREATININE mg/dL 0.93 1.01* 1.01*   CALCIUM mg/dL 8.4* 8.5* 8.5*   BILIRUBIN mg/dL 2.2* 2.7* 2.0*   ALK  PHOS U/L 318* 294* 269*   ALT (SGPT) U/L 106* 97* 71*   AST (SGOT) U/L 310* 342* 303*   GLUCOSE mg/dL 105* 110* 93         Lab Results   Lab Value Date/Time    LIPASE 32 06/30/2023 1609    LIPASE 33 05/20/2022 1453    LIPASE 45 08/19/2020 2254    LIPASE 10 08/12/2020 1211         Assessment & Plan   Assessment:     Elevated liver enzymes    Plan:     - primary ordered MRCP; however, was not completed due to recent endoclip placement  - given elevation in liver enzymes and gallstones noted on imaging, one consideration would be choledocholithiasis  - another consideration would be medication induced liver enzyme elevation such as with Macrobid; however, pt was on medication prior to presentation  - will make NPO after MN in the event ERCP is warranted for further evaluation    I discussed the patients findings and my recommendations with patient, family, and primary care team.         Domenica Bermudez M.D.  [place] [address]  [place]  [address]  Office: [phone]

## 2024-01-01 NOTE — PLAN OF CARE
Goal Outcome Evaluation:      VSS. Patient alert and orienting making needs known. Turned q2h. Urine output adequate per external cath, dark ramon colored urine. Skin care performed per MD order. Bed in lowest locked position and call light within reach.

## 2024-01-02 LAB
ALBUMIN SERPL-MCNC: 2.4 G/DL (ref 3.5–5.2)
ALBUMIN/GLOB SERPL: 0.5 G/DL
ALP SERPL-CCNC: 285 U/L (ref 39–117)
ALT SERPL W P-5'-P-CCNC: 79 U/L (ref 1–33)
ANION GAP SERPL CALCULATED.3IONS-SCNC: 9.9 MMOL/L (ref 5–15)
ANISOCYTOSIS BLD QL: NORMAL
AST SERPL-CCNC: 185 U/L (ref 1–32)
BASOPHILS # BLD AUTO: 0.02 10*3/MM3 (ref 0–0.2)
BASOPHILS NFR BLD AUTO: 0.4 % (ref 0–1.5)
BILIRUB SERPL-MCNC: 1.6 MG/DL (ref 0–1.2)
BUN SERPL-MCNC: 19 MG/DL (ref 8–23)
BUN/CREAT SERPL: 23.5 (ref 7–25)
CALCIUM SPEC-SCNC: 8.4 MG/DL (ref 8.6–10.5)
CHLORIDE SERPL-SCNC: 101 MMOL/L (ref 98–107)
CO2 SERPL-SCNC: 25.1 MMOL/L (ref 22–29)
CREAT SERPL-MCNC: 0.81 MG/DL (ref 0.57–1)
DEPRECATED RDW RBC AUTO: 66.8 FL (ref 37–54)
EGFRCR SERPLBLD CKD-EPI 2021: 74.4 ML/MIN/1.73
EOSINOPHIL # BLD AUTO: 0.17 10*3/MM3 (ref 0–0.4)
EOSINOPHIL NFR BLD AUTO: 3.4 % (ref 0.3–6.2)
ERYTHROCYTE [DISTWIDTH] IN BLOOD BY AUTOMATED COUNT: 25.5 % (ref 12.3–15.4)
FLUAV SUBTYP SPEC NAA+PROBE: NOT DETECTED
FLUBV RNA ISLT QL NAA+PROBE: NOT DETECTED
GLOBULIN UR ELPH-MCNC: 4.4 GM/DL
GLUCOSE SERPL-MCNC: 97 MG/DL (ref 65–99)
HCT VFR BLD AUTO: 31.6 % (ref 34–46.6)
HGB BLD-MCNC: 9.5 G/DL (ref 12–15.9)
HYPOCHROMIA BLD QL: NORMAL
IMM GRANULOCYTES # BLD AUTO: 0.03 10*3/MM3 (ref 0–0.05)
IMM GRANULOCYTES NFR BLD AUTO: 0.6 % (ref 0–0.5)
LYMPHOCYTES # BLD AUTO: 0.58 10*3/MM3 (ref 0.7–3.1)
LYMPHOCYTES NFR BLD AUTO: 11.6 % (ref 19.6–45.3)
MAGNESIUM SERPL-MCNC: 1.8 MG/DL (ref 1.6–2.4)
MCH RBC QN AUTO: 22.8 PG (ref 26.6–33)
MCHC RBC AUTO-ENTMCNC: 30.1 G/DL (ref 31.5–35.7)
MCV RBC AUTO: 75.8 FL (ref 79–97)
MICROCYTES BLD QL: NORMAL
MONOCYTES # BLD AUTO: 0.68 10*3/MM3 (ref 0.1–0.9)
MONOCYTES NFR BLD AUTO: 13.7 % (ref 5–12)
NEUTROPHILS NFR BLD AUTO: 3.5 10*3/MM3 (ref 1.7–7)
NEUTROPHILS NFR BLD AUTO: 70.3 % (ref 42.7–76)
NRBC BLD AUTO-RTO: 0 /100 WBC (ref 0–0.2)
PHOSPHATE SERPL-MCNC: 2.7 MG/DL (ref 2.5–4.5)
PLAT MORPH BLD: NORMAL
PLATELET # BLD AUTO: 231 10*3/MM3 (ref 140–450)
PMV BLD AUTO: 10.4 FL (ref 6–12)
POTASSIUM SERPL-SCNC: 3.7 MMOL/L (ref 3.5–5.2)
PROT SERPL-MCNC: 6.8 G/DL (ref 6–8.5)
RBC # BLD AUTO: 4.17 10*6/MM3 (ref 3.77–5.28)
RSV RNA NPH QL NAA+NON-PROBE: NOT DETECTED
SARS-COV-2 RNA RESP QL NAA+PROBE: NOT DETECTED
SODIUM SERPL-SCNC: 136 MMOL/L (ref 136–145)
WBC MORPH BLD: NORMAL
WBC NRBC COR # BLD AUTO: 4.98 10*3/MM3 (ref 3.4–10.8)

## 2024-01-02 PROCEDURE — 80053 COMPREHEN METABOLIC PANEL: CPT | Performed by: INTERNAL MEDICINE

## 2024-01-02 PROCEDURE — 83735 ASSAY OF MAGNESIUM: CPT | Performed by: INTERNAL MEDICINE

## 2024-01-02 PROCEDURE — 85025 COMPLETE CBC W/AUTO DIFF WBC: CPT | Performed by: INTERNAL MEDICINE

## 2024-01-02 PROCEDURE — 85007 BL SMEAR W/DIFF WBC COUNT: CPT | Performed by: INTERNAL MEDICINE

## 2024-01-02 PROCEDURE — 84100 ASSAY OF PHOSPHORUS: CPT | Performed by: INTERNAL MEDICINE

## 2024-01-02 PROCEDURE — 87637 SARSCOV2&INF A&B&RSV AMP PRB: CPT | Performed by: INTERNAL MEDICINE

## 2024-01-02 PROCEDURE — 99232 SBSQ HOSP IP/OBS MODERATE 35: CPT | Performed by: INTERNAL MEDICINE

## 2024-01-02 RX ADMIN — CITALOPRAM HYDROBROMIDE 10 MG: 20 TABLET ORAL at 20:19

## 2024-01-02 RX ADMIN — CARVEDILOL 6.25 MG: 6.25 TABLET, FILM COATED ORAL at 17:52

## 2024-01-02 RX ADMIN — APIXABAN 5 MG: 5 TABLET, FILM COATED ORAL at 20:19

## 2024-01-02 RX ADMIN — CARVEDILOL 6.25 MG: 6.25 TABLET, FILM COATED ORAL at 10:13

## 2024-01-02 RX ADMIN — Medication 250 MG: at 20:19

## 2024-01-02 RX ADMIN — NYSTATIN: 100000 POWDER TOPICAL at 10:17

## 2024-01-02 RX ADMIN — Medication 10 ML: at 10:16

## 2024-01-02 RX ADMIN — NYSTATIN: 100000 POWDER TOPICAL at 22:22

## 2024-01-02 RX ADMIN — Medication 10 ML: at 20:19

## 2024-01-02 NOTE — PLAN OF CARE
Goal Outcome Evaluation:      VSS. Patient alert and orienting making needs known. Turned q2h. Urine output adequate per external cath, dark ramon colored urine. Skin care performed per MD order. Patient had 2 large soft brown tan Bms this shift, no rectal bleeding noted. NPO since midnight per order. Bed in lowest locked position and call light within reach.

## 2024-01-02 NOTE — PLAN OF CARE
Goal Outcome Evaluation:    Patient noted with new onset of nonproductive cough and elevated temperature. MD notified, new orders received. No complaints of pain this shift. Tolerating regular diet well.  Skin treatments completed. VSS.

## 2024-01-02 NOTE — PROGRESS NOTES
[facility]   Hospitalist Progress Note  Date: 2024  Patient Name: Isabell Ribera  : 1945  MRN: [id number]  Date of admission: 2023    Subjective   Subjective     Chief Complaint:   Anemia    Summary:   Isabell Ribera is a 78 y.o. female with history of atrial fibrillation (on Eliquis currently, was on warfarin in the past), type 2 diabetes mellitus with diabetic polyneuropathy, dementia, rectal bleeding and constipation presented after patient was found to have abnormal labs.  Patient is 24/ care.  Patient was seen by primary care physician via telehealth and labs were obtained which was found to have low hemoglobin after which she was directed to the emergency department.  Patient daughter stated that they have not noticed any bleeding, her stool has been darker but not black to dark.  Patient's daughter stated she is not taking any anticoagulation however Eliquis was on her med list for history of atrial fibrillation.  Vitals were stable on presentation.  Lab work showed hemoglobin of 6.8 on presentation.  CT abdomen no acute abdominal/pelvic process; did show small bilateral pleural effusion with cholelithiasis, bilateral nonobstructing renal stones, small hiatal hernia with rectum moderately distended with stool.  GI was consulted and patient was admitted for further evaluation management.  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  Biopsies with normal first portion of duodenum and second portion of duodenum.  No concern for bleeding since admission.  GI following the patient.  Patient's hemoglobin stabilized, her volume status was appropriate, patient did have some elevation in her LFTs which are undergoing further workup, patient has known cholelithiasis    Interval Followup:   No acute events overnight, patient's LFTs did improve some, patient  denies any nausea or vomiting, her hemoglobin continues to improve without any signs of bleeding    Objective   Objective     Vitals:   Temp:  [98.3 °F (36.8 °C)-100.3 °F (37.9 °C)] 100.3 °F (37.9 °C)  Heart Rate:  [64-77] 67  Resp:  [14-16] 16  BP: (100-141)/(44-81) 100/62    Physical Exam   GEN: No acute distress  HEENT: Moist mucous membranes  LUNGS: Equal chest rise bilaterally  CARDIAC: Regular rate and rhythm  NEURO: Moving all 4 extremities spontaneously  SKIN: No obvious breakdown    Result Review    Result Review:  I have personally reviewed the results as below  [x]  Laboratory CBC, CMP personally reviewed  CBC          12/31/2023    05:08 1/1/2024    05:03 1/2/2024    04:12   CBC   WBC 5.90  6.13  4.98    RBC 3.99  3.86  4.17    Hemoglobin 9.1  8.8  9.5    Hematocrit 30.4  28.9  31.6    MCV 76.2  74.9  75.8    MCH 22.8  22.8  22.8    MCHC 29.9  30.4  30.1    RDW 24.9  25.1  25.5    Platelets 215  233  231      CMP          12/31/2023    05:08 1/1/2024    05:03 1/2/2024    04:12   CMP   Glucose 110  105  97    BUN 22  21  19    Creatinine 1.01  0.93  0.81    EGFR 57.1  63.0  74.4    Sodium 136  135  136    Potassium 3.8  3.9  3.7    Chloride 100  100  101    Calcium 8.5  8.4  8.4    Total Protein 6.6  6.7  6.8    Albumin 2.4  2.4  2.4    Globulin 4.2  4.3  4.4    Total Bilirubin 2.7  2.2  1.6    Alkaline Phosphatase 294  318  285    AST (SGOT) 342  310  185    ALT (SGPT) 97  106  79    Albumin/Globulin Ratio 0.6  0.6  0.5    BUN/Creatinine Ratio 21.8  22.6  23.5    Anion Gap 10.9  9.0  9.9      []  Microbiology  []  Radiology  []  EKG/Telemetry   []  Cardiology/Vascular   []  Pathology  []  Old records  []  Other:    Assessment & Plan   Assessment / Plan     Assessment:    GI bleed    Hypokalemia    Afib    Essential hypertension    Anemia    Plan:  Patient admitted to the hospital for further workup and management of above  Monitor hemoglobin closely, patient status post 2 units PRBCs this admission,  hemoglobin remained stable  Gastroenterology consulted, appreciate their recommendations,   MRCP unable to be performed secondary to recent Endo Clip, discussed with gastroenterology,   Patient status post EGD which was significant for medium size hiatal hernia, esophagus, gastritis  Colonoscopy with polyp removed as above  Continue oral PPI  Continue to hold diuretics currently  Continue carvedilol  Continue Lexapro  Continue Macrobid for UTI prophylaxis  Ultrasound liver personally reviewed, unrevealing, MRCP unable to be obtained as above  CBC, CMP reviewed 1/2/2024  Repeat CBC, CMP, mag and Phos in a.m. 1/2/2024  Patient is being evaluated for possible ERCP, awaiting GI recommendations     Reviewed patients labs and imaging, and discussed with patient and nurse at bedside.    Disposition: Home with home health when cleared for discharge by GI    DVT prophylaxis:  Medical and mechanical DVT prophylaxis orders are present.    CODE STATUS:   Level Of Support Discussed With: Patient  Code Status (Patient has no pulse and is not breathing): CPR (Attempt to Resuscitate)  Medical Interventions (Patient has pulse or is breathing): Full Support        Electronically signed by Devon Lee MD, 01/02/24, 10:43 AM EST.

## 2024-01-03 ENCOUNTER — READMISSION MANAGEMENT (OUTPATIENT)
Dept: CALL CENTER | Facility: HOSPITAL | Age: 79
End: 2024-01-03
Payer: MEDICARE

## 2024-01-03 ENCOUNTER — TELEPHONE (OUTPATIENT)
Dept: GASTROENTEROLOGY | Facility: CLINIC | Age: 79
End: 2024-01-03
Payer: MEDICARE

## 2024-01-03 VITALS
OXYGEN SATURATION: 97 % | DIASTOLIC BLOOD PRESSURE: 58 MMHG | WEIGHT: 220.9 LBS | HEART RATE: 93 BPM | BODY MASS INDEX: 34.67 KG/M2 | TEMPERATURE: 99 F | SYSTOLIC BLOOD PRESSURE: 148 MMHG | HEIGHT: 67 IN | RESPIRATION RATE: 18 BRPM

## 2024-01-03 DIAGNOSIS — B96.81 HELICOBACTER PYLORI GASTRITIS: Primary | ICD-10-CM

## 2024-01-03 DIAGNOSIS — K29.70 HELICOBACTER PYLORI GASTRITIS: Primary | ICD-10-CM

## 2024-01-03 LAB
ALBUMIN SERPL-MCNC: 2.3 G/DL (ref 3.5–5.2)
ALBUMIN/GLOB SERPL: 0.5 G/DL
ALP SERPL-CCNC: 251 U/L (ref 39–117)
ALT SERPL W P-5'-P-CCNC: 56 U/L (ref 1–33)
ANION GAP SERPL CALCULATED.3IONS-SCNC: 7.5 MMOL/L (ref 5–15)
AST SERPL-CCNC: 118 U/L (ref 1–32)
BASOPHILS # BLD AUTO: 0.03 10*3/MM3 (ref 0–0.2)
BASOPHILS NFR BLD AUTO: 0.6 % (ref 0–1.5)
BILIRUB SERPL-MCNC: 1 MG/DL (ref 0–1.2)
BUN SERPL-MCNC: 16 MG/DL (ref 8–23)
BUN/CREAT SERPL: 18.4 (ref 7–25)
CALCIUM SPEC-SCNC: 8.5 MG/DL (ref 8.6–10.5)
CHLORIDE SERPL-SCNC: 102 MMOL/L (ref 98–107)
CO2 SERPL-SCNC: 25.5 MMOL/L (ref 22–29)
CREAT SERPL-MCNC: 0.87 MG/DL (ref 0.57–1)
DEPRECATED RDW RBC AUTO: 68.8 FL (ref 37–54)
EGFRCR SERPLBLD CKD-EPI 2021: 68.3 ML/MIN/1.73
EOSINOPHIL # BLD AUTO: 0.03 10*3/MM3 (ref 0–0.4)
EOSINOPHIL NFR BLD AUTO: 0.6 % (ref 0.3–6.2)
ERYTHROCYTE [DISTWIDTH] IN BLOOD BY AUTOMATED COUNT: 25.7 % (ref 12.3–15.4)
GLOBULIN UR ELPH-MCNC: 4.4 GM/DL
GLUCOSE SERPL-MCNC: 82 MG/DL (ref 65–99)
HCT VFR BLD AUTO: 32.5 % (ref 34–46.6)
HGB BLD-MCNC: 9.8 G/DL (ref 12–15.9)
IMM GRANULOCYTES # BLD AUTO: 0.03 10*3/MM3 (ref 0–0.05)
IMM GRANULOCYTES NFR BLD AUTO: 0.6 % (ref 0–0.5)
LYMPHOCYTES # BLD AUTO: 0.89 10*3/MM3 (ref 0.7–3.1)
LYMPHOCYTES NFR BLD AUTO: 19 % (ref 19.6–45.3)
MAGNESIUM SERPL-MCNC: 1.9 MG/DL (ref 1.6–2.4)
MCH RBC QN AUTO: 23.3 PG (ref 26.6–33)
MCHC RBC AUTO-ENTMCNC: 30.2 G/DL (ref 31.5–35.7)
MCV RBC AUTO: 77.2 FL (ref 79–97)
MONOCYTES # BLD AUTO: 0.92 10*3/MM3 (ref 0.1–0.9)
MONOCYTES NFR BLD AUTO: 19.7 % (ref 5–12)
NEUTROPHILS NFR BLD AUTO: 2.78 10*3/MM3 (ref 1.7–7)
NEUTROPHILS NFR BLD AUTO: 59.5 % (ref 42.7–76)
NRBC BLD AUTO-RTO: 0 /100 WBC (ref 0–0.2)
PHOSPHATE SERPL-MCNC: 2.9 MG/DL (ref 2.5–4.5)
PLATELET # BLD AUTO: 225 10*3/MM3 (ref 140–450)
PMV BLD AUTO: 10.6 FL (ref 6–12)
POTASSIUM SERPL-SCNC: 3.8 MMOL/L (ref 3.5–5.2)
PROT SERPL-MCNC: 6.7 G/DL (ref 6–8.5)
RBC # BLD AUTO: 4.21 10*6/MM3 (ref 3.77–5.28)
SODIUM SERPL-SCNC: 135 MMOL/L (ref 136–145)
WBC NRBC COR # BLD AUTO: 4.68 10*3/MM3 (ref 3.4–10.8)

## 2024-01-03 PROCEDURE — 80053 COMPREHEN METABOLIC PANEL: CPT | Performed by: INTERNAL MEDICINE

## 2024-01-03 PROCEDURE — 83735 ASSAY OF MAGNESIUM: CPT | Performed by: INTERNAL MEDICINE

## 2024-01-03 PROCEDURE — 99239 HOSP IP/OBS DSCHRG MGMT >30: CPT | Performed by: INTERNAL MEDICINE

## 2024-01-03 PROCEDURE — 85025 COMPLETE CBC W/AUTO DIFF WBC: CPT | Performed by: INTERNAL MEDICINE

## 2024-01-03 PROCEDURE — 84100 ASSAY OF PHOSPHORUS: CPT | Performed by: INTERNAL MEDICINE

## 2024-01-03 RX ORDER — CLARITHROMYCIN 500 MG/1
500 TABLET, COATED ORAL 2 TIMES DAILY
Qty: 28 TABLET | Refills: 0 | Status: SHIPPED | OUTPATIENT
Start: 2024-01-03 | End: 2024-01-17

## 2024-01-03 RX ORDER — PANTOPRAZOLE SODIUM 20 MG/1
20 TABLET, DELAYED RELEASE ORAL 2 TIMES DAILY
Qty: 28 TABLET | Refills: 0 | Status: SHIPPED | OUTPATIENT
Start: 2024-01-03 | End: 2024-01-17

## 2024-01-03 RX ORDER — METRONIDAZOLE 500 MG/1
500 TABLET ORAL 2 TIMES DAILY
Qty: 28 TABLET | Refills: 0 | Status: SHIPPED | OUTPATIENT
Start: 2024-01-03 | End: 2024-01-17

## 2024-01-03 RX ORDER — AMOXICILLIN 500 MG/1
1000 CAPSULE ORAL 2 TIMES DAILY
Qty: 56 CAPSULE | Refills: 0 | Status: SHIPPED | OUTPATIENT
Start: 2024-01-03 | End: 2024-01-17

## 2024-01-03 RX ADMIN — NITROFURANTOIN MONOHYDRATE/MACROCRYSTALLINE 100 MG: 25; 75 CAPSULE ORAL at 08:22

## 2024-01-03 RX ADMIN — Medication 15 ML: at 08:23

## 2024-01-03 RX ADMIN — Medication 10 ML: at 08:24

## 2024-01-03 RX ADMIN — CARVEDILOL 6.25 MG: 6.25 TABLET, FILM COATED ORAL at 08:22

## 2024-01-03 RX ADMIN — PANTOPRAZOLE SODIUM 40 MG: 40 TABLET, DELAYED RELEASE ORAL at 05:21

## 2024-01-03 RX ADMIN — APIXABAN 5 MG: 5 TABLET, FILM COATED ORAL at 08:22

## 2024-01-03 RX ADMIN — Medication 250 MG: at 08:22

## 2024-01-03 NOTE — TELEPHONE ENCOUNTER
----- Message from CAROL Ramirez sent at 12/29/2023  5:17 PM EST -----  EGD 12/23/2023: Medium size hiatal hernia, Bobo's esophagus noted-12 cm-biopsy showed chronic inflammation negative for intestinal metaplasia or dysplasia, gastritis-biopsy with chronic inactive gastritis-positive for H. pylori, normal duodenum    Noted patient is still admitted - confirmed w DR Corbin to treat after D/C  Please notify me once d/c summary available

## 2024-01-03 NOTE — SIGNIFICANT NOTE
01/03/24 0945   Coping/Psychosocial   Observed Emotional State calm;cooperative   Verbalized Emotional State relief;hopefulness   Trust Relationship/Rapport empathic listening provided   Family/Support Persons daughter;family   Involvement in Care interacting with patient   Additional Documentation Spiritual Care (Group)   Spiritual Care   Use of Spiritual Resources non-Rastafarian use of spiritual care   Spiritual Care Source  initiative   Spiritual Care Follow-Up follow-up, none required as presently assessed   Response to Spiritual Care receptive of support;thanks expressed   Spiritual Care Interventions supportive conversation provided   Spiritual Care Visit Type initial   Receptivity to Spiritual Care visit welcomed

## 2024-01-03 NOTE — TELEPHONE ENCOUNTER
Please notify patient I sent in antibiotics to treat her H. pylori, she should hold her Macrobid while taking these antibiotics, I also sent in Protonix 20 mg twice per day for the H. pylori regimen, then  we will need to check a breath test, patient will need to resume Protonix 20 mg/day for Bobo's esophagus noted after breath test.  Please educate patient on instructions for this  Schedule [id number] with me please put in 30-minute slot

## 2024-01-03 NOTE — DISCHARGE SUMMARY
[facility]         HOSPITALIST  DISCHARGE SUMMARY    Patient Name: Isabell Ribear  : 1945  MRN: [id number]    Date of Admission: 2023  Date of Discharge:  1/3/2024  Primary Care Physician: No primary care provider on file.    Consults       Date and Time Order Name Status Description    2023  8:42 PM Inpatient Hospitalist Consult      2023  7:33 PM Gastroenterology (on-call MD unless specified) Completed             Active and Resolved Hospital Problems:    GI bleed    Hypokalemia    Afib    Essential hypertension    Anemia    Hospital Course     Hospital Course:  Isabell Ribera is a 78 y.o. female with history of atrial fibrillation (on Eliquis currently, was on warfarin in the past), type 2 diabetes mellitus with diabetic polyneuropathy, dementia, rectal bleeding and constipation presented after patient was found to have abnormal labs.  Patient is  care.  Patient was seen by primary care physician via telehealth and labs were obtained which was found to have low hemoglobin after which she was directed to the emergency department.  Patient daughter stated that they have not noticed any bleeding, her stool has been darker but not black to dark.  Patient's daughter stated she is not taking any anticoagulation however Eliquis was on her med list for history of atrial fibrillation.  Vitals were stable on presentation.  Lab work showed hemoglobin of 6.8 on presentation.  CT abdomen no acute abdominal/pelvic process; did show small bilateral pleural effusion with cholelithiasis, bilateral nonobstructing renal stones, small hiatal hernia with rectum moderately distended with stool.  GI was consulted and patient was admitted for further evaluation management.  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  Biopsies with normal first portion of  duodenum and second portion of duodenum.  No concern for bleeding since admission.  GI following the patient.  Patient's hemoglobin stabilized, her volume status was appropriate, patient did have some elevation in her LFTs which are undergoing further workup, patient has known cholelithiasis.  Patient's ultrasound was unremarkable, patient was reevaluated by gastroenterology, did not feel she needed ERCP as her LFTs were downtrending.  Patient was at her baseline on day of discharge, her hemoglobin was stable.  She is discharged home today in stable condition.    Day of Discharge     Vital Signs:  Temp:  [99 °F (37.2 °C)-99.7 °F (37.6 °C)] 99 °F (37.2 °C)  Heart Rate:  [63-93] 93  Resp:  [16-18] 18  BP: (123-154)/(51-72) 148/58    Physical Exam:   GEN: No acute distress  HEENT: Moist mucous membranes  LUNGS: Equal chest rise bilaterally  CARDIAC: Regular rate and rhythm  NEURO: Moving all 4 extremities spontaneously  SKIN: No obvious breakdown    Discharge Details        Discharge Medications        Continue These Medications        Instructions Start Date   carvedilol 6.25 MG tablet  Commonly known as: COREG   TAKE ONE TABLET BY MOUTH TWICE A DAY WITH FOOD      citalopram 10 MG tablet  Commonly known as: CeleXA   10 mg, Oral, Daily      Eliquis 5 MG tablet tablet  Generic drug: apixaban   5 mg, Oral, Every 12 Hours Scheduled      furosemide 20 MG tablet  Commonly known as: LASIX   20 mg, Oral, 2 Times Daily      hydrOXYzine 25 MG tablet  Commonly known as: ATARAX   25 mg, Oral, 2 Times Daily PRN      multivitamin with minerals tablet tablet   1 tablet, Oral, Daily      nitrofurantoin (macrocrystal-monohydrate) 100 MG capsule  Commonly known as: MACROBID   100 mg, Oral, Daily      potassium chloride 10 MEQ CR tablet   1 tablet, Oral, Daily      saccharomyces boulardii 250 MG capsule  Commonly known as: FLORASTOR   250 mg, Oral, 2 Times Daily               Allergies   Allergen Reactions    Levofloxacin Hives        Discharge Disposition:  Home or Self Care    Diet:  Hospital:  Diet Order   Procedures    Diet: Regular/House Diet; Texture: Regular Texture (IDDSI 7); Fluid Consistency: Thin (IDDSI 0)       Discharge Activity:       CODE STATUS:  Code Status and Medical Interventions:   Ordered at: 12/22/23 2136     Level Of Support Discussed With:    Patient     Code Status (Patient has no pulse and is not breathing):    CPR (Attempt to Resuscitate)     Medical Interventions (Patient has pulse or is breathing):    Full Support       No future appointments.    Additional Instructions for the Follow-ups that You Need to Schedule       Discharge Follow-up with PCP   As directed       Currently Documented PCP:    No primary care provider on file.    PCP Phone Number:    None     Follow Up Details: 3 to 7 days                Pertinent  and/or Most Recent Results     IMAGING:  US Gallbladder    Result Date: 12/31/2023  PROCEDURE: US GALLBLADDER  COMPARISON: 12/22/2023 (A/P CT w/o).  INDICATIONS: increasing LFTs; nausea/vomiting; known cholelithiasis  TECHNIQUE:  A limited abdominal ultrasound examination of the right upper quadrant was performed, tailored in order to evaluate the gallbladder and the biliary tree.  Two-dimensional grayscale images as well as color and spectral Doppler analysis are provided for review.   FINDINGS:  The study is limited due to obscuring bowel gas as well as large body habitus.  Gallstones are seen.  No definite gallbladder wall thickening or pericholecystic fluid.  Grossly, probably no acute cholecystitis.  No hepatomegaly is suspected.  The pancreas, liver, and right kidney are not well seen.  The pancreas is not clearly identified on the study.  Grossly, the thyc-kw-ahus length of the right kidney is 9.6 cm.  No gross evidence of right hydronephrosis.  Doppler interrogation of the hepatic vasculature reveals grossly patent vessels with normal blood flow direction.  The common bile duct measures 5  mm in diameter.  The left kidney, inferior vena cava, abdominal aorta, and spleen were not evaluated.  A negative sonographic Alexandre's sign was reported.         The study is very limited.  Gallstones are present.  Grossly, no acute cholecystitis is suspected.  No biliary ductal dilatation.  A negative sonographic Alexandre's sign was reported.    Please note that portions of this note were completed with a voice recognition program.  CHARI SIDDIQUI JR, MD       Electronically Signed and Approved By: CHARI SIDDIQUI JR, MD on 12/31/2023 at 5:36              CT Abdomen Pelvis Without Contrast    Result Date: 12/22/2023  PROCEDURE: CT ABDOMEN PELVIS WO CONTRAST  COMPARISON: [facility], CT, CT ABDOMEN PELVIS WO CONTRAST, 12/29/2022, 14:29.  INDICATIONS: abd pain, anuria  TECHNIQUE: CT images were created without intravenous contrast.   PROTOCOL:   Standard imaging protocol performed    RADIATION:   DLP: 1358.7 mGy*cm   Automated exposure control was utilized to minimize radiation dose.  FINDINGS:  Within the lung bases are small bilateral pleural effusions with minimal scattered atelectasis.  The unenhanced liver, adrenal glands, spleen, and pancreas are unremarkable.  There are several stones in the gallbladder.  There are bilateral nonobstructing renal stones.  There is a small hiatal hernia.  The small bowel appears normal in caliber and configuration.  The colon appears normal.  The appendix appears normal.  There is no ascites or loculated collection.  No abnormally enlarged lymph nodes are identified.  The rectum is moderately distended with stool.  The uterus and urinary bladder are unremarkable.  No aggressive osseous lesions are identified.        1. No acute process identified within abdomen/pelvis. 2. Small bilateral pleural effusions. 3. Cholelithiasis. 4. Bilateral nonobstructing renal stones. 5. Small hiatal hernia. 6. Rectum moderately distended with stool.     TESSIE MEDRANO MD        Electronically Signed and Approved By: TESSIE MEDRANO MD on 12/22/2023 at 17:15             XR Chest 1 View    Result Date: 12/22/2023  PROCEDURE: XR CHEST 1 VW  COMPARISON: [facility], CR, XR CHEST 1 VW, 12/27/2022, 10:54.  INDICATIONS: AMS, shortness of air  FINDINGS:  No consolidations or pleural effusions are observed.  Chronic changes are noted throughout the lungs.  The cardiac silhouette and mediastinum are unchanged. No definitive acute osseous abnormalities are seen on this single view.        1. Chronic changes are noted.  There is no evidence for acute cardiopulmonary process.         TESSIE DE LUNA MD       Electronically Signed and Approved By: TESSIE DE LUNA MD on 12/22/2023 at 15:50               LAB RESULTS:      Lab 01/03/24  0544 01/02/24  0412 01/01/24  0503 12/31/23  0508 12/30/23 0449   WBC 4.68 4.98 6.13 5.90 5.09   HEMOGLOBIN 9.8* 9.5* 8.8* 9.1* 9.7*   HEMATOCRIT 32.5* 31.6* 28.9* 30.4* 32.6*   PLATELETS 225 231 233 215 227   NEUTROS ABS 2.78 3.50 3.73 4.05 3.06   IMMATURE GRANS (ABS) 0.03 0.03 0.01 0.01 0.01   LYMPHS ABS 0.89 0.58* 1.20 0.95 1.17   MONOS ABS 0.92* 0.68 0.74 0.59 0.52   EOS ABS 0.03 0.17 0.42* 0.25 0.30   MCV 77.2* 75.8* 74.9* 76.2* 76.9*         Lab 01/03/24  0544 01/02/24  0412 01/01/24  0503 12/31/23  0508 12/30/23  0449   SODIUM 135* 136 135* 136 135*   POTASSIUM 3.8 3.7 3.9 3.8 4.0   CHLORIDE 102 101 100 100 100   CO2 25.5 25.1 26.0 25.1 26.6   ANION GAP 7.5 9.9 9.0 10.9 8.4   BUN 16 19 21 22 20   CREATININE 0.87 0.81 0.93 1.01* 1.01*   EGFR 68.3 74.4 63.0 57.1* 57.1*   GLUCOSE 82 97 105* 110* 93   CALCIUM 8.5* 8.4* 8.4* 8.5* 8.5*   MAGNESIUM 1.9 1.8 1.8 1.9 1.8   PHOSPHORUS 2.9 2.7 2.7 3.0 3.0         Lab 01/03/24  0544 01/02/24  0412 01/01/24  0503 12/31/23  0508 12/30/23  0449   TOTAL PROTEIN 6.7 6.8 6.7 6.6 6.8   ALBUMIN 2.3* 2.4* 2.4* 2.4* 2.4*   GLOBULIN 4.4 4.4 4.3 4.2 4.4   ALT (SGPT) 56* 79* 106* 97* 71*   AST (SGOT) 118* 185* 310* 342* 303*    BILIRUBIN 1.0 1.6* 2.2* 2.7* 2.0*   ALK PHOS 251* 285* 318* 294* 269*                     Brief Urine Lab Results  (Last result in the past 365 days)        Color   Clarity   Blood   Leuk Est   Nitrite   Protein   CREAT   Urine HCG        09/05/23 1006 Yellow   Cloudy   Small (1+)   Large (3+)   Negative   Trace                 Microbiology Results (last 10 days)       Procedure Component Value - Date/Time    COVID-19, FLU A/B, RSV PCR 1 HR TAT - Swab, Nasopharynx [[id number]]  (Normal) Collected: 01/02/24 1826    Lab Status: Final result Specimen: Swab from Nasopharynx Updated: 01/02/24 1952     COVID19 Not Detected     Influenza A PCR Not Detected     Influenza B PCR Not Detected     RSV, PCR Not Detected    Narrative:      Fact sheet for providers: https://www.fda.gov/media/915911/download    Fact sheet for patients: https://www.fda.gov/media/942217/download    Test performed by PCR.              Results for orders placed during the hospital encounter of 12/27/22    Adult Transthoracic Echo Complete w/ Color, Spectral and Contrast if necessary per protocol    Interpretation Summary    Left ventricular systolic function is normal. Calculated left ventricular EF = 65%    Left ventricular diastolic function was not assessed.    There is calcification of the aortic valve.      Time spent on Discharge including face to face service: Greater than 30 minutes      Electronically signed by Devon Lee MD, 01/03/24, 11:50 AM EST.

## 2024-01-04 NOTE — TELEPHONE ENCOUNTER
Attempted to reach patient, however daughter, Ms. Gissell Jacinto answered the phone.  Unfortunately we do not have an VLAD on file.  Ms. Jacinto states that she will be with the patient tomorrow morning and requests a call back at that time at the same phone number.    Will defer and attempt to call tomorrow.

## 2024-01-04 NOTE — OUTREACH NOTE
Prep Survey      Flowsheet Row Responses   [facility] patient discharged from? [facility]   Is LACE score < 7 ? No   Eligibility Readm Mgmt   Discharge diagnosis GI bleed, anemia   Does the patient have one of the following disease processes/diagnoses(primary or secondary)? Other   Does the patient have Home health ordered? Yes   What is the Home health agency?  [facility]   Is there a DME ordered? No   Prep survey completed? Yes            Shirin KLINE - Registered Nurse

## 2024-01-05 ENCOUNTER — TELEPHONE (OUTPATIENT)
Dept: GASTROENTEROLOGY | Facility: CLINIC | Age: 79
End: 2024-01-05
Payer: MEDICARE

## 2024-01-05 NOTE — TELEPHONE ENCOUNTER
Spoke with Ms. Gissell Jacinto, she had just left her mother and asked for a return call on Monday morning.

## 2024-01-05 NOTE — TELEPHONE ENCOUNTER
----- Message from CAROL Ramirez sent at 12/29/2023  5:24 PM EST -----  Colonoscopy 12/27/2023: Diverticula in the ascending, small polyp in the ascending colon-adenomatous/benign, single medium sized angioectasia without bleeding found in the proximal ascending colon coagulation was performed

## 2024-01-06 ENCOUNTER — HOSPITAL ENCOUNTER (EMERGENCY)
Facility: HOSPITAL | Age: 79
Discharge: HOME | End: 2024-01-06
Attending: EMERGENCY MEDICINE
Payer: MEDICARE

## 2024-01-06 ENCOUNTER — APPOINTMENT (OUTPATIENT)
Dept: GENERAL RADIOLOGY | Facility: HOSPITAL | Age: 79
End: 2024-01-06
Payer: MEDICARE

## 2024-01-06 VITALS
WEIGHT: 224.87 LBS | BODY MASS INDEX: 35.29 KG/M2 | OXYGEN SATURATION: 98 % | RESPIRATION RATE: 18 BRPM | DIASTOLIC BLOOD PRESSURE: 50 MMHG | HEART RATE: 60 BPM | TEMPERATURE: 99.9 F | SYSTOLIC BLOOD PRESSURE: 120 MMHG | HEIGHT: 67 IN

## 2024-01-06 DIAGNOSIS — U07.1 COVID: Primary | ICD-10-CM

## 2024-01-06 DIAGNOSIS — R05.9 COUGH, UNSPECIFIED TYPE: ICD-10-CM

## 2024-01-06 LAB
ALBUMIN SERPL-MCNC: 2.3 G/DL (ref 3.5–5.2)
ALBUMIN/GLOB SERPL: 0.5 G/DL
ALP SERPL-CCNC: 262 U/L (ref 39–117)
ALT SERPL W P-5'-P-CCNC: 30 U/L (ref 1–33)
ANION GAP SERPL CALCULATED.3IONS-SCNC: 11.1 MMOL/L (ref 5–15)
AST SERPL-CCNC: 79 U/L (ref 1–32)
BASOPHILS # BLD AUTO: 0.02 10*3/MM3 (ref 0–0.2)
BASOPHILS NFR BLD AUTO: 0.7 % (ref 0–1.5)
BILIRUB SERPL-MCNC: 0.7 MG/DL (ref 0–1.2)
BUN SERPL-MCNC: 22 MG/DL (ref 8–23)
BUN/CREAT SERPL: 25.9 (ref 7–25)
CALCIUM SPEC-SCNC: 7.6 MG/DL (ref 8.6–10.5)
CHLORIDE SERPL-SCNC: 101 MMOL/L (ref 98–107)
CO2 SERPL-SCNC: 21.9 MMOL/L (ref 22–29)
CREAT SERPL-MCNC: 0.85 MG/DL (ref 0.57–1)
DEPRECATED RDW RBC AUTO: 65.8 FL (ref 37–54)
EGFRCR SERPLBLD CKD-EPI 2021: 70.2 ML/MIN/1.73
EOSINOPHIL # BLD AUTO: 0.12 10*3/MM3 (ref 0–0.4)
EOSINOPHIL NFR BLD AUTO: 3.9 % (ref 0.3–6.2)
ERYTHROCYTE [DISTWIDTH] IN BLOOD BY AUTOMATED COUNT: 25.8 % (ref 12.3–15.4)
FLUAV SUBTYP SPEC NAA+PROBE: NOT DETECTED
FLUBV RNA ISLT QL NAA+PROBE: NOT DETECTED
GLOBULIN UR ELPH-MCNC: 4.5 GM/DL
GLUCOSE SERPL-MCNC: 125 MG/DL (ref 65–99)
HCT VFR BLD AUTO: 32.1 % (ref 34–46.6)
HGB BLD-MCNC: 10 G/DL (ref 12–15.9)
IMM GRANULOCYTES # BLD AUTO: 0.03 10*3/MM3 (ref 0–0.05)
IMM GRANULOCYTES NFR BLD AUTO: 1 % (ref 0–0.5)
LYMPHOCYTES # BLD AUTO: 0.96 10*3/MM3 (ref 0.7–3.1)
LYMPHOCYTES NFR BLD AUTO: 31.3 % (ref 19.6–45.3)
MCH RBC QN AUTO: 22.9 PG (ref 26.6–33)
MCHC RBC AUTO-ENTMCNC: 31.2 G/DL (ref 31.5–35.7)
MCV RBC AUTO: 73.5 FL (ref 79–97)
MONOCYTES # BLD AUTO: 0.37 10*3/MM3 (ref 0.1–0.9)
MONOCYTES NFR BLD AUTO: 12.1 % (ref 5–12)
NEUTROPHILS NFR BLD AUTO: 1.57 10*3/MM3 (ref 1.7–7)
NEUTROPHILS NFR BLD AUTO: 51 % (ref 42.7–76)
NRBC BLD AUTO-RTO: 0 /100 WBC (ref 0–0.2)
PLATELET # BLD AUTO: 189 10*3/MM3 (ref 140–450)
PMV BLD AUTO: 10.3 FL (ref 6–12)
POTASSIUM SERPL-SCNC: 3.9 MMOL/L (ref 3.5–5.2)
PROT SERPL-MCNC: 6.8 G/DL (ref 6–8.5)
RBC # BLD AUTO: 4.37 10*6/MM3 (ref 3.77–5.28)
RSV RNA NPH QL NAA+NON-PROBE: NOT DETECTED
SARS-COV-2 RNA RESP QL NAA+PROBE: DETECTED
SODIUM SERPL-SCNC: 134 MMOL/L (ref 136–145)
WBC NRBC COR # BLD AUTO: 3.07 10*3/MM3 (ref 3.4–10.8)

## 2024-01-06 PROCEDURE — 80053 COMPREHEN METABOLIC PANEL: CPT

## 2024-01-06 PROCEDURE — 94640 AIRWAY INHALATION TREATMENT: CPT

## 2024-01-06 PROCEDURE — 87637 SARSCOV2&INF A&B&RSV AMP PRB: CPT

## 2024-01-06 PROCEDURE — 94799 UNLISTED PULMONARY SVC/PX: CPT

## 2024-01-06 PROCEDURE — 71045 X-RAY EXAM CHEST 1 VIEW: CPT

## 2024-01-06 PROCEDURE — 85025 COMPLETE CBC W/AUTO DIFF WBC: CPT

## 2024-01-06 PROCEDURE — 99283 EMERGENCY DEPT VISIT LOW MDM: CPT

## 2024-01-06 PROCEDURE — 36415 COLL VENOUS BLD VENIPUNCTURE: CPT

## 2024-01-06 RX ORDER — CALCIUM CARBONATE/VITAMIN D3 600 MG-10
1 TABLET ORAL ONCE
Status: DISCONTINUED | OUTPATIENT
Start: 2024-01-06 | End: 2024-01-07 | Stop reason: HOSPADM

## 2024-01-06 RX ORDER — BENZONATATE 100 MG/1
100 CAPSULE ORAL 3 TIMES DAILY PRN
Qty: 30 CAPSULE | Refills: 0 | Status: SHIPPED | OUTPATIENT
Start: 2024-01-06

## 2024-01-06 RX ORDER — ALBUTEROL SULFATE 2.5 MG/3ML
2.5 SOLUTION RESPIRATORY (INHALATION) ONCE
Status: COMPLETED | OUTPATIENT
Start: 2024-01-06 | End: 2024-01-06

## 2024-01-06 RX ADMIN — ALBUTEROL SULFATE 2.5 MG: 2.5 SOLUTION RESPIRATORY (INHALATION) at 20:45

## 2024-01-07 NOTE — ED PROVIDER NOTES
Time: 8:54 PM EST  Date of encounter:  1/6/2024  Independent Historian/Clinical History and Information was obtained by:   Patient  Chief Complaint: cough    History is limited by: N/A    History of Present Illness:  Patient is a 78 y.o. year old female who presents to the emergency department for evaluation of nonproductive cough for 5 days.  Denies fever, diarrhea, nausea, vomiting. Daughter states he was admitted for a GI bleed on 12/22/20232 and was discharge on 1/3/2024. She was tested for covid, flu, RSV which was negative on 1/2/2024.    HPI    Patient Care Team  Primary Care Provider: Provider, No Known    Past Medical History:     Allergies   Allergen Reactions    Levofloxacin Hives     Past Medical History:   Diagnosis Date    Afib     Aftercare following ankle joint replacement surgery 06/01/2015    Arthritis     Arthritis of knee 06/01/2015    CHF (congestive heart failure)     Dementia     Essential hypertension     Gout     HTN (hypertension)     Left knee pain     Lymphedema     Recurrent UTI 05/11/2021    Urinary retention 05/11/2021     Past Surgical History:   Procedure Laterality Date    CATARACT EXTRACTION Bilateral     COLONOSCOPY N/A 12/27/2023    Procedure: COLONOSCOPY WITH POLYPECTOMY/SNARE/CLIP APPLICATION X1/FULGURATION OF AVMS USING APC MACHINE;  Surgeon: Junior Corbin MD;  Location: [facility] ENDOSCOPY;  Service: Gastroenterology;  Laterality: N/A;  COLON POLYP  DIVERTICULOSIS  AVMS OF COLON    ENDOSCOPY N/A 12/23/2023    Procedure: ESOPHAGOGASTRODUODENOSCOPY WITH BIOPSIES;  Surgeon: Junior Corbin MD;  Location: [facility] ENDOSCOPY;  Service: Gastroenterology;  Laterality: N/A;  GASTRITIS, MATA'S, HIATAL HERNIA    KIDNEY STONE SURGERY      KNEE SURGERY Left 2007    REPLACEMENT    OTHER SURGICAL HISTORY      JOINT SURGERY, UNSPECIFIED    TUBAL ABDOMINAL LIGATION       Family History   Problem Relation Age of Onset    Heart failure Mother 64        CONGESTIVE    Arthritis Mother      Arthritis Sister     Colon cancer Neg Hx        Home Medications:  Prior to Admission medications    Medication Sig Start Date End Date Taking? Authorizing Provider   amoxicillin (AMOXIL) 500 MG capsule Take 2 capsules by mouth 2 (Two) Times a Day for 14 days. 1/3/24 1/17/24  Myla Mukherjee APRN   carvedilol (COREG) 6.25 MG tablet TAKE ONE TABLET BY MOUTH TWICE A DAY WITH FOOD 9/20/21   Ivanna Frederick APRN   citalopram (CeleXA) 10 MG tablet Take 1 tablet by mouth Daily. 12/9/23   Maricarmen Cat MD   clarithromycin (BIAXIN) 500 MG tablet Take 1 tablet by mouth 2 (Two) Times a Day for 14 days. 1/3/24 1/17/24  Myla Mukherjee APRN   Eliquis 5 MG tablet tablet Take 1 tablet by mouth Every 12 (Twelve) Hours. 12/21/23   Maricarmen Cat MD   furosemide (LASIX) 20 MG tablet Take 1 tablet by mouth 2 (Two) Times a Day. 3/4/23   Maricarmen Cat MD   hydrOXYzine (ATARAX) 25 MG tablet Take 1 tablet by mouth 2 (Two) Times a Day As Needed. 3/22/23   Maricarmen Cat MD   metroNIDAZOLE (FLAGYL) 500 MG tablet Take 1 tablet by mouth 2 (Two) Times a Day for 14 days. 1/3/24 1/17/24  Myla Mukherjee APRN   multivitamin with minerals tablet tablet Take 1 tablet by mouth Daily.    Maricarmen Cat MD   nitrofurantoin, macrocrystal-monohydrate, (MACROBID) 100 MG capsule Take 1 capsule by mouth Daily. 9/19/23   Maricarmen Cat MD   pantoprazole (Protonix) 20 MG EC tablet Take 1 tablet by mouth 2 (Two) Times a Day for 14 days. 1/3/24 1/17/24  Myla Mukherjee APRN   potassium chloride 10 MEQ CR tablet Take 1 tablet by mouth Daily. 10/10/23   Maricarmen Cat MD   saccharomyces boulardii (FLORASTOR) 250 MG capsule Take 1 capsule by mouth 2 (Two) Times a Day.    Maricarmen Cat MD        Social History:   Social History     Tobacco Use    Smoking status: Former     Passive exposure: Past    Smokeless tobacco: Never   Vaping Use    Vaping Use: Never  "used   Substance Use Topics    Alcohol use: Not Currently    Drug use: Defer         Review of Systems:  Review of Systems   Constitutional:  Negative for chills and fever.   HENT:  Negative for congestion, ear pain and sore throat.    Eyes:  Negative for pain.   Respiratory:  Positive for cough. Negative for chest tightness and shortness of breath.    Cardiovascular:  Negative for chest pain.   Gastrointestinal:  Negative for abdominal pain, diarrhea, nausea and vomiting.   Genitourinary:  Negative for flank pain and hematuria.   Musculoskeletal:  Negative for joint swelling.   Skin:  Negative for pallor.   Neurological:  Negative for seizures and headaches.   All other systems reviewed and are negative.         Physical Exam:  /43   Pulse 56   Temp 100.2 °F (37.9 °C) (Oral)   Resp 20   Ht 170.2 cm (67\")   Wt 102 kg (224 lb 13.9 oz)   LMP  (LMP Unknown)   SpO2 96%   BMI 35.22 kg/m²     Physical Exam  Vitals and nursing note reviewed.   Constitutional:       General: She is not in acute distress.     Appearance: Normal appearance. She is not toxic-appearing.   HENT:      Head: Normocephalic and atraumatic.      Mouth/Throat:      Mouth: Mucous membranes are moist.   Eyes:      General: No scleral icterus.     Conjunctiva/sclera: Conjunctivae normal.   Cardiovascular:      Rate and Rhythm: Normal rate and regular rhythm.      Pulses: Normal pulses.      Heart sounds: Normal heart sounds.   Pulmonary:      Effort: Pulmonary effort is normal. No respiratory distress.      Breath sounds: Wheezing present.      Comments: Mild expiratory wheezes  Abdominal:      General: Abdomen is flat.      Palpations: Abdomen is soft.      Tenderness: There is no abdominal tenderness.   Musculoskeletal:         General: Normal range of motion.      Cervical back: Normal range of motion and neck supple.   Skin:     General: Skin is warm and dry.   Neurological:      Mental Status: She is alert and oriented to person, " place, and time. Mental status is at baseline.   Psychiatric:         Judgment: Judgment normal.         Vital signs were reviewed under triage note.            Procedures:  Procedures      Medical Decision Making:      Comorbidities that affect care:    Hypertension, dementia, A-fib, gout    External Notes reviewed:    Previous ED Note: 1/3/2024 discharge summary of GI bleed, hypokalemia, hypertension, anemia, A-fib      The following orders were placed and all results were independently analyzed by me:  Orders Placed This Encounter   Procedures    COVID-19, FLU A/B, RSV PCR 1 HR TAT - Swab, Nasopharynx    XR Chest 1 View    Comprehensive Metabolic Panel    CBC Auto Differential    CBC & Differential       Medications Given in the Emergency Department:  Medications   calcium carb-cholecalciferol 600-10 MG-MCG per tablet 1 tablet (has no administration in time range)   albuterol (PROVENTIL) nebulizer solution 0.083% 2.5 mg/3mL (2.5 mg Nebulization Given 1/6/24 2045)        ED Course:         Labs:    Lab Results (last 24 hours)       Procedure Component Value Units Date/Time    CBC & Differential [[id number]]  (Abnormal) Collected: 01/06/24 2059    Specimen: Blood Updated: 01/06/24 2119    Narrative:      The following orders were created for panel order CBC & Differential.  Procedure                               Abnormality         Status                     ---------                               -----------         ------                     CBC Auto Differential[[id number]]        Abnormal            Final result               Scan Slide[[id number]]                                                                    Please view results for these tests on the individual orders.    Comprehensive Metabolic Panel [[id number]]  (Abnormal) Collected: 01/06/24 2059    Specimen: Blood Updated: 01/06/24 2135     Glucose 125 mg/dL      BUN 22 mg/dL      Creatinine 0.85 mg/dL      Sodium 134 mmol/L      Potassium 3.9 mmol/L       Comment: Slight hemolysis detected by analyzer. Result may be falsely elevated.        Chloride 101 mmol/L      CO2 21.9 mmol/L      Calcium 7.6 mg/dL      Total Protein 6.8 g/dL      Albumin 2.3 g/dL      ALT (SGPT) 30 U/L      AST (SGOT) 79 U/L      Alkaline Phosphatase 262 U/L      Total Bilirubin 0.7 mg/dL      Globulin 4.5 gm/dL      A/G Ratio 0.5 g/dL      BUN/Creatinine Ratio 25.9     Anion Gap 11.1 mmol/L      eGFR 70.2 mL/min/1.73     Narrative:      GFR Normal >60  Chronic Kidney Disease <60  Kidney Failure <15    The GFR formula is only valid for adults with stable renal function between ages 18 and 70.    CBC Auto Differential [[id number]]  (Abnormal) Collected: 01/06/24 2059    Specimen: Blood Updated: 01/06/24 2119     WBC 3.07 10*3/mm3      RBC 4.37 10*6/mm3      Hemoglobin 10.0 g/dL      Hematocrit 32.1 %      MCV 73.5 fL      MCH 22.9 pg      MCHC 31.2 g/dL      RDW 25.8 %      RDW-SD 65.8 fl      MPV 10.3 fL      Platelets 189 10*3/mm3      Neutrophil % 51.0 %      Lymphocyte % 31.3 %      Monocyte % 12.1 %      Eosinophil % 3.9 %      Basophil % 0.7 %      Immature Grans % 1.0 %      Neutrophils, Absolute 1.57 10*3/mm3      Lymphocytes, Absolute 0.96 10*3/mm3      Monocytes, Absolute 0.37 10*3/mm3      Eosinophils, Absolute 0.12 10*3/mm3      Basophils, Absolute 0.02 10*3/mm3      Immature Grans, Absolute 0.03 10*3/mm3      nRBC 0.0 /100 WBC     COVID-19, FLU A/B, RSV PCR 1 HR TAT - Swab, Nasopharynx [[id number]]  (Abnormal) Collected: 01/06/24 2101    Specimen: Swab from Nasopharynx Updated: 01/06/24 2154     COVID19 Detected     Influenza A PCR Not Detected     Influenza B PCR Not Detected     RSV, PCR Not Detected    Narrative:      Fact sheet for providers: https://www.fda.gov/media/720604/download    Fact sheet for patients: https://www.fda.gov/media/592789/download    Test performed by PCR.             Imaging:    XR Chest 1 View    Result Date: 1/6/2024  PROCEDURE: XR CHEST 1 VW   COMPARISON: 12/22/2023.  INDICATIONS: cough  FINDINGS:  A single AP (or PA) upright portable chest radiograph was performed.  Borderline cardiac enlargement is seen.  No acute infiltrate is appreciated.  No pleural effusion or pneumothorax is identified.  Bilateral subsegmental atelectasis and/or linear fibrosis is (are) suggested.  The thoracic aorta is atherosclerotic.  Degenerative changes involve the imaged spine and the bilateral shoulders.  Chronic calcified granulomatous disease may involve the chest.  No significant interval change is seen since the prior study (or studies).        No acute infiltrate is appreciated.     Please note that portions of this note were completed with a voice recognition program.  CHARI SIDDIQUI JR, MD       Electronically Signed and Approved By: CHARI SIDDIQUI JR, MD on 1/06/2024 at 22:28                 Differential Diagnosis and Discussion:    Cough: Differential diagnosis includes but is not limited to pneumonia, acute bronchitis, upper respiratory infection, ACE inhibitor use, allergic reaction, epiglottitis, seasonal allergies, chemical irritants, exercise-induced asthma, viral syndrome.    All labs were reviewed and interpreted by me.  All X-rays impressions were independently interpreted by me.    MDM     Amount and/or Complexity of Data Reviewed  Clinical lab tests: reviewed  Tests in the radiology section of CPT®: reviewed  Decide to obtain previous medical records or to obtain history from someone other than the patient: yes    Risk of Complications, Morbidity, and/or Mortality  Presenting problems: moderate  Diagnostic procedures: moderate  Management options: moderate         Patient Care Considerations:    ANTIBIOTICS: I considered prescribing antibiotics as an outpatient however no bacterial focus of infection was found.      Consultants/Shared Management Plan:    None    Social Determinants of Health:    Patient has presented with family members who are  responsible, reliable and will ensure follow up care.      Disposition and Care Coordination:    Discharged: The patient is suitable and stable for discharge with no need for consideration of observation or admission.    [unfilled]  I have explained discharge medications and the need for follow up with the patient/caretakers. This was also printed in the discharge instructions. Patient was discharged with the following medications and follow up:      Medication List        New Prescriptions      benzonatate 100 MG capsule  Commonly known as: TESSALON  Take 1 capsule by mouth 3 (Three) Times a Day As Needed for Cough.               Where to Get Your Medications        These medications were sent to [facility]/pharmacy #[id number] - [place], [place] - [address] - [phone]  - [phone]  [address] [place] [address] [place] [address]      Hours: 24-hours Phone: [phone]   benzonatate 100 MG capsule      Provider, No Known  [facility]  [place] [address]    Schedule an appointment as soon as possible for a visit   As needed       Final diagnoses:   COVID   Cough, unspecified type        ED Disposition       ED Disposition   Discharge    Condition   Stable    Comment   --               This medical record created using voice recognition software.             Sara Burns, APRN  01/06/24 9723

## 2024-01-07 NOTE — DISCHARGE INSTRUCTIONS
Take all medications as prescribed. Read and follow educational instructions provided to you in discharge packet. If symptoms worsen or fail to improve as anticipated return to  ER. Patient agrees to treatment plan.

## 2024-01-08 NOTE — TELEPHONE ENCOUNTER
"Spoke with daughter, Gissell Jacinto, and informed of CAROL Austin result note and recommendations.  Verified  understanding.     Patient has just been diagnosed with Covid, family members that have been in contact are also taking Covid exposure precautions.    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 Pylori.  Daughter states patient has had acid reflux issues \"for years\".  Advised to avoid laying flat within 3 hours of eating and to avoid trigger foods.      Educated on H. Pylori bacterial infection and importance of completing medication as prescribed.  Provided medication details. Advised to hold nitrofurantoin during treatment.    Daughter verbalized understanding states she will insure patient starts treatment today.    Daughter also states patient is bed bound and wouldn't be able to get her to the lab to have H. Pylori breath test.  Daughter unsure as well if able to bring patient for office visit.      Will defer for 2 weeks and follow at that time with patient/daughter.          "

## 2024-01-10 ENCOUNTER — READMISSION MANAGEMENT (OUTPATIENT)
Dept: CALL CENTER | Facility: HOSPITAL | Age: 79
End: 2024-01-10
Payer: MEDICARE

## 2024-01-10 NOTE — OUTREACH NOTE
Medical Week 1 Survey      Flowsheet Row Responses   [facility] patient discharged from? [facility]   Does the patient have one of the following disease processes/diagnoses(primary or secondary)? Other   Week 1 attempt successful? Yes   Call start time 0955   Call end time 0958   Discharge diagnosis GI bleed, anemia   Is patient permission given to speak with other caregiver? Yes   List who call center can speak with Domenica Jacinto   Person spoke with today (if not patient) and relationship Domenica Jacinto- daughter   Meds reviewed with patient/caregiver? Yes   Does the patient have all medications ordered at discharge? N/A   Is the patient taking all medications as directed (includes completed medication regime)? Yes   Does the patient have a primary care provider?  No   Does the patient have an appointment with their PCP within 7 days of discharge? No   Comments regarding PCP Daughter inquired about hospice care or a pcp that will come to the home. Referral will be sent to ambulatory .   Has the patient kept scheduled appointments due by today? N/A   [facility] is [facility]    Has home health visited the patient within 72 hours of discharge? Yes   Psychosocial issues? No   Did the patient receive a copy of their discharge instructions? Yes   Nursing interventions Reviewed instructions with patient   What is the patient's perception of their health status since discharge? Improving   Is the patient/caregiver able to teach back signs and symptoms related to disease process for when to call PCP? Yes   Is the patient/caregiver able to teach back signs and symptoms related to disease process for when to call 911? Yes   Is the patient/caregiver able to teach back the hierarchy of who to call/visit for symptoms/problems? PCP, Specialist, Home health nurse, Urgent Care, ED, 911 Yes   If the patient is a current smoker, are they able to teach back resources for cessation? Not a smoker   Week 1 call  completed? Yes   Would this patient benefit from a Referral to Amb Social Work? Yes   Reason for Social Work Referral Caregiving/Support  [Daughter would like some information about Hospice, in home caregivers, and a pcp that comes to the home.]   Is the patient interested in additional calls from an ambulatory ? No   Call end time [id number]            Shanna KLINE - Licensed Nurse

## 2024-01-12 ENCOUNTER — PATIENT OUTREACH (OUTPATIENT)
Dept: CASE MANAGEMENT | Facility: OTHER | Age: 79
End: 2024-01-12
Payer: MEDICARE

## 2024-01-12 NOTE — OUTREACH NOTE
AMBULATORY CASE MANAGEMENT NOTE    Name and Relationship of Patient/Support Person: GISSELL YO - Emergency Contact    Patient Outreach    Contacted pt's [facility] Gissell at the request of nurse call center. Per Gissell pt lives at home with her , has dementia and is bedridden requiring total care. [facility] has recently moved in with them to assist with care. Pt does get HH wound care from [facility]. Gissell's main concern is finding a pcp that comes to the home. Gissell stated they were using [facility] for pcp but they are no longer covered by [employer] as of the beginning of the year. She stated they contacted [employer] and was referred to their website to find providers. She stated they have contacted a couple doctors and are awaiting a call back. Gissell is also wanting additional information on in-home caregivers and hospice. Will assist in finding resources and call her back next week.    Iram FIELDS  Ambulatory Case Management    1/12/2024, 12:43 EST

## 2024-01-14 ENCOUNTER — HOSPITAL ENCOUNTER (INPATIENT)
Facility: HOSPITAL | Age: 79
LOS: 4 days | Discharge: HOME | End: 2024-01-18
Attending: EMERGENCY MEDICINE | Admitting: HOSPITALIST
Payer: MEDICARE

## 2024-01-14 ENCOUNTER — APPOINTMENT (OUTPATIENT)
Dept: GENERAL RADIOLOGY | Facility: HOSPITAL | Age: 79
End: 2024-01-14
Payer: MEDICARE

## 2024-01-14 ENCOUNTER — APPOINTMENT (OUTPATIENT)
Dept: CT IMAGING | Facility: HOSPITAL | Age: 79
End: 2024-01-14
Payer: MEDICARE

## 2024-01-14 DIAGNOSIS — E87.6 HYPOKALEMIA: ICD-10-CM

## 2024-01-14 DIAGNOSIS — U07.1 COVID-19: Primary | ICD-10-CM

## 2024-01-14 DIAGNOSIS — J96.01 ACUTE RESPIRATORY FAILURE WITH HYPOXIA: ICD-10-CM

## 2024-01-14 DIAGNOSIS — U07.1 COVID: ICD-10-CM

## 2024-01-14 DIAGNOSIS — E83.42 HYPOMAGNESEMIA: ICD-10-CM

## 2024-01-14 DIAGNOSIS — R13.12 OROPHARYNGEAL DYSPHAGIA: ICD-10-CM

## 2024-01-14 LAB
ALBUMIN SERPL-MCNC: 2.6 G/DL (ref 3.5–5.2)
ALBUMIN/GLOB SERPL: 0.6 G/DL
ALP SERPL-CCNC: 150 U/L (ref 39–117)
ALT SERPL W P-5'-P-CCNC: 9 U/L (ref 1–33)
ANION GAP SERPL CALCULATED.3IONS-SCNC: 12 MMOL/L (ref 5–15)
ANISOCYTOSIS BLD QL: NORMAL
AST SERPL-CCNC: 56 U/L (ref 1–32)
BACTERIA UR QL AUTO: ABNORMAL /HPF
BASOPHILS # BLD AUTO: 0.03 10*3/MM3 (ref 0–0.2)
BASOPHILS NFR BLD AUTO: 1.1 % (ref 0–1.5)
BILIRUB SERPL-MCNC: 0.9 MG/DL (ref 0–1.2)
BILIRUB UR QL STRIP: NEGATIVE
BILIRUB UR QL STRIP: NEGATIVE
BUN SERPL-MCNC: 15 MG/DL (ref 8–23)
BUN/CREAT SERPL: 15.6 (ref 7–25)
CALCIUM SPEC-SCNC: 7.8 MG/DL (ref 8.6–10.5)
CHLORIDE SERPL-SCNC: 101 MMOL/L (ref 98–107)
CLARITY UR: CLEAR
CLARITY UR: CLEAR
CO2 SERPL-SCNC: 25 MMOL/L (ref 22–29)
COLOR UR: YELLOW
COLOR UR: YELLOW
CREAT SERPL-MCNC: 0.96 MG/DL (ref 0.57–1)
D-LACTATE SERPL-SCNC: 1.4 MMOL/L (ref 0.5–2)
DEPRECATED RDW RBC AUTO: 71.3 FL (ref 37–54)
EGFRCR SERPLBLD CKD-EPI 2021: 60.7 ML/MIN/1.73
EOSINOPHIL # BLD AUTO: 0.04 10*3/MM3 (ref 0–0.4)
EOSINOPHIL NFR BLD AUTO: 1.4 % (ref 0.3–6.2)
ERYTHROCYTE [DISTWIDTH] IN BLOOD BY AUTOMATED COUNT: 26.7 % (ref 12.3–15.4)
FERRITIN SERPL-MCNC: 120.1 NG/ML (ref 13–150)
FLUAV SUBTYP SPEC NAA+PROBE: NOT DETECTED
FLUBV RNA ISLT QL NAA+PROBE: NOT DETECTED
FOLATE SERPL-MCNC: >20 NG/ML (ref 4.78–24.2)
GLOBULIN UR ELPH-MCNC: 4.5 GM/DL
GLUCOSE BLDC GLUCOMTR-MCNC: 113 MG/DL (ref 70–99)
GLUCOSE BLDC GLUCOMTR-MCNC: 132 MG/DL (ref 70–99)
GLUCOSE SERPL-MCNC: 117 MG/DL (ref 65–99)
GLUCOSE UR STRIP-MCNC: NEGATIVE MG/DL
GLUCOSE UR STRIP-MCNC: NEGATIVE MG/DL
HCT VFR BLD AUTO: 36 % (ref 34–46.6)
HGB BLD-MCNC: 10.7 G/DL (ref 12–15.9)
HGB UR QL STRIP.AUTO: ABNORMAL
HGB UR QL STRIP.AUTO: ABNORMAL
HOLD SPECIMEN: NORMAL
HOLD SPECIMEN: NORMAL
HYALINE CASTS UR QL AUTO: ABNORMAL /LPF
HYPOCHROMIA BLD QL: NORMAL
IMM GRANULOCYTES # BLD AUTO: 0.01 10*3/MM3 (ref 0–0.05)
IMM GRANULOCYTES NFR BLD AUTO: 0.4 % (ref 0–0.5)
IRON 24H UR-MRATE: 34 MCG/DL (ref 37–145)
IRON SATN MFR SERPL: 13 % (ref 20–50)
KETONES UR QL STRIP: NEGATIVE
KETONES UR QL STRIP: NEGATIVE
LEUKOCYTE ESTERASE UR QL STRIP.AUTO: ABNORMAL
LEUKOCYTE ESTERASE UR QL STRIP.AUTO: ABNORMAL
LYMPHOCYTES # BLD AUTO: 0.78 10*3/MM3 (ref 0.7–3.1)
LYMPHOCYTES NFR BLD AUTO: 28.1 % (ref 19.6–45.3)
MAGNESIUM SERPL-MCNC: 1.4 MG/DL (ref 1.6–2.4)
MAGNESIUM SERPL-MCNC: 1.5 MG/DL (ref 1.6–2.4)
MCH RBC QN AUTO: 22.7 PG (ref 26.6–33)
MCHC RBC AUTO-ENTMCNC: 29.7 G/DL (ref 31.5–35.7)
MCV RBC AUTO: 76.3 FL (ref 79–97)
MICROCYTES BLD QL: NORMAL
MONOCYTES # BLD AUTO: 0.29 10*3/MM3 (ref 0.1–0.9)
MONOCYTES NFR BLD AUTO: 10.4 % (ref 5–12)
NEUTROPHILS NFR BLD AUTO: 1.63 10*3/MM3 (ref 1.7–7)
NEUTROPHILS NFR BLD AUTO: 58.6 % (ref 42.7–76)
NITRITE UR QL STRIP: NEGATIVE
NITRITE UR QL STRIP: NEGATIVE
NRBC BLD AUTO-RTO: 0 /100 WBC (ref 0–0.2)
PH UR STRIP.AUTO: 7.5 [PH] (ref 5–8)
PH UR STRIP.AUTO: 7.5 [PH] (ref 5–8)
PHOSPHATE SERPL-MCNC: 2.2 MG/DL (ref 2.5–4.5)
PLATELET # BLD AUTO: 219 10*3/MM3 (ref 140–450)
PMV BLD AUTO: 10.8 FL (ref 6–12)
POTASSIUM SERPL-SCNC: 2.9 MMOL/L (ref 3.5–5.2)
PROCALCITONIN SERPL-MCNC: 0.25 NG/ML (ref 0–0.25)
PROT SERPL-MCNC: 7.1 G/DL (ref 6–8.5)
PROT UR QL STRIP: NEGATIVE
PROT UR QL STRIP: NEGATIVE
QT INTERVAL: 503 MS
QTC INTERVAL: 530 MS
RBC # BLD AUTO: 4.72 10*6/MM3 (ref 3.77–5.28)
RBC # UR STRIP: ABNORMAL /HPF
REF LAB TEST METHOD: ABNORMAL
RETICS # AUTO: 0.05 10*6/MM3 (ref 0.02–0.13)
RETICS/RBC NFR AUTO: 0.96 % (ref 0.7–1.9)
RSV RNA NPH QL NAA+NON-PROBE: NOT DETECTED
SARS-COV-2 RNA RESP QL NAA+PROBE: DETECTED
SMALL PLATELETS BLD QL SMEAR: ADEQUATE
SODIUM SERPL-SCNC: 138 MMOL/L (ref 136–145)
SP GR UR STRIP: 1.01 (ref 1–1.03)
SP GR UR STRIP: 1.01 (ref 1–1.03)
SQUAMOUS #/AREA URNS HPF: ABNORMAL /HPF
TIBC SERPL-MCNC: 262 MCG/DL (ref 298–536)
TRANSFERRIN SERPL-MCNC: 176 MG/DL (ref 200–360)
TROPONIN T SERPL HS-MCNC: 38 NG/L
UROBILINOGEN UR QL STRIP: ABNORMAL
UROBILINOGEN UR QL STRIP: ABNORMAL
VIT B12 BLD-MCNC: >2000 PG/ML (ref 211–946)
WBC # UR STRIP: ABNORMAL /HPF
WBC MORPH BLD: NORMAL
WBC NRBC COR # BLD AUTO: 2.78 10*3/MM3 (ref 3.4–10.8)
WHOLE BLOOD HOLD COAG: NORMAL
WHOLE BLOOD HOLD SPECIMEN: NORMAL

## 2024-01-14 PROCEDURE — 71250 CT THORAX DX C-: CPT

## 2024-01-14 PROCEDURE — 25010000002 DEXAMETHASONE SODIUM PHOSPHATE 10 MG/ML SOLUTION: Performed by: HOSPITALIST

## 2024-01-14 PROCEDURE — 25010000002 MAGNESIUM SULFATE 2 GM/50ML SOLUTION: Performed by: HOSPITALIST

## 2024-01-14 PROCEDURE — 25010000002 MAGNESIUM SULFATE 2 GM/50ML SOLUTION: Performed by: EMERGENCY MEDICINE

## 2024-01-14 PROCEDURE — 85007 BL SMEAR W/DIFF WBC COUNT: CPT

## 2024-01-14 PROCEDURE — 83540 ASSAY OF IRON: CPT | Performed by: HOSPITALIST

## 2024-01-14 PROCEDURE — 99285 EMERGENCY DEPT VISIT HI MDM: CPT

## 2024-01-14 PROCEDURE — 94664 DEMO&/EVAL PT USE INHALER: CPT

## 2024-01-14 PROCEDURE — 25010000002 FUROSEMIDE PER 20 MG: Performed by: HOSPITALIST

## 2024-01-14 PROCEDURE — 25010000002 POTASSIUM CHLORIDE 10 MEQ/100ML SOLUTION: Performed by: HOSPITALIST

## 2024-01-14 PROCEDURE — 80053 COMPREHEN METABOLIC PANEL: CPT

## 2024-01-14 PROCEDURE — 85045 AUTOMATED RETICULOCYTE COUNT: CPT | Performed by: HOSPITALIST

## 2024-01-14 PROCEDURE — 71045 X-RAY EXAM CHEST 1 VIEW: CPT

## 2024-01-14 PROCEDURE — 82948 REAGENT STRIP/BLOOD GLUCOSE: CPT

## 2024-01-14 PROCEDURE — 85025 COMPLETE CBC W/AUTO DIFF WBC: CPT

## 2024-01-14 PROCEDURE — 93005 ELECTROCARDIOGRAM TRACING: CPT

## 2024-01-14 PROCEDURE — 84145 PROCALCITONIN (PCT): CPT | Performed by: HOSPITALIST

## 2024-01-14 PROCEDURE — 84100 ASSAY OF PHOSPHORUS: CPT | Performed by: HOSPITALIST

## 2024-01-14 PROCEDURE — 87637 SARSCOV2&INF A&B&RSV AMP PRB: CPT | Performed by: EMERGENCY MEDICINE

## 2024-01-14 PROCEDURE — 99223 1ST HOSP IP/OBS HIGH 75: CPT | Performed by: HOSPITALIST

## 2024-01-14 PROCEDURE — 82728 ASSAY OF FERRITIN: CPT | Performed by: HOSPITALIST

## 2024-01-14 PROCEDURE — 83735 ASSAY OF MAGNESIUM: CPT | Performed by: HOSPITALIST

## 2024-01-14 PROCEDURE — 83605 ASSAY OF LACTIC ACID: CPT | Performed by: HOSPITALIST

## 2024-01-14 PROCEDURE — 36415 COLL VENOUS BLD VENIPUNCTURE: CPT

## 2024-01-14 PROCEDURE — 87449 NOS EACH ORGANISM AG IA: CPT | Performed by: HOSPITALIST

## 2024-01-14 PROCEDURE — 84466 ASSAY OF TRANSFERRIN: CPT | Performed by: HOSPITALIST

## 2024-01-14 PROCEDURE — 87040 BLOOD CULTURE FOR BACTERIA: CPT | Performed by: HOSPITALIST

## 2024-01-14 PROCEDURE — 87899 AGENT NOS ASSAY W/OPTIC: CPT | Performed by: HOSPITALIST

## 2024-01-14 PROCEDURE — 94640 AIRWAY INHALATION TREATMENT: CPT

## 2024-01-14 PROCEDURE — 82746 ASSAY OF FOLIC ACID SERUM: CPT | Performed by: HOSPITALIST

## 2024-01-14 PROCEDURE — 93005 ELECTROCARDIOGRAM TRACING: CPT | Performed by: EMERGENCY MEDICINE

## 2024-01-14 PROCEDURE — 94799 UNLISTED PULMONARY SVC/PX: CPT

## 2024-01-14 PROCEDURE — 25010000002 CEFEPIME PER 500 MG: Performed by: HOSPITALIST

## 2024-01-14 PROCEDURE — 87086 URINE CULTURE/COLONY COUNT: CPT | Performed by: HOSPITALIST

## 2024-01-14 PROCEDURE — 81001 URINALYSIS AUTO W/SCOPE: CPT | Performed by: HOSPITALIST

## 2024-01-14 PROCEDURE — 84484 ASSAY OF TROPONIN QUANT: CPT

## 2024-01-14 PROCEDURE — 83735 ASSAY OF MAGNESIUM: CPT

## 2024-01-14 PROCEDURE — 82607 VITAMIN B-12: CPT | Performed by: HOSPITALIST

## 2024-01-14 RX ORDER — BENZONATATE 100 MG/1
100 CAPSULE ORAL 3 TIMES DAILY PRN
Status: DISCONTINUED | OUTPATIENT
Start: 2024-01-14 | End: 2024-01-19 | Stop reason: HOSPADM

## 2024-01-14 RX ORDER — POTASSIUM CHLORIDE 7.45 MG/ML
10 INJECTION INTRAVENOUS ONCE
Status: DISCONTINUED | OUTPATIENT
Start: 2024-01-14 | End: 2024-01-14 | Stop reason: SDUPTHER

## 2024-01-14 RX ORDER — POTASSIUM CHLORIDE 7.45 MG/ML
10 INJECTION INTRAVENOUS
Status: DISCONTINUED | OUTPATIENT
Start: 2024-01-14 | End: 2024-01-14

## 2024-01-14 RX ORDER — FUROSEMIDE 10 MG/ML
40 INJECTION INTRAMUSCULAR; INTRAVENOUS ONCE
Status: COMPLETED | OUTPATIENT
Start: 2024-01-14 | End: 2024-01-14

## 2024-01-14 RX ORDER — METRONIDAZOLE 500 MG/1
500 TABLET ORAL 2 TIMES DAILY
Qty: 6 TABLET | Refills: 0 | Status: COMPLETED | OUTPATIENT
Start: 2024-01-14 | End: 2024-01-17

## 2024-01-14 RX ORDER — NICOTINE POLACRILEX 4 MG
15 LOZENGE BUCCAL
Status: DISCONTINUED | OUTPATIENT
Start: 2024-01-14 | End: 2024-01-19 | Stop reason: HOSPADM

## 2024-01-14 RX ORDER — SODIUM CHLORIDE 0.9 % (FLUSH) 0.9 %
10 SYRINGE (ML) INJECTION EVERY 12 HOURS SCHEDULED
Status: DISCONTINUED | OUTPATIENT
Start: 2024-01-14 | End: 2024-01-19 | Stop reason: HOSPADM

## 2024-01-14 RX ORDER — SODIUM CHLORIDE 0.9 % (FLUSH) 0.9 %
10 SYRINGE (ML) INJECTION AS NEEDED
Status: DISCONTINUED | OUTPATIENT
Start: 2024-01-14 | End: 2024-01-19 | Stop reason: HOSPADM

## 2024-01-14 RX ORDER — AMOXICILLIN 250 MG
2 CAPSULE ORAL 2 TIMES DAILY
Status: DISCONTINUED | OUTPATIENT
Start: 2024-01-14 | End: 2024-01-15

## 2024-01-14 RX ORDER — CLARITHROMYCIN 250 MG/1
500 TABLET, FILM COATED ORAL EVERY 12 HOURS SCHEDULED
Status: COMPLETED | OUTPATIENT
Start: 2024-01-14 | End: 2024-01-17

## 2024-01-14 RX ORDER — MAGNESIUM SULFATE HEPTAHYDRATE 40 MG/ML
2 INJECTION, SOLUTION INTRAVENOUS ONCE
Status: COMPLETED | OUTPATIENT
Start: 2024-01-14 | End: 2024-01-14

## 2024-01-14 RX ORDER — FUROSEMIDE 20 MG/1
20 TABLET ORAL 2 TIMES DAILY
Status: DISCONTINUED | OUTPATIENT
Start: 2024-01-14 | End: 2024-01-14

## 2024-01-14 RX ORDER — ONDANSETRON 4 MG/1
4 TABLET, ORALLY DISINTEGRATING ORAL EVERY 6 HOURS PRN
Status: DISCONTINUED | OUTPATIENT
Start: 2024-01-14 | End: 2024-01-19 | Stop reason: HOSPADM

## 2024-01-14 RX ORDER — PANTOPRAZOLE SODIUM 20 MG/1
20 TABLET, DELAYED RELEASE ORAL 2 TIMES DAILY
Status: DISCONTINUED | OUTPATIENT
Start: 2024-01-14 | End: 2024-01-19 | Stop reason: HOSPADM

## 2024-01-14 RX ORDER — BUDESONIDE 0.5 MG/2ML
0.5 INHALANT ORAL
Status: DISCONTINUED | OUTPATIENT
Start: 2024-01-14 | End: 2024-01-19 | Stop reason: HOSPADM

## 2024-01-14 RX ORDER — ONDANSETRON 2 MG/ML
4 INJECTION INTRAMUSCULAR; INTRAVENOUS EVERY 6 HOURS PRN
Status: DISCONTINUED | OUTPATIENT
Start: 2024-01-14 | End: 2024-01-19 | Stop reason: HOSPADM

## 2024-01-14 RX ORDER — POTASSIUM CHLORIDE 7.45 MG/ML
10 INJECTION INTRAVENOUS
Status: COMPLETED | OUTPATIENT
Start: 2024-01-14 | End: 2024-01-14

## 2024-01-14 RX ORDER — AMOXICILLIN 500 MG/1
500 CAPSULE ORAL 2 TIMES DAILY
Status: COMPLETED | OUTPATIENT
Start: 2024-01-14 | End: 2024-01-17

## 2024-01-14 RX ORDER — POTASSIUM CHLORIDE 750 MG/1
40 CAPSULE, EXTENDED RELEASE ORAL 2 TIMES DAILY WITH MEALS
Status: DISCONTINUED | OUTPATIENT
Start: 2024-01-14 | End: 2024-01-14

## 2024-01-14 RX ORDER — CARVEDILOL 6.25 MG/1
6.25 TABLET ORAL 2 TIMES DAILY WITH MEALS
Status: DISCONTINUED | OUTPATIENT
Start: 2024-01-14 | End: 2024-01-15

## 2024-01-14 RX ORDER — POTASSIUM CHLORIDE 750 MG/1
40 CAPSULE, EXTENDED RELEASE ORAL ONCE
Status: COMPLETED | OUTPATIENT
Start: 2024-01-14 | End: 2024-01-14

## 2024-01-14 RX ORDER — POTASSIUM CHLORIDE 7.45 MG/ML
10 INJECTION INTRAVENOUS
Status: DISPENSED | OUTPATIENT
Start: 2024-01-14 | End: 2024-01-14

## 2024-01-14 RX ORDER — ACETAMINOPHEN 650 MG/1
650 SUPPOSITORY RECTAL EVERY 4 HOURS PRN
Status: DISCONTINUED | OUTPATIENT
Start: 2024-01-14 | End: 2024-01-19 | Stop reason: HOSPADM

## 2024-01-14 RX ORDER — SODIUM CHLORIDE 0.9 % (FLUSH) 0.9 %
10 SYRINGE (ML) INJECTION AS NEEDED
Status: DISCONTINUED | OUTPATIENT
Start: 2024-01-14 | End: 2024-01-14

## 2024-01-14 RX ORDER — BISACODYL 10 MG
10 SUPPOSITORY, RECTAL RECTAL DAILY PRN
Status: DISCONTINUED | OUTPATIENT
Start: 2024-01-14 | End: 2024-01-15

## 2024-01-14 RX ORDER — DEXAMETHASONE SODIUM PHOSPHATE 10 MG/ML
6 INJECTION, SOLUTION INTRAMUSCULAR; INTRAVENOUS DAILY
Status: DISCONTINUED | OUTPATIENT
Start: 2024-01-14 | End: 2024-01-15

## 2024-01-14 RX ORDER — IPRATROPIUM BROMIDE AND ALBUTEROL SULFATE 2.5; .5 MG/3ML; MG/3ML
3 SOLUTION RESPIRATORY (INHALATION)
Status: DISCONTINUED | OUTPATIENT
Start: 2024-01-14 | End: 2024-01-19 | Stop reason: HOSPADM

## 2024-01-14 RX ORDER — GUAIFENESIN 600 MG/1
1200 TABLET, EXTENDED RELEASE ORAL EVERY 12 HOURS SCHEDULED
Status: DISCONTINUED | OUTPATIENT
Start: 2024-01-14 | End: 2024-01-17

## 2024-01-14 RX ORDER — BISACODYL 5 MG/1
5 TABLET, DELAYED RELEASE ORAL DAILY PRN
Status: DISCONTINUED | OUTPATIENT
Start: 2024-01-14 | End: 2024-01-15

## 2024-01-14 RX ORDER — INSULIN LISPRO 100 [IU]/ML
2-7 INJECTION, SOLUTION INTRAVENOUS; SUBCUTANEOUS
Status: DISCONTINUED | OUTPATIENT
Start: 2024-01-14 | End: 2024-01-15

## 2024-01-14 RX ORDER — SODIUM CHLORIDE 9 MG/ML
40 INJECTION, SOLUTION INTRAVENOUS AS NEEDED
Status: DISCONTINUED | OUTPATIENT
Start: 2024-01-14 | End: 2024-01-19 | Stop reason: HOSPADM

## 2024-01-14 RX ORDER — IBUPROFEN 600 MG/1
1 TABLET ORAL
Status: DISCONTINUED | OUTPATIENT
Start: 2024-01-14 | End: 2024-01-19 | Stop reason: HOSPADM

## 2024-01-14 RX ORDER — DEXTROSE MONOHYDRATE 25 G/50ML
25 INJECTION, SOLUTION INTRAVENOUS
Status: DISCONTINUED | OUTPATIENT
Start: 2024-01-14 | End: 2024-01-19 | Stop reason: HOSPADM

## 2024-01-14 RX ORDER — POLYETHYLENE GLYCOL 3350 17 G/17G
17 POWDER, FOR SOLUTION ORAL DAILY PRN
Status: DISCONTINUED | OUTPATIENT
Start: 2024-01-14 | End: 2024-01-15

## 2024-01-14 RX ORDER — MAGNESIUM SULFATE HEPTAHYDRATE 40 MG/ML
2 INJECTION, SOLUTION INTRAVENOUS ONCE
Qty: 50 ML | Refills: 0 | Status: COMPLETED | OUTPATIENT
Start: 2024-01-14 | End: 2024-01-14

## 2024-01-14 RX ORDER — ACETAMINOPHEN 325 MG/1
650 TABLET ORAL EVERY 6 HOURS PRN
Status: DISCONTINUED | OUTPATIENT
Start: 2024-01-14 | End: 2024-01-19 | Stop reason: HOSPADM

## 2024-01-14 RX ORDER — CITALOPRAM 20 MG/1
10 TABLET ORAL DAILY
Status: DISCONTINUED | OUTPATIENT
Start: 2024-01-15 | End: 2024-01-19 | Stop reason: HOSPADM

## 2024-01-14 RX ADMIN — POTASSIUM CHLORIDE 10 MEQ: 7.46 INJECTION, SOLUTION INTRAVENOUS at 16:40

## 2024-01-14 RX ADMIN — POTASSIUM CHLORIDE 10 MEQ: 7.46 INJECTION, SOLUTION INTRAVENOUS at 18:30

## 2024-01-14 RX ADMIN — FUROSEMIDE 40 MG: 10 INJECTION, SOLUTION INTRAMUSCULAR; INTRAVENOUS at 16:40

## 2024-01-14 RX ADMIN — AMOXICILLIN 500 MG: 500 CAPSULE ORAL at 21:27

## 2024-01-14 RX ADMIN — PANTOPRAZOLE 20 MG: 20 TABLET, DELAYED RELEASE ORAL at 21:26

## 2024-01-14 RX ADMIN — IPRATROPIUM BROMIDE AND ALBUTEROL SULFATE 3 ML: .5; 3 SOLUTION RESPIRATORY (INHALATION) at 15:07

## 2024-01-14 RX ADMIN — GUAIFENESIN 1200 MG: 600 TABLET ORAL at 21:27

## 2024-01-14 RX ADMIN — MAGNESIUM SULFATE HEPTAHYDRATE 2 G: 40 INJECTION, SOLUTION INTRAVENOUS at 14:30

## 2024-01-14 RX ADMIN — DOCUSATE SODIUM 50MG AND SENNOSIDES 8.6MG 2 TABLET: 8.6; 5 TABLET, FILM COATED ORAL at 21:26

## 2024-01-14 RX ADMIN — BUDESONIDE 0.5 MG: 0.5 SUSPENSION RESPIRATORY (INHALATION) at 15:07

## 2024-01-14 RX ADMIN — DEXAMETHASONE SODIUM PHOSPHATE 6 MG: 10 INJECTION INTRAMUSCULAR; INTRAVENOUS at 15:10

## 2024-01-14 RX ADMIN — POTASSIUM CHLORIDE 40 MEQ: 10 CAPSULE, COATED, EXTENDED RELEASE ORAL at 15:09

## 2024-01-14 RX ADMIN — APIXABAN 5 MG: 5 TABLET, FILM COATED ORAL at 21:27

## 2024-01-14 RX ADMIN — CARVEDILOL 6.25 MG: 6.25 TABLET, FILM COATED ORAL at 19:05

## 2024-01-14 RX ADMIN — POTASSIUM CHLORIDE 10 MEQ: 7.46 INJECTION, SOLUTION INTRAVENOUS at 19:40

## 2024-01-14 RX ADMIN — MAGNESIUM SULFATE HEPTAHYDRATE 2 G: 40 INJECTION, SOLUTION INTRAVENOUS at 19:05

## 2024-01-14 RX ADMIN — Medication 10 ML: at 21:28

## 2024-01-14 RX ADMIN — CLARITHROMYCIN 500 MG: 250 TABLET ORAL at 21:26

## 2024-01-14 RX ADMIN — CEFEPIME 2000 MG: 2 INJECTION, POWDER, FOR SOLUTION INTRAVENOUS at 18:13

## 2024-01-14 RX ADMIN — METRONIDAZOLE 500 MG: 500 TABLET ORAL at 21:27

## 2024-01-14 NOTE — H&P
[facility]  [facility]: 2024   Patient Name: Isabell Ribera  : 1945  MRN: [id number]  Primary Care Physician:  Provider, No Known  Date of admission: 2024    Subjective cough, nausea, decreased oral intake  Subjective   Chief Complaint: Cough, nausea, decreased oral intake    HPI: Patient is a 78-year-old female with a past medical history of A-fib on DOAC, congestive heart failure, essential hypertension, lymphedema who presents to the emergency room with cough, nausea, poor oral intake.  Patient tested positive for COVID today.  She first tested positive on 2024.  She did come into the emergency room then.    Patient was just in the hospital from 2023 to 1/3/2024.  At that time, the patient had rectal bleeding.  CT abdomen showed no acute abdominal process. 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 Patient's ultrasound was unremarkable, patient was reevaluated by gastroenterology, did not feel she needed ERCP as her LFTs were downtrending.     On this admission, patient had temperature 98.9, pulse of 70, respiratory rate of 20, blood pressure 135/57, and she was required 2 L of oxygen saturating 88%.    Chest x-ray showed cardiomegaly.  Patchy mixed interstitial/airspace disease, left greater than right side.  This could be due to pulmonary edema or multifocal pneumonia.  On labs, patient's high-sensitivity troponin was 38.  Sodium is 138, potassium is 2.9, glucose is 117, creatinine is 0.96, calcium was 7.8, magnesium was 1.5, alkaline phosphatase was 150, albumin was 2.6, AST was 56, white blood cell count was 2.78, hemoglobin was 10.7.  Neutrophils are 1.62.    Patient is bedbound at baseline and requires total home care.   Personal History     Past Medical History:  Past Medical History:   Diagnosis Date    Afib      Aftercare following ankle joint replacement surgery 06/01/2015    Arthritis     Arthritis of knee 06/01/2015    CHF (congestive heart failure)     Dementia     Essential hypertension     Gout     HTN (hypertension)     Left knee pain     Lymphedema     Recurrent UTI 05/11/2021    Urinary retention 05/11/2021           Past Surgical History:  Past Surgical History:   Procedure Laterality Date    CATARACT EXTRACTION Bilateral     COLONOSCOPY N/A 12/27/2023    Procedure: COLONOSCOPY WITH POLYPECTOMY/SNARE/CLIP APPLICATION X1/FULGURATION OF AVMS USING APC MACHINE;  Surgeon: Junior Corbin MD;  Location: [facility] ENDOSCOPY;  Service: Gastroenterology;  Laterality: N/A;  COLON POLYP  DIVERTICULOSIS  AVMS OF COLON    ENDOSCOPY N/A 12/23/2023    Procedure: ESOPHAGOGASTRODUODENOSCOPY WITH BIOPSIES;  Surgeon: Junior Corbin MD;  Location: [facility] ENDOSCOPY;  Service: Gastroenterology;  Laterality: N/A;  GASTRITIS, MATA'S, HIATAL HERNIA    KIDNEY STONE SURGERY      KNEE SURGERY Left 2007    REPLACEMENT    OTHER SURGICAL HISTORY      JOINT SURGERY, UNSPECIFIED    TUBAL ABDOMINAL LIGATION           Family History:   Breast Cancer-related family history is not on file.      Social History:   Social History     Socioeconomic History    Marital status:    Tobacco Use    Smoking status: Former     Passive exposure: Past    Smokeless tobacco: Never   Vaping Use    Vaping Use: Never used   Substance and Sexual Activity    Alcohol use: Not Currently    Drug use: Defer    Sexual activity: Defer         Home Medications:  amoxicillin, apixaban, benzonatate, carvedilol, citalopram, clarithromycin, furosemide, hydrOXYzine, metroNIDAZOLE, multivitamin with minerals, pantoprazole, and potassium chloride    Allergies:  Allergies   Allergen Reactions    Levofloxacin Hives       Review of Systems   All systems were reviewed and negative except for shortness of breath    Objective   Objective     Vitals:   Temp:  [98.2 °F  (36.8 °C)-98.9 °F (37.2 °C)] 98.2 °F (36.8 °C)  Heart Rate:  [59-79] 79  Resp:  [18-20] 18  BP: (118-143)/(57-68) 141/68  Flow (L/min):  [2] 2    Physical Exam   Physical Exam  HENT:      Head: Normocephalic.      Nose: Nose normal.      Mouth/Throat:      Mouth: Mucous membranes are moist.   Eyes:      Extraocular Movements: Extraocular movements intact.      Pupils: Pupils are equal, round, and reactive to light.   Cardiovascular:      Rate and Rhythm: Normal rate and regular rhythm.   Pulmonary:      Breath sounds: Wheezing present.   Abdominal:      General: Abdomen is flat.      Palpations: Abdomen is soft.   Musculoskeletal:         General: Swelling present.   Skin:     General: Skin is warm and dry.   Neurological:      General: No focal deficit present.      Mental Status: She is alert.   Psychiatric:         Mood and Affect: Mood normal.         Behavior: Behavior normal.          Result Review      Result Review:  I have personally reviewed the results from the time of this admission to 1/14/2024 15:27 EST and agree with these findings:  [x]  Laboratory  [x]  Microbiology  []  Radiology  []  EKG/Telemetry   []  Cardiology/Vascular   [x]  Pathology  [x]  Old records  []  Other:    Lab Results (most recent)       Procedure Component Value Units Date/Time    COVID-19, FLU A/B, RSV PCR 1 HR TAT - Swab, Nasopharynx [[id number]]  (Abnormal) Collected: 01/14/24 1032    Specimen: Swab from Nasopharynx Updated: 01/14/24 1149     COVID19 Detected     Influenza A PCR Not Detected     Influenza B PCR Not Detected     RSV, PCR Not Detected    Narrative:      Fact sheet for providers: https://www.fda.gov/media/552181/download    Fact sheet for patients: https://www.fda.gov/media/838204/download    Test performed by PCR.    [place] Draw [[id number]] Collected: 01/14/24 1039    Specimen: Blood from Arm, Right Updated: 01/14/24 1146    Narrative:      The following orders were created for panel order Tod  Draw.  Procedure                               Abnormality         Status                     ---------                               -----------         ------                     Green Top (Gel)[[id number]]                                  Final result               Lavender Top[[id number]]                                     Final result               Gold Top - SST[[id number]]                                   Final result               Light Blue Top[[id number]]                                   Final result                 Please view results for these tests on the individual orders.    Light Blue Top [[id number]] Collected: 01/14/24 1039    Specimen: Blood from Arm, Right Updated: 01/14/24 1146     Extra Tube Hold for add-ons.     Comment: Auto resulted       Green Top (Gel) [[id number]] Collected: 01/14/24 1039    Specimen: Blood from Arm, Right Updated: 01/14/24 1132     Extra Tube Hold for add-ons.     Comment: Auto resulted.       Gold Top - SST [[id number]] Collected: 01/14/24 1039    Specimen: Blood from Arm, Right Updated: 01/14/24 1132     Extra Tube Hold for add-ons.     Comment: Auto resulted.       Comprehensive Metabolic Panel [[id number]]  (Abnormal) Collected: 01/14/24 1039    Specimen: Blood from Arm, Right Updated: 01/14/24 1112     Glucose 117 mg/dL      BUN 15 mg/dL      Creatinine 0.96 mg/dL      Sodium 138 mmol/L      Potassium 2.9 mmol/L      Chloride 101 mmol/L      CO2 25.0 mmol/L      Calcium 7.8 mg/dL      Total Protein 7.1 g/dL      Albumin 2.6 g/dL      ALT (SGPT) 9 U/L      AST (SGOT) 56 U/L      Alkaline Phosphatase 150 U/L      Total Bilirubin 0.9 mg/dL      Globulin 4.5 gm/dL      A/G Ratio 0.6 g/dL      BUN/Creatinine Ratio 15.6     Anion Gap 12.0 mmol/L      eGFR 60.7 mL/min/1.73     Narrative:      GFR Normal >60  Chronic Kidney Disease <60  Kidney Failure <15    The GFR formula is only valid for adults with stable renal function between ages 18 and 70.    Single High Sensitivity  Troponin T [[id number]]  (Abnormal) Collected: 01/14/24 1039    Specimen: Blood from Arm, Right Updated: 01/14/24 1112     HS Troponin T 38 ng/L     Narrative:      High Sensitive Troponin T Reference Range:  <14.0 ng/L- Negative Female for AMI  <22.0 ng/L- Negative Male for AMI  >=14 - Abnormal Female indicating possible myocardial injury.  >=22 - Abnormal Male indicating possible myocardial injury.   Clinicians would have to utilize clinical acumen, EKG, Troponin, and serial changes to determine if it is an Acute Myocardial Infarction or myocardial injury due to an underlying chronic condition.         Magnesium [[id number]]  (Abnormal) Collected: 01/14/24 1039    Specimen: Blood from Arm, Right Updated: 01/14/24 1112     Magnesium 1.5 mg/dL     Lavender Top [[id number]] Collected: 01/14/24 1039    Specimen: Blood from Arm, Right Updated: 01/14/24 1112     Extra Tube hold for add-on     Comment: Auto resulted       CBC & Differential [[id number]]  (Abnormal) Collected: 01/14/24 1039    Specimen: Blood from Arm, Right Updated: 01/14/24 1110    Narrative:      The following orders were created for panel order CBC & Differential.  Procedure                               Abnormality         Status                     ---------                               -----------         ------                     CBC Auto Differential[[id number]]        Abnormal            Final result               Scan Slide[[id number]]                                       Final result                 Please view results for these tests on the individual orders.    Scan Slide [[id number]] Collected: 01/14/24 1039    Specimen: Blood from Arm, Right Updated: 01/14/24 1110     Anisocytosis Mod/2+     Hypochromia Slight/1+     Microcytes Slight/1+     WBC Morphology Normal     Platelet Estimate Adequate    CBC Auto Differential [[id number]]  (Abnormal) Collected: 01/14/24 1039    Specimen: Blood from Arm, Right Updated: 01/14/24 1058     WBC 2.78  10*3/mm3      RBC 4.72 10*6/mm3      Hemoglobin 10.7 g/dL      Hematocrit 36.0 %      MCV 76.3 fL      MCH 22.7 pg      MCHC 29.7 g/dL      RDW 26.7 %      RDW-SD 71.3 fl      MPV 10.8 fL      Platelets 219 10*3/mm3      Neutrophil % 58.6 %      Lymphocyte % 28.1 %      Monocyte % 10.4 %      Eosinophil % 1.4 %      Basophil % 1.1 %      Immature Grans % 0.4 %      Neutrophils, Absolute 1.63 10*3/mm3      Lymphocytes, Absolute 0.78 10*3/mm3      Monocytes, Absolute 0.29 10*3/mm3      Eosinophils, Absolute 0.04 10*3/mm3      Basophils, Absolute 0.03 10*3/mm3      Immature Grans, Absolute 0.01 10*3/mm3      nRBC 0.0 /100 WBC             XR Chest 1 View    Result Date: 1/14/2024    1. Cardiomegaly. 2. Patchy mixed interstitial/airspace disease, left greater than right side.  This could be due to pulmonary edema or multifocal pneumonia.       JENN PORTER MD       Electronically Signed and Approved By: JENN PORTER MD on 1/14/2024 at 11:02              Assessment & Plan   Assessment / Plan   #1 COVID-pneumonia  -Recent hospitalization.  Could have HCAP.  -Complete respiratory panel ordered.  -Will treat with antibiotics, steroids, bronchodilator.  -Will get CT for better visualization.    #2 H. pylori infection  -Have discussed antibiotic selection with pharmacy considering #1 and #2   -Continue Protonix twice daily.    #3 atrial fibrillation  -Continue Coreg, Eliquis.    #4 bilateral lower extremity swelling  -40 of IV Lasix.    #5 recurrent UTIs  -previous cultures resistant to macrobid.  Patient has been prophylactically put on macrobid. Patient has been growing proteus.  Will recheck UC.    #6 Low potassium and low magnesium.         DVT prophylaxis:  Medical DVT prophylaxis orders are present.    CODE STATUS:    Level Of Support Discussed With: Patient  Code Status (Patient has no pulse and is not breathing): CPR (Attempt to Resuscitate)  Medical Interventions (Patient has pulse or is breathing): Full  Support      Admission Status:  I believe this patient meets inpatient status.    Electronically signed by Eren Guadalupe DO, 01/14/24, 2:44 PM EST.

## 2024-01-14 NOTE — ED PROVIDER NOTES
Time: 11:26 AM EST  Date of encounter:  1/14/2024  Independent Historian/Clinical History and Information was obtained by:   Patient and Family    History is limited by: N/A    Chief Complaint: Cough, nausea, decreased p.o. intake      History of Present Illness:  Patient is a 78 y.o. year old female who presents to the emergency department for evaluation of cough, nausea, decreased p.o. intake.  Reports that she has a history of A-fib, CHF, hypertension, gallstones who presents with complaints of nausea, decreased p.o. intake as well as cough.  States that she was just in the hospital in December with COVID.  States that she has not felt well over the last few days since had decreased p.o. intake.  Patient normally not on oxygen but was hypoxic when she got here and had to be placed on oxygen per nursing.    HPI    Patient Care Team  Primary Care Provider: Provider, No Known    Past Medical History:     Allergies   Allergen Reactions    Levofloxacin Hives     Past Medical History:   Diagnosis Date    Afib     Aftercare following ankle joint replacement surgery 06/01/2015    Arthritis     Arthritis of knee 06/01/2015    CHF (congestive heart failure)     Dementia     Essential hypertension     Gout     HTN (hypertension)     Left knee pain     Lymphedema     Recurrent UTI 05/11/2021    Urinary retention 05/11/2021     Past Surgical History:   Procedure Laterality Date    CATARACT EXTRACTION Bilateral     COLONOSCOPY N/A 12/27/2023    Procedure: COLONOSCOPY WITH POLYPECTOMY/SNARE/CLIP APPLICATION X1/FULGURATION OF AVMS USING APC MACHINE;  Surgeon: Junior Corbin MD;  Location: [facility] ENDOSCOPY;  Service: Gastroenterology;  Laterality: N/A;  COLON POLYP  DIVERTICULOSIS  AVMS OF COLON    ENDOSCOPY N/A 12/23/2023    Procedure: ESOPHAGOGASTRODUODENOSCOPY WITH BIOPSIES;  Surgeon: Junior Corbin MD;  Location: [facility] ENDOSCOPY;  Service: Gastroenterology;  Laterality: N/A;  GASTRITIS, MATA'S, HIATAL HERNIA     KIDNEY STONE SURGERY      KNEE SURGERY Left 2007    REPLACEMENT    OTHER SURGICAL HISTORY      JOINT SURGERY, UNSPECIFIED    TUBAL ABDOMINAL LIGATION       Family History   Problem Relation Age of Onset    Heart failure Mother 64        CONGESTIVE    Arthritis Mother     Arthritis Sister     Colon cancer Neg Hx        Home Medications:  Prior to Admission medications    Medication Sig Start Date End Date Taking? Authorizing Provider   amoxicillin (AMOXIL) 500 MG capsule Take 2 capsules by mouth 2 (Two) Times a Day for 14 days. 1/3/24 1/17/24  Myla Mukherjee APRN   benzonatate (TESSALON) 100 MG capsule Take 1 capsule by mouth 3 (Three) Times a Day As Needed for Cough. 1/6/24   Sara Burns APRN   carvedilol (COREG) 6.25 MG tablet TAKE ONE TABLET BY MOUTH TWICE A DAY WITH FOOD 9/20/21   Ivanna Frederick APRN   citalopram (CeleXA) 10 MG tablet Take 1 tablet by mouth Daily. 12/9/23   Maricarmen Cat MD   clarithromycin (BIAXIN) 500 MG tablet Take 1 tablet by mouth 2 (Two) Times a Day for 14 days. 1/3/24 1/17/24  Myla Mukherjee APRN   Eliquis 5 MG tablet tablet Take 1 tablet by mouth Every 12 (Twelve) Hours. 12/21/23   Maricarmen Cat MD   furosemide (LASIX) 20 MG tablet Take 1 tablet by mouth 2 (Two) Times a Day. 3/4/23   Maricarmen Cat MD   hydrOXYzine (ATARAX) 25 MG tablet Take 1 tablet by mouth 2 (Two) Times a Day As Needed. 3/22/23   Maricarmen Cat MD   metroNIDAZOLE (FLAGYL) 500 MG tablet Take 1 tablet by mouth 2 (Two) Times a Day for 14 days. 1/3/24 1/17/24  Myla Mukherjee APRN   multivitamin with minerals tablet tablet Take 1 tablet by mouth Daily.    Maricarmen Cat MD   nitrofurantoin, macrocrystal-monohydrate, (MACROBID) 100 MG capsule Take 1 capsule by mouth Daily. 9/19/23   Maricarmen Cat MD   pantoprazole (Protonix) 20 MG EC tablet Take 1 tablet by mouth 2 (Two) Times a Day. 1/11/24   Jaky Hancock APRN   potassium chloride  "10 MEQ CR tablet Take 1 tablet by mouth Daily. 10/10/23   Provider, Historical, MD   saccharomyces boulardii (FLORASTOR) 250 MG capsule Take 1 capsule by mouth 2 (Two) Times a Day.    Provider, MD Maricarmen        Social History:   Social History     Tobacco Use    Smoking status: Former     Passive exposure: Past    Smokeless tobacco: Never   Vaping Use    Vaping Use: Never used   Substance Use Topics    Alcohol use: Not Currently    Drug use: Defer         Review of Systems:  Review of Systems   Constitutional:  Negative for fever.   Respiratory:  Positive for cough.    Gastrointestinal:  Positive for nausea. Negative for abdominal pain.        Physical Exam:  /58   Pulse 69   Temp 98.9 °F (37.2 °C) (Oral)   Resp 20   Ht 170.2 cm (67\")   Wt 102 kg (224 lb 13.9 oz)   LMP  (LMP Unknown)   SpO2 (!) 84%   BMI 35.22 kg/m²     Physical Exam  Vitals and nursing note reviewed.   Constitutional:       Appearance: She is obese. She is ill-appearing.      Comments: Chronically ill-appearing   HENT:      Head: Normocephalic and atraumatic.   Eyes:      General: No scleral icterus.  Cardiovascular:      Rate and Rhythm: Normal rate and regular rhythm.      Heart sounds: Normal heart sounds.   Pulmonary:      Effort: Pulmonary effort is normal.      Breath sounds: Normal breath sounds.   Abdominal:      Palpations: Abdomen is soft.      Tenderness: There is no abdominal tenderness.      Comments: There is no abdominal tenderness noted.   Musculoskeletal:         General: Normal range of motion.      Cervical back: Normal range of motion.      Right lower leg: Edema present.      Left lower leg: Edema present.      Comments: Chronic lower extremity edema   Skin:     Findings: No rash.   Neurological:      General: No focal deficit present.      Mental Status: She is alert.                  Procedures:  Procedures      Medical Decision Making:      Comorbidities that affect care:    Atrial Fibrillation, Congestive " Heart Failure, Diabetes, Hypertension    External Notes reviewed:    Reviewed admission from 12/22/2023      The following orders were placed and all results were independently analyzed by me:  Orders Placed This Encounter   Procedures    COVID-19, FLU A/B, RSV PCR 1 HR TAT - Swab, Nasopharynx    Blood Culture - Blood,    Blood Culture - Blood,    XR Chest 1 View    [place] Draw    Comprehensive Metabolic Panel    Single High Sensitivity Troponin T    Magnesium    Urinalysis With Microscopic If Indicated (No Culture) - Urine, Clean Catch    CBC Auto Differential    Scan Slide    Lactic Acid, Plasma    NPO Diet NPO Type: Strict NPO    Undress & Gown    Continuous Pulse Oximetry    Vital Signs    Orthostatic Blood Pressure    Inpatient Hospitalist Consult    Oxygen Therapy- Nasal Cannula; Titrate 1-6 LPM Per SpO2; 90 - 95%    POC Glucose Once    ECG 12 Lead ED Triage Standing Order; Weak / Dizzy / AMS    Insert Peripheral IV    Inpatient Admission    Fall Precautions    CBC & Differential    Green Top (Gel)    Lavender Top    Gold Top - SST    Light Blue Top       Medications Given in the Emergency Department:  Medications   sodium chloride 0.9 % flush 10 mL (has no administration in time range)   magnesium sulfate 2g/50 mL (PREMIX) infusion (has no administration in time range)   potassium chloride 10 mEq in 100 mL IVPB (has no administration in time range)   potassium chloride (MICRO-K/KLOR-CON) CR capsule (has no administration in time range)   dexAMETHasone sodium phosphate injection 6 mg (has no administration in time range)   AZITHROMYCIN 500 MG/250 ML 0.9% NS IVPB (vial-mate) (has no administration in time range)   ipratropium-albuterol (DUO-NEB) nebulizer solution 3 mL (has no administration in time range)        ED Course:    ED Course as of 01/14/24 1307   Sun Jan 14, 2024   1128 EKG interpreted by me  Time: 1021  Heart rate 67  A-fib, nonspecific ST changes, PVC [MA]   1303 Spoke with Dr. Guadalupe who agrees  to admit [MA]      ED Course User Index  [MA] Morgan Gonzalez MD       Labs:    Lab Results (last 24 hours)       Procedure Component Value Units Date/Time    COVID-19, FLU A/B, RSV PCR 1 HR TAT - Swab, Nasopharynx [[id number]]  (Abnormal) Collected: 01/14/24 1032    Specimen: Swab from Nasopharynx Updated: 01/14/24 1149     COVID19 Detected     Influenza A PCR Not Detected     Influenza B PCR Not Detected     RSV, PCR Not Detected    Narrative:      Fact sheet for providers: https://www.fda.gov/media/063186/download    Fact sheet for patients: https://www.fda.gov/media/620486/download    Test performed by PCR.    CBC & Differential [[id number]]  (Abnormal) Collected: 01/14/24 1039    Specimen: Blood from Arm, Right Updated: 01/14/24 1110    Narrative:      The following orders were created for panel order CBC & Differential.  Procedure                               Abnormality         Status                     ---------                               -----------         ------                     CBC Auto Differential[[id number]]        Abnormal            Final result               Scan Slide[[id number]]                                       Final result                 Please view results for these tests on the individual orders.    Comprehensive Metabolic Panel [[id number]]  (Abnormal) Collected: 01/14/24 1039    Specimen: Blood from Arm, Right Updated: 01/14/24 1112     Glucose 117 mg/dL      BUN 15 mg/dL      Creatinine 0.96 mg/dL      Sodium 138 mmol/L      Potassium 2.9 mmol/L      Chloride 101 mmol/L      CO2 25.0 mmol/L      Calcium 7.8 mg/dL      Total Protein 7.1 g/dL      Albumin 2.6 g/dL      ALT (SGPT) 9 U/L      AST (SGOT) 56 U/L      Alkaline Phosphatase 150 U/L      Total Bilirubin 0.9 mg/dL      Globulin 4.5 gm/dL      A/G Ratio 0.6 g/dL      BUN/Creatinine Ratio 15.6     Anion Gap 12.0 mmol/L      eGFR 60.7 mL/min/1.73     Narrative:      GFR Normal >60  Chronic Kidney Disease <60  Kidney Failure  <15    The GFR formula is only valid for adults with stable renal function between ages 18 and 70.    Single High Sensitivity Troponin T [[id number]]  (Abnormal) Collected: 01/14/24 1039    Specimen: Blood from Arm, Right Updated: 01/14/24 1112     HS Troponin T 38 ng/L     Narrative:      High Sensitive Troponin T Reference Range:  <14.0 ng/L- Negative Female for AMI  <22.0 ng/L- Negative Male for AMI  >=14 - Abnormal Female indicating possible myocardial injury.  >=22 - Abnormal Male indicating possible myocardial injury.   Clinicians would have to utilize clinical acumen, EKG, Troponin, and serial changes to determine if it is an Acute Myocardial Infarction or myocardial injury due to an underlying chronic condition.         Magnesium [[id number]]  (Abnormal) Collected: 01/14/24 1039    Specimen: Blood from Arm, Right Updated: 01/14/24 1112     Magnesium 1.5 mg/dL     CBC Auto Differential [[id number]]  (Abnormal) Collected: 01/14/24 1039    Specimen: Blood from Arm, Right Updated: 01/14/24 1058     WBC 2.78 10*3/mm3      RBC 4.72 10*6/mm3      Hemoglobin 10.7 g/dL      Hematocrit 36.0 %      MCV 76.3 fL      MCH 22.7 pg      MCHC 29.7 g/dL      RDW 26.7 %      RDW-SD 71.3 fl      MPV 10.8 fL      Platelets 219 10*3/mm3      Neutrophil % 58.6 %      Lymphocyte % 28.1 %      Monocyte % 10.4 %      Eosinophil % 1.4 %      Basophil % 1.1 %      Immature Grans % 0.4 %      Neutrophils, Absolute 1.63 10*3/mm3      Lymphocytes, Absolute 0.78 10*3/mm3      Monocytes, Absolute 0.29 10*3/mm3      Eosinophils, Absolute 0.04 10*3/mm3      Basophils, Absolute 0.03 10*3/mm3      Immature Grans, Absolute 0.01 10*3/mm3      nRBC 0.0 /100 WBC     Scan Slide [[id number]] Collected: 01/14/24 1039    Specimen: Blood from Arm, Right Updated: 01/14/24 1110     Anisocytosis Mod/2+     Hypochromia Slight/1+     Microcytes Slight/1+     WBC Morphology Normal     Platelet Estimate Adequate             Imaging:    XR Chest 1 View    Result  Date: 1/14/2024  PROCEDURE: XR CHEST 1 VW  COMPARISON: [facility], CR, XR CHEST 1 VW, 1/06/2024, 20:36.  INDICATIONS: Cough; Weakness - Generalized  FINDINGS:  The heart is enlarged.  There is patchy mixed interstitial/airspace disease which has developed throughout the lungs, left greater than right side.  This could be due to pulmonary edema or multifocal pneumonia.  There is no pleural effusion or pneumothorax.  There are chronic age-related changes involving the bony thorax and thoracic aorta.        1. Cardiomegaly. 2. Patchy mixed interstitial/airspace disease, left greater than right side.  This could be due to pulmonary edema or multifocal pneumonia.       JENN PORTER MD       Electronically Signed and Approved By: JENN PORTER MD on 1/14/2024 at 11:02                Differential Diagnosis and Discussion:    Cough: Differential diagnosis includes but is not limited to pneumonia, acute bronchitis, upper respiratory infection, ACE inhibitor use, allergic reaction, epiglottitis, seasonal allergies, chemical irritants, exercise-induced asthma, viral syndrome.    All labs were reviewed and interpreted by me.  All X-rays impressions were independently interpreted by me.  EKG was interpreted by me.    MDM     Amount and/or Complexity of Data Reviewed  Decide to obtain previous medical records or to obtain history from someone other than the patient: yes       Patient is 78-year-old female who presents with complaints of cough, nausea, decreased p.o. intake.  Recently diagnosed with COVID-19.  Had hypoxia here when she had movement as well as multiple electrolyte abnormalities including having hypokalemia and hypomagnesemia.  She is requiring oxygen at this time.  Will need admission to the hospital for further workup management.          Patient Care Considerations:          Consultants/Shared Management Plan:    Hospitalist: I have discussed the case with Dr. Guadalupe who agrees to accept the patient for  admission.    Social Determinants of Health:          Disposition and Care Coordination:    Admit:   Through independent evaluation of the patient's history, physical, and imperical data, the patient meets criteria for observation/admission to the hospital.        Final diagnoses:   COVID-19   Acute respiratory failure with hypoxia   Hypomagnesemia   Hypokalemia        ED Disposition       ED Disposition   Decision to Admit    Condition   --    Comment   Level of Care: Telemetry [5]   Diagnosis: COVID [[id number]]   Admitting Physician: DYLAN ESPINOZA [[id number]]   Attending Physician: DYLAN ESPINOZA [[id number]]   Certification: I Certify That Inpatient Hospital Services Are Medically Necessary For Greater Than 2 Midnights                 This medical record created using voice recognition software.             Morgan Gonzalez MD  01/14/24 7898

## 2024-01-14 NOTE — ED NOTES
Nursing staff attempted to sit patient up at bedside. Per family patient is bed bound requiring total care at home. During attempt to adjust patient in bed, patient desat to 84%. Upon readjustment patient o2 remained in the 80s. RN put patient on 2L nasal cannula.

## 2024-01-15 LAB
ANION GAP SERPL CALCULATED.3IONS-SCNC: 11.2 MMOL/L (ref 5–15)
BACTERIA SPEC AEROBE CULT: NO GROWTH
BASOPHILS # BLD AUTO: 0 10*3/MM3 (ref 0–0.2)
BASOPHILS NFR BLD AUTO: 0 % (ref 0–1.5)
BUN SERPL-MCNC: 17 MG/DL (ref 8–23)
BUN/CREAT SERPL: 19.3 (ref 7–25)
CALCIUM SPEC-SCNC: 8 MG/DL (ref 8.6–10.5)
CHLORIDE SERPL-SCNC: 105 MMOL/L (ref 98–107)
CO2 SERPL-SCNC: 23.8 MMOL/L (ref 22–29)
CREAT SERPL-MCNC: 0.88 MG/DL (ref 0.57–1)
DEPRECATED RDW RBC AUTO: 66.4 FL (ref 37–54)
EGFRCR SERPLBLD CKD-EPI 2021: 67.4 ML/MIN/1.73
EOSINOPHIL # BLD AUTO: 0 10*3/MM3 (ref 0–0.4)
EOSINOPHIL NFR BLD AUTO: 0 % (ref 0.3–6.2)
ERYTHROCYTE [DISTWIDTH] IN BLOOD BY AUTOMATED COUNT: 26.5 % (ref 12.3–15.4)
GLUCOSE BLDC GLUCOMTR-MCNC: 141 MG/DL (ref 70–99)
GLUCOSE BLDC GLUCOMTR-MCNC: 148 MG/DL (ref 70–99)
GLUCOSE SERPL-MCNC: 153 MG/DL (ref 65–99)
HCT VFR BLD AUTO: 32.5 % (ref 34–46.6)
HGB BLD-MCNC: 10 G/DL (ref 12–15.9)
IMM GRANULOCYTES # BLD AUTO: 0.01 10*3/MM3 (ref 0–0.05)
IMM GRANULOCYTES NFR BLD AUTO: 0.8 % (ref 0–0.5)
L PNEUMO1 AG UR QL IA: NEGATIVE
LYMPHOCYTES # BLD AUTO: 0.44 10*3/MM3 (ref 0.7–3.1)
LYMPHOCYTES NFR BLD AUTO: 34.1 % (ref 19.6–45.3)
MAGNESIUM SERPL-MCNC: 2.1 MG/DL (ref 1.6–2.4)
MCH RBC QN AUTO: 22.5 PG (ref 26.6–33)
MCHC RBC AUTO-ENTMCNC: 30.8 G/DL (ref 31.5–35.7)
MCV RBC AUTO: 73 FL (ref 79–97)
MONOCYTES # BLD AUTO: 0.1 10*3/MM3 (ref 0.1–0.9)
MONOCYTES NFR BLD AUTO: 7.8 % (ref 5–12)
NEUTROPHILS NFR BLD AUTO: 0.74 10*3/MM3 (ref 1.7–7)
NEUTROPHILS NFR BLD AUTO: 57.3 % (ref 42.7–76)
NRBC BLD AUTO-RTO: 0 /100 WBC (ref 0–0.2)
NT-PROBNP SERPL-MCNC: 4030 PG/ML (ref 0–1800)
PHOSPHATE SERPL-MCNC: 2.7 MG/DL (ref 2.5–4.5)
PLATELET # BLD AUTO: 202 10*3/MM3 (ref 140–450)
PMV BLD AUTO: 10.1 FL (ref 6–12)
POTASSIUM SERPL-SCNC: 3.4 MMOL/L (ref 3.5–5.2)
RBC # BLD AUTO: 4.45 10*6/MM3 (ref 3.77–5.28)
S PNEUM AG SPEC QL LA: NEGATIVE
SODIUM SERPL-SCNC: 140 MMOL/L (ref 136–145)
WBC NRBC COR # BLD AUTO: 1.29 10*3/MM3 (ref 3.4–10.8)

## 2024-01-15 PROCEDURE — 25010000002 FUROSEMIDE PER 20 MG: Performed by: INTERNAL MEDICINE

## 2024-01-15 PROCEDURE — 94799 UNLISTED PULMONARY SVC/PX: CPT

## 2024-01-15 PROCEDURE — 80048 BASIC METABOLIC PNL TOTAL CA: CPT | Performed by: HOSPITALIST

## 2024-01-15 PROCEDURE — 83735 ASSAY OF MAGNESIUM: CPT | Performed by: HOSPITALIST

## 2024-01-15 PROCEDURE — 83880 ASSAY OF NATRIURETIC PEPTIDE: CPT | Performed by: INTERNAL MEDICINE

## 2024-01-15 PROCEDURE — 94664 DEMO&/EVAL PT USE INHALER: CPT

## 2024-01-15 PROCEDURE — 99232 SBSQ HOSP IP/OBS MODERATE 35: CPT | Performed by: INTERNAL MEDICINE

## 2024-01-15 PROCEDURE — 84100 ASSAY OF PHOSPHORUS: CPT | Performed by: HOSPITALIST

## 2024-01-15 PROCEDURE — 85025 COMPLETE CBC W/AUTO DIFF WBC: CPT | Performed by: HOSPITALIST

## 2024-01-15 PROCEDURE — 82948 REAGENT STRIP/BLOOD GLUCOSE: CPT

## 2024-01-15 PROCEDURE — 25010000002 DEXAMETHASONE SODIUM PHOSPHATE 10 MG/ML SOLUTION: Performed by: HOSPITALIST

## 2024-01-15 PROCEDURE — 97165 OT EVAL LOW COMPLEX 30 MIN: CPT

## 2024-01-15 PROCEDURE — 25010000002 CEFEPIME PER 500 MG: Performed by: HOSPITALIST

## 2024-01-15 PROCEDURE — 92610 EVALUATE SWALLOWING FUNCTION: CPT

## 2024-01-15 RX ORDER — BISACODYL 10 MG
10 SUPPOSITORY, RECTAL RECTAL DAILY PRN
Status: DISCONTINUED | OUTPATIENT
Start: 2024-01-15 | End: 2024-01-19 | Stop reason: HOSPADM

## 2024-01-15 RX ORDER — AMOXICILLIN 250 MG
1 CAPSULE ORAL NIGHTLY PRN
Status: DISCONTINUED | OUTPATIENT
Start: 2024-01-15 | End: 2024-01-19 | Stop reason: HOSPADM

## 2024-01-15 RX ORDER — FUROSEMIDE 10 MG/ML
40 INJECTION INTRAMUSCULAR; INTRAVENOUS ONCE
Status: COMPLETED | OUTPATIENT
Start: 2024-01-15 | End: 2024-01-15

## 2024-01-15 RX ORDER — POTASSIUM CHLORIDE 750 MG/1
40 CAPSULE, EXTENDED RELEASE ORAL ONCE
Status: COMPLETED | OUTPATIENT
Start: 2024-01-15 | End: 2024-01-15

## 2024-01-15 RX ORDER — CARVEDILOL 3.12 MG/1
3.12 TABLET ORAL 2 TIMES DAILY WITH MEALS
Status: DISCONTINUED | OUTPATIENT
Start: 2024-01-15 | End: 2024-01-17

## 2024-01-15 RX ORDER — POLYETHYLENE GLYCOL 3350 17 G/17G
17 POWDER, FOR SOLUTION ORAL DAILY PRN
Status: DISCONTINUED | OUTPATIENT
Start: 2024-01-15 | End: 2024-01-19 | Stop reason: HOSPADM

## 2024-01-15 RX ORDER — BISACODYL 5 MG/1
5 TABLET, DELAYED RELEASE ORAL DAILY PRN
Status: DISCONTINUED | OUTPATIENT
Start: 2024-01-15 | End: 2024-01-19 | Stop reason: HOSPADM

## 2024-01-15 RX ADMIN — DEXAMETHASONE SODIUM PHOSPHATE 6 MG: 10 INJECTION INTRAMUSCULAR; INTRAVENOUS at 08:35

## 2024-01-15 RX ADMIN — FUROSEMIDE 40 MG: 10 INJECTION, SOLUTION INTRAMUSCULAR; INTRAVENOUS at 11:39

## 2024-01-15 RX ADMIN — CLARITHROMYCIN 500 MG: 250 TABLET ORAL at 08:34

## 2024-01-15 RX ADMIN — IPRATROPIUM BROMIDE AND ALBUTEROL SULFATE 3 ML: .5; 3 SOLUTION RESPIRATORY (INHALATION) at 00:39

## 2024-01-15 RX ADMIN — Medication 10 ML: at 08:34

## 2024-01-15 RX ADMIN — CARVEDILOL 3.12 MG: 3.12 TABLET, FILM COATED ORAL at 18:01

## 2024-01-15 RX ADMIN — IPRATROPIUM BROMIDE AND ALBUTEROL SULFATE 3 ML: .5; 3 SOLUTION RESPIRATORY (INHALATION) at 19:57

## 2024-01-15 RX ADMIN — APIXABAN 5 MG: 5 TABLET, FILM COATED ORAL at 08:34

## 2024-01-15 RX ADMIN — Medication 10 ML: at 20:39

## 2024-01-15 RX ADMIN — CEFEPIME 2000 MG: 2 INJECTION, POWDER, FOR SOLUTION INTRAVENOUS at 01:08

## 2024-01-15 RX ADMIN — PANTOPRAZOLE 20 MG: 20 TABLET, DELAYED RELEASE ORAL at 20:39

## 2024-01-15 RX ADMIN — BUDESONIDE 0.5 MG: 0.5 SUSPENSION RESPIRATORY (INHALATION) at 06:52

## 2024-01-15 RX ADMIN — BUDESONIDE 0.5 MG: 0.5 SUSPENSION RESPIRATORY (INHALATION) at 19:58

## 2024-01-15 RX ADMIN — CITALOPRAM HYDROBROMIDE 10 MG: 20 TABLET ORAL at 08:34

## 2024-01-15 RX ADMIN — AMOXICILLIN 500 MG: 500 CAPSULE ORAL at 08:34

## 2024-01-15 RX ADMIN — AMOXICILLIN 500 MG: 500 CAPSULE ORAL at 20:39

## 2024-01-15 RX ADMIN — METRONIDAZOLE 500 MG: 500 TABLET ORAL at 20:39

## 2024-01-15 RX ADMIN — GUAIFENESIN 1200 MG: 600 TABLET ORAL at 20:39

## 2024-01-15 RX ADMIN — GUAIFENESIN 1200 MG: 600 TABLET ORAL at 08:34

## 2024-01-15 RX ADMIN — POTASSIUM CHLORIDE 40 MEQ: 10 CAPSULE, COATED, EXTENDED RELEASE ORAL at 08:38

## 2024-01-15 RX ADMIN — APIXABAN 5 MG: 5 TABLET, FILM COATED ORAL at 20:39

## 2024-01-15 RX ADMIN — IPRATROPIUM BROMIDE AND ALBUTEROL SULFATE 3 ML: .5; 3 SOLUTION RESPIRATORY (INHALATION) at 11:31

## 2024-01-15 RX ADMIN — METRONIDAZOLE 500 MG: 500 TABLET ORAL at 08:34

## 2024-01-15 RX ADMIN — PANTOPRAZOLE 20 MG: 20 TABLET, DELAYED RELEASE ORAL at 08:35

## 2024-01-15 RX ADMIN — IPRATROPIUM BROMIDE AND ALBUTEROL SULFATE 3 ML: .5; 3 SOLUTION RESPIRATORY (INHALATION) at 06:52

## 2024-01-15 RX ADMIN — CLARITHROMYCIN 500 MG: 250 TABLET ORAL at 20:39

## 2024-01-15 NOTE — THERAPY EVALUATION
Patient Name: Isabell Ribera  : 1945    MRN: [id number]                              Today's Date: 1/15/2024       Admit Date: 2024    Visit Dx:     ICD-10-CM ICD-9-CM   1. COVID-19  U07.1 079.89   2. Acute respiratory failure with hypoxia  J96.01 518.81   3. Hypomagnesemia  E83.42 275.2   4. Hypokalemia  E87.6 276.8     Patient Active Problem List   Diagnosis    Recurrent urinary tract infection    Hypokalemia    Elevated brain natriuretic peptide (BNP) level    Afib    Lymphedema    Essential hypertension    Anticoagulated on Coumadin    Pleural effusion    Nephrolithiasis    GI bleed    Anemia    COVID     Past Medical History:   Diagnosis Date    Afib     Aftercare following ankle joint replacement surgery 2015    Arthritis     Arthritis of knee 2015    CHF (congestive heart failure)     Dementia     Essential hypertension     Gout     HTN (hypertension)     Left knee pain     Lymphedema     Recurrent UTI 2021    Urinary retention 2021     Past Surgical History:   Procedure Laterality Date    CATARACT EXTRACTION Bilateral     COLONOSCOPY N/A 2023    Procedure: COLONOSCOPY WITH POLYPECTOMY/SNARE/CLIP APPLICATION X1/FULGURATION OF AVMS USING APC MACHINE;  Surgeon: Junior Corbin MD;  Location: [facility] ENDOSCOPY;  Service: Gastroenterology;  Laterality: N/A;  COLON POLYP  DIVERTICULOSIS  AVMS OF COLON    ENDOSCOPY N/A 2023    Procedure: ESOPHAGOGASTRODUODENOSCOPY WITH BIOPSIES;  Surgeon: Junior Corbin MD;  Location: [facility] ENDOSCOPY;  Service: Gastroenterology;  Laterality: N/A;  GASTRITIS, MATA'S, HIATAL HERNIA    KIDNEY STONE SURGERY      KNEE SURGERY Left 2007    REPLACEMENT    OTHER SURGICAL HISTORY      JOINT SURGERY, UNSPECIFIED    TUBAL ABDOMINAL LIGATION        General Information       Row Name 01/15/24 0830          OT Time and Intention    Document Type evaluation  -PG     Mode of Treatment individual therapy;occupational therapy  -PG        Row Name 01/15/24 0830          General Information    Patient Profile Reviewed yes  Lives at home with her  and daughter.  Patient is bedbound and dependent with all self-care activities  -PG     Existing Precautions/Restrictions fall  -PG     Barriers to Rehab none identified  -PG       Row Name 01/15/24 0830          Occupational Profile    Reason for Services/Referral (Occupational Profile) Patient is a pleasant 78-year-old female admitted for COVID/generalized weakness.  No previous OT services identified.  Patient is being evaluated by Occupational Therapy due to recent decline in ADL function.  -PG       Row Name 01/15/24 0830          Cognition    Orientation Status (Cognition) oriented x 3  -PG               User Key  (r) = Recorded By, (t) = Taken By, (c) = Cosigned By      Initials Name Provider Type    PG Satnam Guevara, OT Occupational Therapist                     Mobility/ADL's       Row Name 01/15/24 0832          Bed Mobility    Bed Mobility bed mobility (all) activities  -PG     All Activities, [place] (Bed Mobility) dependent (less than 25% patient effort)  -PG       Row Name 01/15/24 0832          Activities of Daily Living    BADL Assessment/Intervention bathing;upper body dressing;lower body dressing;grooming;toileting  -PG       Row Name 01/15/24 0832          Bathing Assessment/Intervention    [place] Level (Bathing) bathing skills;maximum assist (25% patient effort)  -PG       Row Name 01/15/24 0832          Upper Body Dressing Assessment/Training    [place] Level (Upper Body Dressing) upper body dressing skills;dependent (less than 25% patient effort)  -PG       Row Name 01/15/24 0832          Lower Body Dressing Assessment/Training    [place] Level (Lower Body Dressing) lower body dressing skills;dependent (less than 25% patient effort)  -PG       Row Name 01/15/24 0832          Grooming Assessment/Training    [place] Level (Grooming) grooming skills;maximum  assist (25% patient effort)  -PG       Row Name 01/15/24 0832          Toileting Assessment/Training    [place] Level (Toileting) toileting skills;dependent (less than 25% patient effort)  -PG               User Key  (r) = Recorded By, (t) = Taken By, (c) = Cosigned By      Initials Name Provider Type    Satnam Mayberry OT Occupational Therapist                   Obj/Interventions       Row Name 01/15/24 0833          Sensory Assessment (Somatosensory)    Sensory Assessment (Somatosensory) sensation intact  -PG       Row Name 01/15/24 0833          Vision Assessment/Intervention    Visual Impairment/Limitations WFL  -PG       Row Name 01/15/24 0833          Range of Motion Comprehensive    General Range of Motion no range of motion deficits identified  -PG       Row Name 01/15/24 0833          Strength Comprehensive (MMT)    General Manual Muscle Testing (MMT) Assessment no strength deficits identified  -PG       Row Name 01/15/24 0833          Motor Skills    Motor Skills coordination;functional endurance  -PG     Coordination WFL  -PG     Functional Endurance Fair minus  -PG               User Key  (r) = Recorded By, (t) = Taken By, (c) = Cosigned By      Initials Name Provider Type    PG Satnam Guevara OT Occupational Therapist                   Goals/Plan    No documentation.                  Clinical Impression       [facility] Name 01/15/24 0834          Pain Assessment    Pretreatment Pain Rating 0/10 - no pain  -PG     Posttreatment Pain Rating 0/10 - no pain  -PG       Row Name 01/15/24 0834          Plan of Care Review    Plan of Care Reviewed With patient  -PG     Progress no change  -PG     Outcome Evaluation Patient is currently bedbound and dependent with all self-care activities at home.  Patient agreeable that no additional OT services are necessary at this time.  -PG       Row Name 01/15/24 0834          Therapy Assessment/Plan (OT)    Criteria for Skilled Therapeutic Interventions Met (OT) no;does  not meet criteria for skilled intervention  -PG     Therapy Frequency (OT) evaluation only  -PG       Row Name 01/15/24 0834          Therapy Plan Review/Discharge Plan (OT)    Anticipated Discharge Disposition (OT) home with 24/7 care;home with assist  -PG               User Key  (r) = Recorded By, (t) = Taken By, (c) = Cosigned By      Initials Name Provider Type    PG Satnam Guevara, OT Occupational Therapist                   Outcome Measures       Row Name 01/15/24 0835          How much help from another is currently needed...    Putting on and taking off regular lower body clothing? 1  -PG     Bathing (including washing, rinsing, and drying) 1  -PG     Toileting (which includes using toilet bed pan or urinal) 1  -PG     Putting on and taking off regular upper body clothing 1  -PG     Taking care of personal grooming (such as brushing teeth) 2  -PG     Eating meals 3  -PG     AM-PAC 6 Clicks Score (OT) 9  -PG       Row Name 01/15/24 0835          Functional Assessment    Outcome Measure Options AM-PAC 6 Clicks Daily Activity (OT);Optimal Instrument  -PG       Row Name 01/15/24 0835          Optimal Instrument    Optimal Instrument Optimal - 3  -PG     Bending/Stooping 5  -PG     Standing 5  -PG     Reaching 2  -PG     From the list, choose the 3 activities you would most like to be able to do without any difficulty Bending/stooping;Standing;Reaching  -PG     Total Score Optimal - 3 12  -PG               User Key  (r) = Recorded By, (t) = Taken By, (c) = Cosigned By      Initials Name Provider Type    PG Satnam Guevara, OT Occupational Therapist                      OT Recommendation and Plan  Therapy Frequency (OT): evaluation only  Plan of Care Review  Plan of Care Reviewed With: patient  Progress: no change  Outcome Evaluation: Patient is currently bedbound and dependent with all self-care activities at home.  Patient agreeable that no additional OT services are necessary at this time.     Time Calculation:    Evaluation Complexity (OT)  Review Occupational Profile/Medical/Therapy History Complexity: brief/low complexity  Assessment, Occupational Performance/Identification of Deficit Complexity: 3-5 performance deficits  Clinical Decision Making Complexity (OT): problem focused assessment/low complexity  Overall Complexity of Evaluation (OT): low complexity     Time Calculation- OT       Row Name 01/15/24 0836             Time Calculation- OT    OT Received On 01/15/24  -PG         Untimed Charges    OT Eval/Re-eval Minutes 30  -PG         Total Minutes    Untimed Charges Total Minutes 30  -PG       Total Minutes 30  -PG                User Key  (r) = Recorded By, (t) = Taken By, (c) = Cosigned By      Initials Name Provider Type    PG Satnam Guevara, OT Occupational Therapist                  Therapy Charges for Today       Code Description Service Date Service Provider Modifiers Qty    [id number] HC OT EVAL LOW COMPLEXITY 2 1/15/2024 Satnam Guevara OT GO 1                 Satnam Guevara OT  1/15/2024

## 2024-01-15 NOTE — PLAN OF CARE
Goal Outcome Evaluation:  Plan of Care Reviewed With: patient        Progress: no change  Outcome Evaluation: Patient is currently bedbound and dependent with all self-care activities at home.  Patient agreeable that no additional OT services are necessary at this time.      Anticipated Discharge Disposition (OT): home with 24/7 care, home with assist

## 2024-01-15 NOTE — PLAN OF CARE
Goal Outcome Evaluation:  Plan of Care Reviewed With: patient        Progress: no change  Outcome Evaluation: Patient A/Ox4, VSS, no c/o pain, admit from ER this afternoon, bed bound, purewick in place, In Iso precautions for COVID+.

## 2024-01-15 NOTE — PROGRESS NOTES
[facility]   Hospitalist Progress Note  Date: 1/15/2024  Patient Name: Isabell Ribera  : 1945  MRN: [id number]  Date of admission: 2024      Subjective   Subjective     Chief Complaint: Follow up for cough    Summary: 77 y/o F with dementia, HFpEF, afib on eliquis who presented with SOB and cough. Spo2 88% on 2L otherwise VSS. WBC 2.78. Lactate WNL. Procal 0.25. CXR bilateral airspace disease. COVID + on  and .    Interval Followup:   Afebrile.  Blood pressure controlled.  On 2 L of oxygen SpO2 96%  Feeling a little better.  Not coughing much up.  Less short of breath.  No chest pain.  No fever or chills. No acute complaints     Review of Systems  Cardiovascular:  No Chest Pain, No Edema   Gastrointestinal:  No Nausea, No Vomiting      Objective   Objective     Vitals:   Temp:  [97.2 °F (36.2 °C)-98.9 °F (37.2 °C)] 97.8 °F (36.6 °C)  Heart Rate:  [59-83] 69  Resp:  [16-20] 20  BP: (101-147)/(50-78) 121/58  Flow (L/min):  [2] 2  Physical Exam    Constitutional: Elderly female, conversant, NAD   Eyes: Pupils equal and reactive, no conjunctival injections   HENT: NCAT, nares patent, MMM   Respiratory: Coarse breath sounds bilaterally, nonlabored respirations    Cardiovascular: RRR, no murmurs, +edema   Gastrointestinal: Positive bowel sounds, soft, nontender, nondistended   Neurologic: Alert, Cranial Nerves grossly intact, speech clear   Skin: Extremities warm, no rashes or wounds    Result Review    Result Review:  I have personally reviewed the following over the last 24 hours (07:00 to 07:00) and agree with the following findings  [x]  Laboratory  CBC          2024    20:59 2024    10:39 1/15/2024    05:13   CBC   WBC 3.07  2.78  1.29    RBC 4.37  4.72  4.45    Hemoglobin 10.0  10.7  10.0    Hematocrit 32.1  36.0  32.5    MCV 73.5  76.3  73.0    MCH 22.9  22.7  22.5    MCHC 31.2  29.7  30.8    RDW 25.8  26.7  26.5    Platelets 189  219  202      BMP          2024    20:59  1/14/2024    10:39 1/15/2024    05:13   BMP   BUN 22  15  17    Creatinine 0.85  0.96  0.88    Sodium 134  138  140    Potassium 3.9  2.9  3.4    Chloride 101  101  105    CO2 21.9  25.0  23.8    Calcium 7.6  7.8  8.0      [x]  Microbiology  Blood culture pending  Urine culture pending  [x]  Radiology   [x]  EKG/Telemetry monitor personally reviewed: afib with slow ventricular rates, down into 50s.   []  Cardiology/Vascular   []  Pathology  []  Old records  [x]  Other:    Intake/Output Summary (Last 24 hours) at 1/15/2024 1059  Last data filed at 1/15/2024 0849  Gross per 24 hour   Intake 240 ml   Output --   Net 240 ml         Assessment & Plan   Assessment / Plan     Assessment/Plan:  COVID 19 pneumonia  Leukopenia  Hypoxia  Hypomagnesemia  Hypokalemia  Hypophosphatemia  A-fib with slow ventricular rates  Chronic anticoagulant with Eliquis  H. pylori infection  Bobo's esophagus  Type 2 diabetes with diabetic polyneuropathy  Dementia       Does have new oxygen demand 2 L with dyspnea and characteristic findings on CT chest  Continue Decadron 6 mg daily, day 2/10  Continue scheduled Pulmicort and DuoNebs  Continue Mucinex twice daily  Wean oxygen, SpO2 goal >90%  Pro-Higinio low. Stop cefepime.  Low suspicion for bacterial infection  Volume status appears generous.  proBNP elevated but not significantly worse than previous.  Will repeat IV Lasix 40 mg times once.  Place external washington catheter for strict UOP.  Trend renal function and electrolytes with daily BMP.  Repeat p.o. potassium chloride 40 mEq x 1  Reduce Coreg to 3.125 mg twice daily given bradycardia  Continue Eliquis 5 mg twice daily for stroke prevention  Prescribed treatment for H. pylori on 1/03/2024.  On amoxicillin/clarithromycin/Flagyl/Protonix.  Initially 2-week treatment duration planned.  Need to clarify when she started taking to determine stop date.  PT/OT evaluation  CBC, BMP in AM    Discussed plan with RN.    DVT prophylaxis:  Medical DVT  prophylaxis orders are present.    CODE STATUS:   Level Of Support Discussed With: Patient  Code Status (Patient has no pulse and is not breathing): CPR (Attempt to Resuscitate)  Medical Interventions (Patient has pulse or is breathing): Full Support      Electronically signed by Brenden Ornelas DO, 01/15/24, 7:57 AM EST.

## 2024-01-15 NOTE — THERAPY EVALUATION
Acute Care - Speech Language Pathology   Swallow Initial Evaluation  [facility]     Patient Name: Isabell Ribera  : 1945  MRN: [id number]  Today's Date: 1/15/2024               Admit Date: 2024    Visit Dx:     ICD-10-CM ICD-9-CM   1. COVID-19  U07.1 079.89   2. Acute respiratory failure with hypoxia  J96.01 518.81   3. Hypomagnesemia  E83.42 275.2   4. Hypokalemia  E87.6 276.8   5. Oropharyngeal dysphagia  R13.12 787.22     Patient Active Problem List   Diagnosis    Recurrent urinary tract infection    Hypokalemia    Elevated brain natriuretic peptide (BNP) level    Afib    Lymphedema    Essential hypertension    Anticoagulated on Coumadin    Pleural effusion    Nephrolithiasis    GI bleed    Anemia    COVID     Past Medical History:   Diagnosis Date    Afib     Aftercare following ankle joint replacement surgery 2015    Arthritis     Arthritis of knee 2015    CHF (congestive heart failure)     Dementia     Essential hypertension     Gout     HTN (hypertension)     Left knee pain     Lymphedema     Recurrent UTI 2021    Urinary retention 2021     Past Surgical History:   Procedure Laterality Date    CATARACT EXTRACTION Bilateral     COLONOSCOPY N/A 2023    Procedure: COLONOSCOPY WITH POLYPECTOMY/SNARE/CLIP APPLICATION X1/FULGURATION OF AVMS USING APC MACHINE;  Surgeon: Junior Corbin MD;  Location: [facility] ENDOSCOPY;  Service: Gastroenterology;  Laterality: N/A;  COLON POLYP  DIVERTICULOSIS  AVMS OF COLON    ENDOSCOPY N/A 2023    Procedure: ESOPHAGOGASTRODUODENOSCOPY WITH BIOPSIES;  Surgeon: Junior Corbin MD;  Location: [facility] ENDOSCOPY;  Service: Gastroenterology;  Laterality: N/A;  GASTRITIS, MATA'S, HIATAL HERNIA    KIDNEY STONE SURGERY      KNEE SURGERY Left 2007    REPLACEMENT    OTHER SURGICAL HISTORY      JOINT SURGERY, UNSPECIFIED    TUBAL ABDOMINAL LIGATION         SLP Recommendation and Plan             Inpatient Speech Pathology Dysphagia  "Evaluation        PAIN SCALE: None indicated    PRECAUTIONS/CONTRAINDICATIONS: Enhanced airborne    SUSPECTED ABUSE/NEGLECT/EXPLOITATION: None identified    SOCIAL/PSYCHOLOGICAL NEEDS/BARRIERS: None identified    PAST SOCIAL HISTORY: 78-year-old female, lives at home with family    PRIOR FUNCTION: On a regular diet     PATIENT GOALS/EXPECTATIONS: To continue eating and drinking    HISTORY: Patient is a 78-year-old female [facility] secondary to COVID-19.  Patient states that she eats \"regular\" food at home however has had poor appetite.    CURRENT DIET LEVEL: Regular    OBJECTIVE:    TEST ADMINISTERED: Clinical dysphagia evaluation    COGNITION/SAFETY AWARENESS: Appears appropriate for current environment    BEHAVIORAL OBSERVATIONS: Alert, cooperative    ORAL MOTOR EXAM: Edentulous    VOICE QUALITY: Clear    REFLEX EXAM: Nonproductive    POSTURE: Sitting fully upright    FEEDING/SWALLOWING FUNCTION: Assessed with thin liquids, nectar thick liquids, purée solids, regular solids    CLINICAL OBSERVATIONS: Nectar thick liquids by spoon, control cup drink and straw drink.  Swallows completed with mild delay.  Vocal quality clear and laryngeal sounds clear with cervical auscultation.  Thin liquids by spoon, control cup drink and straw drink.  Swallows completed with mild delay.  Vocal quality clear and laryngeal sounds clear with cervical auscultation.  Purée solids with swallows completed.  Regular solids with extended chewing however likely due to lack of dentition.  Items cut small with improved manipulation.  Swallows completed.  Laryngeal elevation noted palpation.  No overt signs or symptoms of aspiration observed at bedside however cannot rule out silent aspiration.    DYSPHAGIA CRITERIA: N/A    FUNCTIONAL ASSESSMENT INSTRUMENT: Patient currently scored a level 7 of 7 on Functional Communication Measures for swallowing indicating a 0% limitation in function.    ASSESSMENT/ PLAN " OF CARE:  At bedside, swallow function appears at her baseline level.  No further direct speech pathology services are needed at this time unless patient demonstrates a decline in status.  PROBLEMS:  1.  na   LTG 1: na   STG 1a: na     RECOMMENDATIONS:   1.   DIET: Regular soft solids, thin liquids    2.  POSITION: Fully upright for all p.o. intake, 30 minutes following    3.  COMPENSATORY STRATEGIES: Assist for self-feeding, small bites and sips, items cut small    Pt/responsible party agrees with plan of care and has been informed of all alternatives, risks and benefits.                 Anticipated Discharge Disposition (SLP): No further SLP services warranted (01/15/24 1139)                                                                  EDUCATION  The patient has been educated in the following areas:   Dysphagia (Swallowing Impairment).              Time Calculation:    Time Calculation- SLP       Row Name 01/15/24 1139             Time Calculation- SLP    SLP Start Time 1030  -SN      SLP Stop Time 1130  -SN      SLP Time Calculation (min) 60 min  -SN      SLP Received On 01/15/24  -SN         Untimed Charges    14707-AL Eval Oral Pharyng Swallow Minutes 60  -SN         Total Minutes    Untimed Charges Total Minutes 60  -SN       Total Minutes 60  -SN                User Key  (r) = Recorded By, (t) = Taken By, (c) = Cosigned By      Initials Name Provider Type    SN Desire Terry MS-CCC/SLP, CNT Speech and Language Pathologist                    Therapy Charges for Today       Code Description Service Date Service Provider Modifiers Qty    [id number] HC ST EVAL ORAL PHARYNG SWALLOW 4 1/15/2024 Desire Terry MS-CCC/SLP, AMANDA GN 1                 BEAU Castañeda/SLP, AMANDA  1/15/2024

## 2024-01-15 NOTE — PLAN OF CARE
Goal Outcome Evaluation:           Progress: no change  Outcome Evaluation: No acute changes throughout shift today, vss, q2 turn in bed, bottom red, encouraged turns, patient reports not feeling hungry but remains thristy.

## 2024-01-16 LAB
ANION GAP SERPL CALCULATED.3IONS-SCNC: 11.6 MMOL/L (ref 5–15)
ANISOCYTOSIS BLD QL: NORMAL
BASOPHILS # BLD AUTO: 0.01 10*3/MM3 (ref 0–0.2)
BASOPHILS NFR BLD AUTO: 0.3 % (ref 0–1.5)
BUN SERPL-MCNC: 24 MG/DL (ref 8–23)
BUN/CREAT SERPL: 25.8 (ref 7–25)
CALCIUM SPEC-SCNC: 8.2 MG/DL (ref 8.6–10.5)
CHLORIDE SERPL-SCNC: 103 MMOL/L (ref 98–107)
CO2 SERPL-SCNC: 24.4 MMOL/L (ref 22–29)
CREAT SERPL-MCNC: 0.93 MG/DL (ref 0.57–1)
DEPRECATED RDW RBC AUTO: 65.1 FL (ref 37–54)
EGFRCR SERPLBLD CKD-EPI 2021: 63 ML/MIN/1.73
EOSINOPHIL # BLD AUTO: 0 10*3/MM3 (ref 0–0.4)
EOSINOPHIL NFR BLD AUTO: 0 % (ref 0.3–6.2)
ERYTHROCYTE [DISTWIDTH] IN BLOOD BY AUTOMATED COUNT: 26.9 % (ref 12.3–15.4)
GLUCOSE SERPL-MCNC: 166 MG/DL (ref 65–99)
HCT VFR BLD AUTO: 34.1 % (ref 34–46.6)
HGB BLD-MCNC: 10.7 G/DL (ref 12–15.9)
HYPOCHROMIA BLD QL: NORMAL
IMM GRANULOCYTES # BLD AUTO: 0.02 10*3/MM3 (ref 0–0.05)
IMM GRANULOCYTES NFR BLD AUTO: 0.6 % (ref 0–0.5)
LYMPHOCYTES # BLD AUTO: 0.48 10*3/MM3 (ref 0.7–3.1)
LYMPHOCYTES NFR BLD AUTO: 14.5 % (ref 19.6–45.3)
MAGNESIUM SERPL-MCNC: 2 MG/DL (ref 1.6–2.4)
MCH RBC QN AUTO: 22.5 PG (ref 26.6–33)
MCHC RBC AUTO-ENTMCNC: 31.4 G/DL (ref 31.5–35.7)
MCV RBC AUTO: 71.6 FL (ref 79–97)
MICROCYTES BLD QL: NORMAL
MONOCYTES # BLD AUTO: 0.26 10*3/MM3 (ref 0.1–0.9)
MONOCYTES NFR BLD AUTO: 7.8 % (ref 5–12)
NEUTROPHILS NFR BLD AUTO: 2.55 10*3/MM3 (ref 1.7–7)
NEUTROPHILS NFR BLD AUTO: 76.8 % (ref 42.7–76)
NRBC BLD AUTO-RTO: 0 /100 WBC (ref 0–0.2)
PLAT MORPH BLD: NORMAL
PLATELET # BLD AUTO: 246 10*3/MM3 (ref 140–450)
PMV BLD AUTO: 10 FL (ref 6–12)
POIKILOCYTOSIS BLD QL SMEAR: NORMAL
POTASSIUM SERPL-SCNC: 3.7 MMOL/L (ref 3.5–5.2)
RBC # BLD AUTO: 4.76 10*6/MM3 (ref 3.77–5.28)
SODIUM SERPL-SCNC: 139 MMOL/L (ref 136–145)
TARGETS BLD QL SMEAR: NORMAL
WBC MORPH BLD: NORMAL
WBC NRBC COR # BLD AUTO: 3.32 10*3/MM3 (ref 3.4–10.8)

## 2024-01-16 PROCEDURE — 63710000001 DEXAMETHASONE PER 0.25 MG: Performed by: INTERNAL MEDICINE

## 2024-01-16 PROCEDURE — 25010000002 ONDANSETRON PER 1 MG: Performed by: HOSPITALIST

## 2024-01-16 PROCEDURE — 85025 COMPLETE CBC W/AUTO DIFF WBC: CPT | Performed by: HOSPITALIST

## 2024-01-16 PROCEDURE — 94799 UNLISTED PULMONARY SVC/PX: CPT

## 2024-01-16 PROCEDURE — 85007 BL SMEAR W/DIFF WBC COUNT: CPT | Performed by: HOSPITALIST

## 2024-01-16 PROCEDURE — 83735 ASSAY OF MAGNESIUM: CPT | Performed by: HOSPITALIST

## 2024-01-16 PROCEDURE — 80048 BASIC METABOLIC PNL TOTAL CA: CPT | Performed by: HOSPITALIST

## 2024-01-16 PROCEDURE — 25010000002 FUROSEMIDE PER 20 MG: Performed by: STUDENT IN AN ORGANIZED HEALTH CARE EDUCATION/TRAINING PROGRAM

## 2024-01-16 PROCEDURE — 94664 DEMO&/EVAL PT USE INHALER: CPT

## 2024-01-16 PROCEDURE — 99232 SBSQ HOSP IP/OBS MODERATE 35: CPT | Performed by: STUDENT IN AN ORGANIZED HEALTH CARE EDUCATION/TRAINING PROGRAM

## 2024-01-16 RX ORDER — FUROSEMIDE 10 MG/ML
40 INJECTION INTRAMUSCULAR; INTRAVENOUS ONCE
Status: COMPLETED | OUTPATIENT
Start: 2024-01-16 | End: 2024-01-16

## 2024-01-16 RX ADMIN — DEXAMETHASONE 6 MG: 4 TABLET ORAL at 09:49

## 2024-01-16 RX ADMIN — AMOXICILLIN 500 MG: 500 CAPSULE ORAL at 09:55

## 2024-01-16 RX ADMIN — Medication 10 ML: at 21:18

## 2024-01-16 RX ADMIN — METRONIDAZOLE 500 MG: 500 TABLET ORAL at 21:18

## 2024-01-16 RX ADMIN — AMOXICILLIN 500 MG: 500 CAPSULE ORAL at 21:18

## 2024-01-16 RX ADMIN — Medication 10 ML: at 09:49

## 2024-01-16 RX ADMIN — CLARITHROMYCIN 500 MG: 250 TABLET ORAL at 09:55

## 2024-01-16 RX ADMIN — IPRATROPIUM BROMIDE AND ALBUTEROL SULFATE 3 ML: .5; 3 SOLUTION RESPIRATORY (INHALATION) at 06:38

## 2024-01-16 RX ADMIN — PANTOPRAZOLE 20 MG: 20 TABLET, DELAYED RELEASE ORAL at 21:18

## 2024-01-16 RX ADMIN — ONDANSETRON 4 MG: 2 INJECTION INTRAMUSCULAR; INTRAVENOUS at 00:25

## 2024-01-16 RX ADMIN — APIXABAN 5 MG: 5 TABLET, FILM COATED ORAL at 09:49

## 2024-01-16 RX ADMIN — IPRATROPIUM BROMIDE AND ALBUTEROL SULFATE 3 ML: .5; 3 SOLUTION RESPIRATORY (INHALATION) at 00:35

## 2024-01-16 RX ADMIN — CLARITHROMYCIN 500 MG: 250 TABLET ORAL at 21:18

## 2024-01-16 RX ADMIN — PANTOPRAZOLE 20 MG: 20 TABLET, DELAYED RELEASE ORAL at 09:55

## 2024-01-16 RX ADMIN — APIXABAN 5 MG: 5 TABLET, FILM COATED ORAL at 21:17

## 2024-01-16 RX ADMIN — CARVEDILOL 3.12 MG: 3.12 TABLET, FILM COATED ORAL at 09:49

## 2024-01-16 RX ADMIN — METRONIDAZOLE 500 MG: 500 TABLET ORAL at 09:55

## 2024-01-16 RX ADMIN — BUDESONIDE 0.5 MG: 0.5 SUSPENSION RESPIRATORY (INHALATION) at 19:18

## 2024-01-16 RX ADMIN — IPRATROPIUM BROMIDE AND ALBUTEROL SULFATE 3 ML: .5; 3 SOLUTION RESPIRATORY (INHALATION) at 12:15

## 2024-01-16 RX ADMIN — BUDESONIDE 0.5 MG: 0.5 SUSPENSION RESPIRATORY (INHALATION) at 06:38

## 2024-01-16 RX ADMIN — GUAIFENESIN 1200 MG: 600 TABLET ORAL at 21:17

## 2024-01-16 RX ADMIN — GUAIFENESIN 1200 MG: 600 TABLET ORAL at 09:55

## 2024-01-16 RX ADMIN — IPRATROPIUM BROMIDE AND ALBUTEROL SULFATE 3 ML: .5; 3 SOLUTION RESPIRATORY (INHALATION) at 19:18

## 2024-01-16 RX ADMIN — FUROSEMIDE 40 MG: 10 INJECTION, SOLUTION INTRAMUSCULAR; INTRAVENOUS at 12:32

## 2024-01-16 NOTE — PLAN OF CARE
Goal Outcome Evaluation:         Pt is bed bound at baseline. She is dependent on her  for all ADLs and transfers. She is not appropriate for therapy.

## 2024-01-16 NOTE — PROGRESS NOTES
[facility]   Hospitalist Progress Note  Date: 2024  Patient Name: Isabell Ribera  : 1945  MRN: [id number]  Date of admission: 2024      Subjective   Subjective     Chief Complaint: Follow up for cough    Summary: 79 y/o F with dementia, HFpEF, afib on eliquis who presented with SOB and cough. Spo2 88% on 2L otherwise VSS. WBC 2.78. Lactate WNL. Procal 0.25. CXR bilateral airspace disease. COVID + on  and .    Interval Followup:   No acute overnight events.  No acute distress.  Patient resting comfortably in bed this morning.  She reports that she feels better than at time of admission.  She is asking for some water.  Denies any chest pain, worsening shortness of breath, or abdominal pain.  Titrated down to 1 L supplemental O2.      Review of Systems  Negative except for above.      Objective   Objective     Vitals:   Temp:  [97 °F (36.1 °C)-98.3 °F (36.8 °C)] 98.3 °F (36.8 °C)  Heart Rate:  [72-85] 85  Resp:  [16-20] 16  BP: ()/() 118/62  Flow (L/min):  [1-2] 1    Physical Exam   Gen: NAD, Alert   Cards: RRR on telemetry  Pulm: Wearing nasal cannula, can complete full sentences  Abd: soft, nondistended  Extremities: Bilateral lower extremity 1+ edema    Result Review    Result Review:  I have personally reviewed the following over the last 24 hours (07:00 to 07:00) and agree with the following findings  [x]  Laboratory  CBC          2024    10:39 1/15/2024    05:13 2024    05:33   CBC   WBC 2.78  1.29  3.32    RBC 4.72  4.45  4.76    Hemoglobin 10.7  10.0  10.7    Hematocrit 36.0  32.5  34.1    MCV 76.3  73.0  71.6    MCH 22.7  22.5  22.5    MCHC 29.7  30.8  31.4    RDW 26.7  26.5  26.9    Platelets 219  202  246      BMP          2024    10:39 1/15/2024    05:13 2024    05:33   BMP   BUN 15  17  24    Creatinine 0.96  0.88  0.93    Sodium 138  140  139    Potassium 2.9  3.4  3.7    Chloride 101  105  103    CO2 25.0  23.8  24.4    Calcium 7.8  8.0  8.2       [x]  Microbiology  Blood culture pending  Urine culture pending  [x]  Radiology   [x]  EKG/Telemetry monitor personally reviewed: afib with slow ventricular rates, down into 50s.   []  Cardiology/Vascular   []  Pathology  []  Old records  [x]  Other:    Intake/Output Summary (Last 24 hours) at 1/16/2024 1136  Last data filed at 1/15/2024 1751  Gross per 24 hour   Intake --   Output 800 ml   Net -800 ml         Assessment & Plan   Assessment / Plan     Assessment:  COVID 19 pneumonia  Leukopenia  Hypoxia  Hypomagnesemia  Hypokalemia  Hypophosphatemia  A-fib with slow ventricular rates  Chronic anticoagulant with Eliquis  H. pylori infection  Bobo's esophagus  Type 2 diabetes with diabetic polyneuropathy  Dementia     Plan:  Continue to wean down supplemental O2 as tolerated, currently at 1 L.  Goal SpO2 greater than 90%.  Continue Decadron 6 mg daily, day 3/10  Continue scheduled Pulmicort and DuoNebs  Continue Mucinex twice daily  Pro-Higinio low.  Antibiotics discontinued.  Volume status appears generous.  proBNP elevated but not significantly worse than previous.  Place external washington catheter for strict UOP.  Trend renal function and electrolytes with daily BMP.  Continue spot diuresis.  IV Lasix x 1 today.  Potassium stable  Reduce Coreg to 3.125 mg twice daily given bradycardia  Continue Eliquis 5 mg twice daily for stroke prevention  Prescribed treatment for H. pylori on 1/03/2024.  On amoxicillin/clarithromycin/Flagyl/Protonix.  Initially 2-week treatment duration planned.  Need to clarify when she started taking to determine stop date.  PT/OT evaluation  CBC, BMP in AM    Discussed plan with RN.    DVT prophylaxis:  Medical DVT prophylaxis orders are present.    CODE STATUS:   Level Of Support Discussed With: Patient  Code Status (Patient has no pulse and is not breathing): CPR (Attempt to Resuscitate)  Medical Interventions (Patient has pulse or is breathing): Full Support      Electronically signed by  Nafisa Jordan DO, 01/16/24, 11:37 AM EST.

## 2024-01-16 NOTE — PLAN OF CARE
Goal Outcome Evaluation:  Plan of Care Reviewed With: patient           Outcome Evaluation: pt transferred from 4mtu. Pt has 2L nc on, VS wnl, moisture associated wounds to rajat area and buttocks, a pressure injury to buttocks, and slight shearing to buttocks as well as back of left knee. Wound pictures taken

## 2024-01-16 NOTE — PLAN OF CARE
Goal Outcome Evaluation:  Plan of Care Reviewed With: patient        Progress: no change  Outcome Evaluation: Pt remained A&Ox3, minus time. Also, pt was titrated to room air maintaining stable oxygen saturation. Pt denied pain this shift. Wound/skin care completed. Pt tolerated well. All other vital signs remained stable.

## 2024-01-17 LAB
ANION GAP SERPL CALCULATED.3IONS-SCNC: 13.4 MMOL/L (ref 5–15)
ANISOCYTOSIS BLD QL: NORMAL
BASOPHILS # BLD AUTO: 0.01 10*3/MM3 (ref 0–0.2)
BASOPHILS NFR BLD AUTO: 0.2 % (ref 0–1.5)
BUN SERPL-MCNC: 32 MG/DL (ref 8–23)
BUN/CREAT SERPL: 34.8 (ref 7–25)
CALCIUM SPEC-SCNC: 8.6 MG/DL (ref 8.6–10.5)
CHLORIDE SERPL-SCNC: 101 MMOL/L (ref 98–107)
CLUMPED PLATELETS: PRESENT
CO2 SERPL-SCNC: 22.6 MMOL/L (ref 22–29)
CREAT SERPL-MCNC: 0.92 MG/DL (ref 0.57–1)
DEPRECATED RDW RBC AUTO: 64.9 FL (ref 37–54)
EGFRCR SERPLBLD CKD-EPI 2021: 63.9 ML/MIN/1.73
EOSINOPHIL # BLD AUTO: 0 10*3/MM3 (ref 0–0.4)
EOSINOPHIL NFR BLD AUTO: 0 % (ref 0.3–6.2)
ERYTHROCYTE [DISTWIDTH] IN BLOOD BY AUTOMATED COUNT: 26.9 % (ref 12.3–15.4)
GLUCOSE SERPL-MCNC: 147 MG/DL (ref 65–99)
HCT VFR BLD AUTO: 33.1 % (ref 34–46.6)
HGB BLD-MCNC: 10.4 G/DL (ref 12–15.9)
HYPOCHROMIA BLD QL: NORMAL
IMM GRANULOCYTES # BLD AUTO: 0.02 10*3/MM3 (ref 0–0.05)
IMM GRANULOCYTES NFR BLD AUTO: 0.5 % (ref 0–0.5)
LYMPHOCYTES # BLD AUTO: 0.49 10*3/MM3 (ref 0.7–3.1)
LYMPHOCYTES NFR BLD AUTO: 11.3 % (ref 19.6–45.3)
MCH RBC QN AUTO: 22.3 PG (ref 26.6–33)
MCHC RBC AUTO-ENTMCNC: 31.4 G/DL (ref 31.5–35.7)
MCV RBC AUTO: 71 FL (ref 79–97)
MICROCYTES BLD QL: NORMAL
MONOCYTES # BLD AUTO: 0.29 10*3/MM3 (ref 0.1–0.9)
MONOCYTES NFR BLD AUTO: 6.7 % (ref 5–12)
NEUTROPHILS NFR BLD AUTO: 3.54 10*3/MM3 (ref 1.7–7)
NEUTROPHILS NFR BLD AUTO: 81.3 % (ref 42.7–76)
NRBC BLD AUTO-RTO: 0 /100 WBC (ref 0–0.2)
PLATELET # BLD AUTO: 261 10*3/MM3 (ref 140–450)
PMV BLD AUTO: 10.2 FL (ref 6–12)
POTASSIUM SERPL-SCNC: 3.5 MMOL/L (ref 3.5–5.2)
RBC # BLD AUTO: 4.66 10*6/MM3 (ref 3.77–5.28)
SMALL PLATELETS BLD QL SMEAR: ADEQUATE
SODIUM SERPL-SCNC: 137 MMOL/L (ref 136–145)
WBC MORPH BLD: NORMAL
WBC NRBC COR # BLD AUTO: 4.35 10*3/MM3 (ref 3.4–10.8)

## 2024-01-17 PROCEDURE — 94799 UNLISTED PULMONARY SVC/PX: CPT

## 2024-01-17 PROCEDURE — 85007 BL SMEAR W/DIFF WBC COUNT: CPT | Performed by: HOSPITALIST

## 2024-01-17 PROCEDURE — 94664 DEMO&/EVAL PT USE INHALER: CPT

## 2024-01-17 PROCEDURE — 80048 BASIC METABOLIC PNL TOTAL CA: CPT | Performed by: HOSPITALIST

## 2024-01-17 PROCEDURE — 85025 COMPLETE CBC W/AUTO DIFF WBC: CPT | Performed by: HOSPITALIST

## 2024-01-17 PROCEDURE — 99232 SBSQ HOSP IP/OBS MODERATE 35: CPT | Performed by: STUDENT IN AN ORGANIZED HEALTH CARE EDUCATION/TRAINING PROGRAM

## 2024-01-17 RX ORDER — GUAIFENESIN 200 MG/10ML
400 LIQUID ORAL EVERY 4 HOURS
Status: DISCONTINUED | OUTPATIENT
Start: 2024-01-17 | End: 2024-01-19 | Stop reason: HOSPADM

## 2024-01-17 RX ORDER — IPRATROPIUM BROMIDE AND ALBUTEROL SULFATE 2.5; .5 MG/3ML; MG/3ML
3 SOLUTION RESPIRATORY (INHALATION) 4 TIMES DAILY PRN
Qty: 60 ML | Refills: 0 | Status: SHIPPED | OUTPATIENT
Start: 2024-01-17

## 2024-01-17 RX ORDER — DEXAMETHASONE 6 MG/1
6 TABLET ORAL
Qty: 6 TABLET | Refills: 0 | Status: SHIPPED | OUTPATIENT
Start: 2024-01-18 | End: 2024-01-24

## 2024-01-17 RX ORDER — CARVEDILOL 6.25 MG/1
6.25 TABLET ORAL 2 TIMES DAILY WITH MEALS
Status: DISCONTINUED | OUTPATIENT
Start: 2024-01-17 | End: 2024-01-19 | Stop reason: HOSPADM

## 2024-01-17 RX ADMIN — APIXABAN 5 MG: 5 TABLET, FILM COATED ORAL at 20:39

## 2024-01-17 RX ADMIN — PANTOPRAZOLE 20 MG: 20 TABLET, DELAYED RELEASE ORAL at 09:43

## 2024-01-17 RX ADMIN — IPRATROPIUM BROMIDE AND ALBUTEROL SULFATE 3 ML: .5; 3 SOLUTION RESPIRATORY (INHALATION) at 06:45

## 2024-01-17 RX ADMIN — GUAIFENESIN 1200 MG: 600 TABLET ORAL at 09:43

## 2024-01-17 RX ADMIN — METRONIDAZOLE 500 MG: 500 TABLET ORAL at 20:38

## 2024-01-17 RX ADMIN — APIXABAN 5 MG: 5 TABLET, FILM COATED ORAL at 09:43

## 2024-01-17 RX ADMIN — PANTOPRAZOLE 20 MG: 20 TABLET, DELAYED RELEASE ORAL at 20:39

## 2024-01-17 RX ADMIN — CARVEDILOL 3.12 MG: 3.12 TABLET, FILM COATED ORAL at 09:43

## 2024-01-17 RX ADMIN — BUDESONIDE 0.5 MG: 0.5 SUSPENSION RESPIRATORY (INHALATION) at 06:45

## 2024-01-17 RX ADMIN — CLARITHROMYCIN 500 MG: 250 TABLET ORAL at 20:38

## 2024-01-17 RX ADMIN — IPRATROPIUM BROMIDE AND ALBUTEROL SULFATE 3 ML: .5; 3 SOLUTION RESPIRATORY (INHALATION) at 19:37

## 2024-01-17 RX ADMIN — AMOXICILLIN 500 MG: 500 CAPSULE ORAL at 20:38

## 2024-01-17 RX ADMIN — CARVEDILOL 6.25 MG: 6.25 TABLET, FILM COATED ORAL at 18:10

## 2024-01-17 RX ADMIN — Medication 10 ML: at 20:40

## 2024-01-17 RX ADMIN — AMOXICILLIN 500 MG: 500 CAPSULE ORAL at 09:43

## 2024-01-17 RX ADMIN — METRONIDAZOLE 500 MG: 500 TABLET ORAL at 09:43

## 2024-01-17 RX ADMIN — IPRATROPIUM BROMIDE AND ALBUTEROL SULFATE 3 ML: .5; 3 SOLUTION RESPIRATORY (INHALATION) at 00:58

## 2024-01-17 RX ADMIN — DEXAMETHASONE 6 MG: 4 TABLET ORAL at 09:42

## 2024-01-17 RX ADMIN — BUDESONIDE 0.5 MG: 0.5 SUSPENSION RESPIRATORY (INHALATION) at 19:37

## 2024-01-17 RX ADMIN — IPRATROPIUM BROMIDE AND ALBUTEROL SULFATE 3 ML: .5; 3 SOLUTION RESPIRATORY (INHALATION) at 12:27

## 2024-01-17 RX ADMIN — CLARITHROMYCIN 500 MG: 250 TABLET ORAL at 09:43

## 2024-01-17 RX ADMIN — GUAIFENESIN 400 MG: 200 SOLUTION ORAL at 22:40

## 2024-01-17 NOTE — PLAN OF CARE
Goal Outcome Evaluation: patient came out of isolation today, plan to discharge tomorrow with . Pleasant and cooperative with care. Takes medication crushed in applesauce

## 2024-01-17 NOTE — CONSULTS
"Nutrition Services    Patient Name: Isabell Ribera  YOB: 1945  MRN: [id number]  Admission date: 1/14/2024      CLINICAL NUTRITION ASSESSMENT      Reason for Assessment  Identified at Risk by Screening Criteria  and PI noted to gluteal area     H&P:  Past Medical History:   Diagnosis Date    Afib     Aftercare following ankle joint replacement surgery 06/01/2015    Arthritis     Arthritis of knee 06/01/2015    CHF (congestive heart failure)     Dementia     Essential hypertension     Gout     HTN (hypertension)     Left knee pain     Lymphedema     Recurrent UTI 05/11/2021    Urinary retention 05/11/2021        Current Problems:   Active Hospital Problems    Diagnosis     **COVID         Nutrition/Diet History         Narrative   78-year-old female presented to the emergency room with shortness of air, cough, nausea and poor intake.  History of A-fib treated with Eliquis.  Past medical history above.  Patient recently hospitalized 12/22/2023 -1/3/2024 due to rectal bleeding/gastritis.    Patient noted with pressure injury to left gluteal area.  MASD to skin folds.  Shahzad score 9.    Last documented bowel movement 1/16/2024.  Patient diagnosed with H. pylori infection while in hospital.  Currently being treated with antibiotics Flagyl and Protonix for 2 weeks.    Patient with poor p.o. intake consuming only about 25% of meals.  Requires tray set up.  Pt appears edentulous but reports she does have some remaining teeth.  Pt opened mouth but RD was unable to visualize teeth or partial tooth fragments.    Pt reports when home she typically has a good appetite however \"I'm sick of COVID\".  Pt agrees she may be better able to consume oral nutrition supplement while in hospital.  RD ordered Boost Glucose control related to current elevated BG levels.    RD conducted NFPE and pt does not meet criteria for malnutrition.  Educated that if appetite returns, there is no need to continue ONS upon discharge.  " "  NRS 2002, at risk.       Anthropometrics        Current Height, Weight Height: 170.2 cm (67\")  Weight: 94.7 kg (208 lb 12.4 oz)   Current BMI Body mass index is 32.7 kg/m².   BMI Classification Obese Class I   % %   Adjusted Body Weight (ABW) 75 kg (165 pounds)   Weight Hx  Wt Readings from Last 30 Encounters:   01/14/24 1442 94.7 kg (208 lb 12.4 oz)   01/14/24 1031 102 kg (224 lb 13.9 oz)   01/06/24 2038 102 kg (224 lb 13.9 oz)   01/03/24 0547 100 kg (220 lb 14.4 oz)   01/02/24 0522 102 kg (224 lb 3.3 oz)   01/01/24 0622 106 kg (232 lb 12.9 oz)   12/31/23 0300 103 kg (226 lb 3.1 oz)   12/30/23 0556 102 kg (225 lb 1.4 oz)   12/29/23 0628 106 kg (233 lb 0.4 oz)   12/28/23 0514 105 kg (231 lb 14.8 oz)   12/27/23 0532 105 kg (231 lb 14.8 oz)   12/26/23 0552 101 kg (223 lb 12.3 oz)   12/25/23 0454 105 kg (230 lb 13.2 oz)   12/24/23 0500 108 kg (237 lb 10.5 oz)   12/23/23 0516 106 kg (233 lb 4 oz)   12/22/23 2300 106 kg (233 lb 11 oz)   12/22/23 1352 106 kg (233 lb 11 oz)   09/05/23 0935 96.8 kg (213 lb 6.5 oz)   06/30/23 1529 84.6 kg (186 lb 8.2 oz)   03/24/23 1108 74.4 kg (164 lb)   01/01/23 0641 79.8 kg (175 lb 14.8 oz)   12/31/22 0640 83.9 kg (184 lb 15.5 oz)   12/30/22 0505 94.8 kg (208 lb 15.9 oz)   12/27/22 1500 94.2 kg (207 lb 10.8 oz)   12/27/22 1009 92.4 kg (203 lb 11.3 oz)   04/01/22 2108 79 kg (174 lb 2.6 oz)   02/13/22 1312 96.3 kg (212 lb 4.9 oz)   08/27/21 1127 90.9 kg (200 lb 6.4 oz)   08/25/21 1105 71.4 kg (157 lb 6.5 oz)   06/15/21 1401 84.8 kg (187 lb)   05/11/21 0000 127 kg (281 lb)   07/30/19 0000 123 kg (272 lb)   03/08/19 0000 131 kg (289 lb)   03/06/19 0000 131 kg (289 lb 9 oz)   02/22/19 0000 119 kg (262 lb)   12/28/18 0000 119 kg (262 lb 1 oz)          Wt Change Observation Weight decrease 11.3 kg (10.9%) in 1 month     Estimated/Assessed Needs  Estimated Needs based on: Adjusted Body Weight       Energy Requirements 25 kcal/kg    EST Needs (kcal/day) 1875       Protein Requirements " 1.0-1.2 g/kg   EST Daily Needs (g/day) 75-90       Fluid Requirements 25-30 ml/kg    Estimated Needs (mL/day) 9535-5196     Labs/Medications         Pertinent Labs Reviewed.   Results from last 7 days   Lab Units 01/17/24  0521 01/16/24  0533 01/15/24  0513 01/14/24  1039   SODIUM mmol/L 137 139 140 138   POTASSIUM mmol/L 3.5 3.7 3.4* 2.9*   CHLORIDE mmol/L 101 103 105 101   CO2 mmol/L 22.6 24.4 23.8 25.0   BUN mg/dL 32* 24* 17 15   CREATININE mg/dL 0.92 0.93 0.88 0.96   CALCIUM mg/dL 8.6 8.2* 8.0* 7.8*   BILIRUBIN mg/dL  --   --   --  0.9   ALK PHOS U/L  --   --   --  150*   ALT (SGPT) U/L  --   --   --  9   AST (SGOT) U/L  --   --   --  56*   GLUCOSE mg/dL 147* 166* 153* 117*     Results from last 7 days   Lab Units 01/17/24  0521 01/16/24  0533 01/15/24  0513 01/14/24  1457   MAGNESIUM mg/dL  --  2.0 2.1 1.4*   PHOSPHORUS mg/dL  --   --  2.7 2.2*   HEMOGLOBIN g/dL 10.4* 10.7* 10.0*  --    HEMATOCRIT % 33.1* 34.1 32.5*  --      COVID19   Date Value Ref Range Status   01/14/2024 Detected (C) Not Detected - Ref. Range Final     Lab Results   Component Value Date    HGBA1C 5.68 (H) 01/03/2022         Pertinent Medications Reviewed.     Malnutrition Severity Assessment         Does not meet criteria     Nutrition Diagnosis         Nutrition Dx Problem 1 Inadequate oral Intake related to inadequate energy Intake as evidenced by decreased appetite. and unintended weight change.     Nutrition Intervention           Current Nutrition Orders & Evaluation of Intake       Current PO Diet Diet: Regular/House Diet; Texture: Regular Texture (IDDSI 7); Fluid Consistency: Thin (IDDSI 0)   Supplement Orders Placed This Encounter      Dietary Nutrition Supplements Boost Glucose Control (Glucerna Shake)           Nutrition Intervention/Prescription        500 jenna, 28 g protein provides approx 26% of EEN        Medical Nutrition Therapy/Nutrition Education          Learner     Readiness Patient  Acceptance     Method     Response  Explanation  Verbalizes understanding     Monitor/Evaluation        Monitor Per protocol, PO intake, Supplement intake, POC/GOC     Nutrition Discharge Plan         No nutrition discharge needs identified at this time     Electronically signed by:  Tatianna Campos RD  01/17/24 15:40 EST

## 2024-01-17 NOTE — PROGRESS NOTES
[facility]   Hospitalist Progress Note  Date: 2024  Patient Name: Isabell Ribera  : 1945  MRN: [id number]  Date of admission: 2024      Subjective   Subjective     Chief Complaint: Follow up for cough    Summary: 79 y/o F with dementia, HFpEF, afib on eliquis who presented with SOB and cough. Spo2 88% on 2L otherwise VSS. WBC 2.78. Lactate WNL. Procal 0.25. CXR bilateral airspace disease. COVID + on  and .    Interval Followup:   No acute overnight events.  No acute distress.  Patient is resting comfortably in bed, on room air.  No family at bedside.  She says that she feels much better and denies any chest pain or shortness of breath.  Discussed possibly going home.  Answered all questions and concerns.    Review of Systems  Negative except for above.      Objective   Objective     Vitals:   Temp:  [98 °F (36.7 °C)-98.6 °F (37 °C)] 98.2 °F (36.8 °C)  Heart Rate:  [82-92] 87  Resp:  [16-20] 18  BP: (128-150)/(62-74) 150/74    Physical Exam   Gen: NAD, Alert   Cards: RRR on telemetry  Pulm: can complete full sentences, no respiratory distress, on room air  Abd: soft, nondistended  Extremities: Bilateral lower extremity 1+ edema    Result Review    Result Review:  I have personally reviewed the following over the last 24 hours (07:00 to 07:00) and agree with the following findings  [x]  Laboratory  CBC          1/15/2024    05:13 2024    05:33 2024    05:21   CBC   WBC 1.29  3.32  4.35    RBC 4.45  4.76  4.66    Hemoglobin 10.0  10.7  10.4    Hematocrit 32.5  34.1  33.1    MCV 73.0  71.6  71.0    MCH 22.5  22.5  22.3    MCHC 30.8  31.4  31.4    RDW 26.5  26.9  26.9    Platelets 202  246  261      BMP          1/15/2024    05:13 2024    05:33 2024    05:21   BMP   BUN 17  24  32    Creatinine 0.88  0.93  0.92    Sodium 140  139  137    Potassium 3.4  3.7  3.5    Chloride 105  103  101    CO2 23.8  24.4  22.6    Calcium 8.0  8.2  8.6      [x]  Microbiology  Blood  culture pending  Urine culture pending  [x]  Radiology   [x]  EKG/Telemetry monitor personally reviewed: afib with slow ventricular rates, down into 50s.   []  Cardiology/Vascular   []  Pathology  []  Old records  [x]  Other:    Intake/Output Summary (Last 24 hours) at 1/17/2024 1740  Last data filed at 1/17/2024 1005  Gross per 24 hour   Intake 360 ml   Output 600 ml   Net -240 ml         Assessment & Plan   Assessment / Plan     Assessment:  COVID 19 pneumonia  Leukopenia  Hypoxia  Hypomagnesemia  Hypokalemia  Hypophosphatemia  A-fib with slow ventricular rates  Chronic anticoagulant with Eliquis  H. pylori infection  Bobo's esophagus  Type 2 diabetes with diabetic polyneuropathy  Dementia     Plan:  Continue to wean down supplemental O2 as tolerated, currently at 1 L.  Goal SpO2 greater than 90%.  Continue Decadron 6 mg daily, day 4/10  Continue scheduled Pulmicort and DuoNebs  Continue Mucinex twice daily  Pro-Higinio low.  Antibiotics discontinued.  Lower extremity edema improved with IV Lasix.    Potassium stable  Increase Coreg back to 6.25 twice daily continue Eliquis 5 mg twice daily for stroke prevention  Prescribed treatment for H. pylori on 1/03/2024.  On amoxicillin/clarithromycin/Flagyl/Protonix.  Initially 2-week treatment duration planned.  Need to clarify when she started taking to determine stop date.  PT/OT evaluation  CBC, BMP in AM    Disposition: Plan to discharge home tomorrow    DME: Patient needs a gel overlay as she is bedbound and immobile.    Discussed plan with RN.    DVT prophylaxis:  Medical DVT prophylaxis orders are present.    CODE STATUS:   Level Of Support Discussed With: Patient  Code Status (Patient has no pulse and is not breathing): CPR (Attempt to Resuscitate)  Medical Interventions (Patient has pulse or is breathing): Full Support

## 2024-01-18 ENCOUNTER — READMISSION MANAGEMENT (OUTPATIENT)
Dept: CALL CENTER | Facility: HOSPITAL | Age: 79
End: 2024-01-18
Payer: MEDICARE

## 2024-01-18 VITALS
OXYGEN SATURATION: 92 % | RESPIRATION RATE: 18 BRPM | HEIGHT: 67 IN | BODY MASS INDEX: 32.77 KG/M2 | HEART RATE: 76 BPM | WEIGHT: 208.78 LBS | DIASTOLIC BLOOD PRESSURE: 58 MMHG | TEMPERATURE: 97.9 F | SYSTOLIC BLOOD PRESSURE: 120 MMHG

## 2024-01-18 PROCEDURE — 94799 UNLISTED PULMONARY SVC/PX: CPT

## 2024-01-18 PROCEDURE — 94664 DEMO&/EVAL PT USE INHALER: CPT

## 2024-01-18 PROCEDURE — 94760 N-INVAS EAR/PLS OXIMETRY 1: CPT

## 2024-01-18 PROCEDURE — 99239 HOSP IP/OBS DSCHRG MGMT >30: CPT | Performed by: STUDENT IN AN ORGANIZED HEALTH CARE EDUCATION/TRAINING PROGRAM

## 2024-01-18 PROCEDURE — 63710000001 DEXAMETHASONE PER 0.25 MG: Performed by: INTERNAL MEDICINE

## 2024-01-18 RX ORDER — METRONIDAZOLE 500 MG/1
500 TABLET ORAL 2 TIMES DAILY
Qty: 28 TABLET | Refills: 0 | Status: SHIPPED | OUTPATIENT
Start: 2024-01-18 | End: 2024-02-01

## 2024-01-18 RX ORDER — AMOXICILLIN 500 MG/1
1000 CAPSULE ORAL 2 TIMES DAILY
Qty: 56 CAPSULE | Refills: 0 | Status: SHIPPED | OUTPATIENT
Start: 2024-01-18 | End: 2024-02-01

## 2024-01-18 RX ORDER — CLARITHROMYCIN 500 MG/1
500 TABLET, COATED ORAL 2 TIMES DAILY
Qty: 28 TABLET | Refills: 0 | Status: SHIPPED | OUTPATIENT
Start: 2024-01-18 | End: 2024-02-01

## 2024-01-18 RX ADMIN — GUAIFENESIN 400 MG: 200 SOLUTION ORAL at 20:43

## 2024-01-18 RX ADMIN — CARVEDILOL 6.25 MG: 6.25 TABLET, FILM COATED ORAL at 08:51

## 2024-01-18 RX ADMIN — IPRATROPIUM BROMIDE AND ALBUTEROL SULFATE 3 ML: .5; 3 SOLUTION RESPIRATORY (INHALATION) at 08:05

## 2024-01-18 RX ADMIN — CARVEDILOL 6.25 MG: 6.25 TABLET, FILM COATED ORAL at 17:36

## 2024-01-18 RX ADMIN — GUAIFENESIN 400 MG: 200 SOLUTION ORAL at 08:52

## 2024-01-18 RX ADMIN — PANTOPRAZOLE 20 MG: 20 TABLET, DELAYED RELEASE ORAL at 20:43

## 2024-01-18 RX ADMIN — PANTOPRAZOLE 20 MG: 20 TABLET, DELAYED RELEASE ORAL at 08:50

## 2024-01-18 RX ADMIN — BUDESONIDE 0.5 MG: 0.5 SUSPENSION RESPIRATORY (INHALATION) at 19:22

## 2024-01-18 RX ADMIN — GUAIFENESIN 400 MG: 200 SOLUTION ORAL at 17:36

## 2024-01-18 RX ADMIN — IPRATROPIUM BROMIDE AND ALBUTEROL SULFATE 3 ML: .5; 3 SOLUTION RESPIRATORY (INHALATION) at 01:21

## 2024-01-18 RX ADMIN — APIXABAN 5 MG: 5 TABLET, FILM COATED ORAL at 20:43

## 2024-01-18 RX ADMIN — APIXABAN 5 MG: 5 TABLET, FILM COATED ORAL at 08:51

## 2024-01-18 RX ADMIN — BUDESONIDE 0.5 MG: 0.5 SUSPENSION RESPIRATORY (INHALATION) at 08:05

## 2024-01-18 RX ADMIN — IPRATROPIUM BROMIDE AND ALBUTEROL SULFATE 3 ML: .5; 3 SOLUTION RESPIRATORY (INHALATION) at 12:06

## 2024-01-18 RX ADMIN — GUAIFENESIN 400 MG: 200 SOLUTION ORAL at 12:55

## 2024-01-18 RX ADMIN — IPRATROPIUM BROMIDE AND ALBUTEROL SULFATE 3 ML: .5; 3 SOLUTION RESPIRATORY (INHALATION) at 19:22

## 2024-01-18 RX ADMIN — Medication 10 ML: at 10:22

## 2024-01-18 RX ADMIN — DEXAMETHASONE 6 MG: 4 TABLET ORAL at 08:52

## 2024-01-18 NOTE — DISCHARGE SUMMARY
[facility]         HOSPITALIST  DISCHARGE SUMMARY    Patient Name: Isabell Ribera  : 1945  MRN: [id number]    Date of Admission: 2024  Date of Discharge:  2024    Primary Care Physician: Ilda Russell APRN    Consults       Date and Time Order Name Status Description    2024 12:54 PM Inpatient Hospitalist Consult      2023  7:33 PM Gastroenterology (on-call MD unless specified) Completed             Active and Resolved Hospital Problems:  Active Hospital Problems    Diagnosis POA    **COVID [U07.1] Yes      Resolved Hospital Problems   No resolved problems to display.       Hospital Course     Hospital Course:  Isabell Ribera is a 78 y.o. female with dementia, HFpEF, afib on eliquis who presented with SOB and cough. Spo2 88% on 2L otherwise VSS. WBC 2.78. Lactate WNL. Procal 0.25. CXR bilateral airspace disease. COVID + on  and .     Patient was admitted for acute hypoxic respiratory failure requiring supplemental O2 up to 2 L.  She was also found to have leukopenia, both suspected to be secondary to her COVID-19 infection.  Supplemental O2 was weaned off and she was resting comfortably on room air.  She was initiated on Decadron 6 mg daily, she will finish a 10-day course.  There was low suspicion for a secondary bacterial pneumonia, all antibiotics discontinued.  She had spot IV diuretics for lower extremity edema.  H. pylori medications were continued, recommend she follow-up with her prescribing physician upon completion of course.  Leukopenia had resolved by the time of discharge.    Patient discharged in stable condition.    DISCHARGE Follow Up Recommendations for labs and diagnostics: Follow-up with PCP in 1 week.  Follow-up with H. pylori/antibiotic prescribing physician after discharge.      Day of Discharge     Vital Signs:  Temp:  [97.2 °F (36.2 °C)-98.2 °F (36.8 °C)] 97.9 °F (36.6 °C)  Heart Rate:  [71-88] 76  Resp:  [16-20] 18  BP:  (126-196)/(62-95) 126/68  Flow (L/min):  [1] 1    Physical Exam:   Gen: NAD, Alert and Oriented  Cards: RRR, no murmur   Pulm: CTA b/l, no wheezing  Abd: soft, nondistended  Extremities: no pitting edema      Discharge Details        Discharge Medications        New Medications        Instructions Start Date   dexAMETHasone 6 MG tablet  Commonly known as: DECADRON   6 mg, Oral, Daily With Breakfast      ipratropium-albuterol 0.5-2.5 mg/3 ml nebulizer  Commonly known as: DUO-NEB   3 mL, Nebulization, 4 Times Daily PRN             Continue These Medications        Instructions Start Date   amoxicillin 500 MG capsule  Commonly known as: AMOXIL   1,000 mg, Oral, 2 Times Daily      benzonatate 100 MG capsule  Commonly known as: TESSALON   100 mg, Oral, 3 Times Daily PRN      carvedilol 6.25 MG tablet  Commonly known as: COREG   TAKE ONE TABLET BY MOUTH TWICE A DAY WITH FOOD      citalopram 10 MG tablet  Commonly known as: CeleXA   10 mg, Oral, Daily      clarithromycin 500 MG tablet  Commonly known as: BIAXIN   500 mg, Oral, 2 Times Daily      Eliquis 5 MG tablet tablet  Generic drug: apixaban   5 mg, Oral, Every 12 Hours Scheduled      furosemide 20 MG tablet  Commonly known as: LASIX   20 mg, Oral, 2 Times Daily      hydrOXYzine 25 MG tablet  Commonly known as: ATARAX   25 mg, Oral, 2 Times Daily PRN      metroNIDAZOLE 500 MG tablet  Commonly known as: FLAGYL   500 mg, Oral, 2 Times Daily      multivitamin with minerals tablet tablet   1 tablet, Oral, Daily      pantoprazole 20 MG EC tablet  Commonly known as: Protonix   20 mg, Oral, 2 Times Daily      potassium chloride 10 MEQ CR tablet   1 tablet, Oral, Daily               Allergies   Allergen Reactions    Levofloxacin Hives       Discharge Disposition:  Home or Self Care    Diet:  Hospital:  Diet Order   Procedures    Diet: Regular/House Diet; Texture: Regular Texture (IDDSI 7); Fluid Consistency: Thin (IDDSI 0)       Discharge Activity:       CODE STATUS:  Code  Status and Medical Interventions:   Ordered at: 01/14/24 1443     Level Of Support Discussed With:    Patient     Code Status (Patient has no pulse and is not breathing):    CPR (Attempt to Resuscitate)     Medical Interventions (Patient has pulse or is breathing):    Full Support         No future appointments.    Additional Instructions for the Follow-ups that You Need to Schedule       Discharge Follow-up with PCP   As directed       Currently Documented PCP:    Ilda Russell APRN    PCP Phone Number:    [phone]     Follow Up Details: one week                Pertinent  and/or Most Recent Results         LAB RESULTS:      Lab 01/17/24  0521 01/16/24  0533 01/15/24  0513 01/14/24  1457 01/14/24  1039   WBC 4.35 3.32* 1.29*  --  2.78*   HEMOGLOBIN 10.4* 10.7* 10.0*  --  10.7*   HEMATOCRIT 33.1* 34.1 32.5*  --  36.0   PLATELETS 261 246 202  --  219   NEUTROS ABS 3.54 2.55 0.74*  --  1.63*   IMMATURE GRANS (ABS) 0.02 0.02 0.01  --  0.01   LYMPHS ABS 0.49* 0.48* 0.44*  --  0.78   MONOS ABS 0.29 0.26 0.10  --  0.29   EOS ABS 0.00 0.00 0.00  --  0.04   MCV 71.0* 71.6* 73.0*  --  76.3*   PROCALCITONIN  --   --   --  0.25  --    LACTATE  --   --   --  1.4  --          Lab 01/17/24  0521 01/16/24  0533 01/15/24  0513 01/14/24  1457 01/14/24  1039   SODIUM 137 139 140  --  138   POTASSIUM 3.5 3.7 3.4*  --  2.9*   CHLORIDE 101 103 105  --  101   CO2 22.6 24.4 23.8  --  25.0   ANION GAP 13.4 11.6 11.2  --  12.0   BUN 32* 24* 17  --  15   CREATININE 0.92 0.93 0.88  --  0.96   EGFR 63.9 63.0 67.4  --  60.7   GLUCOSE 147* 166* 153*  --  117*   CALCIUM 8.6 8.2* 8.0*  --  7.8*   MAGNESIUM  --  2.0 2.1 1.4* 1.5*   PHOSPHORUS  --   --  2.7 2.2*  --          Lab 01/14/24  1039   TOTAL PROTEIN 7.1   ALBUMIN 2.6*   GLOBULIN 4.5   ALT (SGPT) 9   AST (SGOT) 56*   BILIRUBIN 0.9   ALK PHOS 150*         Lab 01/15/24  0513 01/14/24  1039   PROBNP 4,030.0*  --    HSTROP T  --  38*             Lab 01/14/24  1457   IRON 34*    IRON SATURATION (TSAT) 13*   TIBC 262*   TRANSFERRIN 176*   FERRITIN 120.10   FOLATE >20.00   VITAMIN B 12 >2,000*         Brief Urine Lab Results  (Last result in the past 365 days)        Color   Clarity   Blood   Leuk Est   Nitrite   Protein   CREAT   Urine HCG        01/14/24 1852 Yellow   Clear   Large (3+)   Small (1+)   Negative   Negative           01/14/24 1852 Yellow   Clear   Large (3+)   Small (1+)   Negative   Negative                 Microbiology Results (last 10 days)       Procedure Component Value - Date/Time    S. Pneumo Ag Urine or CSF - Urine, Urine, Clean Catch [[id number]]  (Normal) Collected: 01/14/24 1852    Lab Status: Final result Specimen: Urine, Clean Catch Updated: 01/15/24 0702     Strep Pneumo Ag Negative    Legionella Antigen, Urine - Urine, Urine, Clean Catch [[id number]]  (Normal) Collected: 01/14/24 1852    Lab Status: Final result Specimen: Urine, Clean Catch Updated: 01/15/24 0702     LEGIONELLA ANTIGEN, URINE Negative    Urine Culture - Urine, Urine, Clean Catch [[id number]]  (Normal) Collected: 01/14/24 1852    Lab Status: Final result Specimen: Urine, Clean Catch Updated: 01/15/24 1948     Urine Culture No growth    Blood Culture - Blood, Arm, Right [[id number]]  (Normal) Collected: 01/14/24 1500    Lab Status: Preliminary result Specimen: Blood from Arm, Right Updated: 01/18/24 1531     Blood Culture No growth at 4 days    Blood Culture - Blood, Hand, Left [[id number]]  (Normal) Collected: 01/14/24 1457    Lab Status: Preliminary result Specimen: Blood from Hand, Left Updated: 01/18/24 1515     Blood Culture No growth at 4 days    COVID-19, FLU A/B, RSV PCR 1 HR TAT - Swab, Nasopharynx [[id number]]  (Abnormal) Collected: 01/14/24 1032    Lab Status: Final result Specimen: Swab from Nasopharynx Updated: 01/14/24 1149     COVID19 Detected     Influenza A PCR Not Detected     Influenza B PCR Not Detected     RSV, PCR Not Detected    Narrative:      Fact sheet for providers:  https://www.fda.gov/media/910455/download    Fact sheet for patients: https://www.fda.gov/media/893163/download    Test performed by PCR.            CT Chest Without Contrast Diagnostic    Result Date: 1/14/2024    1. Abnormal appearance of the lungs which can be seen with COVID-19 pneumonia. 2. Cardiomegaly with coronary artery atherosclerotic vascular disease. 3. Small hiatal hernia.    JENN PORTER MD       Electronically Signed and Approved By: JENN PORTER MD on 1/14/2024 at 18:13             XR Chest 1 View    Result Date: 1/14/2024    1. Cardiomegaly. 2. Patchy mixed interstitial/airspace disease, left greater than right side.  This could be due to pulmonary edema or multifocal pneumonia.       JENN PORTER MD       Electronically Signed and Approved By: JENN PORTER MD on 1/14/2024 at 11:02                               Time spent on Discharge including face to face service:  >30 minutes    Electronically signed by Nafisa Jordan DO, 01/18/24, 4:07 PM EST.

## 2024-01-18 NOTE — PLAN OF CARE
Goal Outcome Evaluation:  Plan of Care Reviewed With: patient        Progress: no change  Outcome Evaluation: pt alert and oriented x4 this shift. c/o of trouble swallwoing, meds given with apple sauce. no other conerns noted.

## 2024-01-18 NOTE — CONSULTS
Discharge Planning Assessment   [facility]     Patient Name: Isabell Ribera  MRN: [id number]  Today's Date: 1/18/2024    Admit Date: 1/14/2024     Discharge Plan       Row Name 01/18/24 1257       Plan    Patient/Family in Agreement with Plan yes    Final Discharge Disposition Code 06 - home with home health care    Final Note Pt to discharge home today.  [facility] notified. Pt on RA at this time. [facility] to deliver gel overlay for mattress to pt's home. MD also ordered for nebulizer and [facility] is aware. No additional needs noted at this time.             Continued Care and Services - Admitted Since 1/14/2024       Home Medical Care       Service Provider Request Status Selected Services Address Phone Fax Patient Preferred    [facility] Accepted N/A [address] [place] [address] [phone] --                  Expected Discharge Date and Time       Expected Discharge Date Expected Discharge Time    Jan 18, 2024         NATHAN Palumbo

## 2024-01-18 NOTE — PLAN OF CARE
Goal Outcome Evaluation:               Pt discharging home via ambulance.

## 2024-01-19 LAB
BACTERIA SPEC AEROBE CULT: NORMAL
BACTERIA SPEC AEROBE CULT: NORMAL

## 2024-01-19 NOTE — OUTREACH NOTE
Prep Survey      Flowsheet Row Responses   [facility] facility patient discharged from? [facility]   Is LACE score < 7 ? No   Eligibility Readm Mgmt   Discharge diagnosis Covid   Does the patient have one of the following disease processes/diagnoses(primary or secondary)? Other   Does the patient have Home health ordered? Yes   What is the Home health agency?  [facility]   Is there a DME ordered? No   Prep survey completed? Yes            JEAN CLAUDE ROSE - Registered Nurse

## 2024-01-21 ENCOUNTER — HOSPITAL ENCOUNTER (EMERGENCY)
Facility: HOSPITAL | Age: 79
Discharge: HOME | End: 2024-01-22
Attending: EMERGENCY MEDICINE | Admitting: EMERGENCY MEDICINE
Payer: MEDICARE

## 2024-01-21 ENCOUNTER — APPOINTMENT (OUTPATIENT)
Dept: GENERAL RADIOLOGY | Facility: HOSPITAL | Age: 79
End: 2024-01-21
Payer: MEDICARE

## 2024-01-21 DIAGNOSIS — R53.83 FATIGUE, UNSPECIFIED TYPE: Primary | ICD-10-CM

## 2024-01-21 DIAGNOSIS — U07.1 COVID: ICD-10-CM

## 2024-01-21 LAB
ALBUMIN SERPL-MCNC: 2.9 G/DL (ref 3.5–5.2)
ALBUMIN/GLOB SERPL: 0.7 G/DL
ALP SERPL-CCNC: 111 U/L (ref 39–117)
ALT SERPL W P-5'-P-CCNC: 40 U/L (ref 1–33)
ANION GAP SERPL CALCULATED.3IONS-SCNC: 12.8 MMOL/L (ref 5–15)
ANISOCYTOSIS BLD QL: NORMAL
AST SERPL-CCNC: 78 U/L (ref 1–32)
BASOPHILS # BLD AUTO: 0 10*3/MM3 (ref 0–0.2)
BASOPHILS NFR BLD AUTO: 0 % (ref 0–1.5)
BILIRUB SERPL-MCNC: 1 MG/DL (ref 0–1.2)
BUN SERPL-MCNC: 41 MG/DL (ref 8–23)
BUN/CREAT SERPL: 47.7 (ref 7–25)
CALCIUM SPEC-SCNC: 8.5 MG/DL (ref 8.6–10.5)
CHLORIDE SERPL-SCNC: 107 MMOL/L (ref 98–107)
CLUMPED PLATELETS: PRESENT
CO2 SERPL-SCNC: 24.2 MMOL/L (ref 22–29)
CREAT SERPL-MCNC: 0.86 MG/DL (ref 0.57–1)
D-LACTATE SERPL-SCNC: 1.2 MMOL/L (ref 0.5–2)
D-LACTATE SERPL-SCNC: 2.6 MMOL/L (ref 0.5–2)
DEPRECATED RDW RBC AUTO: 67.9 FL (ref 37–54)
EGFRCR SERPLBLD CKD-EPI 2021: 69.2 ML/MIN/1.73
EOSINOPHIL # BLD AUTO: 0 10*3/MM3 (ref 0–0.4)
EOSINOPHIL NFR BLD AUTO: 0 % (ref 0.3–6.2)
ERYTHROCYTE [DISTWIDTH] IN BLOOD BY AUTOMATED COUNT: 27.6 % (ref 12.3–15.4)
FLUAV SUBTYP SPEC NAA+PROBE: NOT DETECTED
FLUBV RNA ISLT QL NAA+PROBE: NOT DETECTED
GLOBULIN UR ELPH-MCNC: 4 GM/DL
GLUCOSE SERPL-MCNC: 279 MG/DL (ref 65–99)
HCT VFR BLD AUTO: 35.9 % (ref 34–46.6)
HGB BLD-MCNC: 11 G/DL (ref 12–15.9)
HOLD SPECIMEN: NORMAL
HOLD SPECIMEN: NORMAL
IMM GRANULOCYTES # BLD AUTO: 0.08 10*3/MM3 (ref 0–0.05)
IMM GRANULOCYTES NFR BLD AUTO: 1.8 % (ref 0–0.5)
LYMPHOCYTES # BLD AUTO: 0.38 10*3/MM3 (ref 0.7–3.1)
LYMPHOCYTES NFR BLD AUTO: 8.7 % (ref 19.6–45.3)
MCH RBC QN AUTO: 22.3 PG (ref 26.6–33)
MCHC RBC AUTO-ENTMCNC: 30.6 G/DL (ref 31.5–35.7)
MCV RBC AUTO: 72.7 FL (ref 79–97)
MONOCYTES # BLD AUTO: 0.3 10*3/MM3 (ref 0.1–0.9)
MONOCYTES NFR BLD AUTO: 6.9 % (ref 5–12)
NEUTROPHILS NFR BLD AUTO: 3.59 10*3/MM3 (ref 1.7–7)
NEUTROPHILS NFR BLD AUTO: 82.6 % (ref 42.7–76)
NRBC BLD AUTO-RTO: 0 /100 WBC (ref 0–0.2)
NT-PROBNP SERPL-MCNC: 3341 PG/ML (ref 0–1800)
PLATELET # BLD AUTO: 296 10*3/MM3 (ref 140–450)
PMV BLD AUTO: 10.4 FL (ref 6–12)
POLYCHROMASIA BLD QL SMEAR: NORMAL
POTASSIUM SERPL-SCNC: 3.4 MMOL/L (ref 3.5–5.2)
PROT SERPL-MCNC: 6.9 G/DL (ref 6–8.5)
QT INTERVAL: 437 MS
QTC INTERVAL: 439 MS
RBC # BLD AUTO: 4.94 10*6/MM3 (ref 3.77–5.28)
RSV RNA NPH QL NAA+NON-PROBE: NOT DETECTED
SARS-COV-2 RNA RESP QL NAA+PROBE: DETECTED
SMALL PLATELETS BLD QL SMEAR: ADEQUATE
SODIUM SERPL-SCNC: 144 MMOL/L (ref 136–145)
TROPONIN T SERPL HS-MCNC: 25 NG/L
WBC MORPH BLD: NORMAL
WBC NRBC COR # BLD AUTO: 4.35 10*3/MM3 (ref 3.4–10.8)
WHOLE BLOOD HOLD COAG: NORMAL
WHOLE BLOOD HOLD SPECIMEN: NORMAL

## 2024-01-21 PROCEDURE — 96360 HYDRATION IV INFUSION INIT: CPT

## 2024-01-21 PROCEDURE — 93005 ELECTROCARDIOGRAM TRACING: CPT | Performed by: EMERGENCY MEDICINE

## 2024-01-21 PROCEDURE — 85007 BL SMEAR W/DIFF WBC COUNT: CPT | Performed by: EMERGENCY MEDICINE

## 2024-01-21 PROCEDURE — 84484 ASSAY OF TROPONIN QUANT: CPT | Performed by: EMERGENCY MEDICINE

## 2024-01-21 PROCEDURE — 85025 COMPLETE CBC W/AUTO DIFF WBC: CPT | Performed by: EMERGENCY MEDICINE

## 2024-01-21 PROCEDURE — 87040 BLOOD CULTURE FOR BACTERIA: CPT | Performed by: EMERGENCY MEDICINE

## 2024-01-21 PROCEDURE — 83880 ASSAY OF NATRIURETIC PEPTIDE: CPT | Performed by: EMERGENCY MEDICINE

## 2024-01-21 PROCEDURE — 83605 ASSAY OF LACTIC ACID: CPT | Performed by: EMERGENCY MEDICINE

## 2024-01-21 PROCEDURE — 87637 SARSCOV2&INF A&B&RSV AMP PRB: CPT

## 2024-01-21 PROCEDURE — 71045 X-RAY EXAM CHEST 1 VIEW: CPT

## 2024-01-21 PROCEDURE — 36415 COLL VENOUS BLD VENIPUNCTURE: CPT

## 2024-01-21 PROCEDURE — 80053 COMPREHEN METABOLIC PANEL: CPT | Performed by: EMERGENCY MEDICINE

## 2024-01-21 PROCEDURE — 25810000003 SODIUM CHLORIDE 0.9 % SOLUTION

## 2024-01-21 PROCEDURE — 99284 EMERGENCY DEPT VISIT MOD MDM: CPT

## 2024-01-21 RX ORDER — SODIUM CHLORIDE 0.9 % (FLUSH) 0.9 %
10 SYRINGE (ML) INJECTION AS NEEDED
Status: DISCONTINUED | OUTPATIENT
Start: 2024-01-21 | End: 2024-01-22 | Stop reason: HOSPADM

## 2024-01-21 RX ORDER — POTASSIUM CHLORIDE 750 MG/1
20 CAPSULE, EXTENDED RELEASE ORAL ONCE
Status: COMPLETED | OUTPATIENT
Start: 2024-01-21 | End: 2024-01-21

## 2024-01-21 RX ADMIN — SODIUM CHLORIDE 1000 ML: 9 INJECTION, SOLUTION INTRAVENOUS at 20:33

## 2024-01-21 RX ADMIN — POTASSIUM CHLORIDE 20 MEQ: 10 CAPSULE, COATED, EXTENDED RELEASE ORAL at 22:37

## 2024-01-22 VITALS
SYSTOLIC BLOOD PRESSURE: 148 MMHG | WEIGHT: 191.58 LBS | RESPIRATION RATE: 16 BRPM | DIASTOLIC BLOOD PRESSURE: 78 MMHG | OXYGEN SATURATION: 95 % | HEIGHT: 67 IN | HEART RATE: 60 BPM | TEMPERATURE: 99 F | BODY MASS INDEX: 30.07 KG/M2

## 2024-01-22 NOTE — DISCHARGE INSTRUCTIONS
Read and follow educational instructions provided to you in discharge packet. If symptoms worsen or fail to improve as anticipated return to  [facility].  Patient agrees to treatment plan.

## 2024-01-22 NOTE — ED NOTES
Pt to ED from home per [facility] with reports of recent admission for covid, states family called EMS for increased sleepiness and difficulty breathing since this morning.      EMS states family reported cyanosis to lips, but not cyanosis noted.    Blood glucose 303mg/dl per EMS.

## 2024-01-22 NOTE — ED PROVIDER NOTES
Time: 7:58 PM EST  Date of encounter:  1/21/2024  Independent Historian/Clinical History and Information was obtained by:   Family (daughter)  Chief Complaint: Fatigue shortness of breath    History is limited by: N/A    History of Present Illness:  Patient is a 78 y.o. year old female who presents to the emergency department for evaluation of fatigue and shortness of breath.  Patient has history of dementia and is a poor historian.  Daughter states since patient has been discharged from hospital last Thursday with diagnosis with COVID, patient has had excessive fatigue, not eating or drinking and not being herself.  Daughter also reports that patient has had difficulty breathing since this morning.    HPI    Patient Care Team  Primary Care Provider: Ilda Russell APRN    Past Medical History:     Allergies   Allergen Reactions    Levofloxacin Hives     Past Medical History:   Diagnosis Date    Afib     Aftercare following ankle joint replacement surgery 06/01/2015    Arthritis     Arthritis of knee 06/01/2015    CHF (congestive heart failure)     Dementia     Essential hypertension     Gout     HTN (hypertension)     Left knee pain     Lymphedema     Recurrent UTI 05/11/2021    Urinary retention 05/11/2021     Past Surgical History:   Procedure Laterality Date    CATARACT EXTRACTION Bilateral     COLONOSCOPY N/A 12/27/2023    Procedure: COLONOSCOPY WITH POLYPECTOMY/SNARE/CLIP APPLICATION X1/FULGURATION OF AVMS USING APC MACHINE;  Surgeon: Junior Corbin MD;  Location: [facility] ENDOSCOPY;  Service: Gastroenterology;  Laterality: N/A;  COLON POLYP  DIVERTICULOSIS  AVMS OF COLON    ENDOSCOPY N/A 12/23/2023    Procedure: ESOPHAGOGASTRODUODENOSCOPY WITH BIOPSIES;  Surgeon: Junior Corbin MD;  Location: [facility] ENDOSCOPY;  Service: Gastroenterology;  Laterality: N/A;  GASTRITIS, MATA'S, HIATAL HERNIA    KIDNEY STONE SURGERY      KNEE SURGERY Left 2007    REPLACEMENT    OTHER SURGICAL HISTORY       JOINT SURGERY, UNSPECIFIED    TUBAL ABDOMINAL LIGATION       Family History   Problem Relation Age of Onset    Heart failure Mother 64        CONGESTIVE    Arthritis Mother     Arthritis Sister     Colon cancer Neg Hx        Home Medications:  Prior to Admission medications    Medication Sig Start Date End Date Taking? Authorizing Provider   amoxicillin (AMOXIL) 500 MG capsule Take 2 capsules by mouth 2 (Two) Times a Day for 14 days. 1/18/24 2/1/24  Nafisa Jordan DO   benzonatate (TESSALON) 100 MG capsule Take 1 capsule by mouth 3 (Three) Times a Day As Needed for Cough. 1/6/24   Sara Burns APRN   carvedilol (COREG) 6.25 MG tablet TAKE ONE TABLET BY MOUTH TWICE A DAY WITH FOOD 9/20/21   Ivanan Frederick APRN   citalopram (CeleXA) 10 MG tablet Take 1 tablet by mouth Daily. 12/9/23   Maricarmen Cat MD   clarithromycin (BIAXIN) 500 MG tablet Take 1 tablet by mouth 2 (Two) Times a Day for 14 days. 1/18/24 2/1/24  Nafisa Jordan DO   dexAMETHasone (DECADRON) 6 MG tablet Take 1 tablet by mouth Daily With Breakfast for 6 doses. 1/18/24 1/24/24  Nafisa Jordan DO   Eliquis 5 MG tablet tablet Take 1 tablet by mouth Every 12 (Twelve) Hours. 12/21/23   Maricarmen Cat MD   furosemide (LASIX) 20 MG tablet Take 1 tablet by mouth 2 (Two) Times a Day. 3/4/23   Maricarmen Cat MD   hydrOXYzine (ATARAX) 25 MG tablet Take 1 tablet by mouth 2 (Two) Times a Day As Needed. 3/22/23   Maricarmen Cat MD   ipratropium-albuterol (DUO-NEB) 0.5-2.5 mg/3 ml nebulizer Take 3 mL by nebulization 4 (Four) Times a Day As Needed for Wheezing. 1/17/24   Nafisa Jordan DO   metroNIDAZOLE (FLAGYL) 500 MG tablet Take 1 tablet by mouth 2 (Two) Times a Day for 14 days. 1/18/24 2/1/24  Nafisa Jordan DO   multivitamin with minerals tablet tablet Take 1 tablet by mouth Daily.    Maricarmen Cat MD   pantoprazole (Protonix) 20 MG EC tablet Take 1 tablet by mouth  "2 (Two) Times a Day. 1/11/24   Jaky Hancock APRN   potassium chloride 10 MEQ CR tablet Take 1 tablet by mouth Daily. 10/10/23   Provider, MD Maricarmen        Social History:   Social History     Tobacco Use    Smoking status: Former     Passive exposure: Past    Smokeless tobacco: Never   Vaping Use    Vaping Use: Never used   Substance Use Topics    Alcohol use: Not Currently    Drug use: Defer         Review of Systems:  Review of Systems   Unable to perform ROS: Dementia          Physical Exam:  /72 (BP Location: Right arm, Patient Position: Sitting)   Pulse 64   Temp 99 °F (37.2 °C) (Oral)   Resp 16   Ht 170.2 cm (67\")   Wt 86.9 kg (191 lb 9.3 oz)   LMP  (LMP Unknown)   SpO2 96%   BMI 30.01 kg/m²     Physical Exam  Vitals and nursing note reviewed.   Constitutional:       General: She is not in acute distress.     Appearance: Normal appearance. She is not ill-appearing, toxic-appearing or diaphoretic.   HENT:      Head: Normocephalic and atraumatic.      Nose: No congestion or rhinorrhea.      Mouth/Throat:      Mouth: Mucous membranes are moist.   Eyes:      General: No scleral icterus.     Conjunctiva/sclera: Conjunctivae normal.      Pupils: Pupils are equal, round, and reactive to light.   Cardiovascular:      Rate and Rhythm: Normal rate and regular rhythm.      Pulses: Normal pulses.      Heart sounds: Normal heart sounds. No murmur heard.  Pulmonary:      Effort: Pulmonary effort is normal. No respiratory distress.      Breath sounds: Normal breath sounds. No decreased breath sounds or wheezing.   Abdominal:      General: Abdomen is flat. Bowel sounds are normal.      Tenderness: There is no abdominal tenderness.   Musculoskeletal:         General: Normal range of motion.      Cervical back: Normal range of motion and neck supple.      Right lower leg: No edema.      Left lower leg: No edema.   Skin:     General: Skin is warm and dry.      Capillary Refill: Capillary refill takes less " than 2 seconds.   Neurological:      Mental Status: She is alert. Mental status is at baseline.   Psychiatric:         Behavior: Behavior normal.         Vital signs were reviewed under triage note.            Procedures:  Procedures      Medical Decision Making:      Comorbidities that affect care:    A-fib, hypertension, congestive heart failure and dementia    External Notes reviewed:    Hospital Discharge Summary: 1/18/2024 diagnosed of COVID      The following orders were placed and all results were independently analyzed by me:  Orders Placed This Encounter   Procedures    Blood Culture - Blood,    Blood Culture - Blood,    COVID-19, FLU A/B, RSV PCR 1 HR TAT - Swab, Nasopharynx    XR Chest 1 View    [place] Draw    Comprehensive Metabolic Panel    BNP    Single High Sensitivity Troponin T    Lactic Acid, Plasma    CBC Auto Differential    Scan Slide    STAT Lactic Acid, Reflex    NPO Diet NPO Type: Strict NPO    Undress & Gown    Continuous Pulse Oximetry    Vital Signs    Oxygen Therapy- Nasal Cannula; Titrate 1-6 LPM Per SpO2; 90 - 95%    ECG 12 Lead ED Triage Standing Order; SOA    Insert Peripheral IV    CBC & Differential    Green Top (Gel)    Lavender Top    Gold Top - SST    Light Blue Top       Medications Given in the Emergency Department:  Medications   sodium chloride 0.9 % flush 10 mL (has no administration in time range)   sodium chloride 0.9 % bolus 1,000 mL (0 mL Intravenous Stopped 1/21/24 2100)   potassium chloride (MICRO-K/KLOR-CON) CR capsule (20 mEq Oral Given 1/21/24 2237)        ED Course:         Labs:    Lab Results (last 24 hours)       Procedure Component Value Units Date/Time    CBC & Differential [[id number]]  (Abnormal) Collected: 01/21/24 1940    Specimen: Blood Updated: 01/21/24 2029    Narrative:      The following orders were created for panel order CBC & Differential.  Procedure                               Abnormality         Status                     ---------                                -----------         ------                     CBC Auto Differential[[id number]]        Abnormal            Final result               Scan Slide[[id number]]                                       Final result                 Please view results for these tests on the individual orders.    BNP [[id number]]  (Abnormal) Collected: 01/21/24 1940    Specimen: Blood Updated: 01/21/24 2017     proBNP 3,341.0 pg/mL     Narrative:      This assay is used as an aid in the diagnosis of individuals suspected of having heart failure. It can be used as an aid in the diagnosis of acute decompensated heart failure (ADHF) in patients presenting with signs and symptoms of ADHF to the emergency department (ED). In addition, NT-proBNP of <300 pg/mL indicates ADHF is not likely.    Age Range Result Interpretation  NT-proBNP Concentration (pg/mL:      <50             Positive            >450                   Gray                 300-450                    Negative             <300    50-75           Positive            >900                  Gray                300-900                  Negative            <300      >75             Positive            >1800                  Gray                300-1800                  Negative            <300    Blood Culture - Blood, Arm, Right [[id number]] Collected: 01/21/24 1940    Specimen: Blood from Arm, Right Updated: 01/21/24 1952    Blood Culture - Blood, Arm, Left [[id number]] Collected: 01/21/24 1940    Specimen: Blood from Arm, Left Updated: 01/21/24 1952    Lactic Acid, Plasma [[id number]]  (Abnormal) Collected: 01/21/24 1940    Specimen: Blood Updated: 01/21/24 2023     Lactate 2.6 mmol/L     CBC Auto Differential [[id number]]  (Abnormal) Collected: 01/21/24 1940    Specimen: Blood Updated: 01/21/24 2029     WBC 4.35 10*3/mm3      RBC 4.94 10*6/mm3      Hemoglobin 11.0 g/dL      Hematocrit 35.9 %      MCV 72.7 fL      MCH 22.3 pg      MCHC 30.6 g/dL      RDW 27.6 %      RDW-SD  67.9 fl      MPV 10.4 fL      Platelets 296 10*3/mm3      Neutrophil % 82.6 %      Lymphocyte % 8.7 %      Monocyte % 6.9 %      Eosinophil % 0.0 %      Basophil % 0.0 %      Immature Grans % 1.8 %      Neutrophils, Absolute 3.59 10*3/mm3      Lymphocytes, Absolute 0.38 10*3/mm3      Monocytes, Absolute 0.30 10*3/mm3      Eosinophils, Absolute 0.00 10*3/mm3      Basophils, Absolute 0.00 10*3/mm3      Immature Grans, Absolute 0.08 10*3/mm3      nRBC 0.0 /100 WBC     Scan Slide [[id number]] Collected: 01/21/24 1940    Specimen: Blood Updated: 01/21/24 2029     Anisocytosis Slight/1+     Polychromasia Slight/1+     WBC Morphology Normal     Platelet Estimate Adequate     Clumped Platelets Present    Comprehensive Metabolic Panel [[id number]]  (Abnormal) Collected: 01/21/24 2058    Specimen: Blood from Arm, Right Updated: 01/21/24 2157     Glucose 279 mg/dL      BUN 41 mg/dL      Creatinine 0.86 mg/dL      Sodium 144 mmol/L      Potassium 3.4 mmol/L      Comment: Slight hemolysis detected by analyzer. Result may be falsely elevated.        Chloride 107 mmol/L      CO2 24.2 mmol/L      Calcium 8.5 mg/dL      Total Protein 6.9 g/dL      Albumin 2.9 g/dL      ALT (SGPT) 40 U/L      AST (SGOT) 78 U/L      Comment: Slight hemolysis detected by analyzer. Result may be falsely elevated.        Alkaline Phosphatase 111 U/L      Total Bilirubin 1.0 mg/dL      Globulin 4.0 gm/dL      A/G Ratio 0.7 g/dL      BUN/Creatinine Ratio 47.7     Anion Gap 12.8 mmol/L      eGFR 69.2 mL/min/1.73     Narrative:      GFR Normal >60  Chronic Kidney Disease <60  Kidney Failure <15    The GFR formula is only valid for adults with stable renal function between ages 18 and 70.    Single High Sensitivity Troponin T [[id number]]  (Abnormal) Collected: 01/21/24 2058    Specimen: Blood from Arm, Right Updated: 01/21/24 2143     HS Troponin T 25 ng/L     Narrative:      High Sensitive Troponin T Reference Range:  <14.0 ng/L- Negative Female for AMI  <22.0  ng/L- Negative Male for AMI  >=14 - Abnormal Female indicating possible myocardial injury.  >=22 - Abnormal Male indicating possible myocardial injury.   Clinicians would have to utilize clinical acumen, EKG, Troponin, and serial changes to determine if it is an Acute Myocardial Infarction or myocardial injury due to an underlying chronic condition.         COVID-19, FLU A/B, RSV PCR 1 HR TAT - Swab, Nasopharynx [[id number]]  (Abnormal) Collected: 01/21/24 2058    Specimen: Swab from Nasopharynx Updated: 01/21/24 2211     COVID19 Detected     Influenza A PCR Not Detected     Influenza B PCR Not Detected     RSV, PCR Not Detected    Narrative:      Fact sheet for providers: https://www.fda.gov/media/151393/download    Fact sheet for patients: https://www.fda.gov/media/907614/download    Test performed by PCR.    STAT Lactic Acid, Reflex [[id number]]  (Normal) Collected: 01/21/24 2245    Specimen: Blood Updated: 01/21/24 2330     Lactate 1.2 mmol/L              Imaging:    XR Chest 1 View    Result Date: 1/21/2024  PROCEDURE: XR CHEST 1 VW  COMPARISON: [facility], CR, XR CHEST 1 VW, 1/14/2024, 10:53.  [facility], CT, CT CHEST WO CONTRAST DIAGNOSTIC, 1/14/2024, 17:32.  INDICATIONS: SHORTNESS OF BREATH, FATIGUE.  FINDINGS:  Patchy bilateral airspace opacities have decreased compared to 1/14/2024 chest x-ray and nearly resolved.  No pneumothorax or large pleural effusion is seen.  Cardiomediastinal contours are stable.        Decreased, near resolution of previously seen patchy bilateral airspace opacities, consistent with improving pneumonia.       FRANCESCO HUMMEL MD       Electronically Signed and Approved By: FRANCESCO HUMMEL MD on 1/21/2024 at 20:07                Differential Diagnosis and Discussion:    Dyspnea: Differential diagnosis includes but is not limited to metabolic acidosis, neurological disorders, psychogenic, asthma, pneumothorax, upper airway obstruction, COPD, pneumonia,  noncardiogenic pulmonary edema, interstitial lung disease, anemia, congestive heart failure, and pulmonary embolism  Weakness: Based on the patient's history, signs, and symptoms, the diffential diagnosis includes but is not limited to meningitis, stroke, sepsis, subarachnoid hemorrhage, intracranial bleeding, encephalitis, acute uti, dehydration, MS, myasthenia gravis, Guillan Warren, migraine variant, neuromuscular disorders vertigo, electrolyte imbalance, and metabolic disorders.    All labs were reviewed and interpreted by me.  All X-rays impressions were independently interpreted by me.  EKG was interpreted by supervising attending.    MDM     Amount and/or Complexity of Data Reviewed  Clinical lab tests: reviewed  Decide to obtain previous medical records or to obtain history from someone other than the patient: yes    Risk of Complications, Morbidity, and/or Mortality  Presenting problems: moderate  Diagnostic procedures: moderate  Management options: moderate         Patient Care Considerations:    ANTIBIOTICS: I considered prescribing antibiotics as an outpatient however no bacterial focus of infection was found.      Consultants/Shared Management Plan:    None    Social Determinants of Health:    Patient has presented with family members who are responsible, reliable and will ensure follow up care.      Disposition and Care Coordination:    Discharged: I considered escalation of care by admitting this patient to the hospital, however the patient has improved and is suitable and  stable for discharge.    [unfilled]  I have explained discharge medications and the need for follow up with the patient/caretakers. This was also printed in the discharge instructions. Patient was discharged with the following medications and follow up:      Medication List      No changes were made to your prescriptions during this visit.      Ilda Russell, APRN  [address] [facility]  [address]  [phone]    Schedule an appointment as soon as possible for a visit   As needed       Final diagnoses:   Fatigue, unspecified type   COVID        ED Disposition       ED Disposition   Discharge    Condition   Stable    Comment   --               This medical record created using voice recognition software.             Sara Burns, APRN  01/21/24 0378

## 2024-01-23 ENCOUNTER — READMISSION MANAGEMENT (OUTPATIENT)
Dept: CALL CENTER | Facility: HOSPITAL | Age: 79
End: 2024-01-23
Payer: MEDICARE

## 2024-01-23 NOTE — OUTREACH NOTE
Medical Week 1 Survey      Flowsheet Row Responses   [facility] patient discharged from? [facility]   Does the patient have one of the following disease processes/diagnoses(primary or secondary)? Other   Week 1 attempt successful? Yes  [ER x 1]   Call start time 0937   Call end time 0942   Discharge diagnosis Covid   Person spoke with today (if not patient) and relationship Domenica Jacinto- daughter   Meds reviewed with patient/caregiver? Yes   Is the patient taking all medications as directed (includes completed medication regime)? No   What is preventing the patient from taking all medications as directed? Other   Medication comments Daughter reports Pt not taking meds. Not eating and only small amt of liquid.   Does the patient have a primary care provider?  Yes   Does the patient have an appointment with their PCP within 7 days of discharge? Yes   Has the patient kept scheduled appointments due by today? Yes   Home health comments PCP has ordered HOSPICE   Psychosocial issues? No   Did the patient receive a copy of their discharge instructions? Yes   Nursing interventions Reviewed instructions with patient   What is the patient's perception of their health status since discharge? Same   Week 1 call completed? Yes   Revoked No further contact(revokes)-requires comment   Graduated/Revoked comments Daughter reports Pt has stopped eating , taking meds and only drinking very small about. PCP contacted HOSPICE and they are awaiting call today for HOSPICE to start.   Call end time 0942            ARABELLA GALDAMEZ - Registered Nurse

## 2024-01-26 LAB
BACTERIA SPEC AEROBE CULT: NORMAL
BACTERIA SPEC AEROBE CULT: NORMAL

## 2024-02-01 LAB
QT INTERVAL: 437 MS
QTC INTERVAL: 439 MS

## (undated) DEVICE — SOLIDIFIER LIQLOC PLS 1500CC BT

## (undated) DEVICE — Device

## (undated) DEVICE — FIAPC® PROBE W/ FILTER 2200 A OD 2.3MM/6.9FR; L 2.2M/7.2FT: Brand: ERBE

## (undated) DEVICE — LINER SURG CANSTR SXN S/RIGD 1500CC

## (undated) DEVICE — SNAR E/S POLYP SNAREMASTER OVL/10MM 2.8X2300MM YEL

## (undated) DEVICE — BLCK/BITE BLOX WO/DENTL/RIM W/STRAP 54F

## (undated) DEVICE — SOL IRRG H2O PL/BG 1000ML STRL

## (undated) DEVICE — FIAPC® PROBE W/ FILTER 2200 SC OD 2.3MM/6.9FR; L 2.2M/7.2FT: Brand: ERBE

## (undated) DEVICE — THE SINGLE USE ETRAP – POLYP TRAP IS USED FOR SUCTION RETRIEVAL OF ENDOSCOPICALLY REMOVED POLYPS.: Brand: ETRAP

## (undated) DEVICE — SINGLE-USE BIOPSY FORCEPS: Brand: RADIAL JAW 4

## (undated) DEVICE — Device: Brand: DEFENDO AIR/WATER/SUCTION AND BIOPSY VALVE

## (undated) DEVICE — CONN JET HYDRA H20 AUXILIARY DISP